# Patient Record
Sex: FEMALE | Race: WHITE | URBAN - METROPOLITAN AREA
[De-identification: names, ages, dates, MRNs, and addresses within clinical notes are randomized per-mention and may not be internally consistent; named-entity substitution may affect disease eponyms.]

---

## 2017-06-14 ENCOUNTER — OUTPATIENT (OUTPATIENT)
Dept: OUTPATIENT SERVICES | Facility: HOSPITAL | Age: 67
LOS: 1 days | Discharge: HOME | End: 2017-06-14

## 2017-06-21 ENCOUNTER — OUTPATIENT (OUTPATIENT)
Dept: OUTPATIENT SERVICES | Facility: HOSPITAL | Age: 67
LOS: 1 days | Discharge: HOME | End: 2017-06-21

## 2017-06-26 ENCOUNTER — OUTPATIENT (OUTPATIENT)
Dept: OUTPATIENT SERVICES | Facility: HOSPITAL | Age: 67
LOS: 1 days | Discharge: HOME | End: 2017-06-26

## 2017-06-28 DIAGNOSIS — F43.12 POST-TRAUMATIC STRESS DISORDER, CHRONIC: ICD-10-CM

## 2017-06-28 DIAGNOSIS — E78.00 PURE HYPERCHOLESTEROLEMIA, UNSPECIFIED: ICD-10-CM

## 2017-06-28 DIAGNOSIS — I25.10 ATHEROSCLEROTIC HEART DISEASE OF NATIVE CORONARY ARTERY WITHOUT ANGINA PECTORIS: ICD-10-CM

## 2017-06-28 DIAGNOSIS — E03.9 HYPOTHYROIDISM, UNSPECIFIED: ICD-10-CM

## 2017-06-28 DIAGNOSIS — Z79.899 OTHER LONG TERM (CURRENT) DRUG THERAPY: ICD-10-CM

## 2017-07-16 ENCOUNTER — EMERGENCY (EMERGENCY)
Facility: HOSPITAL | Age: 67
LOS: 0 days | Discharge: HOME | End: 2017-07-16
Admitting: INTERNAL MEDICINE

## 2017-07-16 DIAGNOSIS — Z79.899 OTHER LONG TERM (CURRENT) DRUG THERAPY: ICD-10-CM
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
## 2017-07-16 DIAGNOSIS — M79.89 OTHER SPECIFIED SOFT TISSUE DISORDERS: ICD-10-CM

## 2017-07-16 DIAGNOSIS — S80.01XA CONTUSION OF RIGHT KNEE, INITIAL ENCOUNTER: ICD-10-CM
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
## 2017-07-16 DIAGNOSIS — E78.5 HYPERLIPIDEMIA, UNSPECIFIED: ICD-10-CM

## 2017-07-16 DIAGNOSIS — I10 ESSENTIAL (PRIMARY) HYPERTENSION: ICD-10-CM

## 2017-07-26 ENCOUNTER — OUTPATIENT (OUTPATIENT)
Dept: OUTPATIENT SERVICES | Facility: HOSPITAL | Age: 67
LOS: 1 days | Discharge: HOME | End: 2017-07-26

## 2017-07-26 DIAGNOSIS — F43.12 POST-TRAUMATIC STRESS DISORDER, CHRONIC: ICD-10-CM

## 2017-08-04 DIAGNOSIS — F43.12 POST-TRAUMATIC STRESS DISORDER, CHRONIC: ICD-10-CM

## 2017-08-23 ENCOUNTER — OUTPATIENT (OUTPATIENT)
Dept: OUTPATIENT SERVICES | Facility: HOSPITAL | Age: 67
LOS: 1 days | Discharge: HOME | End: 2017-08-23

## 2017-08-23 DIAGNOSIS — F43.12 POST-TRAUMATIC STRESS DISORDER, CHRONIC: ICD-10-CM

## 2017-09-08 ENCOUNTER — OUTPATIENT (OUTPATIENT)
Dept: OUTPATIENT SERVICES | Facility: HOSPITAL | Age: 67
LOS: 1 days | Discharge: HOME | End: 2017-09-08

## 2017-09-08 DIAGNOSIS — F43.12 POST-TRAUMATIC STRESS DISORDER, CHRONIC: ICD-10-CM

## 2017-11-01 ENCOUNTER — OUTPATIENT (OUTPATIENT)
Dept: OUTPATIENT SERVICES | Facility: HOSPITAL | Age: 67
LOS: 1 days | Discharge: HOME | End: 2017-11-01

## 2017-11-01 DIAGNOSIS — F43.12 POST-TRAUMATIC STRESS DISORDER, CHRONIC: ICD-10-CM

## 2017-11-10 ENCOUNTER — OUTPATIENT (OUTPATIENT)
Dept: OUTPATIENT SERVICES | Facility: HOSPITAL | Age: 67
LOS: 1 days | Discharge: HOME | End: 2017-11-10

## 2017-11-10 DIAGNOSIS — F43.12 POST-TRAUMATIC STRESS DISORDER, CHRONIC: ICD-10-CM

## 2017-11-22 ENCOUNTER — OUTPATIENT (OUTPATIENT)
Dept: OUTPATIENT SERVICES | Facility: HOSPITAL | Age: 67
LOS: 1 days | Discharge: HOME | End: 2017-11-22

## 2017-11-22 DIAGNOSIS — I20.9 ANGINA PECTORIS, UNSPECIFIED: ICD-10-CM

## 2017-12-04 ENCOUNTER — OUTPATIENT (OUTPATIENT)
Dept: OUTPATIENT SERVICES | Facility: HOSPITAL | Age: 67
LOS: 1 days | Discharge: HOME | End: 2017-12-04

## 2017-12-04 DIAGNOSIS — F43.12 POST-TRAUMATIC STRESS DISORDER, CHRONIC: ICD-10-CM

## 2018-01-31 ENCOUNTER — OUTPATIENT (OUTPATIENT)
Dept: OUTPATIENT SERVICES | Facility: HOSPITAL | Age: 68
LOS: 1 days | Discharge: HOME | End: 2018-01-31

## 2018-01-31 DIAGNOSIS — Z01.818 ENCOUNTER FOR OTHER PREPROCEDURAL EXAMINATION: ICD-10-CM

## 2018-01-31 DIAGNOSIS — S82.51XA DISPLACED FRACTURE OF MEDIAL MALLEOLUS OF RIGHT TIBIA, INITIAL ENCOUNTER FOR CLOSED FRACTURE: ICD-10-CM

## 2018-02-01 ENCOUNTER — OUTPATIENT (OUTPATIENT)
Dept: OUTPATIENT SERVICES | Facility: HOSPITAL | Age: 68
LOS: 1 days | Discharge: HOME | End: 2018-02-01

## 2018-02-01 DIAGNOSIS — F33.1 MAJOR DEPRESSIVE DISORDER, RECURRENT, MODERATE: ICD-10-CM

## 2018-02-01 DIAGNOSIS — F43.12 POST-TRAUMATIC STRESS DISORDER, CHRONIC: ICD-10-CM

## 2018-02-07 ENCOUNTER — INPATIENT (INPATIENT)
Facility: HOSPITAL | Age: 68
LOS: 12 days | Discharge: NURSING, REHAB OR LONG-TERM CARE | End: 2018-02-20
Attending: ORTHOPAEDIC SURGERY

## 2018-02-07 ENCOUNTER — TRANSCRIPTION ENCOUNTER (OUTPATIENT)
Age: 68
End: 2018-02-07

## 2018-02-07 VITALS
WEIGHT: 158.07 LBS | DIASTOLIC BLOOD PRESSURE: 104 MMHG | RESPIRATION RATE: 18 BRPM | HEART RATE: 72 BPM | TEMPERATURE: 98 F | SYSTOLIC BLOOD PRESSURE: 176 MMHG | HEIGHT: 62 IN

## 2018-02-07 RX ORDER — MEPERIDINE HYDROCHLORIDE 50 MG/ML
12.5 INJECTION INTRAMUSCULAR; INTRAVENOUS; SUBCUTANEOUS ONCE
Qty: 0 | Refills: 0 | Status: ON HOLD | OUTPATIENT
Start: 2018-02-07

## 2018-02-07 RX ORDER — ENOXAPARIN SODIUM 100 MG/ML
40 INJECTION SUBCUTANEOUS EVERY 24 HOURS
Qty: 0 | Refills: 0 | Status: COMPLETED | OUTPATIENT
Start: 2018-02-07 | End: 2018-02-14

## 2018-02-07 RX ORDER — HYDROMORPHONE HYDROCHLORIDE 2 MG/ML
0.5 INJECTION INTRAMUSCULAR; INTRAVENOUS; SUBCUTANEOUS
Qty: 0 | Refills: 0 | Status: ON HOLD | OUTPATIENT
Start: 2018-02-07

## 2018-02-07 RX ORDER — CARVEDILOL PHOSPHATE 80 MG/1
12.5 CAPSULE, EXTENDED RELEASE ORAL DAILY
Qty: 0 | Refills: 0 | Status: DISCONTINUED | OUTPATIENT
Start: 2018-02-07 | End: 2018-02-20

## 2018-02-07 RX ORDER — LOSARTAN POTASSIUM 100 MG/1
100 TABLET, FILM COATED ORAL DAILY
Qty: 0 | Refills: 0 | Status: DISCONTINUED | OUTPATIENT
Start: 2018-02-07 | End: 2018-02-20

## 2018-02-07 RX ORDER — OXYCODONE HYDROCHLORIDE 5 MG/1
5 TABLET ORAL ONCE
Qty: 0 | Refills: 0 | Status: ON HOLD | OUTPATIENT
Start: 2018-02-07

## 2018-02-07 RX ORDER — HYDROCHLOROTHIAZIDE 25 MG
25 TABLET ORAL DAILY
Qty: 0 | Refills: 0 | Status: DISCONTINUED | OUTPATIENT
Start: 2018-02-07 | End: 2018-02-20

## 2018-02-07 RX ORDER — OXYCODONE AND ACETAMINOPHEN 5; 325 MG/1; MG/1
1 TABLET ORAL EVERY 6 HOURS
Qty: 0 | Refills: 0 | Status: DISCONTINUED | OUTPATIENT
Start: 2018-02-07 | End: 2018-02-09

## 2018-02-07 RX ORDER — CEFAZOLIN SODIUM 1 G
1000 VIAL (EA) INJECTION EVERY 8 HOURS
Qty: 0 | Refills: 0 | Status: COMPLETED | OUTPATIENT
Start: 2018-02-07 | End: 2018-02-08

## 2018-02-07 RX ORDER — SERTRALINE 25 MG/1
100 TABLET, FILM COATED ORAL DAILY
Qty: 0 | Refills: 0 | Status: DISCONTINUED | OUTPATIENT
Start: 2018-02-07 | End: 2018-02-20

## 2018-02-07 RX ORDER — MORPHINE SULFATE 50 MG/1
2 CAPSULE, EXTENDED RELEASE ORAL
Qty: 0 | Refills: 0 | Status: ON HOLD | OUTPATIENT
Start: 2018-02-07

## 2018-02-07 RX ORDER — ACETAMINOPHEN 500 MG
650 TABLET ORAL EVERY 6 HOURS
Qty: 0 | Refills: 0 | Status: COMPLETED | OUTPATIENT
Start: 2018-02-07 | End: 2018-02-14

## 2018-02-07 RX ORDER — ZOLPIDEM TARTRATE 10 MG/1
5 TABLET ORAL AT BEDTIME
Qty: 0 | Refills: 0 | Status: DISCONTINUED | OUTPATIENT
Start: 2018-02-07 | End: 2018-02-13

## 2018-02-07 RX ORDER — DIPHENHYDRAMINE HCL 50 MG
25 CAPSULE ORAL ONCE
Qty: 0 | Refills: 0 | Status: COMPLETED | OUTPATIENT
Start: 2018-02-07 | End: 2018-02-16

## 2018-02-07 RX ORDER — ONDANSETRON 8 MG/1
4 TABLET, FILM COATED ORAL ONCE
Qty: 0 | Refills: 0 | Status: ON HOLD | OUTPATIENT
Start: 2018-02-07

## 2018-02-07 RX ORDER — DEXAMETHASONE 0.5 MG/5ML
4 ELIXIR ORAL ONCE
Qty: 0 | Refills: 0 | Status: ON HOLD | OUTPATIENT
Start: 2018-02-07

## 2018-02-07 RX ADMIN — Medication 100 MILLIGRAM(S): at 22:53

## 2018-02-07 RX ADMIN — ZOLPIDEM TARTRATE 5 MILLIGRAM(S): 10 TABLET ORAL at 23:25

## 2018-02-07 NOTE — PRE-ANESTHESIA EVALUATION ADULT - NSANTHOSAYNRD_GEN_A_CORE
No. SULEMA screening performed.  STOP BANG Legend: 0-2 = LOW Risk; 3-4 = INTERMEDIATE Risk; 5-8 = HIGH Risk/none

## 2018-02-07 NOTE — BRIEF OPERATIVE NOTE - PROCEDURE
<<-----Click on this checkbox to enter Procedure Osteotomy of distal tibia  02/07/2018  repair of right tibia non-union/malunion CPT 70693  Active  [email or web address] Osteotomy of right medial malleolus  02/07/2018  repair of right tibia non-union/malunion CPT 92148  Active  [email or web address]

## 2018-02-07 NOTE — BRIEF OPERATIVE NOTE - POST-OP DX
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

## 2018-02-07 NOTE — DISCHARGE NOTE ADULT - MEDICATION SUMMARY - MEDICATIONS TO TAKE
I will START or STAY ON the medications listed below when I get home from the hospital:    losartan 100 mg oral tablet  -- 1 tab(s) by mouth once a day  -- Indication: For home med    enoxaparin  -- 40 unit(s) subcutaneous once a day  -- Indication: For dvt prophylaxis    pregabalin 75 mg oral capsule  -- 1 cap(s) by mouth once a day  -- Indication: For home med    sertraline 100 mg oral tablet  -- 1 tab(s) by mouth once a day  -- Indication: For home med    carvedilol 12.5 mg oral tablet  -- 1 tab(s) by mouth once a day  -- Indication: For home med    hydroCHLOROthiazide 25 mg oral tablet  -- 1 tab(s) by mouth once a day  -- Indication: For home med

## 2018-02-07 NOTE — BRIEF OPERATIVE NOTE - OPERATION/FINDINGS
mal-aligned, impending malunion of right medial malleolus which was corrected to normal; total TT 60 min at 300 mmHG    post op- NWB x 6-12 weeks.  periop PO abx post op.  ASA for DVT. mal-aligned, impending malunion of right medial malleolus which was corrected to normal; total TT 60 min at 300 mmHG  Dictation #  post op- NWB x 6-12 weeks.  periop PO abx post op.  ASA for DVT. mal-aligned, impending malunion of right medial malleolus which was corrected to normal; total TT 60 min at 300 mmHG  Dictation # [id number]; Implants: Synthes 5 hole 1/3 tubular with 4 x4.0 and 1 x3.5 mm screws  post op- NWB x 6-12 weeks.  periop PO abx post op.  ASA for DVT.

## 2018-02-07 NOTE — DISCHARGE NOTE ADULT - CARE PLAN
Principal Discharge DX:	Ankle fracture, right, closed, with nonunion, subsequent encounter  Goal:	fracture healing, return of ADLs  Assessment and plan of treatment:	s/p ORIF

## 2018-02-07 NOTE — BRIEF OPERATIVE NOTE - PRE-OP DX
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

## 2018-02-07 NOTE — PRE-ANESTHESIA EVALUATION ADULT - NSANTHPMHFT_GEN_ALL_CORE
hypertension  osteoarthritis   anxiety  depression, non suicidal  hypercholesterolemia  CAD  hep A    psh:   csection  cardiac stent  cholecystectomy biliary fistula repair  left leg above knee amputation

## 2018-02-07 NOTE — PRE-ANESTHESIA EVALUATION ADULT - NSANTHLABRESULTSFT_GEN_ALL_CORE
Sodium, Serum 141    mEq/L  135 - 146 Final         Potassium, Serum 3.7    mmol/L  3.5 - 5.0 Final TEST PERFORMED AT [address], [facility] [address]       Chloride, Serum 107    mEq/L  98 - 110 Final         Carbon Dioxide, Serum 25    mEq/L  17 - 32 Final TESTS PERFORMED AT [address], [facility] [address]       Anion Gap, Serum 9.0    mEq/L  7 - 14 Final         Blood Urea Nitrogen, Serum 23  High mg/dL  10 - 20 Final         Creatinine, Serum 0.75    mg/dL  0.7 - 1.5 Final TEST PERFORMED AT [address], [facility] [address]       Glucose, Serum 89    mg/dL  70 - 110 Final TEST PERFORMED AT [address], [facility] [address]       Calcium, Total Serum 10.0    mg/dL  8.5 - 10.1 Final TESTS PERFORMED AT [address], [facility] [address]       GFR Calculation 77         Final         BUN/Creatinine 30.7  High %  10 - 20 Final     --------------------------------------------------------------------------                                 Test Set Comments: Sodium, Serum 141    mEq/L  135 - 146 Final         Potassium, Serum 3.7    mmol/L  3.5 - 5.0 Final TEST PERFORMED AT [address], [place] [address]       Chloride, Serum 107    mEq/L  98 - 110 Final         Carbon Dioxide, Serum 25    mEq/L  17 - 32 Final TESTS PERFORMED AT [address], [place] [address]       Anion Gap, Serum 9.0    mEq/L  7 - 14 Final         Blood Urea Nitrogen, Serum 23  High mg/dL  10 - 20 Final         Creatinine, Serum 0.75    mg/dL  0.7 - 1.5 Final TEST PERFORMED AT [address] [address]       Glucose, Serum 89    mg/dL  70 - 110 Final TEST PERFORMED AT [address] [place] [address]       Calcium, Total Serum 10.0    mg/dL  8.5 - 10.1 Final TESTS PERFORMED AT [address], [place] [address]       GFR Calculation 77         Final         BUN/Creatinine 30.7  High %  10 - 20 Final     --------------------------------------------------------------------------           Test Item    Results    Flag    Units    Reference Range    Test Item Status    Comments    Sensitivities       Protein Total, Serum 7.3    g/dL  6.0 - 8.0 Final TEST PERFORMED AT [address], [place] [address]       Albumin, Serum 4.2    g/dL  3.0 - 5.5 Final TEST PERFORMED AT [address] [address]       Bilirubin Total, Serum 0.8    mg/dL  0.2 - 1.2 Final         Alkaline Phosphatase, Serum 115    IU/L  30 - 115 Final         Aspartate Aminotransferase (AST/SGOT) 23    IU/L  0 - 41 Final         Alanine Aminotransferase (ALT/SGPT) 19    IU/L  0 - 45 Final TESTS PERFORMED AT [address] [address]       Bilirubin Direct, Serum 0.19    mg/dL  0.0 - 0.2 Final TEST PERFORMED AT [address] [address]     --------------------------------------------------------------                                                          Test Set Comments: Sodium, Serum 141    mEq/L  135 - 146 Final         Potassium, Serum 3.7    mmol/L  3.5 - 5.0 Final TEST PERFORMED AT [address] [place] [address]       Chloride, Serum 107    mEq/L  98 - 110 Final         Carbon Dioxide, Serum 25    mEq/L  17 - 32 Final TESTS PERFORMED AT [address] [place] [address]       Anion Gap, Serum 9.0    mEq/L  7 - 14 Final         Blood Urea Nitrogen, Serum 23  High mg/dL  10 - 20 Final         Creatinine, Serum 0.75    mg/dL  0.7 - 1.5 Final TEST PERFORMED AT [address] [address]       Glucose, Serum 89    mg/dL  70 - 110 Final TEST PERFORMED AT [address], [place] [address]       Calcium, Total Serum 10.0    mg/dL  8.5 - 10.1 Final TESTS PERFORMED AT [address] [place] [address]       GFR Calculation 77         Final         BUN/Creatinine 30.7  High %  10 - 20 Final     --------------------------------------------------------------------------     Protein Total, Serum 7.3    g/dL  6.0 - 8.0 Final TEST PERFORMED AT [address] [place] [address]       Albumin, Serum 4.2    g/dL  3.0 - 5.5 Final TEST PERFORMED AT [address] [place] [address]       Bilirubin Total, Serum 0.8    mg/dL  0.2 - 1.2 Final         Alkaline Phosphatase, Serum 115    IU/L  30 - 115 Final         Aspartate Aminotransferase (AST/SGOT) 23    IU/L  0 - 41 Final         Alanine Aminotransferase (ALT/SGPT) 19    IU/L  0 - 45 Final TESTS PERFORMED AT [address] [address]       Bilirubin Direct, Serum 0.19    mg/dL  0.0 - 0.2 Final TEST PERFORMED AT [place] [address] [address]     --------------------------------------------------------------                                                              Test Set Comments: Sodium, Serum 141    mEq/L  135 - 146 Final         Potassium, Serum 3.7    mmol/L  3.5 - 5.0 Final TEST PERFORMED AT [address] [place] [address]       Chloride, Serum 107    mEq/L  98 - 110 Final         Carbon Dioxide, Serum 25    mEq/L  17 - 32 Final TESTS PERFORMED AT [address] [place] [address]       Anion Gap, Serum 9.0    mEq/L  7 - 14 Final         Blood Urea Nitrogen, Serum 23  High mg/dL  10 - 20 Final         Creatinine, Serum 0.75    mg/dL  0.7 - 1.5 Final TEST PERFORMED AT [address] [place] [address]       Glucose, Serum 89    mg/dL  70 - 110 Final TEST PERFORMED AT [address] [address]       Calcium, Total Serum 10.0    mg/dL  8.5 - 10.1 Final TESTS PERFORMED AT [address] [place] [address]       GFR Calculation 77         Final         BUN/Creatinine 30.7  High %  10 - 20 Final     --------------------------------------------------------------------------     Protein Total, Serum 7.3    g/dL  6.0 - 8.0 Final TEST PERFORMED AT [address] [place] [address]       Albumin, Serum 4.2    g/dL  3.0 - 5.5 Final TEST PERFORMED AT [address] [place] [address]       Bilirubin Total, Serum 0.8    mg/dL  0.2 - 1.2 Final         Alkaline Phosphatase, Serum 115    IU/L  30 - 115 Final         Aspartate Aminotransferase (AST/SGOT) 23    IU/L  0 - 41 Final         Alanine Aminotransferase (ALT/SGPT) 19    IU/L  0 - 45 Final TESTS PERFORMED AT [address] [place] [address]       Bilirubin Direct, Serum 0.19    mg/dL  0.0 - 0.2 Final TEST PERFORMED AT [place] [address] [address] [address]     --------------------------------------------------------------     Prothrombin Time, Plasma 11.7    SEC  9.95 - 12.87 Final         INR 1.1       0.65 - 1.3 Final NO ANTICOAGULANT,NORMAL 0.65 - 1.3 ORAL ANTICOAGULANT,STD DOSE 2.0 - 3.0 MECHANICAL HEART VALVES 2.5 - 3.5 NOTE: INR RESULTS ARE INTENDED FOR USE ONLY TO MONITOR ORAL ANTICOAGULANT THERAPY IN STABILIZED PATIENTS. TESTS PERFORMED AT [address] [address]        ---------------------------------------------                                                                                              Test Set Comments: Sodium, Serum 141    mEq/L  135 - 146 Final         Potassium, Serum 3.7    mmol/L  3.5 - 5.0 Final TEST PERFORMED AT [address] [place] [address]       Chloride, Serum 107    mEq/L  98 - 110 Final         Carbon Dioxide, Serum 25    mEq/L  17 - 32 Final TESTS PERFORMED AT [address] [place] [address]       Anion Gap, Serum 9.0    mEq/L  7 - 14 Final         Blood Urea Nitrogen, Serum 23  High mg/dL  10 - 20 Final         Creatinine, Serum 0.75    mg/dL  0.7 - 1.5 Final TEST PERFORMED AT [address] [place] [address]       Glucose, Serum 89    mg/dL  70 - 110 Final TEST PERFORMED AT [address] [place] [address]       Calcium, Total Serum 10.0    mg/dL  8.5 - 10.1 Final TESTS PERFORMED AT [address] [place] [address]       GFR Calculation 77         Final         BUN/Creatinine 30.7  High %  10 - 20 Final     --------------------------------------------------------------------------     Protein Total, Serum 7.3    g/dL  6.0 - 8.0 Final TEST PERFORMED AT [address] [place] [address]       Albumin, Serum 4.2    g/dL  3.0 - 5.5 Final TEST PERFORMED AT [address] [place] [address]       Bilirubin Total, Serum 0.8    mg/dL  0.2 - 1.2 Final         Alkaline Phosphatase, Serum 115    IU/L  30 - 115 Final         Aspartate Aminotransferase (AST/SGOT) 23    IU/L  0 - 41 Final         Alanine Aminotransferase (ALT/SGPT) 19    IU/L  0 - 45 Final TESTS PERFORMED AT [address] [place] [address]       Bilirubin Direct, Serum 0.19    mg/dL  0.0 - 0.2 Final TEST PERFORMED AT [place] [address] [address] [address]     --------------------------------------------------------------     Prothrombin Time, Plasma 11.7    SEC  9.95 - 12.87 Final         INR 1.1       0.65 - 1.3 Final NO ANTICOAGULANT,NORMAL 0.65 - 1.3 ORAL ANTICOAGULANT,STD DOSE 2.0 - 3.0 MECHANICAL HEART VALVES 2.5 - 3.5 NOTE: INR RESULTS ARE INTENDED FOR USE ONLY TO MONITOR ORAL ANTICOAGULANT THERAPY IN STABILIZED PATIENTS. TESTS PERFORMED AT [address], [facility] [address]        ---------------------------------------------  Activated Partial Thromboplastin Time 32.5    SEC  27.0 - 39.2 Final TEST PERFORMED AT [address], [facility] [address]       ----------------------------------          Test Item    Results    Flag    Units    Reference Range    Test Item Status    Comments    Sensitivities       WBC Count 7.21    TH/MM3  4.8 - 10.8 Final         RBC Count 4.16  Low MIL/MM3  4.2 - 5.4 Final         Hemoglobin 12.9    g/dL  12.0 - 16.0 Final         Hematocrit 38.0    %  37 - 47 Final         MCV 91.3    fL  81 - 99 Final         MCH 31.0    PG  27 - 31 Final         MCHC 33.9    G/DL  32 - 37 Final         RCDW 13.4    %  11.5 - 14.5 Final         Platelet Count - Automated 200    TH/MM3  130 - 400 Final         MPV 11.4  High fL  7.4 - 10.4 Final         Auto Lymphocyte # 2.37    TH/MM3  1.2 - 3.4 Final         Auto Monocyte # 0.49    TH/MM3  0.11 - 0.59 Final         Auto Eosinophil # 0.11    TH/MM3  0 - 0.7 Final         Auto Basophil # 0.04    TH/MM3  0 - 0.2 Final         Auto Lymphocyte % 32.9    %  20.5 - 51.1 Final         Auto Monocyte % 6.8    %  1.7 - 9.3 Final         Auto Basophil % 0.6    %  0 - 2 Final         Auto Immature Granulocyte # 0.01    TH/MM3  0.01 - 0.03 Final         Auto Immature Granulocyte % 0.1    %  0.1 - 0.3 Final         Eosinophils % 1.5    %  0 - 8 Final         Auto Granulocytes % 58.1    %  42.2 - 75.2 Final         Auto Granulocytes # 4.19    TH/MM3  1.4 - 6.5 Final         Differential AUTO         Final TESTS PERFORMED AT [address] [address]                                                                                                                                                 Test Set Comments:

## 2018-02-07 NOTE — DISCHARGE NOTE ADULT - CARE PROVIDER_API CALL
Bandar Oropeza), Orthopaedic Surgery  [address] [place] [address]  Phone: [phone]  Fax: [phone]

## 2018-02-07 NOTE — DISCHARGE NOTE ADULT - PATIENT PORTAL LINK FT
You can access the [facility] Portal, offered by [facility], by registering with the following website: [email or web address]

## 2018-02-08 RX ADMIN — OXYCODONE AND ACETAMINOPHEN 1 TABLET(S): 5; 325 TABLET ORAL at 20:23

## 2018-02-08 RX ADMIN — OXYCODONE AND ACETAMINOPHEN 1 TABLET(S): 5; 325 TABLET ORAL at 21:20

## 2018-02-08 RX ADMIN — Medication 75 MILLIGRAM(S): at 15:37

## 2018-02-08 RX ADMIN — OXYCODONE AND ACETAMINOPHEN 1 TABLET(S): 5; 325 TABLET ORAL at 17:06

## 2018-02-08 RX ADMIN — Medication 100 MILLIGRAM(S): at 05:39

## 2018-02-08 RX ADMIN — ENOXAPARIN SODIUM 40 MILLIGRAM(S): 100 INJECTION SUBCUTANEOUS at 12:55

## 2018-02-08 RX ADMIN — SERTRALINE 100 MILLIGRAM(S): 25 TABLET, FILM COATED ORAL at 12:56

## 2018-02-08 RX ADMIN — OXYCODONE AND ACETAMINOPHEN 1 TABLET(S): 5; 325 TABLET ORAL at 09:30

## 2018-02-08 NOTE — PROGRESS NOTE ADULT - SUBJECTIVE AND OBJECTIVE BOX
68 y o F s/p right medial malleolus fx ORIF pod 1  pt seen and examined, c/o pain, burning sensation  NAD, A&Ox3, Vital Signs Last 24 Hrs  T(C): 37.2 (08 Feb 2018 15:45), Max: 37.2 (08 Feb 2018 15:45)  T(F): 99 (08 Feb 2018 15:45), Max: 99 (08 Feb 2018 15:45)  HR: 85 (08 Feb 2018 15:45) (66 - 85)  BP: 150/70 (08 Feb 2018 15:45) (93/53 - 150/70)  BP(mean): --  RR: 18 (08 Feb 2018 15:45) (15 - 23)  SpO2: 95% (07 Feb 2018 17:30) (95% - 96%)    PE: RLE splint in place, dressing d/c/i, toes are well perfused, motor function, STLT grossly intact

## 2018-02-08 NOTE — PROGRESS NOTE ADULT - ASSESSMENT
68 y o F s/p right medial malleolus fx ORIF pod 1  pain control  PT evaluation  DVT prophylaxis  abx prophylaxis  will follow

## 2018-02-09 RX ADMIN — SERTRALINE 100 MILLIGRAM(S): 25 TABLET, FILM COATED ORAL at 12:28

## 2018-02-09 RX ADMIN — Medication 25 MILLIGRAM(S): at 05:00

## 2018-02-09 RX ADMIN — ZOLPIDEM TARTRATE 5 MILLIGRAM(S): 10 TABLET ORAL at 00:57

## 2018-02-09 RX ADMIN — OXYCODONE AND ACETAMINOPHEN 1 TABLET(S): 5; 325 TABLET ORAL at 00:49

## 2018-02-09 RX ADMIN — Medication 75 MILLIGRAM(S): at 12:28

## 2018-02-09 RX ADMIN — LOSARTAN POTASSIUM 100 MILLIGRAM(S): 100 TABLET, FILM COATED ORAL at 05:00

## 2018-02-09 RX ADMIN — CARVEDILOL PHOSPHATE 12.5 MILLIGRAM(S): 80 CAPSULE, EXTENDED RELEASE ORAL at 05:00

## 2018-02-09 RX ADMIN — ENOXAPARIN SODIUM 40 MILLIGRAM(S): 100 INJECTION SUBCUTANEOUS at 12:29

## 2018-02-09 RX ADMIN — OXYCODONE AND ACETAMINOPHEN 1 TABLET(S): 5; 325 TABLET ORAL at 01:15

## 2018-02-09 RX ADMIN — ZOLPIDEM TARTRATE 5 MILLIGRAM(S): 10 TABLET ORAL at 23:27

## 2018-02-09 NOTE — PROGRESS NOTE ADULT - SUBJECTIVE AND OBJECTIVE BOX
68 y o F s/p right medial malleolus fx ORIF pod 2      S+E at bedside on AM rounds. Pain controlled. NAEO.       AFVSS  NAD  Non labored on RA  RLE  Splint C/D/I  Calf soft NTTP  +EHL/FHL  SILT distal exposed digits  WWP    A/P  68 y o F s/p right medial malleolus fx ORIF pod 2  NWB RLE  DVT PPX  OOB to chair  PT/Rehab  Dispo pending

## 2018-02-10 RX ADMIN — SERTRALINE 100 MILLIGRAM(S): 25 TABLET, FILM COATED ORAL at 12:23

## 2018-02-10 RX ADMIN — ENOXAPARIN SODIUM 40 MILLIGRAM(S): 100 INJECTION SUBCUTANEOUS at 12:25

## 2018-02-10 RX ADMIN — Medication 25 MILLIGRAM(S): at 06:50

## 2018-02-10 RX ADMIN — LOSARTAN POTASSIUM 100 MILLIGRAM(S): 100 TABLET, FILM COATED ORAL at 06:50

## 2018-02-10 RX ADMIN — CARVEDILOL PHOSPHATE 12.5 MILLIGRAM(S): 80 CAPSULE, EXTENDED RELEASE ORAL at 06:51

## 2018-02-10 RX ADMIN — Medication 75 MILLIGRAM(S): at 12:24

## 2018-02-10 NOTE — PROGRESS NOTE ADULT - SUBJECTIVE AND OBJECTIVE BOX
68 y o F s/p right medial malleolus fx ORIF pod #3      S+E at bedside on AM rounds. Doing well. Pain controlled        Vital Signs Last 24 Hrs  T(F): 99.4 (09 Feb 2018 23:27), Max: 99.4 (09 Feb 2018 23:27)  HR: 77 (09 Feb 2018 23:27) (72 - 77)  BP: 140/62 (09 Feb 2018 23:27) (139/67 - 143/80)  RR: 18 (09 Feb 2018 23:27) (18 - 18)    NAD  Non labored on RA  RLE  Splint C/D/I  Calf soft NTTP, no swelling   +EHL/FHL  SILT distal exposed digits  Foot WWP    A/P  68 y o F s/p right medial malleolus fx ORIF POD#3  NWB RLE  DVT PPX  OOB to chair  PT/Rehab  Dispo Rehab

## 2018-02-11 RX ADMIN — LOSARTAN POTASSIUM 100 MILLIGRAM(S): 100 TABLET, FILM COATED ORAL at 06:52

## 2018-02-11 RX ADMIN — Medication 75 MILLIGRAM(S): at 13:30

## 2018-02-11 RX ADMIN — SERTRALINE 100 MILLIGRAM(S): 25 TABLET, FILM COATED ORAL at 13:30

## 2018-02-11 RX ADMIN — ZOLPIDEM TARTRATE 5 MILLIGRAM(S): 10 TABLET ORAL at 22:13

## 2018-02-11 RX ADMIN — ENOXAPARIN SODIUM 40 MILLIGRAM(S): 100 INJECTION SUBCUTANEOUS at 13:30

## 2018-02-11 RX ADMIN — Medication 25 MILLIGRAM(S): at 06:52

## 2018-02-11 RX ADMIN — Medication 650 MILLIGRAM(S): at 22:12

## 2018-02-11 RX ADMIN — CARVEDILOL PHOSPHATE 12.5 MILLIGRAM(S): 80 CAPSULE, EXTENDED RELEASE ORAL at 06:52

## 2018-02-11 NOTE — PROGRESS NOTE ADULT - SUBJECTIVE AND OBJECTIVE BOX
68 y o F s/p right medial malleolus fx ORIF pod #4      S+E at bedside on AM rounds. NAEO      AFVSS  NAD  Non labored on RA  RLE  Splint C/D/I  Calf soft NTTP, no swelling   +EHL/FHL  SILT distal exposed digits  Foot WWP    A/P  68 y o F s/p right medial malleolus fx ORIF POD#4  NWB RLE  DVT PPX  OOB to chair  PT/Rehab  Dispo Rehab

## 2018-02-12 RX ADMIN — ENOXAPARIN SODIUM 40 MILLIGRAM(S): 100 INJECTION SUBCUTANEOUS at 11:34

## 2018-02-12 RX ADMIN — SERTRALINE 100 MILLIGRAM(S): 25 TABLET, FILM COATED ORAL at 11:30

## 2018-02-12 RX ADMIN — Medication 75 MILLIGRAM(S): at 11:29

## 2018-02-12 RX ADMIN — ZOLPIDEM TARTRATE 5 MILLIGRAM(S): 10 TABLET ORAL at 23:09
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
## 2018-02-12 NOTE — PROGRESS NOTE ADULT - SUBJECTIVE AND OBJECTIVE BOX
68 y o F s/p right medial malleolus fx ORIF pod #5      S+E at bedside on AM rounds. NAEO      AFVSS  NAD  Non labored on RA  RLE  Splint C/D/I  Calf soft NTTP, no swelling   +EHL/FHL  SILT distal exposed digits  Foot WWP    A/P  68 y o F s/p right medial malleolus fx ORIF POD#5  NWB RLE  DVT PPX  OOB to chair  PT/Rehab: continue ambulation training  Dispo Rehab

## 2018-02-12 NOTE — PROGRESS NOTE ADULT - SUBJECTIVE AND OBJECTIVE BOX
68yFemale  No Acute Events    T(C): 36.5 (02-12-18 @ 07:30), Max: 36.6 (02-11-18 @ 23:30)  HR: 62 (02-12-18 @ 07:30) (62 - 64)  BP: 110/65 (02-12-18 @ 07:30) (100/64 - 110/65)  RR: 18 (02-12-18 @ 07:30) (18 - 18)  SpO2: --    PE  NAD  Compartments soft, NT  NVI  Splint clean/dry/intact    Plan  - DVT PPx  - IS  - SCD  - PT  - Pain Control

## 2018-02-12 NOTE — CONSULT NOTE ADULT - ASSESSMENT
IMPRESSION: Rehab of right ankle fracture, S/P ORIF; left remote AKA    PRECAUTIONS: [x  ] Cardiac  [  ] Respiratory  [  ] Seizures [  ] Contact Isolation  [  ] Droplet Isolation  [  ] Other    Weight Bearing Status: NWB RLE    RECOMMENDATION: It would be prudent to start with sliding board transfers and wheelchair mobility. Ambulation would be possible at this time.    Out of Bed to Chair     DVT/Decubiti Prophylaxis    REHAB PLAN:     [ x  ] Bedside P/T 3-5 times a week   [   ]   Bedside O/T  2-3 times a week             [   ] No Rehab Therapy Indicated                   [   ]  Speech Therapy   Conditioning/ROM                                    ADL  Bed Mobility                                               Conditioning/ROM  Transfers                                                     Bed Mobility  Sitting /Standing Balance                         Transfers                                        Gait Training                                               Sitting/Standing Balance  Stair Training [   ]Applicable                    Home equipment Eval                                                                        Splinting  [   ] Only      GOALS:   ADL   [   ]   Independent                    Transfers  [   ] Independent                          Ambulation  [   ] Independent     [    ] With device                            [x   ]  CG                                                         [ x  ]  CG                                                                  [   ] CG                            [    ] Min A                                                   [   ] Min A                                                              [   ] Min  A          DISCHARGE PLAN:   [   ]  Good candidate for Intensive Rehabilitation/Hospital based-[facility]A [facility]                                             Will tolerate 3hrs Intensive Rehab Daily                                       [ x   ]  Short Term Rehab in Skilled Nursing Facility is required                                       [    ]  Home with Outpatient or  services                                         [    ]  Possible Candidate for Intensive Hospital based Rehab

## 2018-02-13 RX ADMIN — ENOXAPARIN SODIUM 40 MILLIGRAM(S): 100 INJECTION SUBCUTANEOUS at 12:36

## 2018-02-13 RX ADMIN — SERTRALINE 100 MILLIGRAM(S): 25 TABLET, FILM COATED ORAL at 13:42

## 2018-02-13 RX ADMIN — Medication 75 MILLIGRAM(S): at 12:36

## 2018-02-13 RX ADMIN — ZOLPIDEM TARTRATE 5 MILLIGRAM(S): 10 TABLET ORAL at 23:57

## 2018-02-13 RX ADMIN — Medication 650 MILLIGRAM(S): at 12:41

## 2018-02-13 NOTE — PROGRESS NOTE ADULT - SUBJECTIVE AND OBJECTIVE BOX
ORTHO PROGRESS NOTE       68yFemale POD # 6     S/P ORIF right ankle.  Pt with Left AKA    Patient seen and examined at bedside . The patient is awake and alert in NAD. No complaints of chest pain, SOB, N/V.    Vital Signs Last 24 Hrs  T(C): 36.8 (12 Feb 2018 23:45), Max: 36.8 (12 Feb 2018 15:31)  T(F): 98.3 (12 Feb 2018 23:45), Max: 98.3 (12 Feb 2018 15:31)  HR: 73 (12 Feb 2018 23:45) (66 - 73)  BP: 115/69 (12 Feb 2018 23:45) (115/69 - 117/67)  BP(mean): --  RR: 18 (12 Feb 2018 23:45) (18 - 18)  SpO2: --        DVT ppx : Lovenox     MEDICATIONS  (STANDING):  carvedilol 12.5 milliGRAM(s) Oral daily  enoxaparin Injectable 40 milliGRAM(s) SubCutaneous every 24 hours  hydrochlorothiazide 25 milliGRAM(s) Oral daily  losartan 100 milliGRAM(s) Oral daily  pregabalin 75 milliGRAM(s) Oral daily  sertraline 100 milliGRAM(s) Oral daily    MEDICATIONS  (PRN):  acetaminophen   Tablet 650 milliGRAM(s) Oral every 6 hours PRN For Temp greater than 38.5 C (101.3 F)  acetaminophen   Tablet. 650 milliGRAM(s) Oral every 6 hours PRN Mild Pain (1 - 3)  diphenhydrAMINE   Capsule 25 milliGRAM(s) Oral Once PRN Insomnia  oxyCODONE    5 mG/acetaminophen 325 mG 1 Tablet(s) Oral every 6 hours PRN Moderate Pain  zolpidem 5 milliGRAM(s) Oral at bedtime PRN Insomnia      PE:  Right lower extremity splint  C/D/I          NVI, SILT           A/P: 68yFemale POD # 6   S/P ORIF Right ankle           OOB to Chair            Physical Therapy           Pain control            Incentive Spirometry            DVT Prophylaxis            Discharge planning for SNF awaiting placement

## 2018-02-14 RX ORDER — ENOXAPARIN SODIUM 100 MG/ML
40 INJECTION SUBCUTANEOUS DAILY
Qty: 0 | Refills: 0 | Status: DISCONTINUED | OUTPATIENT
Start: 2018-02-14 | End: 2018-02-20

## 2018-02-14 RX ADMIN — Medication 25 MILLIGRAM(S): at 05:59

## 2018-02-14 RX ADMIN — Medication 75 MILLIGRAM(S): at 11:15

## 2018-02-14 RX ADMIN — ENOXAPARIN SODIUM 40 MILLIGRAM(S): 100 INJECTION SUBCUTANEOUS at 11:10

## 2018-02-14 RX ADMIN — Medication 650 MILLIGRAM(S): at 13:54

## 2018-02-14 RX ADMIN — LOSARTAN POTASSIUM 100 MILLIGRAM(S): 100 TABLET, FILM COATED ORAL at 05:59

## 2018-02-14 RX ADMIN — CARVEDILOL PHOSPHATE 12.5 MILLIGRAM(S): 80 CAPSULE, EXTENDED RELEASE ORAL at 05:59

## 2018-02-14 RX ADMIN — SERTRALINE 100 MILLIGRAM(S): 25 TABLET, FILM COATED ORAL at 11:10

## 2018-02-14 RX ADMIN — Medication 650 MILLIGRAM(S): at 11:18

## 2018-02-14 NOTE — PHYSICAL THERAPY INITIAL EVALUATION ADULT - IMPAIRMENTS FOUND, PT EVAL
gross motor/ROM/gait, locomotion, and balance/aerobic capacity/endurance/ergonomics and body mechanics

## 2018-02-14 NOTE — PROGRESS NOTE ADULT - SUBJECTIVE AND OBJECTIVE BOX
68yFemale  No Acute Events    T(C): 36.3 (02-14-18 @ 07:30), Max: 37.2 (02-13-18 @ 23:37)  HR: 71 (02-14-18 @ 07:30) (70 - 71)  BP: 116/56 (02-14-18 @ 07:30) (116/56 - 135/77)  RR: 18 (02-14-18 @ 07:30) (17 - 18)  SpO2: --    PE  NAD  Compartments soft, NT  NVI  splint clean/dry/intact    Plan  - DVT PPx  - IS  - SCD  - PT  - Pain Control

## 2018-02-14 NOTE — PHYSICAL THERAPY INITIAL EVALUATION ADULT - TRANSFER SAFETY CONCERNS NOTED: SIT/STAND, REHAB EVAL
decreased balance during turns/decreased proprioception/decreased sequencing ability/inability to maintain weight-bearing restrictions w/o assist
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
## 2018-02-14 NOTE — PHYSICAL THERAPY INITIAL EVALUATION ADULT - PLANNED THERAPY INTERVENTIONS, PT EVAL
gait training/bed mobility training/strengthening/balance training/prosthetic fitting/training/wheelchair management/propulsion training

## 2018-02-14 NOTE — PROGRESS NOTE ADULT - SUBJECTIVE AND OBJECTIVE BOX
ORTHO PROGRESS NOTE       68yFemale POD # 7     S/P ORIF right ankle.  Pt with Left AKA    Patient seen and examined at bedside . The patient is awake and alert in NAD. No complaints of chest pain, SOB, N/V.    Vital Signs Last 24 Hrs  T(C): 37.2 (13 Feb 2018 23:37), Max: 37.2 (13 Feb 2018 23:37)  T(F): 99 (13 Feb 2018 23:37), Max: 99 (13 Feb 2018 23:37)  HR: 71 (13 Feb 2018 23:37) (70 - 71)  BP: 135/77 (13 Feb 2018 23:37) (132/72 - 135/77)  BP(mean): --  RR: 18 (13 Feb 2018 23:37) (17 - 18)  SpO2: --      DVT ppx : Lovenox     MEDICATIONS  (STANDING):  carvedilol 12.5 milliGRAM(s) Oral daily  enoxaparin Injectable 40 milliGRAM(s) SubCutaneous every 24 hours  hydrochlorothiazide 25 milliGRAM(s) Oral daily  losartan 100 milliGRAM(s) Oral daily  pregabalin 75 milliGRAM(s) Oral daily  sertraline 100 milliGRAM(s) Oral daily    MEDICATIONS  (PRN):  acetaminophen   Tablet 650 milliGRAM(s) Oral every 6 hours PRN For Temp greater than 38.5 C (101.3 F)  acetaminophen   Tablet. 650 milliGRAM(s) Oral every 6 hours PRN Mild Pain (1 - 3)  diphenhydrAMINE   Capsule 25 milliGRAM(s) Oral Once PRN Insomnia  oxyCODONE    5 mG/acetaminophen 325 mG 1 Tablet(s) Oral every 6 hours PRN Moderate Pain  zolpidem 5 milliGRAM(s) Oral at bedtime PRN Insomnia      PE:  Right lower extremity splint  C/D/I          NVI, SILT           A/P: 68yFemale POD # 7   S/P ORIF Right ankle           OOB to Chair            Physical Therapy           Pain control            Incentive Spirometry            DVT Prophylaxis            Discharge planning for SNF awaiting placement

## 2018-02-14 NOTE — PHYSICAL THERAPY INITIAL EVALUATION ADULT - PHYSICAL ASSIST/NONPHYSICAL ASSIST: SIT/STAND, REHAB EVAL
2 person assist/verbal cues/2 PTs assisted pt to stand and lock into L prosthetic/nonverbal cues (demo/gestures)/set-up required
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
## 2018-02-15 RX ADMIN — Medication 25 MILLIGRAM(S): at 06:04

## 2018-02-15 RX ADMIN — CARVEDILOL PHOSPHATE 12.5 MILLIGRAM(S): 80 CAPSULE, EXTENDED RELEASE ORAL at 06:04

## 2018-02-15 RX ADMIN — SERTRALINE 100 MILLIGRAM(S): 25 TABLET, FILM COATED ORAL at 11:10

## 2018-02-15 RX ADMIN — Medication 650 MILLIGRAM(S): at 18:27

## 2018-02-15 RX ADMIN — ENOXAPARIN SODIUM 40 MILLIGRAM(S): 100 INJECTION SUBCUTANEOUS at 11:10

## 2018-02-15 RX ADMIN — LOSARTAN POTASSIUM 100 MILLIGRAM(S): 100 TABLET, FILM COATED ORAL at 06:04

## 2018-02-15 NOTE — PROGRESS NOTE ADULT - SUBJECTIVE AND OBJECTIVE BOX
68 F POD # 8     S/P ORIF right ankle.  Pt with Left AKA    Patient seen and examined at bedside . NAD. No CP, SOB    Vital Signs Last 24 Hrs  T(C): 36 (15 Feb 2018 07:39), Max: 37.1 (14 Feb 2018 23:34)  T(F): 96.8 (15 Feb 2018 07:39), Max: 98.7 (14 Feb 2018 23:34)  HR: 68 (15 Feb 2018 07:39) (65 - 80)  BP: 97/55 (15 Feb 2018 07:39) (70/45 - 126/67)  BP(mean): --  RR: 18 (15 Feb 2018 07:39) (17 - 18)  SpO2: --      DVT ppx : Lovenox       PE:  Right lower extremity splint  C/D/I          SHABBIR HOUSTON

## 2018-02-15 NOTE — PROGRESS NOTE ADULT - ASSESSMENT
A/P: 68 F POD # 8   S/P ORIF Right ankle                    Physical Therapy, NWB           Pain control            Incentive Spirometry            DVT Prophylaxis            Discharge planning for SNF awaiting placement

## 2018-02-16 LAB
ANION GAP SERPL CALC-SCNC: 14 MMOL/L — SIGNIFICANT CHANGE UP (ref 7–14)
BUN SERPL-MCNC: 45 MG/DL — HIGH (ref 10–20)
CALCIUM SERPL-MCNC: 9.6 MG/DL — SIGNIFICANT CHANGE UP (ref 8.5–10.1)
CHLORIDE SERPL-SCNC: 101 MMOL/L — SIGNIFICANT CHANGE UP (ref 98–110)
CO2 SERPL-SCNC: 22 MMOL/L — SIGNIFICANT CHANGE UP (ref 17–32)
CREAT SERPL-MCNC: 1 MG/DL — SIGNIFICANT CHANGE UP (ref 0.7–1.5)
GLUCOSE SERPL-MCNC: 114 MG/DL — HIGH (ref 70–110)
HCT VFR BLD CALC: 34.9 % — LOW (ref 37–47)
HGB BLD-MCNC: 12.3 G/DL — LOW (ref 14–18)
MCHC RBC-ENTMCNC: 30.2 PG — SIGNIFICANT CHANGE UP (ref 27–31)
MCHC RBC-ENTMCNC: 35.2 G/DL — SIGNIFICANT CHANGE UP (ref 32–37)
MCV RBC AUTO: 85.7 FL — SIGNIFICANT CHANGE UP (ref 81–91)
NRBC # BLD: 0 /100 WBCS — SIGNIFICANT CHANGE UP (ref 0–0)
PLATELET # BLD AUTO: 284 K/UL — SIGNIFICANT CHANGE UP (ref 130–400)
POTASSIUM SERPL-MCNC: 3.5 MMOL/L — SIGNIFICANT CHANGE UP (ref 3.5–5)
POTASSIUM SERPL-SCNC: 3.5 MMOL/L — SIGNIFICANT CHANGE UP (ref 3.5–5)
RBC # BLD: 4.07 M/UL — LOW (ref 4.2–5.4)
RBC # FLD: 12.6 % — SIGNIFICANT CHANGE UP (ref 11.5–14.5)
SODIUM SERPL-SCNC: 137 MMOL/L — SIGNIFICANT CHANGE UP (ref 135–146)
WBC # BLD: 9.92 K/UL — SIGNIFICANT CHANGE UP (ref 4.8–10.8)
WBC # FLD AUTO: 9.92 K/UL — SIGNIFICANT CHANGE UP (ref 4.8–10.8)

## 2018-02-16 RX ORDER — ACETAMINOPHEN 500 MG
650 TABLET ORAL EVERY 6 HOURS
Qty: 0 | Refills: 0 | Status: DISCONTINUED | OUTPATIENT
Start: 2018-02-16 | End: 2018-02-20

## 2018-02-16 RX ADMIN — Medication 25 MILLIGRAM(S): at 00:43

## 2018-02-16 RX ADMIN — LOSARTAN POTASSIUM 100 MILLIGRAM(S): 100 TABLET, FILM COATED ORAL at 05:50

## 2018-02-16 RX ADMIN — SERTRALINE 100 MILLIGRAM(S): 25 TABLET, FILM COATED ORAL at 12:52

## 2018-02-16 RX ADMIN — CARVEDILOL PHOSPHATE 12.5 MILLIGRAM(S): 80 CAPSULE, EXTENDED RELEASE ORAL at 05:50

## 2018-02-16 RX ADMIN — Medication 25 MILLIGRAM(S): at 05:50

## 2018-02-16 RX ADMIN — ENOXAPARIN SODIUM 40 MILLIGRAM(S): 100 INJECTION SUBCUTANEOUS at 12:53

## 2018-02-16 NOTE — PROGRESS NOTE ADULT - SUBJECTIVE AND OBJECTIVE BOX
ORTHO PROGRESS NOTE       68yFemale POD # 9     S/P ORIF right ankle.  Pt with Left AKA    Patient seen and examined at bedside . The patient is awake and alert in NAD. No complaints of chest pain, SOB, N/V.  Vital Signs Last 24 Hrs  T(C): 36.5 (15 Feb 2018 23:40), Max: 36.5 (15 Feb 2018 23:40)  T(F): 97.7 (15 Feb 2018 23:40), Max: 97.7 (15 Feb 2018 23:40)  HR: 65 (15 Feb 2018 23:40) (65 - 65)  BP: 93/54 (15 Feb 2018 23:40) (89/57 - 93/54)  BP(mean): --  RR: 18 (15 Feb 2018 23:40) (16 - 18)  SpO2: 98% (15 Feb 2018 19:30) (98% - 98%)      DVT ppx : Lovenox     MEDICATIONS  (STANDING):  carvedilol 12.5 milliGRAM(s) Oral daily  enoxaparin Injectable 40 milliGRAM(s) SubCutaneous daily  hydrochlorothiazide 25 milliGRAM(s) Oral daily  losartan 100 milliGRAM(s) Oral daily  sertraline 100 milliGRAM(s) Oral daily      PE:  Right lower extremity splint  C/D/I          NVI, SILT           A/P: 68yFemale POD # 9   S/P ORIF Right ankle           OOB to Chair            Physical Therapy           Pain control            Incentive Spirometry            DVT Prophylaxis            Discharge planning for SNF awaiting placement

## 2018-02-17 RX ORDER — ZOLPIDEM TARTRATE 10 MG/1
5 TABLET ORAL AT BEDTIME
Qty: 0 | Refills: 0 | Status: DISCONTINUED | OUTPATIENT
Start: 2018-02-17 | End: 2018-02-20

## 2018-02-17 RX ADMIN — ENOXAPARIN SODIUM 40 MILLIGRAM(S): 100 INJECTION SUBCUTANEOUS at 12:20

## 2018-02-17 RX ADMIN — Medication 650 MILLIGRAM(S): at 22:26

## 2018-02-17 RX ADMIN — Medication 650 MILLIGRAM(S): at 00:47

## 2018-02-17 RX ADMIN — Medication 650 MILLIGRAM(S): at 23:00

## 2018-02-17 RX ADMIN — Medication 650 MILLIGRAM(S): at 13:36

## 2018-02-17 RX ADMIN — Medication 650 MILLIGRAM(S): at 12:25

## 2018-02-17 RX ADMIN — SERTRALINE 100 MILLIGRAM(S): 25 TABLET, FILM COATED ORAL at 12:20

## 2018-02-17 RX ADMIN — ZOLPIDEM TARTRATE 5 MILLIGRAM(S): 10 TABLET ORAL at 00:48

## 2018-02-18 RX ADMIN — SERTRALINE 100 MILLIGRAM(S): 25 TABLET, FILM COATED ORAL at 11:05

## 2018-02-18 RX ADMIN — ZOLPIDEM TARTRATE 5 MILLIGRAM(S): 10 TABLET ORAL at 21:05

## 2018-02-18 RX ADMIN — Medication 25 MILLIGRAM(S): at 06:03

## 2018-02-18 RX ADMIN — ZOLPIDEM TARTRATE 5 MILLIGRAM(S): 10 TABLET ORAL at 00:03

## 2018-02-18 RX ADMIN — CARVEDILOL PHOSPHATE 12.5 MILLIGRAM(S): 80 CAPSULE, EXTENDED RELEASE ORAL at 06:03

## 2018-02-18 RX ADMIN — Medication 650 MILLIGRAM(S): at 20:47

## 2018-02-18 RX ADMIN — ENOXAPARIN SODIUM 40 MILLIGRAM(S): 100 INJECTION SUBCUTANEOUS at 11:05

## 2018-02-18 RX ADMIN — LOSARTAN POTASSIUM 100 MILLIGRAM(S): 100 TABLET, FILM COATED ORAL at 06:03

## 2018-02-19 RX ADMIN — ENOXAPARIN SODIUM 40 MILLIGRAM(S): 100 INJECTION SUBCUTANEOUS at 11:36

## 2018-02-19 RX ADMIN — Medication 650 MILLIGRAM(S): at 11:36

## 2018-02-19 RX ADMIN — ZOLPIDEM TARTRATE 5 MILLIGRAM(S): 10 TABLET ORAL at 22:30

## 2018-02-19 RX ADMIN — Medication 650 MILLIGRAM(S): at 22:29

## 2018-02-19 RX ADMIN — CARVEDILOL PHOSPHATE 12.5 MILLIGRAM(S): 80 CAPSULE, EXTENDED RELEASE ORAL at 05:41

## 2018-02-19 RX ADMIN — Medication 650 MILLIGRAM(S): at 13:24

## 2018-02-19 RX ADMIN — Medication 25 MILLIGRAM(S): at 05:41

## 2018-02-19 RX ADMIN — LOSARTAN POTASSIUM 100 MILLIGRAM(S): 100 TABLET, FILM COATED ORAL at 05:41

## 2018-02-19 RX ADMIN — SERTRALINE 100 MILLIGRAM(S): 25 TABLET, FILM COATED ORAL at 11:35

## 2018-02-20 VITALS
RESPIRATION RATE: 18 BRPM | HEART RATE: 92 BPM | SYSTOLIC BLOOD PRESSURE: 136 MMHG | DIASTOLIC BLOOD PRESSURE: 70 MMHG | TEMPERATURE: 99 F

## 2018-02-20 RX ORDER — SERTRALINE 25 MG/1
1 TABLET, FILM COATED ORAL
Qty: 0 | Refills: 0 | DISCHARGE
Start: 2018-02-20

## 2018-02-20 RX ORDER — CARVEDILOL PHOSPHATE 80 MG/1
1 CAPSULE, EXTENDED RELEASE ORAL
Qty: 0 | Refills: 0 | COMMUNITY
Start: 2018-02-20

## 2018-02-20 RX ORDER — LOSARTAN POTASSIUM 100 MG/1
1 TABLET, FILM COATED ORAL
Qty: 0 | Refills: 0 | DISCHARGE
Start: 2018-02-20

## 2018-02-20 RX ORDER — ENOXAPARIN SODIUM 100 MG/ML
40 INJECTION SUBCUTANEOUS
Qty: 0 | Refills: 0 | COMMUNITY
Start: 2018-02-20

## 2018-02-20 RX ADMIN — LOSARTAN POTASSIUM 100 MILLIGRAM(S): 100 TABLET, FILM COATED ORAL at 05:13

## 2018-02-20 RX ADMIN — Medication 650 MILLIGRAM(S): at 06:56

## 2018-02-20 RX ADMIN — ENOXAPARIN SODIUM 40 MILLIGRAM(S): 100 INJECTION SUBCUTANEOUS at 12:23

## 2018-02-20 RX ADMIN — SERTRALINE 100 MILLIGRAM(S): 25 TABLET, FILM COATED ORAL at 12:23

## 2018-02-20 RX ADMIN — Medication 25 MILLIGRAM(S): at 05:13

## 2018-02-20 RX ADMIN — CARVEDILOL PHOSPHATE 12.5 MILLIGRAM(S): 80 CAPSULE, EXTENDED RELEASE ORAL at 05:13

## 2018-02-20 RX ADMIN — Medication 75 MILLIGRAM(S): at 14:44

## 2018-02-20 NOTE — PROVIDER CONTACT NOTE (OTHER) - SITUATION
Pt having Loose bowel movements x3 this shift. Pt VSS at this time. No other issues at this time.
pt refuses IV access
md notified of pt's Bp 104/56,p68, md states to give all BP meds,

## 2018-02-20 NOTE — PROGRESS NOTE ADULT - SUBJECTIVE AND OBJECTIVE BOX
68yFemale  No Acute Events    T(C): 35.9 (02-20-18 @ 07:15), Max: 36.4 (02-20-18 @ 00:00)  HR: 60 (02-20-18 @ 07:15) (60 - 60)  BP: 104/65 (02-20-18 @ 07:15) (97/57 - 104/65)  RR: 19 (02-20-18 @ 07:15) (18 - 19)  SpO2: --    PE  NAD  Compartments soft, NT  NVI  clean/dry/intact  Splint intact    Plan  - DVT PPx  - IS  - SCD  - PT  - Pain Control  - dc planning

## 2018-02-21 ENCOUNTER — OUTPATIENT (OUTPATIENT)
Dept: OUTPATIENT SERVICES | Facility: HOSPITAL | Age: 68
LOS: 1 days | Discharge: HOME | End: 2018-02-21

## 2018-02-21 DIAGNOSIS — R05 COUGH: ICD-10-CM

## 2018-02-22 ENCOUNTER — OUTPATIENT (OUTPATIENT)
Dept: OUTPATIENT SERVICES | Facility: HOSPITAL | Age: 68
LOS: 1 days | Discharge: HOME | End: 2018-02-22

## 2018-02-22 DIAGNOSIS — F32.9 MAJOR DEPRESSIVE DISORDER, SINGLE EPISODE, UNSPECIFIED: ICD-10-CM

## 2018-02-22 DIAGNOSIS — F41.9 ANXIETY DISORDER, UNSPECIFIED: ICD-10-CM
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
## 2018-02-22 DIAGNOSIS — M19.90 UNSPECIFIED OSTEOARTHRITIS, UNSPECIFIED SITE: ICD-10-CM

## 2018-02-22 DIAGNOSIS — S82.54XA NONDISPLACED FRACTURE OF MEDIAL MALLEOLUS OF RIGHT TIBIA, INITIAL ENCOUNTER FOR CLOSED FRACTURE: ICD-10-CM

## 2018-02-22 DIAGNOSIS — R79.9 ABNORMAL FINDING OF BLOOD CHEMISTRY, UNSPECIFIED: ICD-10-CM

## 2018-02-22 DIAGNOSIS — E78.00 PURE HYPERCHOLESTEROLEMIA, UNSPECIFIED: ICD-10-CM

## 2018-02-22 DIAGNOSIS — Z95.5 PRESENCE OF CORONARY ANGIOPLASTY IMPLANT AND GRAFT: ICD-10-CM
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
## 2018-02-22 DIAGNOSIS — I10 ESSENTIAL (PRIMARY) HYPERTENSION: ICD-10-CM

## 2018-02-22 DIAGNOSIS — Z00.00 ENCOUNTER FOR GENERAL ADULT MEDICAL EXAMINATION WITHOUT ABNORMAL FINDINGS: ICD-10-CM

## 2018-02-22 DIAGNOSIS — Z89.612 ACQUIRED ABSENCE OF LEFT LEG ABOVE KNEE: ICD-10-CM

## 2018-02-22 DIAGNOSIS — I25.10 ATHEROSCLEROTIC HEART DISEASE OF NATIVE CORONARY ARTERY WITHOUT ANGINA PECTORIS: ICD-10-CM
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
## 2018-02-26 ENCOUNTER — OUTPATIENT (OUTPATIENT)
Dept: OUTPATIENT SERVICES | Facility: HOSPITAL | Age: 68
LOS: 1 days | Discharge: HOME | End: 2018-02-26

## 2018-02-26 DIAGNOSIS — R79.9 ABNORMAL FINDING OF BLOOD CHEMISTRY, UNSPECIFIED: ICD-10-CM

## 2018-03-05 ENCOUNTER — OUTPATIENT (OUTPATIENT)
Dept: OUTPATIENT SERVICES | Facility: HOSPITAL | Age: 68
LOS: 1 days | Discharge: HOME | End: 2018-03-05

## 2018-03-05 DIAGNOSIS — R79.9 ABNORMAL FINDING OF BLOOD CHEMISTRY, UNSPECIFIED: ICD-10-CM

## 2018-04-12 ENCOUNTER — OUTPATIENT (OUTPATIENT)
Dept: OUTPATIENT SERVICES | Facility: HOSPITAL | Age: 68
LOS: 1 days | Discharge: HOME | End: 2018-04-12

## 2018-04-12 DIAGNOSIS — Z02.9 ENCOUNTER FOR ADMINISTRATIVE EXAMINATIONS, UNSPECIFIED: ICD-10-CM

## 2018-05-08 ENCOUNTER — OUTPATIENT (OUTPATIENT)
Dept: OUTPATIENT SERVICES | Facility: HOSPITAL | Age: 68
LOS: 1 days | Discharge: HOME | End: 2018-05-08

## 2018-05-08 DIAGNOSIS — F33.1 MAJOR DEPRESSIVE DISORDER, RECURRENT, MODERATE: ICD-10-CM

## 2018-05-08 DIAGNOSIS — F43.12 POST-TRAUMATIC STRESS DISORDER, CHRONIC: ICD-10-CM

## 2018-06-13 ENCOUNTER — OUTPATIENT (OUTPATIENT)
Dept: OUTPATIENT SERVICES | Facility: HOSPITAL | Age: 68
LOS: 1 days | Discharge: HOME | End: 2018-06-13

## 2018-06-13 DIAGNOSIS — F33.1 MAJOR DEPRESSIVE DISORDER, RECURRENT, MODERATE: ICD-10-CM

## 2018-06-13 DIAGNOSIS — F43.12 POST-TRAUMATIC STRESS DISORDER, CHRONIC: ICD-10-CM

## 2018-06-25 ENCOUNTER — OUTPATIENT (OUTPATIENT)
Dept: OUTPATIENT SERVICES | Facility: HOSPITAL | Age: 68
LOS: 1 days | Discharge: HOME | End: 2018-06-25

## 2018-06-25 VITALS
DIASTOLIC BLOOD PRESSURE: 86 MMHG | HEIGHT: 62 IN | RESPIRATION RATE: 20 BRPM | OXYGEN SATURATION: 100 % | HEART RATE: 80 BPM | WEIGHT: 139.99 LBS | TEMPERATURE: 97 F | SYSTOLIC BLOOD PRESSURE: 160 MMHG

## 2018-06-25 DIAGNOSIS — Z98.890 OTHER SPECIFIED POSTPROCEDURAL STATES: Chronic | ICD-10-CM

## 2018-06-25 DIAGNOSIS — E01.8 OTHER IODINE-DEFICIENCY RELATED THYROID DISORDERS AND ALLIED CONDITIONS: ICD-10-CM

## 2018-06-25 DIAGNOSIS — F32.9 MAJOR DEPRESSIVE DISORDER, SINGLE EPISODE, UNSPECIFIED: ICD-10-CM

## 2018-06-25 DIAGNOSIS — M17.11 UNILATERAL PRIMARY OSTEOARTHRITIS, RIGHT KNEE: ICD-10-CM

## 2018-06-25 DIAGNOSIS — F41.9 ANXIETY DISORDER, UNSPECIFIED: ICD-10-CM

## 2018-06-25 DIAGNOSIS — Z01.818 ENCOUNTER FOR OTHER PREPROCEDURAL EXAMINATION: ICD-10-CM

## 2018-06-25 DIAGNOSIS — I10 ESSENTIAL (PRIMARY) HYPERTENSION: ICD-10-CM

## 2018-06-25 DIAGNOSIS — R06.02 SHORTNESS OF BREATH: ICD-10-CM

## 2018-06-25 LAB
ALBUMIN SERPL ELPH-MCNC: 4.5 G/DL — SIGNIFICANT CHANGE UP (ref 3.5–5.2)
ALP SERPL-CCNC: 141 U/L — HIGH (ref 30–115)
ALT FLD-CCNC: 13 U/L — SIGNIFICANT CHANGE UP (ref 0–41)
ANION GAP SERPL CALC-SCNC: 16 MMOL/L — HIGH (ref 7–14)
APPEARANCE UR: (no result)
APTT BLD: 40.9 SEC — HIGH (ref 27–39.2)
AST SERPL-CCNC: 20 U/L — SIGNIFICANT CHANGE UP (ref 0–41)
BACTERIA # UR AUTO: (no result) /HPF
BASOPHILS # BLD AUTO: 0.07 K/UL — SIGNIFICANT CHANGE UP (ref 0–0.2)
BASOPHILS NFR BLD AUTO: 0.8 % — SIGNIFICANT CHANGE UP (ref 0–1)
BILIRUB SERPL-MCNC: 0.5 MG/DL — SIGNIFICANT CHANGE UP (ref 0.2–1.2)
BILIRUB UR-MCNC: NEGATIVE — SIGNIFICANT CHANGE UP
BLD GP AB SCN SERPL QL: SIGNIFICANT CHANGE UP
BUN SERPL-MCNC: 15 MG/DL — SIGNIFICANT CHANGE UP (ref 10–20)
CALCIUM SERPL-MCNC: 9.6 MG/DL — SIGNIFICANT CHANGE UP (ref 8.5–10.1)
CHLORIDE SERPL-SCNC: 101 MMOL/L — SIGNIFICANT CHANGE UP (ref 98–110)
CO2 SERPL-SCNC: 24 MMOL/L — SIGNIFICANT CHANGE UP (ref 17–32)
COLOR SPEC: YELLOW — SIGNIFICANT CHANGE UP
CREAT SERPL-MCNC: 0.7 MG/DL — SIGNIFICANT CHANGE UP (ref 0.7–1.5)
DIFF PNL FLD: NEGATIVE — SIGNIFICANT CHANGE UP
EOSINOPHIL # BLD AUTO: 0.22 K/UL — SIGNIFICANT CHANGE UP (ref 0–0.7)
EOSINOPHIL NFR BLD AUTO: 2.5 % — SIGNIFICANT CHANGE UP (ref 0–8)
EPI CELLS # UR: (no result) /HPF
GLUCOSE SERPL-MCNC: 84 MG/DL — SIGNIFICANT CHANGE UP (ref 70–99)
GLUCOSE UR QL: NEGATIVE MG/DL — SIGNIFICANT CHANGE UP
HCT VFR BLD CALC: 40.1 % — SIGNIFICANT CHANGE UP (ref 37–47)
HGB BLD-MCNC: 13.4 G/DL — SIGNIFICANT CHANGE UP (ref 12–16)
IMM GRANULOCYTES NFR BLD AUTO: 0.2 % — SIGNIFICANT CHANGE UP (ref 0.1–0.3)
INR BLD: 1.2 RATIO — SIGNIFICANT CHANGE UP (ref 0.65–1.3)
KETONES UR-MCNC: NEGATIVE — SIGNIFICANT CHANGE UP
LEUKOCYTE ESTERASE UR-ACNC: NEGATIVE — SIGNIFICANT CHANGE UP
LYMPHOCYTES # BLD AUTO: 2.03 K/UL — SIGNIFICANT CHANGE UP (ref 1.2–3.4)
LYMPHOCYTES # BLD AUTO: 23.2 % — SIGNIFICANT CHANGE UP (ref 20.5–51.1)
MCHC RBC-ENTMCNC: 29.6 PG — SIGNIFICANT CHANGE UP (ref 27–31)
MCHC RBC-ENTMCNC: 33.4 G/DL — SIGNIFICANT CHANGE UP (ref 32–37)
MCV RBC AUTO: 88.7 FL — SIGNIFICANT CHANGE UP (ref 81–99)
MONOCYTES # BLD AUTO: 0.68 K/UL — HIGH (ref 0.1–0.6)
MONOCYTES NFR BLD AUTO: 7.8 % — SIGNIFICANT CHANGE UP (ref 1.7–9.3)
MRSA PCR RESULT.: NEGATIVE — SIGNIFICANT CHANGE UP
NEUTROPHILS # BLD AUTO: 5.74 K/UL — SIGNIFICANT CHANGE UP (ref 1.4–6.5)
NEUTROPHILS NFR BLD AUTO: 65.5 % — SIGNIFICANT CHANGE UP (ref 42.2–75.2)
NITRITE UR-MCNC: NEGATIVE — SIGNIFICANT CHANGE UP
NRBC # BLD: 0 /100 WBCS — SIGNIFICANT CHANGE UP (ref 0–0)
PH UR: 5.5 — SIGNIFICANT CHANGE UP (ref 5–8)
PLATELET # BLD AUTO: 202 K/UL — SIGNIFICANT CHANGE UP (ref 130–400)
POTASSIUM SERPL-MCNC: 4.5 MMOL/L — SIGNIFICANT CHANGE UP (ref 3.5–5)
POTASSIUM SERPL-SCNC: 4.5 MMOL/L — SIGNIFICANT CHANGE UP (ref 3.5–5)
PROT SERPL-MCNC: 7.7 G/DL — SIGNIFICANT CHANGE UP (ref 6–8)
PROT UR-MCNC: NEGATIVE MG/DL — SIGNIFICANT CHANGE UP
PROTHROM AB SERPL-ACNC: 12.9 SEC — HIGH (ref 9.95–12.87)
RBC # BLD: 4.52 M/UL — SIGNIFICANT CHANGE UP (ref 4.2–5.4)
RBC # FLD: 14.2 % — SIGNIFICANT CHANGE UP (ref 11.5–14.5)
SODIUM SERPL-SCNC: 141 MMOL/L — SIGNIFICANT CHANGE UP (ref 135–146)
SP GR SPEC: 1.02 — SIGNIFICANT CHANGE UP (ref 1.01–1.03)
TYPE + AB SCN PNL BLD: SIGNIFICANT CHANGE UP
UROBILINOGEN FLD QL: 0.2 MG/DL — SIGNIFICANT CHANGE UP (ref 0.2–0.2)
WBC # BLD: 8.76 K/UL — SIGNIFICANT CHANGE UP (ref 4.8–10.8)
WBC # FLD AUTO: 8.76 K/UL — SIGNIFICANT CHANGE UP (ref 4.8–10.8)
WBC UR QL: SIGNIFICANT CHANGE UP /HPF

## 2018-06-25 NOTE — H&P PST ADULT - NSANTHOSAYNRD_GEN_A_CORE
No. SULEMA screening performed.  STOP BANG Legend: 0-2 = LOW Risk; 3-4 = INTERMEDIATE Risk; 5-8 = HIGH Risk

## 2018-06-25 NOTE — H&P PST ADULT - HISTORY OF PRESENT ILLNESS
69 y/o female reports h/o osteoarthritis with severe right knee pain with difficult ambulation for few years. Denies chest pain, cough, fever; admits to [facility] < 1/2 blk / 1 FOS.   Elected for procedure.  S/p mechanical fall with right ankle fracture s/p ORIF ([facility], Feb 2018).    CXR (1/31/18): No acute disease.   Stress test (11/22/17): no fixed defect. EF 55%.  Last Echo (2010): EF 60%
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
## 2018-06-25 NOTE — H&P PST ADULT - PMH
Anxiety    Chronic pain    Depression    Essential hypertension    HLD (hyperlipidemia)    Hypothyroid    Insomnia    Osteoarthritis

## 2018-06-25 NOTE — H&P PST ADULT - ATTENDING COMMENTS
Orthopedic Attending  Risks, benefits and alternative to surgical management of the patient's arthritic right knee were again reviewed with patient and her son, their questions answered to their satisfaction and the patient wishes to proceed with right total knee replacement surgery today.

## 2018-06-26 LAB
CULTURE RESULTS: SIGNIFICANT CHANGE UP
SPECIMEN SOURCE: SIGNIFICANT CHANGE UP

## 2018-07-18 ENCOUNTER — RESULT REVIEW (OUTPATIENT)
Age: 68
End: 2018-07-18

## 2018-07-18 ENCOUNTER — INPATIENT (INPATIENT)
Facility: HOSPITAL | Age: 68
LOS: 8 days | Discharge: NURSING, REHAB OR LONG-TERM CARE | End: 2018-07-27
Attending: ORTHOPAEDIC SURGERY | Admitting: ORTHOPAEDIC SURGERY

## 2018-07-18 VITALS
DIASTOLIC BLOOD PRESSURE: 87 MMHG | TEMPERATURE: 98 F | HEART RATE: 62 BPM | WEIGHT: 139.99 LBS | HEIGHT: 62 IN | RESPIRATION RATE: 18 BRPM | SYSTOLIC BLOOD PRESSURE: 144 MMHG
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
## 2018-07-18 DIAGNOSIS — Z98.890 OTHER SPECIFIED POSTPROCEDURAL STATES: Chronic | ICD-10-CM

## 2018-07-18 LAB
BLD GP AB SCN SERPL QL: SIGNIFICANT CHANGE UP
TYPE + AB SCN PNL BLD: SIGNIFICANT CHANGE UP

## 2018-07-18 RX ORDER — SERTRALINE 25 MG/1
100 TABLET, FILM COATED ORAL DAILY
Qty: 0 | Refills: 0 | Status: DISCONTINUED | OUTPATIENT
Start: 2018-07-18 | End: 2018-07-27

## 2018-07-18 RX ORDER — MIRTAZAPINE 45 MG/1
15 TABLET, ORALLY DISINTEGRATING ORAL AT BEDTIME
Qty: 0 | Refills: 0 | Status: DISCONTINUED | OUTPATIENT
Start: 2018-07-18 | End: 2018-07-27

## 2018-07-18 RX ORDER — FERROUS SULFATE 325(65) MG
325 TABLET ORAL
Qty: 0 | Refills: 0 | Status: DISCONTINUED | OUTPATIENT
Start: 2018-07-18 | End: 2018-07-27

## 2018-07-18 RX ORDER — DIPHENHYDRAMINE HCL 50 MG
25 CAPSULE ORAL AT BEDTIME
Qty: 0 | Refills: 0 | Status: DISCONTINUED | OUTPATIENT
Start: 2018-07-18 | End: 2018-07-27

## 2018-07-18 RX ORDER — CARVEDILOL PHOSPHATE 80 MG/1
12.5 CAPSULE, EXTENDED RELEASE ORAL DAILY
Qty: 0 | Refills: 0 | Status: DISCONTINUED | OUTPATIENT
Start: 2018-07-18 | End: 2018-07-27

## 2018-07-18 RX ORDER — LANOLIN ALCOHOL/MO/W.PET/CERES
3 CREAM (GRAM) TOPICAL AT BEDTIME
Qty: 0 | Refills: 0 | Status: DISCONTINUED | OUTPATIENT
Start: 2018-07-18 | End: 2018-07-27

## 2018-07-18 RX ORDER — ACETAMINOPHEN 500 MG
650 TABLET ORAL EVERY 6 HOURS
Qty: 0 | Refills: 0 | Status: DISCONTINUED | OUTPATIENT
Start: 2018-07-18 | End: 2018-07-27

## 2018-07-18 RX ORDER — ENOXAPARIN SODIUM 100 MG/ML
40 INJECTION SUBCUTANEOUS ONCE
Qty: 0 | Refills: 0 | Status: COMPLETED | OUTPATIENT
Start: 2018-07-19 | End: 2018-07-19

## 2018-07-18 RX ORDER — SODIUM CHLORIDE 9 MG/ML
1000 INJECTION, SOLUTION INTRAVENOUS
Qty: 0 | Refills: 0 | Status: DISCONTINUED | OUTPATIENT
Start: 2018-07-18 | End: 2018-07-19

## 2018-07-18 RX ORDER — OXYCODONE HYDROCHLORIDE 5 MG/1
5 TABLET ORAL EVERY 4 HOURS
Qty: 0 | Refills: 0 | Status: DISCONTINUED | OUTPATIENT
Start: 2018-07-18 | End: 2018-07-25

## 2018-07-18 RX ORDER — METOCLOPRAMIDE HCL 10 MG
10 TABLET ORAL EVERY 6 HOURS
Qty: 0 | Refills: 0 | Status: DISCONTINUED | OUTPATIENT
Start: 2018-07-18 | End: 2018-07-27

## 2018-07-18 RX ORDER — OXYCODONE HYDROCHLORIDE 5 MG/1
10 TABLET ORAL EVERY 4 HOURS
Qty: 0 | Refills: 0 | Status: DISCONTINUED | OUTPATIENT
Start: 2018-07-18 | End: 2018-07-25

## 2018-07-18 RX ORDER — HYDROMORPHONE HYDROCHLORIDE 2 MG/ML
0.25 INJECTION INTRAMUSCULAR; INTRAVENOUS; SUBCUTANEOUS
Qty: 0 | Refills: 0 | Status: DISCONTINUED | OUTPATIENT
Start: 2018-07-18 | End: 2018-07-18

## 2018-07-18 RX ORDER — DOCUSATE SODIUM 100 MG
100 CAPSULE ORAL THREE TIMES A DAY
Qty: 0 | Refills: 0 | Status: DISCONTINUED | OUTPATIENT
Start: 2018-07-18 | End: 2018-07-27

## 2018-07-18 RX ORDER — LEVOTHYROXINE SODIUM 125 MCG
25 TABLET ORAL DAILY
Qty: 0 | Refills: 0 | Status: DISCONTINUED | OUTPATIENT
Start: 2018-07-18 | End: 2018-07-27

## 2018-07-18 RX ORDER — ASPIRIN/CALCIUM CARB/MAGNESIUM 324 MG
325 TABLET ORAL
Qty: 0 | Refills: 0 | Status: DISCONTINUED | OUTPATIENT
Start: 2018-07-18 | End: 2018-07-27

## 2018-07-18 RX ORDER — FOLIC ACID 0.8 MG
1 TABLET ORAL DAILY
Qty: 0 | Refills: 0 | Status: DISCONTINUED | OUTPATIENT
Start: 2018-07-18 | End: 2018-07-27

## 2018-07-18 RX ORDER — HYDROMORPHONE HYDROCHLORIDE 2 MG/ML
0.5 INJECTION INTRAMUSCULAR; INTRAVENOUS; SUBCUTANEOUS
Qty: 0 | Refills: 0 | Status: DISCONTINUED | OUTPATIENT
Start: 2018-07-18 | End: 2018-07-18

## 2018-07-18 RX ORDER — ONDANSETRON 8 MG/1
4 TABLET, FILM COATED ORAL ONCE
Qty: 0 | Refills: 0 | Status: DISCONTINUED | OUTPATIENT
Start: 2018-07-18 | End: 2018-07-27

## 2018-07-18 RX ORDER — SIMVASTATIN 20 MG/1
40 TABLET, FILM COATED ORAL AT BEDTIME
Qty: 0 | Refills: 0 | Status: DISCONTINUED | OUTPATIENT
Start: 2018-07-18 | End: 2018-07-27

## 2018-07-18 RX ORDER — ZOLPIDEM TARTRATE 10 MG/1
5 TABLET ORAL AT BEDTIME
Qty: 0 | Refills: 0 | Status: DISCONTINUED | OUTPATIENT
Start: 2018-07-18 | End: 2018-07-25

## 2018-07-18 RX ORDER — SODIUM CHLORIDE 9 MG/ML
1000 INJECTION INTRAMUSCULAR; INTRAVENOUS; SUBCUTANEOUS
Qty: 0 | Refills: 0 | Status: DISCONTINUED | OUTPATIENT
Start: 2018-07-18 | End: 2018-07-19

## 2018-07-18 RX ORDER — POTASSIUM CHLORIDE 20 MEQ
10 PACKET (EA) ORAL DAILY
Qty: 0 | Refills: 0 | Status: DISCONTINUED | OUTPATIENT
Start: 2018-07-18 | End: 2018-07-27

## 2018-07-18 RX ORDER — CEFAZOLIN SODIUM 1 G
2000 VIAL (EA) INJECTION EVERY 8 HOURS
Qty: 0 | Refills: 0 | Status: COMPLETED | OUTPATIENT
Start: 2018-07-18 | End: 2018-07-19

## 2018-07-18 RX ORDER — MORPHINE SULFATE 50 MG/1
4 CAPSULE, EXTENDED RELEASE ORAL EVERY 4 HOURS
Qty: 0 | Refills: 0 | Status: DISCONTINUED | OUTPATIENT
Start: 2018-07-18 | End: 2018-07-18

## 2018-07-18 RX ORDER — LOSARTAN POTASSIUM 100 MG/1
100 TABLET, FILM COATED ORAL DAILY
Qty: 0 | Refills: 0 | Status: DISCONTINUED | OUTPATIENT
Start: 2018-07-18 | End: 2018-07-27

## 2018-07-18 RX ORDER — HYDROCHLOROTHIAZIDE 25 MG
25 TABLET ORAL DAILY
Qty: 0 | Refills: 0 | Status: DISCONTINUED | OUTPATIENT
Start: 2018-07-18 | End: 2018-07-27

## 2018-07-18 RX ADMIN — Medication 325 MILLIGRAM(S): at 20:04

## 2018-07-18 RX ADMIN — Medication 100 MILLIGRAM(S): at 22:10

## 2018-07-18 RX ADMIN — SODIUM CHLORIDE 75 MILLILITER(S): 9 INJECTION INTRAMUSCULAR; INTRAVENOUS; SUBCUTANEOUS at 19:38

## 2018-07-18 RX ADMIN — ZOLPIDEM TARTRATE 5 MILLIGRAM(S): 10 TABLET ORAL at 22:08

## 2018-07-18 RX ADMIN — SODIUM CHLORIDE 100 MILLILITER(S): 9 INJECTION, SOLUTION INTRAVENOUS at 15:21

## 2018-07-18 RX ADMIN — Medication 10 MILLIEQUIVALENT(S): at 22:09

## 2018-07-18 RX ADMIN — SIMVASTATIN 40 MILLIGRAM(S): 20 TABLET, FILM COATED ORAL at 22:10

## 2018-07-18 RX ADMIN — Medication 1 MILLIGRAM(S): at 22:08

## 2018-07-18 RX ADMIN — Medication 650 MILLIGRAM(S): at 16:05

## 2018-07-18 RX ADMIN — Medication 25 MICROGRAM(S): at 22:08

## 2018-07-18 RX ADMIN — MIRTAZAPINE 15 MILLIGRAM(S): 45 TABLET, ORALLY DISINTEGRATING ORAL at 22:10

## 2018-07-18 RX ADMIN — Medication 3 MILLIGRAM(S): at 22:08

## 2018-07-18 NOTE — PRE-ANESTHESIA EVALUATION ADULT - NSANTHADDINFOFT_GEN_ALL_CORE
Pt personally intervewed, history reviewed. Plan explained, all risks and benefits explained. Questions answered.

## 2018-07-18 NOTE — BRIEF OPERATIVE NOTE - OPERATION/FINDINGS
grade chondromalacia; TT  minutes  mmHg  post-op: WBAT; ABx and DVT ppx per protocol  Dict:  Implants; Rob grade V chondromalacia; TT  126 minutes  300 mmHg  post-op: WBAT; ABx and DVT ppx per protocol  Dict:  Implants; [employer] Triathalon size 4 PS knee; size 4 tibial tray with 13mm tibial poly; 35mm patella asymmetric patella; Tobramycin Simplex PMMA grade V chondromalacia; TT  126 minutes  300 mmHg  post-op: WBAT; ABx and DVT ppx per protocol  Dict:[id number]  Implants; [employer] Triathalon size 4 PS component; size 4 universal base plate tibial tray with 13mm tibial poly; 35mm patella asymmetric patella; Tobramycin Simplex PMMA

## 2018-07-18 NOTE — BRIEF OPERATIVE NOTE - PROCEDURE
<<-----Click on this checkbox to enter Procedure Total knee replacement  07/18/2018  of right knee, CPT 82907  Active  [id number] Total knee replacement  07/18/2018  of right knee, CPT 10198 -74840 (Modified to accomodate pre-existing fracture and deformity, which made the surgery more complicated and akin to revision TKA, CPT code 18211  Active  Bandar Oropeza

## 2018-07-18 NOTE — CHART NOTE - NSCHARTNOTEFT_GEN_A_CORE
PACU ANESTHESIA ADMISSION NOTE      ____ Intubated  TV:______       Rate: ______      FiO2: ______    __x__ Patent Airway    __x__ Full return of protective reflexes    ____ Full recovery from anesthesia / sedation to baseline status    Vitals:    /66  RR 14  O2sat. 98%  Temp: 36.9C      Mental Status:  __x__ Awake   _x____ Alert   _____ Drowsy   _____ Sedated    Nausea/Vomiting: ____ Yes, See Post - Op Orders      __x__ No    Pain Scale (0-10): _x____    Treatment: ____ None    __x__ See Post - Op/PCA Orders    Post - Operative Fluids:   ____ Oral   __x__ See Post - Op Orders    Plan:  Discharge to:   ____Home       __x___Floor      _____Critical Care    _____ Other:_________________    Comments: s/p general anesthesia with ETT. No anesthesia complications. Pt's condition is stable in PACU. Full report is given to PACU RN.

## 2018-07-19 LAB
ALBUMIN SERPL ELPH-MCNC: 3.6 G/DL — SIGNIFICANT CHANGE UP (ref 3.5–5.2)
ALP SERPL-CCNC: 106 U/L — SIGNIFICANT CHANGE UP (ref 30–115)
ALT FLD-CCNC: 11 U/L — SIGNIFICANT CHANGE UP (ref 0–41)
ANION GAP SERPL CALC-SCNC: 13 MMOL/L — SIGNIFICANT CHANGE UP (ref 7–14)
AST SERPL-CCNC: 19 U/L — SIGNIFICANT CHANGE UP (ref 0–41)
BASOPHILS # BLD AUTO: 0.02 K/UL — SIGNIFICANT CHANGE UP (ref 0–0.2)
BASOPHILS NFR BLD AUTO: 0.2 % — SIGNIFICANT CHANGE UP (ref 0–1)
BILIRUB SERPL-MCNC: 0.6 MG/DL — SIGNIFICANT CHANGE UP (ref 0.2–1.2)
BUN SERPL-MCNC: 23 MG/DL — HIGH (ref 10–20)
CALCIUM SERPL-MCNC: 8.4 MG/DL — LOW (ref 8.5–10.1)
CHLORIDE SERPL-SCNC: 102 MMOL/L — SIGNIFICANT CHANGE UP (ref 98–110)
CO2 SERPL-SCNC: 24 MMOL/L — SIGNIFICANT CHANGE UP (ref 17–32)
CREAT SERPL-MCNC: 0.9 MG/DL — SIGNIFICANT CHANGE UP (ref 0.7–1.5)
EOSINOPHIL # BLD AUTO: 0.02 K/UL — SIGNIFICANT CHANGE UP (ref 0–0.7)
EOSINOPHIL NFR BLD AUTO: 0.2 % — SIGNIFICANT CHANGE UP (ref 0–8)
GLUCOSE SERPL-MCNC: 109 MG/DL — HIGH (ref 70–99)
HCT VFR BLD CALC: 27.8 % — LOW (ref 37–47)
HGB BLD-MCNC: 9.2 G/DL — LOW (ref 12–16)
IMM GRANULOCYTES NFR BLD AUTO: 0.2 % — SIGNIFICANT CHANGE UP (ref 0.1–0.3)
LYMPHOCYTES # BLD AUTO: 1.49 K/UL — SIGNIFICANT CHANGE UP (ref 1.2–3.4)
LYMPHOCYTES # BLD AUTO: 17.5 % — LOW (ref 20.5–51.1)
MCHC RBC-ENTMCNC: 29.3 PG — SIGNIFICANT CHANGE UP (ref 27–31)
MCHC RBC-ENTMCNC: 33.1 G/DL — SIGNIFICANT CHANGE UP (ref 32–37)
MCV RBC AUTO: 88.5 FL — SIGNIFICANT CHANGE UP (ref 81–99)
MONOCYTES # BLD AUTO: 0.79 K/UL — HIGH (ref 0.1–0.6)
MONOCYTES NFR BLD AUTO: 9.3 % — SIGNIFICANT CHANGE UP (ref 1.7–9.3)
NEUTROPHILS # BLD AUTO: 6.16 K/UL — SIGNIFICANT CHANGE UP (ref 1.4–6.5)
NEUTROPHILS NFR BLD AUTO: 72.6 % — SIGNIFICANT CHANGE UP (ref 42.2–75.2)
PLATELET # BLD AUTO: 155 K/UL — SIGNIFICANT CHANGE UP (ref 130–400)
POTASSIUM SERPL-MCNC: 4.1 MMOL/L — SIGNIFICANT CHANGE UP (ref 3.5–5)
POTASSIUM SERPL-SCNC: 4.1 MMOL/L — SIGNIFICANT CHANGE UP (ref 3.5–5)
PROT SERPL-MCNC: 6 G/DL — SIGNIFICANT CHANGE UP (ref 6–8)
RBC # BLD: 3.14 M/UL — LOW (ref 4.2–5.4)
RBC # FLD: 14 % — SIGNIFICANT CHANGE UP (ref 11.5–14.5)
SODIUM SERPL-SCNC: 139 MMOL/L — SIGNIFICANT CHANGE UP (ref 135–146)
WBC # BLD: 8.5 K/UL — SIGNIFICANT CHANGE UP (ref 4.8–10.8)
WBC # FLD AUTO: 8.5 K/UL — SIGNIFICANT CHANGE UP (ref 4.8–10.8)

## 2018-07-19 RX ADMIN — Medication 100 MILLIGRAM(S): at 13:45

## 2018-07-19 RX ADMIN — MIRTAZAPINE 15 MILLIGRAM(S): 45 TABLET, ORALLY DISINTEGRATING ORAL at 21:37

## 2018-07-19 RX ADMIN — Medication 325 MILLIGRAM(S): at 18:03

## 2018-07-19 RX ADMIN — SERTRALINE 100 MILLIGRAM(S): 25 TABLET, FILM COATED ORAL at 12:00

## 2018-07-19 RX ADMIN — ENOXAPARIN SODIUM 40 MILLIGRAM(S): 100 INJECTION SUBCUTANEOUS at 11:59

## 2018-07-19 RX ADMIN — LOSARTAN POTASSIUM 100 MILLIGRAM(S): 100 TABLET, FILM COATED ORAL at 07:59

## 2018-07-19 RX ADMIN — Medication 100 MILLIGRAM(S): at 06:38

## 2018-07-19 RX ADMIN — Medication 100 MILLIGRAM(S): at 06:39

## 2018-07-19 RX ADMIN — Medication 100 MILLIGRAM(S): at 21:37

## 2018-07-19 RX ADMIN — Medication 650 MILLIGRAM(S): at 13:42

## 2018-07-19 RX ADMIN — Medication 650 MILLIGRAM(S): at 23:28

## 2018-07-19 RX ADMIN — Medication 3 MILLIGRAM(S): at 21:37

## 2018-07-19 RX ADMIN — OXYCODONE HYDROCHLORIDE 10 MILLIGRAM(S): 5 TABLET ORAL at 21:37

## 2018-07-19 RX ADMIN — Medication 650 MILLIGRAM(S): at 07:09

## 2018-07-19 RX ADMIN — OXYCODONE HYDROCHLORIDE 10 MILLIGRAM(S): 5 TABLET ORAL at 23:12

## 2018-07-19 RX ADMIN — Medication 75 MILLIGRAM(S): at 12:40

## 2018-07-19 RX ADMIN — Medication 325 MILLIGRAM(S): at 12:00

## 2018-07-19 RX ADMIN — Medication 25 MILLIGRAM(S): at 07:58

## 2018-07-19 RX ADMIN — Medication 650 MILLIGRAM(S): at 20:44

## 2018-07-19 RX ADMIN — Medication 325 MILLIGRAM(S): at 06:38

## 2018-07-19 RX ADMIN — Medication 650 MILLIGRAM(S): at 18:03

## 2018-07-19 RX ADMIN — SODIUM CHLORIDE 75 MILLILITER(S): 9 INJECTION INTRAMUSCULAR; INTRAVENOUS; SUBCUTANEOUS at 06:39

## 2018-07-19 RX ADMIN — Medication 1 TABLET(S): at 12:00

## 2018-07-19 RX ADMIN — Medication 650 MILLIGRAM(S): at 06:37

## 2018-07-19 RX ADMIN — Medication 1 MILLIGRAM(S): at 12:00

## 2018-07-19 RX ADMIN — Medication 100 MILLIGRAM(S): at 14:03

## 2018-07-19 RX ADMIN — OXYCODONE HYDROCHLORIDE 10 MILLIGRAM(S): 5 TABLET ORAL at 11:57

## 2018-07-19 RX ADMIN — OXYCODONE HYDROCHLORIDE 10 MILLIGRAM(S): 5 TABLET ORAL at 17:28

## 2018-07-19 RX ADMIN — SIMVASTATIN 40 MILLIGRAM(S): 20 TABLET, FILM COATED ORAL at 21:37

## 2018-07-19 RX ADMIN — Medication 650 MILLIGRAM(S): at 13:45

## 2018-07-19 RX ADMIN — Medication 325 MILLIGRAM(S): at 08:34

## 2018-07-19 RX ADMIN — Medication 25 MICROGRAM(S): at 06:39

## 2018-07-19 RX ADMIN — ZOLPIDEM TARTRATE 5 MILLIGRAM(S): 10 TABLET ORAL at 21:37

## 2018-07-19 RX ADMIN — CARVEDILOL PHOSPHATE 12.5 MILLIGRAM(S): 80 CAPSULE, EXTENDED RELEASE ORAL at 07:59

## 2018-07-19 RX ADMIN — Medication 10 MILLIEQUIVALENT(S): at 12:43

## 2018-07-19 RX ADMIN — OXYCODONE HYDROCHLORIDE 10 MILLIGRAM(S): 5 TABLET ORAL at 10:46

## 2018-07-19 NOTE — OCCUPATIONAL THERAPY INITIAL EVALUATION ADULT - ADDITIONAL COMMENTS
pt lives in  with 5 large steps to enter and full flight to bedroom on 2nd fl. +bathtub, Pt reports her spouse assisted with ADL's PTA.
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
## 2018-07-19 NOTE — OCCUPATIONAL THERAPY INITIAL EVALUATION ADULT - RANGE OF MOTION EXAMINATION, UPPER EXTREMITY
Left UE Active ROM was WFL (within functional limits)/Right UE Passive ROM was WNL (within normal limits)/Pt reports right shoulder became limited as of a few weeks ago 2* arthritis. right should AROM: 0-45 degrees flexion; elbow to digits WFL

## 2018-07-19 NOTE — CONSULT NOTE ADULT - ASSESSMENT
IMPRESSION: Rehab of R TKR / L AKA    PRECAUTIONS: [  ] Cardiac  [  ] Respiratory  [  ] Seizures [  ] Contact Isolation  [  ] Droplet Isolation  [  ] Other    Weight Bearing Status: WBAT RLE    RECOMMENDATION:    Out of Bed to Chair     DVT/Decubiti Prophylaxis    REHAB PLAN:     [  X ] Bedside P/T 3-5 times a week   [X   ]   Bedside O/T  2-3 times a week             [   ] No Rehab Therapy Indicated                   [   ]  Speech Therapy   Conditioning/ROM                                    ADL  Bed Mobility                                               Conditioning/ROM  Transfers                                                     Bed Mobility  Sitting /Standing Balance                         Transfers                                        Gait Training                                               Sitting/Standing Balance  Stair Training [   ]Applicable                    Home equipment Eval                                                                        Splinting  [   ] Only      GOALS:   ADL   [ X  ]   Independent                    Transfers  [ X  ] Independent                          Ambulation  [ X  ] Independent     [  X  ] With device                            [   ]  CG                                                         [   ]  CG                                                                  [   ] CG                            [    ] Min A                                                   [   ] Min A                                                              [   ] Min  A          DISCHARGE PLAN:   [X   ]  Good candidate for Intensive Rehabilitation/Hospital based-[facility]                                             Will tolerate 3hrs Intensive Rehab Daily                                       [    ]  Short Term Rehab in Skilled Nursing Facility                                       [    ]  Home with Outpatient or  services                                         [    ]  Possible Candidate for Intensive Hospital based Rehab
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
## 2018-07-20 LAB
HCT VFR BLD CALC: 24.1 % — LOW (ref 37–47)
HGB BLD-MCNC: 8.2 G/DL — LOW (ref 12–16)
MCHC RBC-ENTMCNC: 29.7 PG — SIGNIFICANT CHANGE UP (ref 27–31)
MCHC RBC-ENTMCNC: 34 G/DL — SIGNIFICANT CHANGE UP (ref 32–37)
MCV RBC AUTO: 87.3 FL — SIGNIFICANT CHANGE UP (ref 81–99)
NRBC # BLD: 0 /100 WBCS — SIGNIFICANT CHANGE UP (ref 0–0)
PLATELET # BLD AUTO: 130 K/UL — SIGNIFICANT CHANGE UP (ref 130–400)
RBC # BLD: 2.76 M/UL — LOW (ref 4.2–5.4)
RBC # FLD: 14.2 % — SIGNIFICANT CHANGE UP (ref 11.5–14.5)
WBC # BLD: 11.66 K/UL — HIGH (ref 4.8–10.8)
WBC # FLD AUTO: 11.66 K/UL — HIGH (ref 4.8–10.8)

## 2018-07-20 RX ADMIN — Medication 1 MILLIGRAM(S): at 12:13

## 2018-07-20 RX ADMIN — Medication 1 TABLET(S): at 12:13

## 2018-07-20 RX ADMIN — Medication 75 MILLIGRAM(S): at 12:13

## 2018-07-20 RX ADMIN — Medication 100 MILLIGRAM(S): at 05:11

## 2018-07-20 RX ADMIN — Medication 650 MILLIGRAM(S): at 12:12

## 2018-07-20 RX ADMIN — LOSARTAN POTASSIUM 100 MILLIGRAM(S): 100 TABLET, FILM COATED ORAL at 05:11

## 2018-07-20 RX ADMIN — Medication 650 MILLIGRAM(S): at 05:11

## 2018-07-20 RX ADMIN — CARVEDILOL PHOSPHATE 12.5 MILLIGRAM(S): 80 CAPSULE, EXTENDED RELEASE ORAL at 05:11

## 2018-07-20 RX ADMIN — SIMVASTATIN 40 MILLIGRAM(S): 20 TABLET, FILM COATED ORAL at 21:17

## 2018-07-20 RX ADMIN — Medication 325 MILLIGRAM(S): at 05:11

## 2018-07-20 RX ADMIN — MIRTAZAPINE 15 MILLIGRAM(S): 45 TABLET, ORALLY DISINTEGRATING ORAL at 21:17

## 2018-07-20 RX ADMIN — Medication 100 MILLIGRAM(S): at 21:17

## 2018-07-20 RX ADMIN — Medication 25 MILLIGRAM(S): at 05:11

## 2018-07-20 RX ADMIN — OXYCODONE HYDROCHLORIDE 10 MILLIGRAM(S): 5 TABLET ORAL at 05:12

## 2018-07-20 RX ADMIN — Medication 650 MILLIGRAM(S): at 00:00

## 2018-07-20 RX ADMIN — Medication 325 MILLIGRAM(S): at 17:08

## 2018-07-20 RX ADMIN — Medication 325 MILLIGRAM(S): at 09:15

## 2018-07-20 RX ADMIN — SERTRALINE 100 MILLIGRAM(S): 25 TABLET, FILM COATED ORAL at 12:13

## 2018-07-20 RX ADMIN — Medication 25 MICROGRAM(S): at 05:11

## 2018-07-20 RX ADMIN — ZOLPIDEM TARTRATE 5 MILLIGRAM(S): 10 TABLET ORAL at 21:24

## 2018-07-20 RX ADMIN — Medication 10 MILLIEQUIVALENT(S): at 12:13

## 2018-07-20 RX ADMIN — Medication 3 MILLIGRAM(S): at 21:17

## 2018-07-20 RX ADMIN — Medication 325 MILLIGRAM(S): at 12:13

## 2018-07-20 RX ADMIN — OXYCODONE HYDROCHLORIDE 10 MILLIGRAM(S): 5 TABLET ORAL at 05:46

## 2018-07-20 RX ADMIN — Medication 650 MILLIGRAM(S): at 05:46

## 2018-07-20 RX ADMIN — Medication 650 MILLIGRAM(S): at 17:09

## 2018-07-20 RX ADMIN — Medication 100 MILLIGRAM(S): at 13:58

## 2018-07-21 LAB
ANION GAP SERPL CALC-SCNC: 14 MMOL/L — SIGNIFICANT CHANGE UP (ref 7–14)
BUN SERPL-MCNC: 27 MG/DL — HIGH (ref 10–20)
CALCIUM SERPL-MCNC: 8.1 MG/DL — LOW (ref 8.5–10.1)
CHLORIDE SERPL-SCNC: 103 MMOL/L — SIGNIFICANT CHANGE UP (ref 98–110)
CO2 SERPL-SCNC: 22 MMOL/L — SIGNIFICANT CHANGE UP (ref 17–32)
CREAT SERPL-MCNC: 1.1 MG/DL — SIGNIFICANT CHANGE UP (ref 0.7–1.5)
GLUCOSE SERPL-MCNC: 232 MG/DL — HIGH (ref 70–99)
HCT VFR BLD CALC: 21.5 % — LOW (ref 37–47)
HGB BLD-MCNC: 7.2 G/DL — CRITICAL LOW (ref 12–16)
MCHC RBC-ENTMCNC: 29.9 PG — SIGNIFICANT CHANGE UP (ref 27–31)
MCHC RBC-ENTMCNC: 33.5 G/DL — SIGNIFICANT CHANGE UP (ref 32–37)
MCV RBC AUTO: 89.2 FL — SIGNIFICANT CHANGE UP (ref 81–99)
NRBC # BLD: 0 /100 WBCS — SIGNIFICANT CHANGE UP (ref 0–0)
PLATELET # BLD AUTO: 127 K/UL — LOW (ref 130–400)
POTASSIUM SERPL-MCNC: 3.4 MMOL/L — LOW (ref 3.5–5)
POTASSIUM SERPL-SCNC: 3.4 MMOL/L — LOW (ref 3.5–5)
RBC # BLD: 2.41 M/UL — LOW (ref 4.2–5.4)
RBC # FLD: 14.1 % — SIGNIFICANT CHANGE UP (ref 11.5–14.5)
SODIUM SERPL-SCNC: 139 MMOL/L — SIGNIFICANT CHANGE UP (ref 135–146)
WBC # BLD: 9.36 K/UL — SIGNIFICANT CHANGE UP (ref 4.8–10.8)
WBC # FLD AUTO: 9.36 K/UL — SIGNIFICANT CHANGE UP (ref 4.8–10.8)

## 2018-07-21 RX ORDER — POTASSIUM CHLORIDE 20 MEQ
10 PACKET (EA) ORAL
Qty: 0 | Refills: 0 | Status: DISCONTINUED | OUTPATIENT
Start: 2018-07-21 | End: 2018-07-22

## 2018-07-21 RX ADMIN — Medication 325 MILLIGRAM(S): at 12:13

## 2018-07-21 RX ADMIN — Medication 650 MILLIGRAM(S): at 12:12

## 2018-07-21 RX ADMIN — Medication 650 MILLIGRAM(S): at 12:15

## 2018-07-21 RX ADMIN — LOSARTAN POTASSIUM 100 MILLIGRAM(S): 100 TABLET, FILM COATED ORAL at 05:48

## 2018-07-21 RX ADMIN — Medication 325 MILLIGRAM(S): at 16:49

## 2018-07-21 RX ADMIN — Medication 100 MILLIEQUIVALENT(S): at 22:34

## 2018-07-21 RX ADMIN — Medication 650 MILLIGRAM(S): at 05:48

## 2018-07-21 RX ADMIN — OXYCODONE HYDROCHLORIDE 10 MILLIGRAM(S): 5 TABLET ORAL at 16:46

## 2018-07-21 RX ADMIN — OXYCODONE HYDROCHLORIDE 10 MILLIGRAM(S): 5 TABLET ORAL at 17:15

## 2018-07-21 RX ADMIN — Medication 25 MICROGRAM(S): at 05:48

## 2018-07-21 RX ADMIN — Medication 325 MILLIGRAM(S): at 05:48

## 2018-07-21 RX ADMIN — Medication 100 MILLIGRAM(S): at 13:30

## 2018-07-21 RX ADMIN — MIRTAZAPINE 15 MILLIGRAM(S): 45 TABLET, ORALLY DISINTEGRATING ORAL at 21:56

## 2018-07-21 RX ADMIN — Medication 1 TABLET(S): at 12:13

## 2018-07-21 RX ADMIN — Medication 75 MILLIGRAM(S): at 12:13

## 2018-07-21 RX ADMIN — SERTRALINE 100 MILLIGRAM(S): 25 TABLET, FILM COATED ORAL at 12:13

## 2018-07-21 RX ADMIN — Medication 3 MILLIGRAM(S): at 21:56

## 2018-07-21 RX ADMIN — Medication 25 MILLIGRAM(S): at 05:48

## 2018-07-21 RX ADMIN — Medication 650 MILLIGRAM(S): at 18:31

## 2018-07-21 RX ADMIN — Medication 650 MILLIGRAM(S): at 18:01

## 2018-07-21 RX ADMIN — ZOLPIDEM TARTRATE 5 MILLIGRAM(S): 10 TABLET ORAL at 21:58

## 2018-07-21 RX ADMIN — Medication 100 MILLIGRAM(S): at 05:48

## 2018-07-21 RX ADMIN — Medication 100 MILLIGRAM(S): at 21:56

## 2018-07-21 RX ADMIN — Medication 325 MILLIGRAM(S): at 18:01

## 2018-07-21 RX ADMIN — Medication 1 MILLIGRAM(S): at 12:13

## 2018-07-21 RX ADMIN — CARVEDILOL PHOSPHATE 12.5 MILLIGRAM(S): 80 CAPSULE, EXTENDED RELEASE ORAL at 05:48

## 2018-07-21 RX ADMIN — Medication 325 MILLIGRAM(S): at 07:55

## 2018-07-21 RX ADMIN — SIMVASTATIN 40 MILLIGRAM(S): 20 TABLET, FILM COATED ORAL at 21:56

## 2018-07-21 RX ADMIN — Medication 10 MILLIEQUIVALENT(S): at 12:12

## 2018-07-22 LAB
ANION GAP SERPL CALC-SCNC: 15 MMOL/L — HIGH (ref 7–14)
BUN SERPL-MCNC: 32 MG/DL — HIGH (ref 10–20)
CALCIUM SERPL-MCNC: 8.3 MG/DL — LOW (ref 8.5–10.1)
CHLORIDE SERPL-SCNC: 102 MMOL/L — SIGNIFICANT CHANGE UP (ref 98–110)
CO2 SERPL-SCNC: 22 MMOL/L — SIGNIFICANT CHANGE UP (ref 17–32)
CREAT SERPL-MCNC: 0.9 MG/DL — SIGNIFICANT CHANGE UP (ref 0.7–1.5)
GLUCOSE SERPL-MCNC: 113 MG/DL — HIGH (ref 70–99)
HCT VFR BLD CALC: 22.8 % — LOW (ref 37–47)
HGB BLD-MCNC: 7.5 G/DL — LOW (ref 12–16)
MAGNESIUM SERPL-MCNC: 1.9 MG/DL — SIGNIFICANT CHANGE UP (ref 1.8–2.4)
MCHC RBC-ENTMCNC: 29.3 PG — SIGNIFICANT CHANGE UP (ref 27–31)
MCHC RBC-ENTMCNC: 32.9 G/DL — SIGNIFICANT CHANGE UP (ref 32–37)
MCV RBC AUTO: 89.1 FL — SIGNIFICANT CHANGE UP (ref 81–99)
NRBC # BLD: 0 /100 WBCS — SIGNIFICANT CHANGE UP (ref 0–0)
PLATELET # BLD AUTO: 159 K/UL — SIGNIFICANT CHANGE UP (ref 130–400)
POTASSIUM SERPL-MCNC: 3.8 MMOL/L — SIGNIFICANT CHANGE UP (ref 3.5–5)
POTASSIUM SERPL-SCNC: 3.8 MMOL/L — SIGNIFICANT CHANGE UP (ref 3.5–5)
RBC # BLD: 2.56 M/UL — LOW (ref 4.2–5.4)
RBC # FLD: 14.4 % — SIGNIFICANT CHANGE UP (ref 11.5–14.5)
SODIUM SERPL-SCNC: 139 MMOL/L — SIGNIFICANT CHANGE UP (ref 135–146)
WBC # BLD: 8.44 K/UL — SIGNIFICANT CHANGE UP (ref 4.8–10.8)
WBC # FLD AUTO: 8.44 K/UL — SIGNIFICANT CHANGE UP (ref 4.8–10.8)

## 2018-07-22 RX ORDER — POTASSIUM CHLORIDE 20 MEQ
20 PACKET (EA) ORAL
Qty: 0 | Refills: 0 | Status: COMPLETED | OUTPATIENT
Start: 2018-07-22 | End: 2018-07-22

## 2018-07-22 RX ADMIN — Medication 3 MILLIGRAM(S): at 21:39

## 2018-07-22 RX ADMIN — Medication 325 MILLIGRAM(S): at 08:09

## 2018-07-22 RX ADMIN — Medication 650 MILLIGRAM(S): at 17:28

## 2018-07-22 RX ADMIN — OXYCODONE HYDROCHLORIDE 10 MILLIGRAM(S): 5 TABLET ORAL at 18:00

## 2018-07-22 RX ADMIN — ZOLPIDEM TARTRATE 5 MILLIGRAM(S): 10 TABLET ORAL at 21:42

## 2018-07-22 RX ADMIN — Medication 325 MILLIGRAM(S): at 17:29

## 2018-07-22 RX ADMIN — Medication 25 MILLIGRAM(S): at 06:01

## 2018-07-22 RX ADMIN — SIMVASTATIN 40 MILLIGRAM(S): 20 TABLET, FILM COATED ORAL at 21:39

## 2018-07-22 RX ADMIN — Medication 100 MILLIGRAM(S): at 21:39

## 2018-07-22 RX ADMIN — OXYCODONE HYDROCHLORIDE 10 MILLIGRAM(S): 5 TABLET ORAL at 06:40

## 2018-07-22 RX ADMIN — Medication 1 TABLET(S): at 12:27

## 2018-07-22 RX ADMIN — Medication 1 MILLIGRAM(S): at 12:27

## 2018-07-22 RX ADMIN — OXYCODONE HYDROCHLORIDE 10 MILLIGRAM(S): 5 TABLET ORAL at 17:29

## 2018-07-22 RX ADMIN — Medication 650 MILLIGRAM(S): at 07:00

## 2018-07-22 RX ADMIN — Medication 50 MILLIEQUIVALENT(S): at 04:17

## 2018-07-22 RX ADMIN — Medication 100 MILLIGRAM(S): at 06:01

## 2018-07-22 RX ADMIN — OXYCODONE HYDROCHLORIDE 10 MILLIGRAM(S): 5 TABLET ORAL at 06:05

## 2018-07-22 RX ADMIN — Medication 25 MICROGRAM(S): at 06:01

## 2018-07-22 RX ADMIN — Medication 650 MILLIGRAM(S): at 12:27

## 2018-07-22 RX ADMIN — Medication 650 MILLIGRAM(S): at 06:00

## 2018-07-22 RX ADMIN — Medication 50 MILLIEQUIVALENT(S): at 01:34

## 2018-07-22 RX ADMIN — Medication 650 MILLIGRAM(S): at 12:26

## 2018-07-22 RX ADMIN — CARVEDILOL PHOSPHATE 12.5 MILLIGRAM(S): 80 CAPSULE, EXTENDED RELEASE ORAL at 06:01

## 2018-07-22 RX ADMIN — MIRTAZAPINE 15 MILLIGRAM(S): 45 TABLET, ORALLY DISINTEGRATING ORAL at 21:39

## 2018-07-22 RX ADMIN — Medication 325 MILLIGRAM(S): at 12:27

## 2018-07-22 RX ADMIN — Medication 75 MILLIGRAM(S): at 13:02

## 2018-07-22 RX ADMIN — Medication 10 MILLIEQUIVALENT(S): at 12:27

## 2018-07-22 RX ADMIN — Medication 650 MILLIGRAM(S): at 18:00

## 2018-07-22 RX ADMIN — SERTRALINE 100 MILLIGRAM(S): 25 TABLET, FILM COATED ORAL at 12:27

## 2018-07-22 RX ADMIN — Medication 100 MILLIGRAM(S): at 13:02

## 2018-07-22 RX ADMIN — Medication 325 MILLIGRAM(S): at 06:01

## 2018-07-22 NOTE — PROVIDER CONTACT NOTE (OTHER) - SITUATION
PCA took pt BP this AM electronically BP was 75/38. RN reassessed and took BP manually and it was 100/60.

## 2018-07-23 LAB — SURGICAL PATHOLOGY STUDY: SIGNIFICANT CHANGE UP

## 2018-07-23 RX ADMIN — Medication 650 MILLIGRAM(S): at 00:33

## 2018-07-23 RX ADMIN — Medication 650 MILLIGRAM(S): at 05:28

## 2018-07-23 RX ADMIN — ZOLPIDEM TARTRATE 5 MILLIGRAM(S): 10 TABLET ORAL at 21:25

## 2018-07-23 RX ADMIN — Medication 325 MILLIGRAM(S): at 08:49

## 2018-07-23 RX ADMIN — Medication 650 MILLIGRAM(S): at 11:23

## 2018-07-23 RX ADMIN — Medication 100 MILLIGRAM(S): at 14:19

## 2018-07-23 RX ADMIN — Medication 25 MILLIGRAM(S): at 05:28

## 2018-07-23 RX ADMIN — Medication 325 MILLIGRAM(S): at 05:28

## 2018-07-23 RX ADMIN — OXYCODONE HYDROCHLORIDE 10 MILLIGRAM(S): 5 TABLET ORAL at 21:23

## 2018-07-23 RX ADMIN — Medication 650 MILLIGRAM(S): at 01:03

## 2018-07-23 RX ADMIN — Medication 100 MILLIGRAM(S): at 21:25

## 2018-07-23 RX ADMIN — Medication 3 MILLIGRAM(S): at 21:25

## 2018-07-23 RX ADMIN — Medication 650 MILLIGRAM(S): at 06:00

## 2018-07-23 RX ADMIN — Medication 1 MILLIGRAM(S): at 11:23

## 2018-07-23 RX ADMIN — Medication 10 MILLIEQUIVALENT(S): at 11:24

## 2018-07-23 RX ADMIN — Medication 75 MILLIGRAM(S): at 12:20

## 2018-07-23 RX ADMIN — OXYCODONE HYDROCHLORIDE 10 MILLIGRAM(S): 5 TABLET ORAL at 17:24

## 2018-07-23 RX ADMIN — Medication 25 MICROGRAM(S): at 05:28

## 2018-07-23 RX ADMIN — Medication 1 TABLET(S): at 11:24

## 2018-07-23 RX ADMIN — SIMVASTATIN 40 MILLIGRAM(S): 20 TABLET, FILM COATED ORAL at 21:25

## 2018-07-23 RX ADMIN — Medication 100 MILLIGRAM(S): at 05:28

## 2018-07-23 RX ADMIN — MIRTAZAPINE 15 MILLIGRAM(S): 45 TABLET, ORALLY DISINTEGRATING ORAL at 22:46

## 2018-07-23 RX ADMIN — Medication 650 MILLIGRAM(S): at 17:22

## 2018-07-23 RX ADMIN — Medication 325 MILLIGRAM(S): at 12:19

## 2018-07-23 RX ADMIN — Medication 325 MILLIGRAM(S): at 17:22

## 2018-07-23 RX ADMIN — SERTRALINE 100 MILLIGRAM(S): 25 TABLET, FILM COATED ORAL at 11:23

## 2018-07-24 RX ADMIN — Medication 25 MICROGRAM(S): at 05:42

## 2018-07-24 RX ADMIN — Medication 100 MILLIGRAM(S): at 05:42

## 2018-07-24 RX ADMIN — OXYCODONE HYDROCHLORIDE 10 MILLIGRAM(S): 5 TABLET ORAL at 18:55

## 2018-07-24 RX ADMIN — MIRTAZAPINE 15 MILLIGRAM(S): 45 TABLET, ORALLY DISINTEGRATING ORAL at 22:23

## 2018-07-24 RX ADMIN — OXYCODONE HYDROCHLORIDE 5 MILLIGRAM(S): 5 TABLET ORAL at 06:22

## 2018-07-24 RX ADMIN — SIMVASTATIN 40 MILLIGRAM(S): 20 TABLET, FILM COATED ORAL at 22:24

## 2018-07-24 RX ADMIN — LOSARTAN POTASSIUM 100 MILLIGRAM(S): 100 TABLET, FILM COATED ORAL at 05:42

## 2018-07-24 RX ADMIN — Medication 325 MILLIGRAM(S): at 12:43

## 2018-07-24 RX ADMIN — OXYCODONE HYDROCHLORIDE 5 MILLIGRAM(S): 5 TABLET ORAL at 05:47

## 2018-07-24 RX ADMIN — Medication 10 MILLIEQUIVALENT(S): at 12:43

## 2018-07-24 RX ADMIN — Medication 25 MILLIGRAM(S): at 05:42

## 2018-07-24 RX ADMIN — OXYCODONE HYDROCHLORIDE 5 MILLIGRAM(S): 5 TABLET ORAL at 13:09

## 2018-07-24 RX ADMIN — Medication 650 MILLIGRAM(S): at 00:58

## 2018-07-24 RX ADMIN — ZOLPIDEM TARTRATE 5 MILLIGRAM(S): 10 TABLET ORAL at 22:22

## 2018-07-24 RX ADMIN — Medication 1 MILLIGRAM(S): at 12:41

## 2018-07-24 RX ADMIN — Medication 325 MILLIGRAM(S): at 05:42

## 2018-07-24 RX ADMIN — OXYCODONE HYDROCHLORIDE 5 MILLIGRAM(S): 5 TABLET ORAL at 12:39

## 2018-07-24 RX ADMIN — Medication 100 MILLIGRAM(S): at 22:22

## 2018-07-24 RX ADMIN — Medication 650 MILLIGRAM(S): at 00:18

## 2018-07-24 RX ADMIN — CARVEDILOL PHOSPHATE 12.5 MILLIGRAM(S): 80 CAPSULE, EXTENDED RELEASE ORAL at 05:42

## 2018-07-24 RX ADMIN — Medication 325 MILLIGRAM(S): at 18:57

## 2018-07-24 RX ADMIN — Medication 650 MILLIGRAM(S): at 12:41

## 2018-07-24 RX ADMIN — Medication 3 MILLIGRAM(S): at 22:23

## 2018-07-24 RX ADMIN — Medication 1 TABLET(S): at 12:41

## 2018-07-24 RX ADMIN — Medication 650 MILLIGRAM(S): at 18:54

## 2018-07-24 RX ADMIN — Medication 100 MILLIGRAM(S): at 14:29

## 2018-07-24 RX ADMIN — Medication 325 MILLIGRAM(S): at 07:58

## 2018-07-24 RX ADMIN — Medication 75 MILLIGRAM(S): at 12:39

## 2018-07-24 RX ADMIN — SERTRALINE 100 MILLIGRAM(S): 25 TABLET, FILM COATED ORAL at 12:40

## 2018-07-25 RX ADMIN — Medication 650 MILLIGRAM(S): at 12:21

## 2018-07-25 RX ADMIN — CARVEDILOL PHOSPHATE 12.5 MILLIGRAM(S): 80 CAPSULE, EXTENDED RELEASE ORAL at 05:59

## 2018-07-25 RX ADMIN — Medication 100 MILLIGRAM(S): at 05:59

## 2018-07-25 RX ADMIN — ZOLPIDEM TARTRATE 5 MILLIGRAM(S): 10 TABLET ORAL at 22:30

## 2018-07-25 RX ADMIN — Medication 1 TABLET(S): at 12:22

## 2018-07-25 RX ADMIN — MIRTAZAPINE 15 MILLIGRAM(S): 45 TABLET, ORALLY DISINTEGRATING ORAL at 22:28

## 2018-07-25 RX ADMIN — Medication 650 MILLIGRAM(S): at 06:01

## 2018-07-25 RX ADMIN — Medication 325 MILLIGRAM(S): at 19:20

## 2018-07-25 RX ADMIN — LOSARTAN POTASSIUM 100 MILLIGRAM(S): 100 TABLET, FILM COATED ORAL at 05:59

## 2018-07-25 RX ADMIN — Medication 1 MILLIGRAM(S): at 12:23

## 2018-07-25 RX ADMIN — Medication 650 MILLIGRAM(S): at 23:43

## 2018-07-25 RX ADMIN — Medication 650 MILLIGRAM(S): at 19:21

## 2018-07-25 RX ADMIN — OXYCODONE HYDROCHLORIDE 5 MILLIGRAM(S): 5 TABLET ORAL at 12:25

## 2018-07-25 RX ADMIN — Medication 325 MILLIGRAM(S): at 05:59

## 2018-07-25 RX ADMIN — OXYCODONE HYDROCHLORIDE 5 MILLIGRAM(S): 5 TABLET ORAL at 12:35

## 2018-07-25 RX ADMIN — SIMVASTATIN 40 MILLIGRAM(S): 20 TABLET, FILM COATED ORAL at 22:27

## 2018-07-25 RX ADMIN — Medication 100 MILLIGRAM(S): at 14:20

## 2018-07-25 RX ADMIN — OXYCODONE HYDROCHLORIDE 10 MILLIGRAM(S): 5 TABLET ORAL at 06:00

## 2018-07-25 RX ADMIN — SERTRALINE 100 MILLIGRAM(S): 25 TABLET, FILM COATED ORAL at 12:22

## 2018-07-25 RX ADMIN — Medication 325 MILLIGRAM(S): at 12:22

## 2018-07-25 RX ADMIN — Medication 10 MILLIEQUIVALENT(S): at 12:23

## 2018-07-25 RX ADMIN — Medication 100 MILLIGRAM(S): at 22:27

## 2018-07-25 RX ADMIN — Medication 25 MILLIGRAM(S): at 05:59

## 2018-07-25 RX ADMIN — Medication 325 MILLIGRAM(S): at 07:51

## 2018-07-25 RX ADMIN — Medication 3 MILLIGRAM(S): at 22:27

## 2018-07-25 RX ADMIN — Medication 650 MILLIGRAM(S): at 06:29

## 2018-07-25 RX ADMIN — OXYCODONE HYDROCHLORIDE 10 MILLIGRAM(S): 5 TABLET ORAL at 06:29

## 2018-07-25 RX ADMIN — Medication 75 MILLIGRAM(S): at 12:22

## 2018-07-25 RX ADMIN — Medication 25 MICROGRAM(S): at 05:59

## 2018-07-25 NOTE — PROVIDER CONTACT NOTE (CHANGE IN STATUS NOTIFICATION) - SITUATION
patient complained of numbness in right lower extremity
patient noted to have low blood pressure 92/53 hr 68 oxygen saturation 96% room air patient noted to state she feels tired, dizzy, sleepy. also patient has no iv and refusing

## 2018-07-25 NOTE — DIETITIAN INITIAL EVALUATION ADULT. - PERTINENT MEDS FT
aspirin, biascodyl, acetaminophen, carvedilol, docusate, iron, hydromorphone, levothyroxine, losartan, morphine, MVI, k-chloride, oxy, simvastatin

## 2018-07-25 NOTE — DIETITIAN INITIAL EVALUATION ADULT. - PT NOT SOURCE
LOS- pt is alert and oriented. Son and a guido bear at bedside. No edema. LBM yesterday per pt. Surgical incision. No oral/swallowing/GI issue reported./other (specify)

## 2018-07-25 NOTE — PROVIDER CONTACT NOTE (CHANGE IN STATUS NOTIFICATION) - ACTION/TREATMENT ORDERED:
now new orders, hold blood pressure meds. please. okay for patient not have iv at this time
md will asses patient, no new orders.

## 2018-07-26 LAB
BLD GP AB SCN SERPL QL: SIGNIFICANT CHANGE UP
HCT VFR BLD CALC: 22.4 % — LOW (ref 37–47)
HGB BLD-MCNC: 7.2 G/DL — CRITICAL LOW (ref 12–16)
MCHC RBC-ENTMCNC: 28.9 PG — SIGNIFICANT CHANGE UP (ref 27–31)
MCHC RBC-ENTMCNC: 32.1 G/DL — SIGNIFICANT CHANGE UP (ref 32–37)
MCV RBC AUTO: 90 FL — SIGNIFICANT CHANGE UP (ref 81–99)
NRBC # BLD: 0 /100 WBCS — SIGNIFICANT CHANGE UP (ref 0–0)
PLATELET # BLD AUTO: 259 K/UL — SIGNIFICANT CHANGE UP (ref 130–400)
RBC # BLD: 2.49 M/UL — LOW (ref 4.2–5.4)
RBC # FLD: 14.6 % — HIGH (ref 11.5–14.5)
TYPE + AB SCN PNL BLD: SIGNIFICANT CHANGE UP
WBC # BLD: 6.61 K/UL — SIGNIFICANT CHANGE UP (ref 4.8–10.8)
WBC # FLD AUTO: 6.61 K/UL — SIGNIFICANT CHANGE UP (ref 4.8–10.8)

## 2018-07-26 RX ORDER — SODIUM CHLORIDE 9 MG/ML
1000 INJECTION INTRAMUSCULAR; INTRAVENOUS; SUBCUTANEOUS
Qty: 0 | Refills: 0 | Status: DISCONTINUED | OUTPATIENT
Start: 2018-07-26 | End: 2018-07-27

## 2018-07-26 RX ORDER — OXYCODONE HYDROCHLORIDE 5 MG/1
5 TABLET ORAL EVERY 4 HOURS
Qty: 0 | Refills: 0 | Status: DISCONTINUED | OUTPATIENT
Start: 2018-07-26 | End: 2018-07-27

## 2018-07-26 RX ORDER — OXYCODONE HYDROCHLORIDE 5 MG/1
10 TABLET ORAL EVERY 4 HOURS
Qty: 0 | Refills: 0 | Status: DISCONTINUED | OUTPATIENT
Start: 2018-07-26 | End: 2018-07-27

## 2018-07-26 RX ADMIN — OXYCODONE HYDROCHLORIDE 5 MILLIGRAM(S): 5 TABLET ORAL at 23:30

## 2018-07-26 RX ADMIN — Medication 325 MILLIGRAM(S): at 17:24

## 2018-07-26 RX ADMIN — SIMVASTATIN 40 MILLIGRAM(S): 20 TABLET, FILM COATED ORAL at 21:50

## 2018-07-26 RX ADMIN — Medication 10 MILLIEQUIVALENT(S): at 12:15

## 2018-07-26 RX ADMIN — Medication 650 MILLIGRAM(S): at 00:15

## 2018-07-26 RX ADMIN — CARVEDILOL PHOSPHATE 12.5 MILLIGRAM(S): 80 CAPSULE, EXTENDED RELEASE ORAL at 05:41

## 2018-07-26 RX ADMIN — Medication 100 MILLIGRAM(S): at 14:37

## 2018-07-26 RX ADMIN — Medication 650 MILLIGRAM(S): at 12:16

## 2018-07-26 RX ADMIN — Medication 1 TABLET(S): at 12:16

## 2018-07-26 RX ADMIN — Medication 325 MILLIGRAM(S): at 12:16

## 2018-07-26 RX ADMIN — Medication 650 MILLIGRAM(S): at 05:41

## 2018-07-26 RX ADMIN — LOSARTAN POTASSIUM 100 MILLIGRAM(S): 100 TABLET, FILM COATED ORAL at 05:42

## 2018-07-26 RX ADMIN — Medication 100 MILLIGRAM(S): at 21:50

## 2018-07-26 RX ADMIN — MIRTAZAPINE 15 MILLIGRAM(S): 45 TABLET, ORALLY DISINTEGRATING ORAL at 21:50

## 2018-07-26 RX ADMIN — Medication 325 MILLIGRAM(S): at 17:25

## 2018-07-26 RX ADMIN — Medication 650 MILLIGRAM(S): at 17:25

## 2018-07-26 RX ADMIN — Medication 25 MILLIGRAM(S): at 05:41

## 2018-07-26 RX ADMIN — Medication 650 MILLIGRAM(S): at 06:14

## 2018-07-26 RX ADMIN — Medication 25 MICROGRAM(S): at 05:41

## 2018-07-26 RX ADMIN — Medication 325 MILLIGRAM(S): at 05:41

## 2018-07-26 RX ADMIN — Medication 100 MILLIGRAM(S): at 05:41

## 2018-07-26 RX ADMIN — Medication 3 MILLIGRAM(S): at 21:50

## 2018-07-26 RX ADMIN — Medication 1 MILLIGRAM(S): at 12:16

## 2018-07-26 RX ADMIN — SERTRALINE 100 MILLIGRAM(S): 25 TABLET, FILM COATED ORAL at 12:16

## 2018-07-26 RX ADMIN — SODIUM CHLORIDE 75 MILLILITER(S): 9 INJECTION INTRAMUSCULAR; INTRAVENOUS; SUBCUTANEOUS at 17:26

## 2018-07-26 RX ADMIN — Medication 325 MILLIGRAM(S): at 08:33

## 2018-07-26 RX ADMIN — Medication 650 MILLIGRAM(S): at 23:30

## 2018-07-27 ENCOUNTER — TRANSCRIPTION ENCOUNTER (OUTPATIENT)
Age: 68
End: 2018-07-27

## 2018-07-27 VITALS
SYSTOLIC BLOOD PRESSURE: 125 MMHG | HEART RATE: 100 BPM | DIASTOLIC BLOOD PRESSURE: 74 MMHG | TEMPERATURE: 99 F | RESPIRATION RATE: 20 BRPM

## 2018-07-27 LAB
HCT VFR BLD CALC: 22 % — LOW (ref 37–47)
HGB BLD-MCNC: 7.1 G/DL — CRITICAL LOW (ref 12–16)
MCHC RBC-ENTMCNC: 29.5 PG — SIGNIFICANT CHANGE UP (ref 27–31)
MCHC RBC-ENTMCNC: 32.3 G/DL — SIGNIFICANT CHANGE UP (ref 32–37)
MCV RBC AUTO: 91.3 FL — SIGNIFICANT CHANGE UP (ref 81–99)
NRBC # BLD: 0 /100 WBCS — SIGNIFICANT CHANGE UP (ref 0–0)
PLATELET # BLD AUTO: 288 K/UL — SIGNIFICANT CHANGE UP (ref 130–400)
RBC # BLD: 2.41 M/UL — LOW (ref 4.2–5.4)
RBC # FLD: 14.7 % — HIGH (ref 11.5–14.5)
WBC # BLD: 7.36 K/UL — SIGNIFICANT CHANGE UP (ref 4.8–10.8)
WBC # FLD AUTO: 7.36 K/UL — SIGNIFICANT CHANGE UP (ref 4.8–10.8)

## 2018-07-27 RX ORDER — ASPIRIN/CALCIUM CARB/MAGNESIUM 324 MG
1 TABLET ORAL
Qty: 0 | Refills: 0 | COMMUNITY

## 2018-07-27 RX ORDER — ASPIRIN/CALCIUM CARB/MAGNESIUM 324 MG
1 TABLET ORAL
Qty: 0 | Refills: 0 | COMMUNITY
Start: 2018-07-27

## 2018-07-27 RX ORDER — OXYCODONE HYDROCHLORIDE 5 MG/1
1 TABLET ORAL
Qty: 0 | Refills: 0 | COMMUNITY
Start: 2018-07-27

## 2018-07-27 RX ORDER — DOCUSATE SODIUM 100 MG
1 CAPSULE ORAL
Qty: 0 | Refills: 0 | DISCHARGE
Start: 2018-07-27

## 2018-07-27 RX ADMIN — Medication 100 MILLIGRAM(S): at 13:37

## 2018-07-27 RX ADMIN — OXYCODONE HYDROCHLORIDE 10 MILLIGRAM(S): 5 TABLET ORAL at 08:43

## 2018-07-27 RX ADMIN — OXYCODONE HYDROCHLORIDE 10 MILLIGRAM(S): 5 TABLET ORAL at 02:53

## 2018-07-27 RX ADMIN — OXYCODONE HYDROCHLORIDE 5 MILLIGRAM(S): 5 TABLET ORAL at 00:00

## 2018-07-27 RX ADMIN — Medication 100 MILLIGRAM(S): at 06:03

## 2018-07-27 RX ADMIN — Medication 650 MILLIGRAM(S): at 06:04

## 2018-07-27 RX ADMIN — Medication 650 MILLIGRAM(S): at 13:40

## 2018-07-27 RX ADMIN — Medication 650 MILLIGRAM(S): at 00:00

## 2018-07-27 RX ADMIN — Medication 325 MILLIGRAM(S): at 06:03

## 2018-07-27 RX ADMIN — SODIUM CHLORIDE 75 MILLILITER(S): 9 INJECTION INTRAMUSCULAR; INTRAVENOUS; SUBCUTANEOUS at 06:01

## 2018-07-27 RX ADMIN — Medication 650 MILLIGRAM(S): at 06:23

## 2018-07-27 RX ADMIN — SERTRALINE 100 MILLIGRAM(S): 25 TABLET, FILM COATED ORAL at 13:38

## 2018-07-27 RX ADMIN — OXYCODONE HYDROCHLORIDE 10 MILLIGRAM(S): 5 TABLET ORAL at 03:23

## 2018-07-27 RX ADMIN — Medication 650 MILLIGRAM(S): at 13:35

## 2018-07-27 RX ADMIN — Medication 325 MILLIGRAM(S): at 08:44

## 2018-07-27 RX ADMIN — OXYCODONE HYDROCHLORIDE 10 MILLIGRAM(S): 5 TABLET ORAL at 13:35

## 2018-07-27 RX ADMIN — SODIUM CHLORIDE 75 MILLILITER(S): 9 INJECTION INTRAMUSCULAR; INTRAVENOUS; SUBCUTANEOUS at 13:40

## 2018-07-27 RX ADMIN — Medication 325 MILLIGRAM(S): at 13:39

## 2018-07-27 RX ADMIN — Medication 25 MICROGRAM(S): at 06:03

## 2018-07-27 RX ADMIN — Medication 1 TABLET(S): at 13:38

## 2018-07-27 RX ADMIN — Medication 10 MILLIEQUIVALENT(S): at 13:37

## 2018-07-27 RX ADMIN — Medication 1 MILLIGRAM(S): at 13:38

## 2018-07-27 RX ADMIN — OXYCODONE HYDROCHLORIDE 10 MILLIGRAM(S): 5 TABLET ORAL at 09:00

## 2018-07-27 NOTE — PROGRESS NOTE ADULT - PROVIDER SPECIALTY LIST ADULT
Orthopedics

## 2018-07-27 NOTE — DISCHARGE NOTE ADULT - HOSPITAL COURSE
s/p tka acute blood loss anemia ,observed , offered transfusion for intermittent low bp , pt refused ,   ICU Vital Signs Last 24 Hrs  T(C): 36.4 (27 Jul 2018 08:00), Max: 36.4 (27 Jul 2018 08:00)  T(F): 97.5 (27 Jul 2018 08:00), Max: 97.5 (27 Jul 2018 08:00)  HR: 66 (27 Jul 2018 08:00) (66 - 77)  BP: 111/59 (27 Jul 2018 08:00) (72/40 - 116/56)  BP(mean): --  ABP: --  ABP(mean): --  RR: 18 (27 Jul 2018 08:00) (18 - 19)  SpO2: --  stable for d/c

## 2018-07-27 NOTE — PROGRESS NOTE ADULT - SUBJECTIVE AND OBJECTIVE BOX
ORTHO PROGRESS NOTE       68yFemale POD #4      S/P right TKA    Patient seen and examined at bedside . The patient is awake and alert in NAD. No complaints of chest pain, SOB, N/V. Pending transfer to     Vital Signs Last 24 Hrs  T(C): 37 (22 Jul 2018 07:27), Max: 37.2 (22 Jul 2018 05:48)  T(F): 98.6 (22 Jul 2018 07:27), Max: 99 (22 Jul 2018 05:48)  HR: 76 (22 Jul 2018 07:27) (71 - 81)  BP: 100/60 (22 Jul 2018 08:01) (75/38 - 106/54)  BP(mean): --  RR: 18 (22 Jul 2018 07:27) (17 - 18)  SpO2: 96% (22 Jul 2018 05:48) (96% - 97%)                                      7.5    8.44  )-----------( 159      ( 22 Jul 2018 06:20 )             22.8   07-22    139  |  102  |  32<H>  ----------------------------<  113<H>  3.8   |  22  |  0.9    Ca    8.3<L>      22 Jul 2018 01:10  Mg     1.9     07-22          DVT ppx :aspirin     MEDICATIONS  (STANDING):  acetaminophen   Tablet. 650 milliGRAM(s) Oral every 6 hours  aspirin 325 milliGRAM(s) Oral two times a day  carvedilol 12.5 milliGRAM(s) Oral daily  docusate sodium 100 milliGRAM(s) Oral three times a day  ferrous    sulfate 325 milliGRAM(s) Oral three times a day with meals  folic acid 1 milliGRAM(s) Oral daily  hydrochlorothiazide 25 milliGRAM(s) Oral daily  levothyroxine 25 MICROGram(s) Oral daily  losartan 100 milliGRAM(s) Oral daily  melatonin 3 milliGRAM(s) Oral at bedtime  mirtazapine 15 milliGRAM(s) Oral at bedtime  multivitamin 1 Tablet(s) Oral daily  potassium chloride    Tablet ER 10 milliEquivalent(s) Oral daily  pregabalin 75 milliGRAM(s) Oral daily  sertraline 100 milliGRAM(s) Oral daily  simvastatin 40 milliGRAM(s) Oral at bedtime  zolpidem 5 milliGRAM(s) Oral at bedtime    MEDICATIONS  (PRN):  acetaminophen   Tablet 650 milliGRAM(s) Oral every 6 hours PRN For Temp greater than 38 C (100.4 F)  bisacodyl Suppository 10 milliGRAM(s) Rectal daily PRN If no bowel movement by POD#2  diphenhydrAMINE   Capsule 25 milliGRAM(s) Oral at bedtime PRN Insomnia  HYDROmorphone  Injectable 0.25 milliGRAM(s) IV Push every 10 minutes PRN Moderate Pain (4 - 6)  HYDROmorphone  Injectable 0.5 milliGRAM(s) IV Push every 10 minutes PRN Severe Pain (7 -10)  metoclopramide Injectable 10 milliGRAM(s) IV Push every 6 hours PRN Nausea and/or Vomiting  morphine  - Injectable 4 milliGRAM(s) IV Push every 4 hours PRN Severe Pain  ondansetron Injectable 4 milliGRAM(s) IV Push once PRN Nausea and/or Vomiting  oxyCODONE    IR 10 milliGRAM(s) Oral every 4 hours PRN Moderate Pain  oxyCODONE    IR 5 milliGRAM(s) Oral every 4 hours PRN Mild Pain      PE:    right knee incision clean. Staples in place          Mild serous drainage. New dry dressing applied         Blistering and ecchymosis at inferior aspect of incision. No signs of infection         Compartments soft         NVI, SILT           A/P: 68y Female POD # 4  S/P  right TKA, doing well.            OOB to Chair           Dressing Changes           Physical Therapy           Pain control            Incentive Spirometry            DVT Prophylaxis            Discharge planning- possible transfer to 
ORTHO PROGRESS NOTE       68yFemale POD #  5    S/P right TKA    Patient seen and examined at bedside . The patient is awake and alert in NAD. No complaints of chest pain, SOB, N/V.    Vital Signs Last 24 Hrs  T(C): 36.7 (23 Jul 2018 07:36), Max: 36.9 (22 Jul 2018 16:06)  T(F): 98.1 (23 Jul 2018 07:36), Max: 98.5 (22 Jul 2018 16:06)  HR: 72 (23 Jul 2018 07:36) (72 - 75)  BP: 108/66 (23 Jul 2018 07:36) (99/54 - 116/56)  BP(mean): --  RR: 18 (23 Jul 2018 07:36) (18 - 18)  SpO2: 97% (23 Jul 2018 05:25) (97% - 97%)                          7.5    8.44  )-----------( 159      ( 22 Jul 2018 06:20 )             22.8     07-22    139  |  102  |  32<H>  ----------------------------<  113<H>  3.8   |  22  |  0.9    Ca    8.3<L>      22 Jul 2018 01:10  Mg     1.9     07-22            DVT ppx : aspirin     MEDICATIONS  (STANDING):  acetaminophen   Tablet. 650 milliGRAM(s) Oral every 6 hours  aspirin 325 milliGRAM(s) Oral two times a day  carvedilol 12.5 milliGRAM(s) Oral daily  docusate sodium 100 milliGRAM(s) Oral three times a day  ferrous    sulfate 325 milliGRAM(s) Oral three times a day with meals  folic acid 1 milliGRAM(s) Oral daily  hydrochlorothiazide 25 milliGRAM(s) Oral daily  levothyroxine 25 MICROGram(s) Oral daily  losartan 100 milliGRAM(s) Oral daily  melatonin 3 milliGRAM(s) Oral at bedtime  mirtazapine 15 milliGRAM(s) Oral at bedtime  multivitamin 1 Tablet(s) Oral daily  potassium chloride    Tablet ER 10 milliEquivalent(s) Oral daily  pregabalin 75 milliGRAM(s) Oral daily  sertraline 100 milliGRAM(s) Oral daily  simvastatin 40 milliGRAM(s) Oral at bedtime  zolpidem 5 milliGRAM(s) Oral at bedtime    MEDICATIONS  (PRN):  acetaminophen   Tablet 650 milliGRAM(s) Oral every 6 hours PRN For Temp greater than 38 C (100.4 F)  bisacodyl Suppository 10 milliGRAM(s) Rectal daily PRN If no bowel movement by POD#2  diphenhydrAMINE   Capsule 25 milliGRAM(s) Oral at bedtime PRN Insomnia  HYDROmorphone  Injectable 0.25 milliGRAM(s) IV Push every 10 minutes PRN Moderate Pain (4 - 6)  HYDROmorphone  Injectable 0.5 milliGRAM(s) IV Push every 10 minutes PRN Severe Pain (7 -10)  metoclopramide Injectable 10 milliGRAM(s) IV Push every 6 hours PRN Nausea and/or Vomiting  morphine  - Injectable 4 milliGRAM(s) IV Push every 4 hours PRN Severe Pain  ondansetron Injectable 4 milliGRAM(s) IV Push once PRN Nausea and/or Vomiting  oxyCODONE    IR 10 milliGRAM(s) Oral every 4 hours PRN Moderate Pain  oxyCODONE    IR 5 milliGRAM(s) Oral every 4 hours PRN Mild Pain      PE:   rigth knee incision C/D/I          Compartments soft         NVI, SILT           A/P: 68y Female POD # 5   S/P right TKA  , doing well.            OOB to Chair            Physical Therapy           Pain control            Incentive Spirometry            DVT Prophylaxis            Discharge planning - poss 4A today
ORTHO PROGRESS NOTE       68yFemale POD #  6  S/P right TKA     Patient seen and examined at bedside . The patient is awake and alert in NAD. No complaints of chest pain, SOB, N/V.    Vital Signs Last 24 Hrs  T(C): 36.3 (24 Jul 2018 07:07), Max: 36.3 (23 Jul 2018 15:30)  T(F): 97.4 (24 Jul 2018 07:07), Max: 97.4 (23 Jul 2018 15:30)  HR: 71 (24 Jul 2018 07:07) (71 - 85)  BP: 119/53 (24 Jul 2018 07:07) (93/54 - 130/68)  BP(mean): --  RR: 20 (24 Jul 2018 07:07) (15 - 20)  SpO2: 99% (24 Jul 2018 05:15) (99% - 99%)            DVT ppx : aspirin    MEDICATIONS  (STANDING):  acetaminophen   Tablet. 650 milliGRAM(s) Oral every 6 hours  aspirin 325 milliGRAM(s) Oral two times a day  carvedilol 12.5 milliGRAM(s) Oral daily  docusate sodium 100 milliGRAM(s) Oral three times a day  ferrous    sulfate 325 milliGRAM(s) Oral three times a day with meals  folic acid 1 milliGRAM(s) Oral daily  hydrochlorothiazide 25 milliGRAM(s) Oral daily  levothyroxine 25 MICROGram(s) Oral daily  losartan 100 milliGRAM(s) Oral daily  melatonin 3 milliGRAM(s) Oral at bedtime  mirtazapine 15 milliGRAM(s) Oral at bedtime  multivitamin 1 Tablet(s) Oral daily  potassium chloride    Tablet ER 10 milliEquivalent(s) Oral daily  pregabalin 75 milliGRAM(s) Oral daily  sertraline 100 milliGRAM(s) Oral daily  simvastatin 40 milliGRAM(s) Oral at bedtime  zolpidem 5 milliGRAM(s) Oral at bedtime    MEDICATIONS  (PRN):  acetaminophen   Tablet 650 milliGRAM(s) Oral every 6 hours PRN For Temp greater than 38 C (100.4 F)  bisacodyl Suppository 10 milliGRAM(s) Rectal daily PRN If no bowel movement by POD#2  diphenhydrAMINE   Capsule 25 milliGRAM(s) Oral at bedtime PRN Insomnia  HYDROmorphone  Injectable 0.25 milliGRAM(s) IV Push every 10 minutes PRN Moderate Pain (4 - 6)  HYDROmorphone  Injectable 0.5 milliGRAM(s) IV Push every 10 minutes PRN Severe Pain (7 -10)  metoclopramide Injectable 10 milliGRAM(s) IV Push every 6 hours PRN Nausea and/or Vomiting  morphine  - Injectable 4 milliGRAM(s) IV Push every 4 hours PRN Severe Pain  ondansetron Injectable 4 milliGRAM(s) IV Push once PRN Nausea and/or Vomiting  oxyCODONE    IR 10 milliGRAM(s) Oral every 4 hours PRN Moderate Pain  oxyCODONE    IR 5 milliGRAM(s) Oral every 4 hours PRN Mild Pain      PE:   right knee incision  C/D/I, small blister distally           Compartments soft         NVI, SILT           A/P: 68y Female POD # 6    S/P     right TKA   , doing well.            OOB to Chair            Physical Therapy           Pain control            Incentive Spirometry            DVT Prophylaxis            Discharge planning
ORTHO PROGRESS NOTE       68yFemale POD #  9    S/P     Patient seen and examined at bedside . The patient is awake and alert in NAD. No complaints of chest pain, SOB, N/V. The patient feels well, no complaints.     Vital Signs Last 24 Hrs  T(C): 36 (27 Jul 2018 00:00), Max: 36.3 (26 Jul 2018 16:00)  T(F): 96.8 (27 Jul 2018 00:00), Max: 97.4 (26 Jul 2018 16:00)  HR: 70 (27 Jul 2018 05:57) (70 - 77)  BP: 112/55 (27 Jul 2018 05:57) (72/40 - 116/56)  BP(mean): --  RR: 18 (27 Jul 2018 05:57) (18 - 19)  SpO2: --                          7.1    7.36  )-----------( 288      ( 27 Jul 2018 05:36 )             22.0                 DVT ppx : aspirin     MEDICATIONS  (STANDING):  acetaminophen   Tablet. 650 milliGRAM(s) Oral every 6 hours  aspirin 325 milliGRAM(s) Oral two times a day  carvedilol 12.5 milliGRAM(s) Oral daily  docusate sodium 100 milliGRAM(s) Oral three times a day  ferrous    sulfate 325 milliGRAM(s) Oral three times a day with meals  folic acid 1 milliGRAM(s) Oral daily  hydrochlorothiazide 25 milliGRAM(s) Oral daily  levothyroxine 25 MICROGram(s) Oral daily  losartan 100 milliGRAM(s) Oral daily  melatonin 3 milliGRAM(s) Oral at bedtime  mirtazapine 15 milliGRAM(s) Oral at bedtime  multivitamin 1 Tablet(s) Oral daily  potassium chloride    Tablet ER 10 milliEquivalent(s) Oral daily  sertraline 100 milliGRAM(s) Oral daily  simvastatin 40 milliGRAM(s) Oral at bedtime  sodium chloride 0.9%. 1000 milliLiter(s) (75 mL/Hr) IV Continuous <Continuous>    MEDICATIONS  (PRN):  acetaminophen   Tablet 650 milliGRAM(s) Oral every 6 hours PRN For Temp greater than 38 C (100.4 F)  bisacodyl Suppository 10 milliGRAM(s) Rectal daily PRN If no bowel movement by POD#2  diphenhydrAMINE   Capsule 25 milliGRAM(s) Oral at bedtime PRN Insomnia  metoclopramide Injectable 10 milliGRAM(s) IV Push every 6 hours PRN Nausea and/or Vomiting  ondansetron Injectable 4 milliGRAM(s) IV Push once PRN Nausea and/or Vomiting  oxyCODONE    IR 10 milliGRAM(s) Oral every 4 hours PRN Moderate Pain  oxyCODONE    IR 5 milliGRAM(s) Oral every 4 hours PRN Mild Pain      PE:   right knee incision C/D/I , blister unchanged          Compartments soft         NVI, SILT           A/P: 68yFemale POD # 9    S/P    right TKA  , doing well.            OOB to Chair            Physical Therapy           Pain control            Incentive Spirometry            DVT Prophylaxis            Discharge planning -snf today
ORTHO PROGRESS NOTE       68yFemale POD #1      S/P right TKA    Patient seen and examined at bedside . The patient is awake and alert in NAD. No complaints of chest pain, SOB, N/V.    Vital Signs Last 24 Hrs  T(C): 37.2 (20 Jul 2018 07:47), Max: 38.1 (19 Jul 2018 20:15)  T(F): 99 (20 Jul 2018 07:47), Max: 100.5 (19 Jul 2018 20:15)  HR: 80 (20 Jul 2018 07:47) (79 - 99)  BP: 90/56 (20 Jul 2018 07:47) (90/56 - 150/84)  BP(mean): --  RR: 18 (20 Jul 2018 07:47) (18 - 19)  SpO2: --                          8.2    11.66 )-----------( 130      ( 20 Jul 2018 05:54 )             24.1     07-19    139  |  102  |  23<H>  ----------------------------<  109<H>  4.1   |  24  |  0.9    Ca    8.4<L>      19 Jul 2018 07:23    TPro  6.0  /  Alb  3.6  /  TBili  0.6  /  DBili  x   /  AST  19  /  ALT  11  /  AlkPhos  106  07-19          DVT ppx :aspirin     MEDICATIONS  (STANDING):  acetaminophen   Tablet. 650 milliGRAM(s) Oral every 6 hours  aspirin 325 milliGRAM(s) Oral two times a day  carvedilol 12.5 milliGRAM(s) Oral daily  docusate sodium 100 milliGRAM(s) Oral three times a day  ferrous    sulfate 325 milliGRAM(s) Oral three times a day with meals  folic acid 1 milliGRAM(s) Oral daily  hydrochlorothiazide 25 milliGRAM(s) Oral daily  levothyroxine 25 MICROGram(s) Oral daily  losartan 100 milliGRAM(s) Oral daily  melatonin 3 milliGRAM(s) Oral at bedtime  mirtazapine 15 milliGRAM(s) Oral at bedtime  multivitamin 1 Tablet(s) Oral daily  potassium chloride    Tablet ER 10 milliEquivalent(s) Oral daily  pregabalin 75 milliGRAM(s) Oral daily  sertraline 100 milliGRAM(s) Oral daily  simvastatin 40 milliGRAM(s) Oral at bedtime  zolpidem 5 milliGRAM(s) Oral at bedtime    MEDICATIONS  (PRN):  acetaminophen   Tablet 650 milliGRAM(s) Oral every 6 hours PRN For Temp greater than 38 C (100.4 F)  bisacodyl Suppository 10 milliGRAM(s) Rectal daily PRN If no bowel movement by POD#2  diphenhydrAMINE   Capsule 25 milliGRAM(s) Oral at bedtime PRN Insomnia  HYDROmorphone  Injectable 0.25 milliGRAM(s) IV Push every 10 minutes PRN Moderate Pain (4 - 6)  HYDROmorphone  Injectable 0.5 milliGRAM(s) IV Push every 10 minutes PRN Severe Pain (7 -10)  metoclopramide Injectable 10 milliGRAM(s) IV Push every 6 hours PRN Nausea and/or Vomiting  morphine  - Injectable 4 milliGRAM(s) IV Push every 4 hours PRN Severe Pain  ondansetron Injectable 4 milliGRAM(s) IV Push once PRN Nausea and/or Vomiting  oxyCODONE    IR 10 milliGRAM(s) Oral every 4 hours PRN Moderate Pain  oxyCODONE    IR 5 milliGRAM(s) Oral every 4 hours PRN Mild Pain      PE:    right knee incision C/D/I          Compartments soft         NVI, SILT           A/P: 68y Female POD #  2  S/P  right TKA, doing well.            OOB to Chair            Physical Therapy           Pain control            Incentive Spirometry            DVT Prophylaxis            Discharge planning
ORTHO PROGRESS NOTE       68yFemale POD #3      S/P right TKA    Patient seen and examined at bedside . The patient is awake and alert in NAD. No complaints of chest pain, SOB, N/V. Ambulated with PT yesterday    Vital Signs Last 24 Hrs  T(C): 37.2 (20 Jul 2018 07:47), Max: 38.1 (19 Jul 2018 20:15)  T(F): 99 (20 Jul 2018 07:47), Max: 100.5 (19 Jul 2018 20:15)  HR: 80 (20 Jul 2018 07:47) (79 - 99)  BP: 90/56 (20 Jul 2018 07:47) (90/56 - 150/84)  BP(mean): --  RR: 18 (20 Jul 2018 07:47) (18 - 19)  SpO2: --                          8.2    11.66 )-----------( 130      ( 20 Jul 2018 05:54 )             24.1     07-19    139  |  102  |  23<H>  ----------------------------<  109<H>  4.1   |  24  |  0.9    Ca    8.4<L>      19 Jul 2018 07:23    TPro  6.0  /  Alb  3.6  /  TBili  0.6  /  DBili  x   /  AST  19  /  ALT  11  /  AlkPhos  106  07-19          DVT ppx :aspirin     MEDICATIONS  (STANDING):  acetaminophen   Tablet. 650 milliGRAM(s) Oral every 6 hours  aspirin 325 milliGRAM(s) Oral two times a day  carvedilol 12.5 milliGRAM(s) Oral daily  docusate sodium 100 milliGRAM(s) Oral three times a day  ferrous    sulfate 325 milliGRAM(s) Oral three times a day with meals  folic acid 1 milliGRAM(s) Oral daily  hydrochlorothiazide 25 milliGRAM(s) Oral daily  levothyroxine 25 MICROGram(s) Oral daily  losartan 100 milliGRAM(s) Oral daily  melatonin 3 milliGRAM(s) Oral at bedtime  mirtazapine 15 milliGRAM(s) Oral at bedtime  multivitamin 1 Tablet(s) Oral daily  potassium chloride    Tablet ER 10 milliEquivalent(s) Oral daily  pregabalin 75 milliGRAM(s) Oral daily  sertraline 100 milliGRAM(s) Oral daily  simvastatin 40 milliGRAM(s) Oral at bedtime  zolpidem 5 milliGRAM(s) Oral at bedtime    MEDICATIONS  (PRN):  acetaminophen   Tablet 650 milliGRAM(s) Oral every 6 hours PRN For Temp greater than 38 C (100.4 F)  bisacodyl Suppository 10 milliGRAM(s) Rectal daily PRN If no bowel movement by POD#2  diphenhydrAMINE   Capsule 25 milliGRAM(s) Oral at bedtime PRN Insomnia  HYDROmorphone  Injectable 0.25 milliGRAM(s) IV Push every 10 minutes PRN Moderate Pain (4 - 6)  HYDROmorphone  Injectable 0.5 milliGRAM(s) IV Push every 10 minutes PRN Severe Pain (7 -10)  metoclopramide Injectable 10 milliGRAM(s) IV Push every 6 hours PRN Nausea and/or Vomiting  morphine  - Injectable 4 milliGRAM(s) IV Push every 4 hours PRN Severe Pain  ondansetron Injectable 4 milliGRAM(s) IV Push once PRN Nausea and/or Vomiting  oxyCODONE    IR 10 milliGRAM(s) Oral every 4 hours PRN Moderate Pain  oxyCODONE    IR 5 milliGRAM(s) Oral every 4 hours PRN Mild Pain      PE:    right knee incision clean. Staples in place          Mild serous drainage. New dry dressing applied         Blistering and ecchymosis at inferior aspect of incision. No signs of infection         Compartments soft         NVI, SILT           A/P: 68y Female POD #  3  S/P  right TKA, doing well.            OOB to Chair           Dressing Changes           Physical Therapy           Pain control            Incentive Spirometry            DVT Prophylaxis            Discharge planning
ORTHOPEDIC      POD0 s/p R TKA pod 1   S/e at bedside.  Doing well, pain controlled. Denies fevers, numbness/tingling. No other complaints.    Vital Signs Last 24 Hrs  T(C): 37.2 (19 Jul 2018 07:35), Max: 37.2 (19 Jul 2018 07:35)  T(F): 98.9 (19 Jul 2018 07:35), Max: 98.9 (19 Jul 2018 07:35)  HR: 85 (19 Jul 2018 07:35) (62 - 108)  BP: 111/61 (19 Jul 2018 07:35) (90/52 - 144/87)  BP(mean): --  RR: 18 (19 Jul 2018 07:35) (15 - 22)  SpO2: 95% (18 Jul 2018 16:35) (95% - 98%)    Gen: awake alert NAD  RLE: dressing c/d/i,         nvid          calf soft nt          ipc x 2 in place          A/P 68yFemale POD1 s/p R TKA    - pain control  - postop abx  - DVT prophylaxis  - IS encouraged  - AM labs  - PT/rehab  - D/c planning
ORTHOPEDIC POST OP CHECK      POD0 s/p R TKA.  S/e at bedside.  Doing well, pain controlled.  Eating.  Denies fevers, numbness/tingling. No other complaints.    T(C): 36.1 (07-18-18 @ 17:13), Max: 36.9 (07-18-18 @ 15:20)  HR: 89 (07-18-18 @ 17:13) (62 - 108)  BP: 102/57 (07-18-18 @ 17:13) (102/57 - 144/87)  RR: 18 (07-18-18 @ 17:13) (15 - 22)  SpO2: 95% (07-18-18 @ 16:35) (95% - 98%)    Gen: awake alert NAD  RLE: dressing c/d/i, SILT s/s/sp/dp/t, motor intact TA/EHL/GS, digits wwp           A/P 68yFemale POD0 s/p R TKA doing well  - pain control  - postop abx  - DVT prophylaxis  - IS encouraged  - AM labs  - PT/rehab  - D/c planning
POD#7 S/P TKA  T(C): 36.1 (07-25-18 @ 07:44), Max: 37.1 (07-25-18 @ 00:00)  HR: 67 (07-25-18 @ 07:44) (67 - 75)  BP: 97/56 (07-25-18 @ 07:44) (91/51 - 114/61)  RR: 18 (07-25-18 @ 07:44) (16 - 18)  SpO2: --        PTS PAIN IS CONTROLLED   WOUND IS CDI DRESSING CHANGED  N/V/I  ABX COMPLETE  DVT PROPHYLAXIS IN PLACE  REHAB IN PROGRESS  D/C PLAN STILL PENDING
POD#8 S/P TKA    Vital Signs Last 24 Hrs  T(C): 35.3 (26 Jul 2018 07:55), Max: 36.4 (25 Jul 2018 16:00)  T(F): 95.6 (26 Jul 2018 07:55), Max: 97.5 (25 Jul 2018 16:00)  HR: 73 (26 Jul 2018 07:55) (66 - 73)  BP: 144/67 (26 Jul 2018 07:55) (87/54 - 144/67)  BP(mean): --  RR: 18 (26 Jul 2018 07:55) (17 - 20)  SpO2: --                          7.2    6.61  )-----------( 259      ( 26 Jul 2018 06:00 )             22.4         PTS PAIN IS CONTROLLED   WOUND IS CDI DRESSING CHANGED  N/V/I  ABX COMPLETE  DVT PROPHYLAXIS IN PLACE  REHAB IN PROGRESS  D/C PLAN, PENDING PLACEMENT

## 2018-07-27 NOTE — DISCHARGE NOTE ADULT - CARE PROVIDER_API CALL
Reg Mckinley), Orthopaedic Surgery  [facility] [place] [address]  Phone: [phone]  Fax: [phone]

## 2018-07-27 NOTE — DISCHARGE NOTE ADULT - MEDICATION SUMMARY - MEDICATIONS TO TAKE
I will START or STAY ON the medications listed below when I get home from the hospital:    celecoxib 200 mg oral capsule  -- 1 cap(s) by mouth once a day  -- Indication: For hm    acetaminophen 325 mg oral tablet  -- 1 tab(s) by mouth every 8 hours, As Needed  -- Indication: For hm    traMADol 50 mg oral tablet  -- 2 tab(s) by mouth 2 times a day, As Needed  -- Indication: For hm    meloxicam 15 mg oral tablet  -- 1 tab(s) by mouth once a day  -- Indication: For hm    oxyCODONE 5 mg oral tablet  -- 1 tab(s) by mouth every 4 hours, As needed, Mild Pain  -- Indication: For hm    aspirin 325 mg oral tablet  -- 1 tab(s) by mouth 2 times a day  -- Indication: For po    losartan 100 mg oral tablet  -- 1 tab(s) by mouth once a day  -- Indication: For hm    pregabalin 75 mg oral capsule  -- 1 cap(s) by mouth once a day  -- Indication: For hm    sertraline 100 mg oral tablet  -- 1 tab(s) by mouth once a day  -- Indication: For hm    mirtazapine 7.5 mg oral tablet  -- 2 tab(s) by mouth once a day (at bedtime)  -- Indication: For hm    simvastatin 40 mg oral tablet  -- 1 tab(s) by mouth once a day (at bedtime)  -- Indication: For hm    zolpidem 5 mg oral tablet  -- 1 tab(s) by mouth once a day (at bedtime)  -- Indication: For hm    carvedilol 12.5 mg oral tablet  -- 1 tab(s) by mouth once a day  -- Indication: For hm    hydroCHLOROthiazide 25 mg oral tablet  -- 1 tab(s) by mouth once a day  -- Indication: For hm    docusate sodium 100 mg oral capsule  -- 1 cap(s) by mouth 3 times a day  -- Indication: For hm    bisacodyl 10 mg rectal suppository  -- 1 suppository(ies) rectally once a day, As needed, If no bowel movement by POD#2  -- Indication: For hm    potassium chloride 10 mEq oral tablet, extended release  -- 1 tab(s) by mouth once a day  -- Indication: For hm    Melatonin 3 mg oral tablet  -- 1 tab(s) by mouth once (at bedtime)  -- Indication: For hm    levothyroxine 25 mcg (0.025 mg) oral capsule  -- 1 cap(s) by mouth once a day  -- Indication: For hm    Multiple Vitamins oral tablet  -- 1 tab(s) by mouth once a day  -- Indication: For hm    folic acid 1 mg oral tablet  -- 1 tab(s) by mouth once a day  -- Indication: For hm

## 2018-07-27 NOTE — DISCHARGE NOTE ADULT - PATIENT PORTAL LINK FT
You can access the [facility] Portal, offered by [facility], by registering with the following website: [email or web address]

## 2018-08-01 DIAGNOSIS — I95.81 POSTPROCEDURAL HYPOTENSION: ICD-10-CM

## 2018-08-01 DIAGNOSIS — M25.761 OSTEOPHYTE, RIGHT KNEE: ICD-10-CM
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
## 2018-08-01 DIAGNOSIS — M21.861 OTHER SPECIFIED ACQUIRED DEFORMITIES OF RIGHT LOWER LEG: ICD-10-CM

## 2018-08-01 DIAGNOSIS — M17.11 UNILATERAL PRIMARY OSTEOARTHRITIS, RIGHT KNEE: ICD-10-CM

## 2018-08-01 DIAGNOSIS — D62 ACUTE POSTHEMORRHAGIC ANEMIA: ICD-10-CM

## 2018-08-01 DIAGNOSIS — E78.5 HYPERLIPIDEMIA, UNSPECIFIED: ICD-10-CM

## 2018-08-01 DIAGNOSIS — I25.10 ATHEROSCLEROTIC HEART DISEASE OF NATIVE CORONARY ARTERY WITHOUT ANGINA PECTORIS: ICD-10-CM

## 2018-08-01 DIAGNOSIS — Z89.612 ACQUIRED ABSENCE OF LEFT LEG ABOVE KNEE: ICD-10-CM

## 2018-08-01 DIAGNOSIS — S80.221A BLISTER (NONTHERMAL), RIGHT KNEE, INITIAL ENCOUNTER: ICD-10-CM

## 2018-08-01 DIAGNOSIS — I10 ESSENTIAL (PRIMARY) HYPERTENSION: ICD-10-CM

## 2018-08-01 DIAGNOSIS — M22.41 CHONDROMALACIA PATELLAE, RIGHT KNEE: ICD-10-CM

## 2018-08-03 ENCOUNTER — OUTPATIENT (OUTPATIENT)
Dept: OUTPATIENT SERVICES | Facility: HOSPITAL | Age: 68
LOS: 1 days | Discharge: HOME | End: 2018-08-03

## 2018-08-03 DIAGNOSIS — Z98.890 OTHER SPECIFIED POSTPROCEDURAL STATES: Chronic | ICD-10-CM

## 2018-08-03 DIAGNOSIS — R79.9 ABNORMAL FINDING OF BLOOD CHEMISTRY, UNSPECIFIED: ICD-10-CM

## 2018-08-03 PROBLEM — E78.5 HYPERLIPIDEMIA, UNSPECIFIED: Chronic | Status: ACTIVE | Noted: 2018-06-25

## 2018-08-03 PROBLEM — I10 ESSENTIAL (PRIMARY) HYPERTENSION: Chronic | Status: ACTIVE | Noted: 2018-06-25

## 2018-08-03 PROBLEM — G89.29 OTHER CHRONIC PAIN: Chronic | Status: ACTIVE | Noted: 2018-06-25

## 2018-08-03 PROBLEM — F41.9 ANXIETY DISORDER, UNSPECIFIED: Chronic | Status: ACTIVE | Noted: 2018-06-25

## 2018-08-03 PROBLEM — F32.9 MAJOR DEPRESSIVE DISORDER, SINGLE EPISODE, UNSPECIFIED: Chronic | Status: ACTIVE | Noted: 2018-06-25

## 2018-08-03 PROBLEM — M19.90 UNSPECIFIED OSTEOARTHRITIS, UNSPECIFIED SITE: Chronic | Status: ACTIVE | Noted: 2018-06-25

## 2018-08-03 PROBLEM — G47.00 INSOMNIA, UNSPECIFIED: Chronic | Status: ACTIVE | Noted: 2018-06-25

## 2018-08-03 PROBLEM — E03.9 HYPOTHYROIDISM, UNSPECIFIED: Chronic | Status: ACTIVE | Noted: 2018-06-25

## 2018-08-10 ENCOUNTER — OUTPATIENT (OUTPATIENT)
Dept: OUTPATIENT SERVICES | Facility: HOSPITAL | Age: 68
LOS: 1 days | Discharge: HOME | End: 2018-08-10

## 2018-08-10 DIAGNOSIS — Z98.890 OTHER SPECIFIED POSTPROCEDURAL STATES: Chronic | ICD-10-CM

## 2018-08-10 DIAGNOSIS — D50.9 IRON DEFICIENCY ANEMIA, UNSPECIFIED: ICD-10-CM

## 2018-11-01 ENCOUNTER — OUTPATIENT (OUTPATIENT)
Dept: OUTPATIENT SERVICES | Facility: HOSPITAL | Age: 68
LOS: 1 days | Discharge: HOME | End: 2018-11-01

## 2018-11-01 DIAGNOSIS — Z12.31 ENCOUNTER FOR SCREENING MAMMOGRAM FOR MALIGNANT NEOPLASM OF BREAST: ICD-10-CM

## 2018-11-01 DIAGNOSIS — Z98.890 OTHER SPECIFIED POSTPROCEDURAL STATES: Chronic | ICD-10-CM

## 2018-11-08 ENCOUNTER — OUTPATIENT (OUTPATIENT)
Dept: OUTPATIENT SERVICES | Facility: HOSPITAL | Age: 68
LOS: 1 days | Discharge: HOME | End: 2018-11-08

## 2018-11-08 DIAGNOSIS — Z98.890 OTHER SPECIFIED POSTPROCEDURAL STATES: Chronic | ICD-10-CM

## 2018-11-08 DIAGNOSIS — M06.4 INFLAMMATORY POLYARTHROPATHY: ICD-10-CM

## 2018-11-09 ENCOUNTER — OUTPATIENT (OUTPATIENT)
Dept: OUTPATIENT SERVICES | Facility: HOSPITAL | Age: 68
LOS: 1 days | Discharge: HOME | End: 2018-11-09

## 2018-11-09 DIAGNOSIS — Z98.890 OTHER SPECIFIED POSTPROCEDURAL STATES: Chronic | ICD-10-CM

## 2018-11-09 DIAGNOSIS — Z02.9 ENCOUNTER FOR ADMINISTRATIVE EXAMINATIONS, UNSPECIFIED: ICD-10-CM

## 2018-11-09 DIAGNOSIS — M06.4 INFLAMMATORY POLYARTHROPATHY: ICD-10-CM

## 2018-11-12 ENCOUNTER — OUTPATIENT (OUTPATIENT)
Dept: OUTPATIENT SERVICES | Facility: HOSPITAL | Age: 68
LOS: 1 days | Discharge: HOME | End: 2018-11-12

## 2018-11-12 DIAGNOSIS — Z98.890 OTHER SPECIFIED POSTPROCEDURAL STATES: Chronic | ICD-10-CM

## 2018-11-13 DIAGNOSIS — M06.4 INFLAMMATORY POLYARTHROPATHY: ICD-10-CM

## 2019-03-18 ENCOUNTER — INPATIENT (INPATIENT)
Facility: HOSPITAL | Age: 69
LOS: 10 days | Discharge: NURSING, REHAB OR LONG-TERM CARE | End: 2019-03-29
Attending: INTERNAL MEDICINE | Admitting: INTERNAL MEDICINE
Payer: MEDICARE

## 2019-03-18 VITALS
RESPIRATION RATE: 17 BRPM | OXYGEN SATURATION: 97 % | HEART RATE: 94 BPM | TEMPERATURE: 97 F | SYSTOLIC BLOOD PRESSURE: 143 MMHG | DIASTOLIC BLOOD PRESSURE: 96 MMHG

## 2019-03-18 DIAGNOSIS — Z98.890 OTHER SPECIFIED POSTPROCEDURAL STATES: Chronic | ICD-10-CM

## 2019-03-18 LAB
ALBUMIN SERPL ELPH-MCNC: 4.4 G/DL — SIGNIFICANT CHANGE UP (ref 3.5–5.2)
ALP SERPL-CCNC: 94 U/L — SIGNIFICANT CHANGE UP (ref 30–115)
ALT FLD-CCNC: 15 U/L — SIGNIFICANT CHANGE UP (ref 0–41)
ANION GAP SERPL CALC-SCNC: 17 MMOL/L — HIGH (ref 7–14)
AST SERPL-CCNC: 15 U/L — SIGNIFICANT CHANGE UP (ref 0–41)
BASOPHILS # BLD AUTO: 0.04 K/UL — SIGNIFICANT CHANGE UP (ref 0–0.2)
BASOPHILS NFR BLD AUTO: 0.4 % — SIGNIFICANT CHANGE UP (ref 0–1)
BILIRUB SERPL-MCNC: 1.1 MG/DL — SIGNIFICANT CHANGE UP (ref 0.2–1.2)
BUN SERPL-MCNC: 18 MG/DL — SIGNIFICANT CHANGE UP (ref 10–20)
CALCIUM SERPL-MCNC: 9.8 MG/DL — SIGNIFICANT CHANGE UP (ref 8.5–10.1)
CHLORIDE SERPL-SCNC: 97 MMOL/L — LOW (ref 98–110)
CO2 SERPL-SCNC: 23 MMOL/L — SIGNIFICANT CHANGE UP (ref 17–32)
CREAT SERPL-MCNC: 0.8 MG/DL — SIGNIFICANT CHANGE UP (ref 0.7–1.5)
EOSINOPHIL # BLD AUTO: 0.06 K/UL — SIGNIFICANT CHANGE UP (ref 0–0.7)
EOSINOPHIL NFR BLD AUTO: 0.5 % — SIGNIFICANT CHANGE UP (ref 0–8)
GLUCOSE SERPL-MCNC: 104 MG/DL — HIGH (ref 70–99)
HCT VFR BLD CALC: 45.9 % — SIGNIFICANT CHANGE UP (ref 37–47)
HGB BLD-MCNC: 15.7 G/DL — SIGNIFICANT CHANGE UP (ref 12–16)
IMM GRANULOCYTES NFR BLD AUTO: 0.5 % — HIGH (ref 0.1–0.3)
LACTATE SERPL-SCNC: 2.3 MMOL/L — HIGH (ref 0.5–2.2)
LYMPHOCYTES # BLD AUTO: 18.6 % — LOW (ref 20.5–51.1)
LYMPHOCYTES # BLD AUTO: 2.12 K/UL — SIGNIFICANT CHANGE UP (ref 1.2–3.4)
MCHC RBC-ENTMCNC: 31 PG — SIGNIFICANT CHANGE UP (ref 27–31)
MCHC RBC-ENTMCNC: 34.2 G/DL — SIGNIFICANT CHANGE UP (ref 32–37)
MCV RBC AUTO: 90.7 FL — SIGNIFICANT CHANGE UP (ref 81–99)
MONOCYTES # BLD AUTO: 1.17 K/UL — HIGH (ref 0.1–0.6)
MONOCYTES NFR BLD AUTO: 10.2 % — HIGH (ref 1.7–9.3)
NEUTROPHILS # BLD AUTO: 7.97 K/UL — HIGH (ref 1.4–6.5)
NEUTROPHILS NFR BLD AUTO: 69.8 % — SIGNIFICANT CHANGE UP (ref 42.2–75.2)
NRBC # BLD: 0 /100 WBCS — SIGNIFICANT CHANGE UP (ref 0–0)
PLATELET # BLD AUTO: 224 K/UL — SIGNIFICANT CHANGE UP (ref 130–400)
POTASSIUM SERPL-MCNC: 3.2 MMOL/L — LOW (ref 3.5–5)
POTASSIUM SERPL-SCNC: 3.2 MMOL/L — LOW (ref 3.5–5)
PROT SERPL-MCNC: 7.5 G/DL — SIGNIFICANT CHANGE UP (ref 6–8)
RBC # BLD: 5.06 M/UL — SIGNIFICANT CHANGE UP (ref 4.2–5.4)
RBC # FLD: 13 % — SIGNIFICANT CHANGE UP (ref 11.5–14.5)
SODIUM SERPL-SCNC: 137 MMOL/L — SIGNIFICANT CHANGE UP (ref 135–146)
WBC # BLD: 11.42 K/UL — HIGH (ref 4.8–10.8)
WBC # FLD AUTO: 11.42 K/UL — HIGH (ref 4.8–10.8)

## 2019-03-18 RX ORDER — LANOLIN ALCOHOL/MO/W.PET/CERES
3 CREAM (GRAM) TOPICAL AT BEDTIME
Qty: 0 | Refills: 0 | Status: DISCONTINUED | OUTPATIENT
Start: 2019-03-18 | End: 2019-03-22

## 2019-03-18 RX ORDER — ZOLPIDEM TARTRATE 10 MG/1
1 TABLET ORAL
Qty: 0 | Refills: 0 | COMMUNITY

## 2019-03-18 RX ORDER — MELOXICAM 15 MG/1
1 TABLET ORAL
Qty: 0 | Refills: 0 | COMMUNITY

## 2019-03-18 RX ORDER — CEFEPIME 1 G/1
2000 INJECTION, POWDER, FOR SOLUTION INTRAMUSCULAR; INTRAVENOUS EVERY 12 HOURS
Qty: 0 | Refills: 0 | Status: DISCONTINUED | OUTPATIENT
Start: 2019-03-19 | End: 2019-03-19

## 2019-03-18 RX ORDER — ACETAMINOPHEN 500 MG
1 TABLET ORAL
Qty: 0 | Refills: 0 | COMMUNITY

## 2019-03-18 RX ORDER — SERTRALINE 25 MG/1
100 TABLET, FILM COATED ORAL DAILY
Qty: 0 | Refills: 0 | Status: DISCONTINUED | OUTPATIENT
Start: 2019-03-18 | End: 2019-03-22

## 2019-03-18 RX ORDER — POTASSIUM CHLORIDE 20 MEQ
40 PACKET (EA) ORAL ONCE
Qty: 0 | Refills: 0 | Status: COMPLETED | OUTPATIENT
Start: 2019-03-18 | End: 2019-03-18

## 2019-03-18 RX ORDER — CEFEPIME 1 G/1
INJECTION, POWDER, FOR SOLUTION INTRAMUSCULAR; INTRAVENOUS
Qty: 0 | Refills: 0 | Status: DISCONTINUED | OUTPATIENT
Start: 2019-03-18 | End: 2019-03-19

## 2019-03-18 RX ORDER — LOSARTAN POTASSIUM 100 MG/1
100 TABLET, FILM COATED ORAL DAILY
Qty: 0 | Refills: 0 | Status: DISCONTINUED | OUTPATIENT
Start: 2019-03-18 | End: 2019-03-22

## 2019-03-18 RX ORDER — FOLIC ACID 0.8 MG
1 TABLET ORAL
Qty: 0 | Refills: 0 | COMMUNITY

## 2019-03-18 RX ORDER — TRAMADOL HYDROCHLORIDE 50 MG/1
2 TABLET ORAL
Qty: 0 | Refills: 0 | COMMUNITY

## 2019-03-18 RX ORDER — FOLIC ACID 0.8 MG
1 TABLET ORAL DAILY
Qty: 0 | Refills: 0 | Status: DISCONTINUED | OUTPATIENT
Start: 2019-03-18 | End: 2019-03-22

## 2019-03-18 RX ORDER — SODIUM CHLORIDE 9 MG/ML
1000 INJECTION, SOLUTION INTRAVENOUS ONCE
Qty: 0 | Refills: 0 | Status: COMPLETED | OUTPATIENT
Start: 2019-03-18 | End: 2019-03-18

## 2019-03-18 RX ORDER — VANCOMYCIN HCL 1 G
1000 VIAL (EA) INTRAVENOUS ONCE
Qty: 0 | Refills: 0 | Status: COMPLETED | OUTPATIENT
Start: 2019-03-18 | End: 2019-03-18

## 2019-03-18 RX ORDER — LEVOTHYROXINE SODIUM 125 MCG
25 TABLET ORAL DAILY
Qty: 0 | Refills: 0 | Status: DISCONTINUED | OUTPATIENT
Start: 2019-03-18 | End: 2019-03-22

## 2019-03-18 RX ORDER — CELECOXIB 200 MG/1
200 CAPSULE ORAL DAILY
Qty: 0 | Refills: 0 | Status: DISCONTINUED | OUTPATIENT
Start: 2019-03-18 | End: 2019-03-22

## 2019-03-18 RX ORDER — PIPERACILLIN AND TAZOBACTAM 4; .5 G/20ML; G/20ML
3.38 INJECTION, POWDER, LYOPHILIZED, FOR SOLUTION INTRAVENOUS ONCE
Qty: 0 | Refills: 0 | Status: COMPLETED | OUTPATIENT
Start: 2019-03-18 | End: 2019-03-18

## 2019-03-18 RX ORDER — POTASSIUM CHLORIDE 20 MEQ
10 PACKET (EA) ORAL DAILY
Qty: 0 | Refills: 0 | Status: DISCONTINUED | OUTPATIENT
Start: 2019-03-18 | End: 2019-03-22

## 2019-03-18 RX ORDER — DOCUSATE SODIUM 100 MG
100 CAPSULE ORAL THREE TIMES A DAY
Qty: 0 | Refills: 0 | Status: DISCONTINUED | OUTPATIENT
Start: 2019-03-18 | End: 2019-03-22

## 2019-03-18 RX ORDER — CEFEPIME 1 G/1
2000 INJECTION, POWDER, FOR SOLUTION INTRAMUSCULAR; INTRAVENOUS ONCE
Qty: 0 | Refills: 0 | Status: COMPLETED | OUTPATIENT
Start: 2019-03-18 | End: 2019-03-18

## 2019-03-18 RX ORDER — HEPARIN SODIUM 5000 [USP'U]/ML
5000 INJECTION INTRAVENOUS; SUBCUTANEOUS EVERY 8 HOURS
Qty: 0 | Refills: 0 | Status: DISCONTINUED | OUTPATIENT
Start: 2019-03-18 | End: 2019-03-22

## 2019-03-18 RX ORDER — MIRTAZAPINE 45 MG/1
2 TABLET, ORALLY DISINTEGRATING ORAL
Qty: 0 | Refills: 0 | COMMUNITY

## 2019-03-18 RX ORDER — VANCOMYCIN HCL 1 G
1000 VIAL (EA) INTRAVENOUS EVERY 12 HOURS
Qty: 0 | Refills: 0 | Status: DISCONTINUED | OUTPATIENT
Start: 2019-03-18 | End: 2019-03-20

## 2019-03-18 RX ORDER — SIMVASTATIN 20 MG/1
40 TABLET, FILM COATED ORAL AT BEDTIME
Qty: 0 | Refills: 0 | Status: DISCONTINUED | OUTPATIENT
Start: 2019-03-18 | End: 2019-03-22

## 2019-03-18 RX ORDER — HYDROCHLOROTHIAZIDE 25 MG
25 TABLET ORAL DAILY
Qty: 0 | Refills: 0 | Status: DISCONTINUED | OUTPATIENT
Start: 2019-03-18 | End: 2019-03-22

## 2019-03-18 RX ADMIN — Medication 250 MILLIGRAM(S): at 11:08

## 2019-03-18 RX ADMIN — Medication 40 MILLIEQUIVALENT(S): at 11:45

## 2019-03-18 RX ADMIN — PIPERACILLIN AND TAZOBACTAM 200 GRAM(S): 4; .5 INJECTION, POWDER, LYOPHILIZED, FOR SOLUTION INTRAVENOUS at 14:17

## 2019-03-18 RX ADMIN — CEFEPIME 100 MILLIGRAM(S): 1 INJECTION, POWDER, FOR SOLUTION INTRAMUSCULAR; INTRAVENOUS at 16:18

## 2019-03-18 RX ADMIN — Medication 100 MILLIGRAM(S): at 21:56

## 2019-03-18 RX ADMIN — SODIUM CHLORIDE 1000 MILLILITER(S): 9 INJECTION, SOLUTION INTRAVENOUS at 12:49

## 2019-03-18 RX ADMIN — Medication 3 MILLIGRAM(S): at 21:56

## 2019-03-18 RX ADMIN — SIMVASTATIN 40 MILLIGRAM(S): 20 TABLET, FILM COATED ORAL at 21:56

## 2019-03-18 RX ADMIN — HEPARIN SODIUM 5000 UNIT(S): 5000 INJECTION INTRAVENOUS; SUBCUTANEOUS at 21:56

## 2019-03-18 RX ADMIN — SODIUM CHLORIDE 1000 MILLILITER(S): 9 INJECTION, SOLUTION INTRAVENOUS at 11:45

## 2019-03-18 RX ADMIN — Medication 1000 MILLIGRAM(S): at 12:49

## 2019-03-18 NOTE — H&P ADULT - ATTENDING COMMENTS
Patient seen today, chart reviewed. Patient's H& P modified. Patient depressed with current condition, tearful. Patient first noted discomfort last week and sought out guidance from prosthetist. Patient states a new mold was done? Patient's WBC decreased with IV antibiotics, awaiting xray and ID evaluation. Further plan pending the above. For now keep without prosthetic device...
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
## 2019-03-18 NOTE — ED ADULT NURSE NOTE - OBJECTIVE STATEMENT
Left BKA abrasion and painful. area is red swollen and tender to touch. denies n/v/f/d. denies sob. Pt states pain started last Wednesday. denies numbness/tingling.

## 2019-03-18 NOTE — ED PROVIDER NOTE - ATTENDING CONTRIBUTION TO CARE
70 y/o female with hx of left BKA, dyslipidemia, hypothyroidism, OA presents to ED with pain, swelling and redness to the BKA stump x 1 week.  No fever or chills.  No trauma.  PE:  agree with above.  A/P:  Cellulitis, IV Abx and Admit.

## 2019-03-18 NOTE — H&P ADULT - HISTORY OF PRESENT ILLNESS
69 year old female with a PMH of depression, Anxiety, HTN, DLD, Hypothyroid, Osteoarthritis with a left AKA performed in 1969 presents here c/o pain swelling and redness of the stump x 1 week. Patient denies fever chills cough or any other associated symptoms.

## 2019-03-18 NOTE — H&P ADULT - NSICDXPASTMEDICALHX_GEN_ALL_CORE_FT
PAST MEDICAL HISTORY:  Anxiety     Chronic pain     Depression     Essential hypertension     HLD (hyperlipidemia)     Hypothyroid     Insomnia     Osteoarthritis

## 2019-03-18 NOTE — ED PROVIDER NOTE - OBJECTIVE STATEMENT
69 year old female with a pmh of depression anxiety htn hld hypothyoird osteoarthritis with a left bka perfrommed in 1969 presents here c/o pain swelling and redness of the stump x 1 week. Patient denies fever chills cough cp n/v abdominal pain urinary frequency urgency burning.

## 2019-03-18 NOTE — H&P ADULT - NSHPLABSRESULTS_GEN_ALL_CORE
15.7   11.42 )-----------( 224      ( 18 Mar 2019 10:27 )             45.9     03-18    137  |  97<L>  |  18  ----------------------------<  104<H>  3.2<L>   |  23  |  0.8    Ca    9.8      18 Mar 2019 10:27    TPro  7.5  /  Alb  4.4  /  TBili  1.1  /  DBili  x   /  AST  15  /  ALT  15  /  AlkPhos  94  03-18

## 2019-03-18 NOTE — H&P ADULT - NSHPPHYSICALEXAM_GEN_ALL_CORE
PHYSICAL EXAM:  GENERAL: NAD, speaks in full sentences, no signs of respiratory distress  HEAD:  Atraumatic, Normocephalic  EYES: EOMI, PERRLA, conjunctiva and sclera clear  NECK: Supple, No JVD  CHEST/LUNG: Clear to auscultation bilaterally; No wheeze; No crackles; No accessory muscles used  HEART: Regular rate and rhythm; No murmurs;   ABDOMEN: Soft, Nontender, Nondistended; Bowel sounds present; No guarding  EXTREMITIES:  Left AKA now red and hot.   PSYCH: AAOx3  NEUROLOGY: non-focal  SKIN: No rashes or lesions

## 2019-03-18 NOTE — ED PROVIDER NOTE - CLINICAL SUMMARY MEDICAL DECISION MAKING FREE TEXT BOX
No signs of sepsis.  No abscess on bedside sonography, but significant cobblestoning.  Will require IV Abx and possible debridement.

## 2019-03-18 NOTE — ED PROVIDER NOTE - NS ED MD TWO NIGHTS YN
Patient saw  Dr Dex Kowalski yesterday  Labs ordered but lab unable to draw  Noted pt has [facility] line    Call  nurse Carlotta and determined labs done yesterday from [facility] and brought to [facility]    Called [facility] and spoke to Reba  [facility]  New orders for Hepatic panel fax [phone]    Per Dr Kowalski no need PT as pt does home testing with [facility] and on Coumadin   Yes

## 2019-03-18 NOTE — ED PROVIDER NOTE - NS ED ROS FT
Constitutional: See HPI.  Eyes: No visual changes,   ENMT:  No neck pain   Cardiac: No cp  Respiratory: No cough   GI: No nausea, vomiting, diarrhea or abdominal pain.  : No dysuria, frequency or burning.   MS: see hpi  Neuro: No headache or weakness. No LOC.  Skin: see hpi

## 2019-03-18 NOTE — ED PROVIDER NOTE - PHYSICAL EXAMINATION
CONSTITUTIONAL: WA / WN / NAD  HEAD: NCAT  EYES: PERRL; EOMI;   ENT: Normal pharynx; mucous membranes pink/moist, no erythema.  NECK: Supple; no meningeal signs  CARD: RRR; nl S1/S2; no M/R/G.   RESP: Respiratory rate and effort are normal; breath sounds clear and equal bilaterally.  ABD: Soft, NT ND   MSK/EXT: left bka with erythema fluctuance. No crepetius  SKIN: erytham fluctuance of left bka stump  NEURO: AAOx3  PSYCH: Memory Intact, Normal Affect

## 2019-03-19 LAB
ALBUMIN SERPL ELPH-MCNC: 4 G/DL — SIGNIFICANT CHANGE UP (ref 3.5–5.2)
ALP SERPL-CCNC: 90 U/L — SIGNIFICANT CHANGE UP (ref 30–115)
ALT FLD-CCNC: 13 U/L — SIGNIFICANT CHANGE UP (ref 0–41)
ANION GAP SERPL CALC-SCNC: 14 MMOL/L — SIGNIFICANT CHANGE UP (ref 7–14)
AST SERPL-CCNC: 12 U/L — SIGNIFICANT CHANGE UP (ref 0–41)
BILIRUB SERPL-MCNC: 1.5 MG/DL — HIGH (ref 0.2–1.2)
BLD GP AB SCN SERPL QL: SIGNIFICANT CHANGE UP
BUN SERPL-MCNC: 14 MG/DL — SIGNIFICANT CHANGE UP (ref 10–20)
CALCIUM SERPL-MCNC: 9.5 MG/DL — SIGNIFICANT CHANGE UP (ref 8.5–10.1)
CHLORIDE SERPL-SCNC: 101 MMOL/L — SIGNIFICANT CHANGE UP (ref 98–110)
CO2 SERPL-SCNC: 23 MMOL/L — SIGNIFICANT CHANGE UP (ref 17–32)
CREAT SERPL-MCNC: 0.7 MG/DL — SIGNIFICANT CHANGE UP (ref 0.7–1.5)
ERYTHROCYTE [SEDIMENTATION RATE] IN BLOOD: 47 MM/HR — HIGH (ref 0–20)
GLUCOSE SERPL-MCNC: 124 MG/DL — HIGH (ref 70–99)
HCT VFR BLD CALC: 43.2 % — SIGNIFICANT CHANGE UP (ref 37–47)
HGB BLD-MCNC: 14.5 G/DL — SIGNIFICANT CHANGE UP (ref 12–16)
LACTATE SERPL-SCNC: 1 MMOL/L — SIGNIFICANT CHANGE UP (ref 0.5–2.2)
MCHC RBC-ENTMCNC: 30.3 PG — SIGNIFICANT CHANGE UP (ref 27–31)
MCHC RBC-ENTMCNC: 33.6 G/DL — SIGNIFICANT CHANGE UP (ref 32–37)
MCV RBC AUTO: 90.4 FL — SIGNIFICANT CHANGE UP (ref 81–99)
MRSA PCR RESULT.: NEGATIVE — SIGNIFICANT CHANGE UP
NRBC # BLD: 0 /100 WBCS — SIGNIFICANT CHANGE UP (ref 0–0)
PLATELET # BLD AUTO: 199 K/UL — SIGNIFICANT CHANGE UP (ref 130–400)
POTASSIUM SERPL-MCNC: 3.9 MMOL/L — SIGNIFICANT CHANGE UP (ref 3.5–5)
POTASSIUM SERPL-SCNC: 3.9 MMOL/L — SIGNIFICANT CHANGE UP (ref 3.5–5)
PROT SERPL-MCNC: 7 G/DL — SIGNIFICANT CHANGE UP (ref 6–8)
RBC # BLD: 4.78 M/UL — SIGNIFICANT CHANGE UP (ref 4.2–5.4)
RBC # FLD: 12.8 % — SIGNIFICANT CHANGE UP (ref 11.5–14.5)
SODIUM SERPL-SCNC: 138 MMOL/L — SIGNIFICANT CHANGE UP (ref 135–146)
TYPE + AB SCN PNL BLD: SIGNIFICANT CHANGE UP
WBC # BLD: 8.36 K/UL — SIGNIFICANT CHANGE UP (ref 4.8–10.8)
WBC # FLD AUTO: 8.36 K/UL — SIGNIFICANT CHANGE UP (ref 4.8–10.8)

## 2019-03-19 RX ORDER — CEFTRIAXONE 500 MG/1
INJECTION, POWDER, FOR SOLUTION INTRAMUSCULAR; INTRAVENOUS
Qty: 0 | Refills: 0 | Status: DISCONTINUED | OUTPATIENT
Start: 2019-03-19 | End: 2019-03-21

## 2019-03-19 RX ORDER — CEFTRIAXONE 500 MG/1
1 INJECTION, POWDER, FOR SOLUTION INTRAMUSCULAR; INTRAVENOUS EVERY 24 HOURS
Qty: 0 | Refills: 0 | Status: DISCONTINUED | OUTPATIENT
Start: 2019-03-20 | End: 2019-03-21

## 2019-03-19 RX ORDER — SUCRALFATE 1 G
1 TABLET ORAL
Qty: 0 | Refills: 0 | Status: DISCONTINUED | OUTPATIENT
Start: 2019-03-19 | End: 2019-03-22

## 2019-03-19 RX ORDER — PANTOPRAZOLE SODIUM 20 MG/1
40 TABLET, DELAYED RELEASE ORAL
Qty: 0 | Refills: 0 | Status: DISCONTINUED | OUTPATIENT
Start: 2019-03-19 | End: 2019-03-22

## 2019-03-19 RX ORDER — CEFTRIAXONE 500 MG/1
1 INJECTION, POWDER, FOR SOLUTION INTRAMUSCULAR; INTRAVENOUS ONCE
Qty: 0 | Refills: 0 | Status: COMPLETED | OUTPATIENT
Start: 2019-03-19 | End: 2019-03-19

## 2019-03-19 RX ORDER — CEFTRIAXONE 500 MG/1
INJECTION, POWDER, FOR SOLUTION INTRAMUSCULAR; INTRAVENOUS
Qty: 0 | Refills: 0 | Status: DISCONTINUED | OUTPATIENT
Start: 2019-03-19 | End: 2019-03-19

## 2019-03-19 RX ORDER — ACETAMINOPHEN 500 MG
650 TABLET ORAL EVERY 6 HOURS
Qty: 0 | Refills: 0 | Status: DISCONTINUED | OUTPATIENT
Start: 2019-03-19 | End: 2019-03-22

## 2019-03-19 RX ADMIN — HEPARIN SODIUM 5000 UNIT(S): 5000 INJECTION INTRAVENOUS; SUBCUTANEOUS at 13:25

## 2019-03-19 RX ADMIN — Medication 3 MILLIGRAM(S): at 21:43

## 2019-03-19 RX ADMIN — CELECOXIB 200 MILLIGRAM(S): 200 CAPSULE ORAL at 11:23

## 2019-03-19 RX ADMIN — Medication 100 MILLIGRAM(S): at 05:44

## 2019-03-19 RX ADMIN — HEPARIN SODIUM 5000 UNIT(S): 5000 INJECTION INTRAVENOUS; SUBCUTANEOUS at 05:44

## 2019-03-19 RX ADMIN — SIMVASTATIN 40 MILLIGRAM(S): 20 TABLET, FILM COATED ORAL at 21:43

## 2019-03-19 RX ADMIN — Medication 100 MILLIGRAM(S): at 21:43

## 2019-03-19 RX ADMIN — Medication 25 MILLIGRAM(S): at 05:43

## 2019-03-19 RX ADMIN — Medication 25 MICROGRAM(S): at 05:44

## 2019-03-19 RX ADMIN — Medication 250 MILLIGRAM(S): at 11:24

## 2019-03-19 RX ADMIN — HEPARIN SODIUM 5000 UNIT(S): 5000 INJECTION INTRAVENOUS; SUBCUTANEOUS at 21:43

## 2019-03-19 RX ADMIN — Medication 1 GRAM(S): at 17:01

## 2019-03-19 RX ADMIN — Medication 250 MILLIGRAM(S): at 22:01

## 2019-03-19 RX ADMIN — SERTRALINE 100 MILLIGRAM(S): 25 TABLET, FILM COATED ORAL at 11:23

## 2019-03-19 RX ADMIN — CEFTRIAXONE 100 GRAM(S): 500 INJECTION, POWDER, FOR SOLUTION INTRAMUSCULAR; INTRAVENOUS at 15:14

## 2019-03-19 RX ADMIN — CEFEPIME 100 MILLIGRAM(S): 1 INJECTION, POWDER, FOR SOLUTION INTRAMUSCULAR; INTRAVENOUS at 05:43

## 2019-03-19 RX ADMIN — Medication 1 GRAM(S): at 23:28

## 2019-03-19 RX ADMIN — Medication 100 MILLIGRAM(S): at 13:25

## 2019-03-19 RX ADMIN — Medication 250 MILLIGRAM(S): at 00:01

## 2019-03-19 RX ADMIN — LOSARTAN POTASSIUM 100 MILLIGRAM(S): 100 TABLET, FILM COATED ORAL at 05:43

## 2019-03-19 RX ADMIN — Medication 1 MILLIGRAM(S): at 11:23

## 2019-03-19 NOTE — CONSULT NOTE ADULT - ASSESSMENT
IMPRESSION: Rehab of L AKA Cellulitis/ RO abscess    PRECAUTIONS: [    ] Cardiac  [    ] Respiratory  [    ] Seizures [    ] Contact Isolation  [    ] Droplet Isolation  [    ] Other    Weight Bearing Status:     RECOMMENDATION: FU WITH PROSTHETIST ONCE CELLULITIS RESOLVES TO ASSESS PROSTHETIC FIT    Out of Bed to Chair     DVT/Decubiti Prophylaxis    REHAB PLAN:     [  x   ] Bedside P/T 3-5 times a week   [     ] Bedside O/T  2-3 times a week   [     ] No Rehab Therapy Indicated   [     ]  Speech Therapy   Conditioning/ROM                                 ADL  Bed Mobility                                            Conditioning/ROM  Transfers                                                  Bed Mobility  Sitting /Standing Balance                      Transfers                                        Gait Training                                            Sitting/Standing Balance  Stair Training [ x  ]Applicable                 Home equipment Eval                                                                     Splinting  [   ] Only      GOALS:   ADL   [  x  ]   Independent         Transfers  [   x ] Independent            Ambulation  [   x  ] Independent     [ x    ] With device                            [    ]  CG                                               [    ]  CG                                                    [     ] CG                            [    ] Min A                                          [    ] Min A                                                [     ] Min  A          DISCHARGE PLAN:   [     ]  Good candidate for Intensive Rehabilitation/Hospital based                                             Will tolerate 3hrs Intensive Rehab Daily                                       [    x  ]  Short Term Rehab in Skilled Nursing Facility                                VS                                     [   x   ]  Home with Outpatient or  services                                         [      ]  Possible Candidate for Intensive Hospital based Rehab

## 2019-03-19 NOTE — PROGRESS NOTE ADULT - ASSESSMENT
69 year old female with a PMH of depression, Anxiety, HTN, DLD, Hypothyroid, Osteoarthritis with a left AKA performed in 1969 presents here c/o pain swelling and redness of the stump x 1 week. Patient denies fever chills cough or any other associated symptoms    1) Left Stump cellulitis - possibly 2/2 trauma with prosthetics as no signs of infection and fevers or chills   ·	ID pending   ·	Follow with X-Ray   ·	Continue with Vancomycin and Cefepime   ·	MRSA swap, if negative d/c vancomycin   ·	blood cultures pending     2) HTN  Stable   Continue with losartan 100 and Hydrochlorothiazide 25.     3) DVT Prophylaxis   Heparin Sub Q     4) Disposition 69 year old female with a PMH of depression, Anxiety, HTN, DLD, Hypothyroid, Osteoarthritis with a left AKA performed in 1969 presents here c/o pain swelling and redness of the stump x 1 week. Patient denies fever chills cough or any other associated symptoms    1) Left Stump cellulitis - possibly 2/2 trauma with prosthetics as no signs of infection and fevers or chills   ·	recommended cold compression q4 h for the erythematous and edematous stump site   ·	ID pending   ·	Follow with X-Ray   ·	Continue with Vancomycin and Cefepime   ·	MRSA swap, if negative d/c vancomycin   ·	blood cultures pending   ·	Elevated lactate levels at admission, Lactate, Blood: 2.3 mmol/L but after administration of 1 L LR resolved to Lactate, Blood: 1.0 mmol/L (03.18.19)  ·	elevated WBC at admission WBC Count: 11.42 K/uL (03.18.19) but decreased to WBC Count: 8.36 K/uL (03.19.19 @ 07:18) with administration of antibiotics  ·	continue to monitor CBC      2) HTN  ·	Stable   ·	Continue with losartan 100 and Hydrochlorothiazide 25.     3) DVT Prophylaxis   Heparin Sub Q     4) Disposition 69 year old female with a PMH of depression, Anxiety, HTN, DLD, Hypothyroid, Osteoarthritis with a left AKA performed in 1969 presents here c/o pain swelling and redness of the stump x 1 week. Patient denies fever chills cough or any other associated symptoms    1) Left Stump cellulitis - possibly 2/2 trauma with prosthetics as no signs of infection and fevers or chills   ·	recommended cold compression q4 h for the erythematous and edematous stump site   ·	ID pending   ·	Follow with X-Ray   ·	Continue with Vancomycin and Cefepime   ·	MRSA swap pending, if negative d/c vancomycin   ·	blood cultures pending   ·	Elevated lactate levels at admission, Lactate, Blood: 2.3 mmol/L but after administration of 1 L LR resolved to Lactate, Blood: 1.0 mmol/L (03.18.19)  ·	elevated WBC at admission WBC Count: 11.42 K/uL (03.18.19) but decreased to WBC Count: 8.36 K/uL (03.19.19 @ 07:18) with administration of antibiotics  ·	continue to monitor CBC  ·	Pt was supposed to get her prosthetic leg changed last week, will hold on to it until get the results of xray back  ·	Pt hemodynamically stable as of now      2) HTN  ·	Stable   ·	Continue with losartan 100 and Hydrochlorothiazide 25.     3) DVT Prophylaxis   Heparin Sub Q   Diet: regular  Activity: with crutches  4) Disposition: home once stable 69 year old female with a PMH of depression, Anxiety, HTN, DLD, Hypothyroid, Osteoarthritis with a left AKA performed in 1969 presents here c/o pain swelling and redness of the stump x 1 week. Patient denies fever chills cough or any other associated symptoms    1) Left Stump cellulitis - possibly 2/2 trauma with prosthetics as no signs of infection and fevers or chills   ·	recommended cold compression q4 h for the erythematous and edematous stump site   ·	ID rec to change cefepime to rocephin 3/19 and cw vanco, Usg guided drainage of stump fluctuant mass and wound cultures  ·	Follow with X-Ray   ·	MRSA swap pending  ·	blood cultures pending   ·	Elevated lactate levels at admission, Lactate, Blood: 2.3 mmol/L but after administration of 1 L LR resolved to Lactate, Blood: 1.0 mmol/L (03.18.19)  ·	elevated WBC at admission WBC Count: 11.42 K/uL (03.18.19) but decreased to WBC Count: 8.36 K/uL (03.19.19 @ 07:18) with administration of antibiotics  ·	continue to monitor CBC  ·	Pt was supposed to get her prosthetic leg changed last week, will hold on to it until get the results of xray back  ·	Pt hemodynamically stable as of now      2) HTN  ·	Stable   ·	Continue with losartan 100 and Hydrochlorothiazide 25.     3) DVT Prophylaxis   Heparin Sub Q   Diet: regular  Activity: with crutches  4) Disposition: home once stable
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
## 2019-03-19 NOTE — CONSULT NOTE ADULT - ASSESSMENT
69yF    Chronic pain  Depression/Anxiety  Osteoarthritis  Hypothyroid  HLD   Essential hypertension    Admitted with CELLULITIS of L stump  lactic acidosis 2.3 Sepsis ruled out on admission     - I&D of fluctuance? obtain U/S vs CT wo, send for culture  - CHANGE cefepime to Ceftriaxone 1g q24h  - Vanc 1g q12h  - MRSA nasal PCR    Spectra [phone]
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
## 2019-03-20 LAB
ANION GAP SERPL CALC-SCNC: 16 MMOL/L — HIGH (ref 7–14)
APTT BLD: 40.5 SEC — HIGH (ref 27–39.2)
BASOPHILS # BLD AUTO: 0.04 K/UL — SIGNIFICANT CHANGE UP (ref 0–0.2)
BASOPHILS NFR BLD AUTO: 0.4 % — SIGNIFICANT CHANGE UP (ref 0–1)
BUN SERPL-MCNC: 18 MG/DL — SIGNIFICANT CHANGE UP (ref 10–20)
CALCIUM SERPL-MCNC: 9.7 MG/DL — SIGNIFICANT CHANGE UP (ref 8.5–10.1)
CHLORIDE SERPL-SCNC: 101 MMOL/L — SIGNIFICANT CHANGE UP (ref 98–110)
CO2 SERPL-SCNC: 22 MMOL/L — SIGNIFICANT CHANGE UP (ref 17–32)
CREAT SERPL-MCNC: 0.7 MG/DL — SIGNIFICANT CHANGE UP (ref 0.7–1.5)
CRP SERPL-MCNC: 5.28 MG/DL — HIGH (ref 0–0.4)
EOSINOPHIL # BLD AUTO: 0.1 K/UL — SIGNIFICANT CHANGE UP (ref 0–0.7)
EOSINOPHIL NFR BLD AUTO: 1.1 % — SIGNIFICANT CHANGE UP (ref 0–8)
GLUCOSE SERPL-MCNC: 118 MG/DL — HIGH (ref 70–99)
HCT VFR BLD CALC: 43.7 % — SIGNIFICANT CHANGE UP (ref 37–47)
HGB BLD-MCNC: 14.5 G/DL — SIGNIFICANT CHANGE UP (ref 12–16)
IMM GRANULOCYTES NFR BLD AUTO: 0.3 % — SIGNIFICANT CHANGE UP (ref 0.1–0.3)
INR BLD: 1.08 RATIO — SIGNIFICANT CHANGE UP (ref 0.65–1.3)
LYMPHOCYTES # BLD AUTO: 1.93 K/UL — SIGNIFICANT CHANGE UP (ref 1.2–3.4)
LYMPHOCYTES # BLD AUTO: 20.7 % — SIGNIFICANT CHANGE UP (ref 20.5–51.1)
MCHC RBC-ENTMCNC: 30.3 PG — SIGNIFICANT CHANGE UP (ref 27–31)
MCHC RBC-ENTMCNC: 33.2 G/DL — SIGNIFICANT CHANGE UP (ref 32–37)
MCV RBC AUTO: 91.2 FL — SIGNIFICANT CHANGE UP (ref 81–99)
MONOCYTES # BLD AUTO: 1 K/UL — HIGH (ref 0.1–0.6)
MONOCYTES NFR BLD AUTO: 10.7 % — HIGH (ref 1.7–9.3)
NEUTROPHILS # BLD AUTO: 6.21 K/UL — SIGNIFICANT CHANGE UP (ref 1.4–6.5)
NEUTROPHILS NFR BLD AUTO: 66.8 % — SIGNIFICANT CHANGE UP (ref 42.2–75.2)
NRBC # BLD: 0 /100 WBCS — SIGNIFICANT CHANGE UP (ref 0–0)
PLATELET # BLD AUTO: 212 K/UL — SIGNIFICANT CHANGE UP (ref 130–400)
POTASSIUM SERPL-MCNC: 3.6 MMOL/L — SIGNIFICANT CHANGE UP (ref 3.5–5)
POTASSIUM SERPL-SCNC: 3.6 MMOL/L — SIGNIFICANT CHANGE UP (ref 3.5–5)
PROTHROM AB SERPL-ACNC: 12.4 SEC — SIGNIFICANT CHANGE UP (ref 9.95–12.87)
RBC # BLD: 4.79 M/UL — SIGNIFICANT CHANGE UP (ref 4.2–5.4)
RBC # FLD: 12.9 % — SIGNIFICANT CHANGE UP (ref 11.5–14.5)
SODIUM SERPL-SCNC: 139 MMOL/L — SIGNIFICANT CHANGE UP (ref 135–146)
WBC # BLD: 9.31 K/UL — SIGNIFICANT CHANGE UP (ref 4.8–10.8)
WBC # FLD AUTO: 9.31 K/UL — SIGNIFICANT CHANGE UP (ref 4.8–10.8)

## 2019-03-20 PROCEDURE — 93971 EXTREMITY STUDY: CPT | Mod: 26

## 2019-03-20 RX ORDER — OXYCODONE AND ACETAMINOPHEN 5; 325 MG/1; MG/1
1 TABLET ORAL EVERY 6 HOURS
Qty: 0 | Refills: 0 | Status: DISCONTINUED | OUTPATIENT
Start: 2019-03-20 | End: 2019-03-21

## 2019-03-20 RX ORDER — MORPHINE SULFATE 50 MG/1
2 CAPSULE, EXTENDED RELEASE ORAL ONCE
Qty: 0 | Refills: 0 | Status: DISCONTINUED | OUTPATIENT
Start: 2019-03-20 | End: 2019-03-20

## 2019-03-20 RX ADMIN — CEFTRIAXONE 100 GRAM(S): 500 INJECTION, POWDER, FOR SOLUTION INTRAMUSCULAR; INTRAVENOUS at 14:19

## 2019-03-20 RX ADMIN — Medication 1 GRAM(S): at 11:04

## 2019-03-20 RX ADMIN — LOSARTAN POTASSIUM 100 MILLIGRAM(S): 100 TABLET, FILM COATED ORAL at 05:38

## 2019-03-20 RX ADMIN — HEPARIN SODIUM 5000 UNIT(S): 5000 INJECTION INTRAVENOUS; SUBCUTANEOUS at 14:19

## 2019-03-20 RX ADMIN — PANTOPRAZOLE SODIUM 40 MILLIGRAM(S): 20 TABLET, DELAYED RELEASE ORAL at 06:01

## 2019-03-20 RX ADMIN — MORPHINE SULFATE 2 MILLIGRAM(S): 50 CAPSULE, EXTENDED RELEASE ORAL at 17:55

## 2019-03-20 RX ADMIN — HEPARIN SODIUM 5000 UNIT(S): 5000 INJECTION INTRAVENOUS; SUBCUTANEOUS at 22:19

## 2019-03-20 RX ADMIN — Medication 100 MILLIGRAM(S): at 22:19

## 2019-03-20 RX ADMIN — Medication 1 GRAM(S): at 17:04

## 2019-03-20 RX ADMIN — Medication 25 MILLIGRAM(S): at 05:38

## 2019-03-20 RX ADMIN — Medication 1 GRAM(S): at 05:38

## 2019-03-20 RX ADMIN — HEPARIN SODIUM 5000 UNIT(S): 5000 INJECTION INTRAVENOUS; SUBCUTANEOUS at 05:40

## 2019-03-20 RX ADMIN — CELECOXIB 200 MILLIGRAM(S): 200 CAPSULE ORAL at 11:04

## 2019-03-20 RX ADMIN — Medication 1 GRAM(S): at 23:52

## 2019-03-20 RX ADMIN — SIMVASTATIN 40 MILLIGRAM(S): 20 TABLET, FILM COATED ORAL at 22:19

## 2019-03-20 RX ADMIN — Medication 100 MILLIGRAM(S): at 05:38

## 2019-03-20 RX ADMIN — Medication 100 MILLIGRAM(S): at 14:20

## 2019-03-20 RX ADMIN — Medication 25 MICROGRAM(S): at 05:38

## 2019-03-20 RX ADMIN — Medication 3 MILLIGRAM(S): at 22:19

## 2019-03-20 RX ADMIN — SERTRALINE 100 MILLIGRAM(S): 25 TABLET, FILM COATED ORAL at 11:04

## 2019-03-20 RX ADMIN — Medication 1 MILLIGRAM(S): at 11:04

## 2019-03-20 NOTE — CONSULT NOTE ADULT - ASSESSMENT
ASSESSMENT:  69y Female h/o traumatic amputation 1969, L AKA, ambulates with prosthesis, here with stump cellulitis, abscess, OM. Plan for Incision and drainage at bedside    PLAN:  -I&D plan as per Dr. Calles    03-20-19 @ 15:58

## 2019-03-20 NOTE — PROGRESS NOTE ADULT - SUBJECTIVE AND OBJECTIVE BOX
NERISSA, ТАТЬЯНА  69y, Female      OVERNIGHT EVENTS:  tm 100.1  US Surrounding the left above-the-knee amputation stump, there is a 3.5 x 3.9 x 3.4 cm complex multiseptated, nonvascular fluid collection with thick wall, consistent with an abscess.  Xray Chronic appearing periosteal reaction is noted at the amputation stump, with apparent osseous erosions measuring 4 mm at the stump, best seen on the lateral view. Findings are highly suggestive of acute osteomyelitis.    ROS negative except as per above    VITALS:  T(F): 97.6, Max: 100.1 (03-19-19 @ 14:12)  HR: 83  BP: 115/83  RR: 18Vital Signs Last 24 Hrs  T(C): 36.4 (20 Mar 2019 05:49), Max: 37.8 (19 Mar 2019 14:12)  T(F): 97.6 (20 Mar 2019 05:49), Max: 100.1 (19 Mar 2019 14:12)  HR: 83 (20 Mar 2019 05:49) (83 - 101)  BP: 115/83 (20 Mar 2019 05:49) (112/63 - 115/83)  BP(mean): --  RR: 18 (20 Mar 2019 05:49) (18 - 20)  SpO2: 98% (19 Mar 2019 22:55) (98% - 98%)    PHYSICAL EXAM:  Gen: Awake and alert, non-toxic appearing, NAD  HEENT: NCAT.   Lungs: CTAB  Abd: Soft. NTND  Extr: wwp, L stump increased warmth, erythema + fluctuance  Skin: no rash  Neuro: No focal deficits  Lines: clean      TESTS & MEASUREMENTS:                        14.5   9.31  )-----------( 212      ( 20 Mar 2019 08:05 )             43.7     03-20    139  |  101  |  18  ----------------------------<  118<H>  3.6   |  22  |  0.7    Ca    9.7      20 Mar 2019 08:05    TPro  7.0  /  Alb  4.0  /  TBili  1.5<H>  /  DBili  x   /  AST  12  /  ALT  13  /  AlkPhos  90  03-19    LIVER FUNCTIONS - ( 19 Mar 2019 07:18 )  Alb: 4.0 g/dL / Pro: 7.0 g/dL / ALK PHOS: 90 U/L / ALT: 13 U/L / AST: 12 U/L / GGT: x             Culture - Blood (collected 03-18-19 @ 10:27)  Source: .Blood Blood-Peripheral  Preliminary Report (03-19-19 @ 17:01):    No growth to date.          RADIOLOGY & ADDITIONAL TESTS:    ANTIBIOTICS:    cefepime   IVPB   100 mL/Hr IV Intermittent (03-18-19 @ 16:18)    cefepime   IVPB   100 mL/Hr IV Intermittent (03-19-19 @ 05:43)    cefTRIAXone   IVPB   100 mL/Hr IV Intermittent (03-19-19 @ 15:14)    piperacillin/tazobactam IVPB.   200 mL/Hr IV Intermittent (03-18-19 @ 14:17)    vancomycin  IVPB   250 mL/Hr IV Intermittent (03-19-19 @ 22:01)   250 mL/Hr IV Intermittent (03-19-19 @ 11:24)   250 mL/Hr IV Intermittent (03-19-19 @ 00:01)    vancomycin  IVPB   250 mL/Hr IV Intermittent (03-18-19 @ 11:08)        cefTRIAXone   IVPB      cefTRIAXone   IVPB 1 Gram(s) IV Intermittent every 24 hours
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
## 2019-03-20 NOTE — PROGRESS NOTE ADULT - ASSESSMENT
69 year old female with a PMH of depression, Anxiety, HTN, DLD, Hypothyroid, Osteoarthritis with a left AKA performed in 1969 presents here c/o pain swelling and redness of the stump x 1 week. Patient denies fever chills cough or any other associated symptoms    1) Left Stump cellulitis - possibly 2/2 trauma with prosthetics as no signs of infection and fevers or chills   ·	recommended cold compression q4 h for the erythematous and edematous stump site   ·	ID rec to change cefepime to rocephin 3/19 and cw vanco, Usg guided drainage of stump fluctuant mass and wound cultures  ·	X ray revealed Osteomyelitis --> follow up with ID on management   ·	MRSA swap negative   ·	blood cultures pending   ·	Elevated lactate levels at admission, Lactate, Blood: 2.3 mmol/L but after administration of 1 L LR resolved to Lactate, Blood: 1.0 mmol/L (03.18.19)  ·	elevated WBC at admission WBC Count: 11.42 K/uL (03.18.19) but decreased to WBC Count: 8.36 K/uL (03.19.19 @ 07:18) with administration of antibiotics  ·	continue to monitor CBC  ·	Pt was supposed to get her prosthetic leg changed last week, will hold on to it until infection resolved   ·	Pt hemodynamically stable as of now      2) HTN  ·	Stable   ·	Continue with losartan 100 and Hydrochlorothiazide 25.     3) DVT Prophylaxis   Heparin Sub Q   Diet: regular  Activity: with crutches  4) Disposition: home once stable 69 year old female with a PMH of depression, Anxiety, HTN, DLD, Hypothyroid, Osteoarthritis with a left AKA performed in 1969 presents here c/o pain swelling and redness of the stump x 1 week. Patient denies fever chills cough or any other associated symptoms    1) Left Stump cellulitis with Acute OM  ·	X ray revealed Osteomyelitis --> follow up with ID on management   ·	ID rec to change cefepime to rocephin 3/19 and cw vanco, Usg guided drainage of stump fluctuant mass and wound cultures > ordered, IR aware  ·	MRSA swab negative   ·	blood cultures pending   ·	recommended cold compression q4 h for the erythematous and edematous stump site   ·	Elevated lactate levels at admission, Lactate, Blood: 2.3 mmol/L but after administration of 1 L LR resolved to Lactate, Blood: 1.0 mmol/L (03.18.19)  ·	elevated WBC at admission WBC Count: 11.42 K/uL (03.18.19) but decreased to WBC Count: 8.36 K/uL (03.19.19 @ 07:18) with administration of antibiotics  ·	continue to monitor CBC  ·	Pt was supposed to get her prosthetic leg changed last week, will hold on to it until infection resolved   ·	Pt hemodynamically stable as of now      2) HTN  ·	Stable   ·	Continue with losartan 100 and Hydrochlorothiazide 25.         3) DVT Prophylaxis   Heparin Sub Q   Diet: regular  Activity: with crutches  4) Disposition: home with home care for IV antibiotics most likely once stable 69 year old female with a PMH of depression, Anxiety, HTN, DLD, Hypothyroid, Osteoarthritis with a left AKA performed in 1969 presents here c/o pain swelling and redness of the stump x 1 week. Patient denies fever chills cough or any other associated symptoms    1) Left Stump cellulitis with Acute OM  ·	X ray revealed Osteomyelitis 	  ·	Patient was education on the use and fitting of prosthetic leg after the resolving of OM infection   ·	ID rec to change cefepime to rocephin 3/19   ·	continue treament duration as per ID team   ·	PICC line placement for treatment at home   ·	 Usg guided drainage of stump fluctuant mass and wound cultures > ordered, IR aware  ·	MRSA swab negative, dc vancomycin   ·	blood cultures pending   ·	recommended cold compression q4 h for the erythematous and edematous stump site   ·	Elevated lactate levels at admission, Lactate, Blood: 2.3 mmol/L but after administration of 1 L LR resolved to Lactate, Blood: 1.0 mmol/L (03.18.19)  ·	elevated WBC at admission WBC Count: 11.42 K/uL (03.18.19) but decreased to WBC Count: 8.36 K/uL (03.19.19 @ 07:18) with administration of antibiotics  ·	continue to monitor CBC  ·	Pt was supposed to get her prosthetic leg changed last week, will hold on to it until infection resolved   ·	Pt hemodynamically stable as of now      2) HTN  ·	Stable   ·	Continue with losartan 100 and Hydrochlorothiazide 25.         3) DVT Prophylaxis   Heparin Sub Q   Diet: regular  Activity: walker  4) Disposition: home with home care for IV antibiotics most likely once stable 69 year old female with a PMH of depression, Anxiety, HTN, DLD, Hypothyroid, Osteoarthritis with a left AKA performed in 1969 presents here c/o pain swelling and redness of the stump x 1 week. Patient denies fever chills cough or any other associated symptoms    1) Left Stump cellulitis with Acute OM  ·	X ray revealed Osteomyelitis 	  ·	Patient was education on the use and fitting of prosthetic leg after the resolving of OM infection   ·	ID rec to change cefepime to rocephin 3/19, continue with vancomycin   ·	continue treament duration as per ID team   ·	PICC line placement for treatment at home   ·	 Usg guided drainage of stump fluctuant mass and wound cultures > ordered, IR aware  ·	MRSA swab negative   ·	blood cultures pending   ·	recommended cold compression q4 h for the erythematous and edematous stump site   ·	Elevated lactate levels at admission, Lactate, Blood: 2.3 mmol/L but after administration of 1 L LR resolved to Lactate, Blood: 1.0 mmol/L (03.18.19)  ·	elevated WBC at admission WBC Count: 11.42 K/uL (03.18.19) but decreased to WBC Count: 8.36 K/uL (03.19.19 @ 07:18) with administration of antibiotics  ·	continue to monitor CBC  ·	Pt was supposed to get her prosthetic leg changed last week, will hold on to it until infection resolved   ·	Pt hemodynamically stable as of now      2) HTN  ·	Stable   ·	Continue with losartan 100 and Hydrochlorothiazide 25.         3) DVT Prophylaxis   Heparin Sub Q   Diet: regular  Activity: walker  4) Disposition: home with home care for IV antibiotics most likely once stable 69 year old female with a PMH of depression, Anxiety, HTN, DLD, Hypothyroid, Osteoarthritis with a left AKA performed in 1969 presents here c/o pain swelling and redness of the stump x 1 week. Patient denies fever chills cough or any other associated symptoms    1) Left Stump cellulitis and abscess with Acute OM  ·	X ray revealed acute Osteomyelitis 	  ·	ID rec to change cefepime to rocephin 3/19 and would decide about course and type of antibiotics once wound cultures are back  ·	PICC line placement for treatment at home, IR consult placed  ·	 green team [id number] consulted to drain the abscess, they rec CT left left with Iv cont, then they would drain most likely today  ·	MRSA swab negative, dc vancomycin   ·	blood cultures pending   ·	recommended cold compression q4 h for the erythematous and edematous stump site   ·	Elevated lactate levels at admission, Lactate, Blood: 2.3 mmol/L but after administration of 1 L LR resolved to Lactate, Blood: 1.0 mmol/L (03.18.19)  ·	elevated WBC at admission WBC Count: 11.42 K/uL (03.18.19) but decreased to WBC Count: 8.36 K/uL (03.19.19 @ 07:18) with administration of antibiotics  ·	continue to monitor CBC  ·	Pt was supposed to get her prosthetic leg changed last week, will hold on to it until infection resolved   ·	Pt hemodynamically stable as of now      2) HTN  ·	Stable   ·	Continue with losartan 100 and Hydrochlorothiazide 25.         3) DVT Prophylaxis   Heparin Sub Q   Diet: regular  Activity: walker  4) Disposition: home with home care for IV antibiotics most likely once stable 69 year old female with a PMH of depression, Anxiety, HTN, DLD, Hypothyroid, Osteoarthritis with a left AKA performed in 1969 presents here c/o pain swelling and redness of the stump x 1 week. Patient denies fever chills cough or any other associated symptoms    1) Left Stump cellulitis and abscess with Acute OM  ·	X ray revealed acute Osteomyelitis 	  ·	ID rec to change cefepime to rocephin 3/19 and would decide about course and type of antibiotics once wound cultures are back  ·	PICC line placement for treatment at home, IR consult placed  ·	 green team [id number] consulted to drain the abscess, they rec CT left left with Iv cont, then they would drain most likely today  ·	MRSA swab negative, dc vancomycin   ·	blood cultures pending   ·	recommended cold compression q4 h for the erythematous and edematous stump site   ·	Elevated lactate levels at admission, Lactate, Blood: 2.3 mmol/L but after administration of 1 L LR resolved to Lactate, Blood: 1.0 mmol/L (03.18.19)  ·	elevated WBC at admission WBC Count: 11.42 K/uL (03.18.19) but decreased to WBC Count: 8.36 K/uL (03.19.19 @ 07:18) with administration of antibiotics  ·	continue to monitor CBC  ·	Pt was supposed to get her prosthetic leg changed last week, will hold on to it until infection resolved   ·	Pt hemodynamically stable as of now      2) HTN  ·	Stable   ·	Continue with losartan 100 and Hydrochlorothiazide 25.     3)Suspected folic acid deficiency  ·	cw folic acid supplementation        3) DVT Prophylaxis   Heparin Sub Q   Diet: regular  Activity: walker  4) Disposition: home with home care for IV antibiotics most likely once stable 69 year old female with a PMH of depression, Anxiety, HTN, DLD, Hypothyroid, Osteoarthritis with a left AKA performed in 1969 presents here c/o pain swelling and redness of the stump x 1 week. Patient denies fever chills cough or any other associated symptoms    1) Left Stump cellulitis and abscess with Acute OM  ·	X ray revealed acute Osteomyelitis 	  ·	ID rec to change cefepime to rocephin 3/19 and would decide about course and type of antibiotics once wound cultures are back  ·	PICC line placement for treatment at home, IR consult placed  ·	GS green team [id number] consulted to drain the abscess, they rec CT left left with Iv cont  ·	Ultrasound Left stump showed fluid collection 3.5 x 3.9 x 4 cm consistent with abscess, GS aware, will do I&d   ·	MRSA swab negative, dc vancomycin   ·	blood cultures pending   ·	recommended cold compression q4 h for the erythematous and edematous stump site   ·	Elevated lactate levels at admission, Lactate, Blood: 2.3 mmol/L but after administration of 1 L LR resolved to Lactate, Blood: 1.0 mmol/L (03.18.19)  ·	elevated WBC at admission WBC Count: 11.42 K/uL (03.18.19) but decreased to WBC Count: 8.36 K/uL (03.19.19 @ 07:18) with administration of antibiotics  ·	continue to monitor CBC  ·	Pt was supposed to get her prosthetic leg changed last week, will hold on to it until infection resolved   ·	Pt hemodynamically stable as of now      2) HTN  ·	Stable   ·	Continue with losartan 100 and Hydrochlorothiazide 25.     3)Suspected folic acid deficiency  ·	cw folic acid supplementation        3) DVT Prophylaxis   Heparin Sub Q   Diet: regular  Activity: walker  4) Disposition: home with home care for IV antibiotics most likely once stable

## 2019-03-20 NOTE — CONSULT NOTE ADULT - ASSESSMENT
ASSESSMENT:  69y Female h/o traumatic amputation 1969, L AKA, ambulates with prosthesis, here with stump cellulitis, abscess, OM. Plan for Incision and drainage at bedside    PLAN:   CT LLE as per ID request, assess extent of OM/bone involvement  Will incise and drain, will send Cx  C/W IV abx  Leg elevation  Keep area clean and dry, CHG orders    Above plan discussed with Dr. Calles , patient, and medical team    --------------------------------------------------------------------------------------    03-20-19 @ 11:46 ASSESSMENT:  69y Female h/o traumatic amputation 1969, L AKA, ambulates with prosthesis, here with stump cellulitis, abscess, OM. Plan for Incision and drainage at bedside    PLAN:   US as above, abscess 3x4cm - Will incise and drain, will send Cx  CT LLE as per ID request, assess extent of OM/bone involvement  C/W IV abx (DC'd vanco, MRSA (-))  Leg elevation, Keep area clean and dry, CHG orders  Wound care: packing, guaze, kerlex    Above plan discussed with Dr. Calles , patient, and medical team    --------------------------------------------------------------------------------------    03-20-19 @ 11:46

## 2019-03-20 NOTE — PROGRESS NOTE ADULT - ASSESSMENT
69 year old female with a PMH of depression, Anxiety, HTN, DLD, Hypothyroid, Osteoarthritis with a left AKA performed in 1969 presents here c/o pain swelling and redness of the stump x 1 week. Patient denies fever chills cough or any other associated symptoms    1) Left Stump cellulitis and abscess with Acute OM  ·	X ray revealed acute Osteomyelitis 	  ·	ID rec to change cefepime to rocephin 3/19 and would decide about course and type of antibiotics once wound cultures are back  ·	PICC line placement for treatment at home, IR consult placed  ·	 green team [id number] consulted to drain the abscess, they rec CT left leg with Iv cont, then they would drain most likely today  ·	MRSA swab negative, dc vancomycin   ·	blood cultures pending   ·	recommended cold compression q4 h for the erythematous and edematous stump site   ·	Elevated lactate levels at admission, Lactate, Blood: 2.3 mmol/L but after administration of 1 L LR resolved to Lactate, Blood: 1.0 mmol/L (03.18.19)  ·	elevated WBC at admission WBC Count: 11.42 K/uL (03.18.19) but decreased to WBC Count: 8.36 K/uL (03.19.19 @ 07:18) with administration of antibiotics  ·	continue to monitor CBC  ·	Pt was supposed to get her prosthetic leg changed last week, will hold on to it until infection resolved   ·	Pt hemodynamically stable as of now      2) HTN  ·	Stable   ·	Continue with losartan 100 and Hydrochlorothiazide 25.     3)Suspected folic acid deficiency  ·	cw folic acid supplementation        3) DVT Prophylaxis   Heparin Sub Q   Diet: regular  Activity: walker  4) Disposition: home with home care for IV antibiotics most likely once stable 69 year old female with a PMH of depression, Anxiety, HTN, DLD, Hypothyroid, Osteoarthritis with a left AKA performed in 1969 presents here c/o pain swelling and redness of the stump x 1 week. Patient denies fever chills cough or any other associated symptoms    1) Left Stump cellulitis and abscess with Acute OM- with possible sepsis present on admission  ·	X ray revealed acute Osteomyelitis 	  ·	ID rec to change cefepime to rocephin 3/19 and would decide about course and type of antibiotics once wound cultures are back  ·	PICC line placement for treatment at home, IR consult placed  ·	 green team [id number] consulted to drain the abscess, they rec CT left leg with Iv cont, then they would drain most likely today  ·	MRSA swab negative, dc vancomycin   ·	blood cultures pending   ·	recommended cold compression q4 h for the erythematous and edematous stump site   ·	Elevated lactate levels at admission, Lactate, Blood: 2.3 mmol/L but after administration of 1 L LR resolved to Lactate, Blood: 1.0 mmol/L (03.18.19)  ·	elevated WBC at admission WBC Count: 11.42 K/uL (03.18.19) but decreased to WBC Count: 8.36 K/uL (03.19.19 @ 07:18) with administration of antibiotics  ·	continue to monitor CBC  ·	Pt was supposed to get her prosthetic leg changed last week, will hold on to it until infection resolved   ·	Pt hemodynamically stable as of now      2) HTN  ·	Stable   ·	Continue with losartan 100 and Hydrochlorothiazide 25.     3)Suspected folic acid deficiency  ·	cw folic acid supplementation        3) DVT Prophylaxis   Heparin Sub Q   Diet: regular  Activity: walker  4) Disposition: home with home care for IV antibiotics most likely once stable;     Or possibly SNF-patient and family asked about [facility]

## 2019-03-20 NOTE — PROGRESS NOTE ADULT - ASSESSMENT
69yF    Chronic pain  Depression/Anxiety  Osteoarthritis  Hypothyroid  HLD   Essential hypertension    Admitted with CELLULITIS of L stump  lactic acidosis 2.3 Sepsis ruled out on admission   US Surrounding the left above-the-knee amputation stump, there is a 3.5 x 3.9 x 3.4 cm complex multiseptated, nonvascular fluid collection with thick wall, consistent with an abscess.  Xray Chronic appearing periosteal reaction is noted at the amputation stump, with apparent osseous erosions measuring 4 mm at the stump, best seen on the lateral view. Findings are highly suggestive of acute osteomyelitis.  MRSA nasal PCR neg  Bcx NGTD    - Needs I&D of abscess, send for G/S and culture  - Will likely need PICC line and treatment for acute OM, regimen pending cultures  - Ceftriaxone 1g q24h      Spectra [id number]
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
## 2019-03-20 NOTE — PHYSICAL THERAPY INITIAL EVALUATION ADULT - GAIT DISTANCE, PT EVAL
30'x2, pt c/o increased pain in her L stump during AMB, rates pain 10/10. Pt denied pain at start of PT.

## 2019-03-20 NOTE — PHYSICAL THERAPY INITIAL EVALUATION ADULT - GENERAL OBSERVATIONS, REHAB EVAL
Pt encountered seated in b/s chair with PICC line in NAD and family present. Pt left same as found with PICC line and family present.
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
## 2019-03-21 LAB
ALBUMIN SERPL ELPH-MCNC: 3.8 G/DL — SIGNIFICANT CHANGE UP (ref 3.5–5.2)
ALP SERPL-CCNC: 95 U/L — SIGNIFICANT CHANGE UP (ref 30–115)
ALT FLD-CCNC: 10 U/L — SIGNIFICANT CHANGE UP (ref 0–41)
ANION GAP SERPL CALC-SCNC: 16 MMOL/L — HIGH (ref 7–14)
APTT BLD: 37.9 SEC — SIGNIFICANT CHANGE UP (ref 27–39.2)
AST SERPL-CCNC: 13 U/L — SIGNIFICANT CHANGE UP (ref 0–41)
BASOPHILS # BLD AUTO: 0.05 K/UL — SIGNIFICANT CHANGE UP (ref 0–0.2)
BASOPHILS NFR BLD AUTO: 0.6 % — SIGNIFICANT CHANGE UP (ref 0–1)
BILIRUB SERPL-MCNC: 0.5 MG/DL — SIGNIFICANT CHANGE UP (ref 0.2–1.2)
BUN SERPL-MCNC: 29 MG/DL — HIGH (ref 10–20)
CALCIUM SERPL-MCNC: 9.1 MG/DL — SIGNIFICANT CHANGE UP (ref 8.5–10.1)
CHLORIDE SERPL-SCNC: 103 MMOL/L — SIGNIFICANT CHANGE UP (ref 98–110)
CO2 SERPL-SCNC: 22 MMOL/L — SIGNIFICANT CHANGE UP (ref 17–32)
CREAT SERPL-MCNC: 0.8 MG/DL — SIGNIFICANT CHANGE UP (ref 0.7–1.5)
EOSINOPHIL # BLD AUTO: 0.19 K/UL — SIGNIFICANT CHANGE UP (ref 0–0.7)
EOSINOPHIL NFR BLD AUTO: 2.2 % — SIGNIFICANT CHANGE UP (ref 0–8)
GLUCOSE SERPL-MCNC: 142 MG/DL — HIGH (ref 70–99)
GRAM STN FLD: SIGNIFICANT CHANGE UP
HCT VFR BLD CALC: 38.1 % — SIGNIFICANT CHANGE UP (ref 37–47)
HCT VFR BLD CALC: 39.5 % — SIGNIFICANT CHANGE UP (ref 37–47)
HGB BLD-MCNC: 12.7 G/DL — SIGNIFICANT CHANGE UP (ref 12–16)
HGB BLD-MCNC: 13.4 G/DL — SIGNIFICANT CHANGE UP (ref 12–16)
IMM GRANULOCYTES NFR BLD AUTO: 0.5 % — HIGH (ref 0.1–0.3)
INR BLD: 1.05 RATIO — SIGNIFICANT CHANGE UP (ref 0.65–1.3)
LYMPHOCYTES # BLD AUTO: 1.77 K/UL — SIGNIFICANT CHANGE UP (ref 1.2–3.4)
LYMPHOCYTES # BLD AUTO: 20.5 % — SIGNIFICANT CHANGE UP (ref 20.5–51.1)
MAGNESIUM SERPL-MCNC: 2.1 MG/DL — SIGNIFICANT CHANGE UP (ref 1.8–2.4)
MCHC RBC-ENTMCNC: 30.5 PG — SIGNIFICANT CHANGE UP (ref 27–31)
MCHC RBC-ENTMCNC: 31 PG — SIGNIFICANT CHANGE UP (ref 27–31)
MCHC RBC-ENTMCNC: 33.3 G/DL — SIGNIFICANT CHANGE UP (ref 32–37)
MCHC RBC-ENTMCNC: 33.9 G/DL — SIGNIFICANT CHANGE UP (ref 32–37)
MCV RBC AUTO: 91.4 FL — SIGNIFICANT CHANGE UP (ref 81–99)
MCV RBC AUTO: 91.6 FL — SIGNIFICANT CHANGE UP (ref 81–99)
MONOCYTES # BLD AUTO: 0.82 K/UL — HIGH (ref 0.1–0.6)
MONOCYTES NFR BLD AUTO: 9.5 % — HIGH (ref 1.7–9.3)
NEUTROPHILS # BLD AUTO: 5.75 K/UL — SIGNIFICANT CHANGE UP (ref 1.4–6.5)
NEUTROPHILS NFR BLD AUTO: 66.7 % — SIGNIFICANT CHANGE UP (ref 42.2–75.2)
NRBC # BLD: 0 /100 WBCS — SIGNIFICANT CHANGE UP (ref 0–0)
NRBC # BLD: 0 /100 WBCS — SIGNIFICANT CHANGE UP (ref 0–0)
PLATELET # BLD AUTO: 192 K/UL — SIGNIFICANT CHANGE UP (ref 130–400)
PLATELET # BLD AUTO: 199 K/UL — SIGNIFICANT CHANGE UP (ref 130–400)
POTASSIUM SERPL-MCNC: 3.8 MMOL/L — SIGNIFICANT CHANGE UP (ref 3.5–5)
POTASSIUM SERPL-SCNC: 3.8 MMOL/L — SIGNIFICANT CHANGE UP (ref 3.5–5)
PROT SERPL-MCNC: 6.8 G/DL — SIGNIFICANT CHANGE UP (ref 6–8)
PROTHROM AB SERPL-ACNC: 12.1 SEC — SIGNIFICANT CHANGE UP (ref 9.95–12.87)
RBC # BLD: 4.16 M/UL — LOW (ref 4.2–5.4)
RBC # BLD: 4.32 M/UL — SIGNIFICANT CHANGE UP (ref 4.2–5.4)
RBC # FLD: 12.8 % — SIGNIFICANT CHANGE UP (ref 11.5–14.5)
RBC # FLD: 12.9 % — SIGNIFICANT CHANGE UP (ref 11.5–14.5)
SODIUM SERPL-SCNC: 141 MMOL/L — SIGNIFICANT CHANGE UP (ref 135–146)
SPECIMEN SOURCE: SIGNIFICANT CHANGE UP
WBC # BLD: 8.62 K/UL — SIGNIFICANT CHANGE UP (ref 4.8–10.8)
WBC # BLD: 9.08 K/UL — SIGNIFICANT CHANGE UP (ref 4.8–10.8)
WBC # FLD AUTO: 8.62 K/UL — SIGNIFICANT CHANGE UP (ref 4.8–10.8)
WBC # FLD AUTO: 9.08 K/UL — SIGNIFICANT CHANGE UP (ref 4.8–10.8)

## 2019-03-21 PROCEDURE — 93925 LOWER EXTREMITY STUDY: CPT | Mod: 26

## 2019-03-21 PROCEDURE — 99222 1ST HOSP IP/OBS MODERATE 55: CPT

## 2019-03-21 RX ORDER — NYSTATIN CREAM 100000 [USP'U]/G
1 CREAM TOPICAL
Qty: 0 | Refills: 0 | Status: DISCONTINUED | OUTPATIENT
Start: 2019-03-21 | End: 2019-03-22

## 2019-03-21 RX ORDER — CEFTRIAXONE 500 MG/1
2 INJECTION, POWDER, FOR SOLUTION INTRAMUSCULAR; INTRAVENOUS EVERY 24 HOURS
Qty: 0 | Refills: 0 | Status: DISCONTINUED | OUTPATIENT
Start: 2019-03-21 | End: 2019-03-22

## 2019-03-21 RX ORDER — OXYCODONE HYDROCHLORIDE 5 MG/1
5 TABLET ORAL EVERY 6 HOURS
Qty: 0 | Refills: 0 | Status: DISCONTINUED | OUTPATIENT
Start: 2019-03-21 | End: 2019-03-22

## 2019-03-21 RX ORDER — CEFTRIAXONE 500 MG/1
2 INJECTION, POWDER, FOR SOLUTION INTRAMUSCULAR; INTRAVENOUS EVERY 24 HOURS
Qty: 0 | Refills: 0 | Status: DISCONTINUED | OUTPATIENT
Start: 2019-03-21 | End: 2019-03-21

## 2019-03-21 RX ADMIN — Medication 1 GRAM(S): at 23:18

## 2019-03-21 RX ADMIN — HEPARIN SODIUM 5000 UNIT(S): 5000 INJECTION INTRAVENOUS; SUBCUTANEOUS at 06:19

## 2019-03-21 RX ADMIN — Medication 25 MILLIGRAM(S): at 06:19

## 2019-03-21 RX ADMIN — Medication 10 MILLIEQUIVALENT(S): at 12:16

## 2019-03-21 RX ADMIN — Medication 100 MILLIGRAM(S): at 21:12

## 2019-03-21 RX ADMIN — HEPARIN SODIUM 5000 UNIT(S): 5000 INJECTION INTRAVENOUS; SUBCUTANEOUS at 13:21

## 2019-03-21 RX ADMIN — Medication 1 GRAM(S): at 12:15

## 2019-03-21 RX ADMIN — CEFTRIAXONE 100 GRAM(S): 500 INJECTION, POWDER, FOR SOLUTION INTRAMUSCULAR; INTRAVENOUS at 21:13

## 2019-03-21 RX ADMIN — Medication 25 MICROGRAM(S): at 06:19

## 2019-03-21 RX ADMIN — SIMVASTATIN 40 MILLIGRAM(S): 20 TABLET, FILM COATED ORAL at 21:11

## 2019-03-21 RX ADMIN — SERTRALINE 100 MILLIGRAM(S): 25 TABLET, FILM COATED ORAL at 12:15

## 2019-03-21 RX ADMIN — HEPARIN SODIUM 5000 UNIT(S): 5000 INJECTION INTRAVENOUS; SUBCUTANEOUS at 21:12

## 2019-03-21 RX ADMIN — Medication 3 MILLIGRAM(S): at 21:12

## 2019-03-21 RX ADMIN — Medication 100 MILLIGRAM(S): at 06:19

## 2019-03-21 RX ADMIN — Medication 1 GRAM(S): at 18:21

## 2019-03-21 RX ADMIN — Medication 1 GRAM(S): at 06:20

## 2019-03-21 RX ADMIN — LOSARTAN POTASSIUM 100 MILLIGRAM(S): 100 TABLET, FILM COATED ORAL at 06:19

## 2019-03-21 RX ADMIN — CELECOXIB 200 MILLIGRAM(S): 200 CAPSULE ORAL at 12:15

## 2019-03-21 RX ADMIN — Medication 100 MILLIGRAM(S): at 13:21

## 2019-03-21 RX ADMIN — Medication 1 MILLIGRAM(S): at 12:16

## 2019-03-21 RX ADMIN — PANTOPRAZOLE SODIUM 40 MILLIGRAM(S): 20 TABLET, DELAYED RELEASE ORAL at 06:19

## 2019-03-21 NOTE — PROGRESS NOTE ADULT - ASSESSMENT
70 yo F PMH depression, Anxiety, HTN, DLD, Hypothyroid, Osteoarthritis with left AKA 1969 presents here c/o pain swelling and redness of the stump x 1 week. Patient denies fever chills cough or any other associated symptoms    Left Stump cellulitis and abscess with Acute OM s/p I&D 3/21 by surgery  ·	CT 3/20 osteomyelitis extending to distal femoral stump, abscess	  ·	ID f/u appreciated, dose of Rocephin (3/19- )  ·	PICC line placement, IR consult placed  ·	BCx NGTD  ·	Vascular- duplex art pending  ·	Duplex vein- No DVT left  ·	cold compression q4 h for the erythematous and edematous stump site   ·	Pt was supposed to get her prosthetic leg changed last week, will hold on to it until infection resolved     HTN, hx CAD  ·	Stable   ·	Continue with losartan 100 and Hydrochlorothiazide 25.     Suspected folic acid deficiency  ·	cw folic acid supplementation    DVT Prophylaxis -Heparin Sub Q   Diet: regular  Activity: walker  Disposition: SNF, pt agreeable

## 2019-03-21 NOTE — PROGRESS NOTE ADULT - SUBJECTIVE AND OBJECTIVE BOX
NERISSA, ТАТЬЯНА  69y  Female  HPI:  69 year old female with a PMH of depression, Anxiety, HTN, DLD, Hypothyroid, Osteoarthritis with a left AKA performed in 1969 presents here c/o pain swelling and redness of the stump x 1 week. Patient denies fever chills cough or any other associated symptoms. (18 Mar 2019 15:07)    MEDICATIONS  (STANDING):  cefTRIAXone   IVPB      cefTRIAXone   IVPB 1 Gram(s) IV Intermittent every 24 hours  celecoxib 200 milliGRAM(s) Oral daily  docusate sodium 100 milliGRAM(s) Oral three times a day  folic acid 1 milliGRAM(s) Oral daily  heparin  Injectable 5000 Unit(s) SubCutaneous every 8 hours  hydrochlorothiazide 25 milliGRAM(s) Oral daily  levothyroxine 25 MICROGram(s) Oral daily  losartan 100 milliGRAM(s) Oral daily  melatonin 3 milliGRAM(s) Oral at bedtime  pantoprazole    Tablet 40 milliGRAM(s) Oral before breakfast  potassium chloride    Tablet ER 10 milliEquivalent(s) Oral daily  sertraline 100 milliGRAM(s) Oral daily  simvastatin 40 milliGRAM(s) Oral at bedtime  sucralfate 1 Gram(s) Oral four times a day    MEDICATIONS  (PRN):  acetaminophen   Tablet .. 650 milliGRAM(s) Oral every 6 hours PRN Moderate Pain (4 - 6)  oxyCODONE    IR 5 milliGRAM(s) Oral every 6 hours PRN Severe Pain (7 - 10)    INTERVAL EVENTS: Patient seen today post ID of left stump.    T(C): 36.1 (03-21-19 @ 04:55), Max: 36.5 (03-20-19 @ 21:35)  HR: 72 (03-21-19 @ 04:55) (72 - 92)  BP: 106/56 (03-21-19 @ 04:55) (101/61 - 106/64)  RR: 18 (03-21-19 @ 04:55) (18 - 20)  SpO2: 98% (03-20-19 @ 20:36) (98% - 98%)  Wt(kg): --Vital Signs Last 24 Hrs  T(C): 36.1 (21 Mar 2019 04:55), Max: 36.5 (20 Mar 2019 21:35)  T(F): 97 (21 Mar 2019 04:55), Max: 97.7 (20 Mar 2019 21:35)  HR: 72 (21 Mar 2019 04:55) (72 - 92)  BP: 106/56 (21 Mar 2019 04:55) (101/61 - 106/64)  BP(mean): --  RR: 18 (21 Mar 2019 04:55) (18 - 20)  SpO2: 98% (20 Mar 2019 20:36) (98% - 98%)    PHYSICAL EXAM:  GENERAL: NAD, tearful  NECK: Supple, No JVD  CHEST/LUNG: Clear  HEART: S1, S2, Regular rate and rhythm;   ABDOMEN: Soft, Nontender, Nondistended; Bowel sounds present  EXTREMITIES: Decreased edema of left stump, dressing in place, blood tinged - dry      LABS:  Labs:                        14.5   9.31  )-----------( 212      ( 20 Mar 2019 08:05 )             43.7             03-20    139  |  101  |  18  ----------------------------<  118<H>  3.6   |  22  |  0.7    Ca    9.7      20 Mar 2019 08:05    PT/INR - ( 20 Mar 2019 08:05 )   PT: 12.40 sec;   INR: 1.08 ratio         PTT - ( 20 Mar 2019 08:05 )  PTT:40.5 sec    Culture - Body Fluid with Gram Stain (collected 20 Mar 2019 18:00)  Source: .Body Fluid None  Gram Stain (21 Mar 2019 08:18):    polymorphonuclear leukocytes seen    No organisms seen    by cytocentrifuge    Culture - Blood (collected 19 Mar 2019 21:39)  Source: .Blood None  Preliminary Report (21 Mar 2019 07:00):    No growth to date.      RADIOLOGY & ADDITIONAL TESTS:
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
## 2019-03-21 NOTE — PROGRESS NOTE ADULT - SUBJECTIVE AND OBJECTIVE BOX
GENERAL SURGERY PROGRESS NOTE     ТАТЬЯНА MULTANI  69y  Female  Hospital day :3d  Procedure: I&D     OVERNIGHT EVENTS:  no acute overnight events. 20CC of serous fluid drained, no fever, normal wbc.Packing change done today     T(F): 97 (03-21-19 @ 04:55), Max: 97.7 (03-20-19 @ 21:35)  HR: 72 (03-21-19 @ 04:55) (72 - 92)  BP: 106/56 (03-21-19 @ 04:55) (101/61 - 106/64)  RR: 18 (03-21-19 @ 04:55) (18 - 20)  SpO2: 98% (03-20-19 @ 20:36) (98% - 98%)    DIET/FLUIDS: folic acid 1 milliGRAM(s) Oral daily  potassium chloride    Tablet ER 10 milliEquivalent(s) Oral daily      GI proph:  pantoprazole    Tablet 40 milliGRAM(s) Oral before breakfast    AC/ proph: heparin  Injectable 5000 Unit(s) SubCutaneous every 8 hours    ABx: cefTRIAXone   IVPB      cefTRIAXone   IVPB 1 Gram(s) IV Intermittent every 24 hours      PHYSICAL EXAM:  GENERAL: NAD, well-appearing  CHEST/LUNG: Clear to auscultation bilaterally  HEART: Regular rate and rhythm  ABDOMEN: Soft, Nontender, Nondistended;   EXTREMITIES:  Left AKA stump, dressing intact. minimal soakage.    LABS  Labs:  CAPILLARY BLOOD GLUCOSE                              14.5   9.31  )-----------( 212      ( 20 Mar 2019 08:05 )             43.7         03-20    139  |  101  |  18  ----------------------------<  118<H>  3.6   |  22  |  0.7          LFTs:     Lactate, Blood: 1.0 mmol/L (03-18-19 @ 22:57)  Lactate, Blood: 2.3 mmol/L (03-18-19 @ 10:27)      Coags:     12.40  ----< 1.08    ( 20 Mar 2019 08:05 )     40.5                    Culture - Body Fluid with Gram Stain (collected 20 Mar 2019 18:00)  Source: .Body Fluid None  Gram Stain (21 Mar 2019 08:18):    polymorphonuclear leukocytes seen    No organisms seen    by cytocentrifuge    Culture - Blood (collected 19 Mar 2019 21:39)  Source: .Blood None  Preliminary Report (21 Mar 2019 07:00):    No growth to date.    Culture - Blood (collected 18 Mar 2019 10:27)  Source: .Blood Blood-Peripheral  Preliminary Report (19 Mar 2019 17:01):    No growth to date.

## 2019-03-21 NOTE — PROGRESS NOTE ADULT - SUBJECTIVE AND OBJECTIVE BOX
NERISSA, ТАТЬЯНА  69y, Female      OVERNIGHT EVENTS:  afebrile  s/p I&D, cx pending, blood, serous fluid    ROS negative except as per above    VITALS:  T(F): 97, Max: 97.7 (03-20-19 @ 21:35)  HR: 72  BP: 106/56  RR: 18Vital Signs Last 24 Hrs  T(C): 36.1 (21 Mar 2019 04:55), Max: 36.5 (20 Mar 2019 21:35)  T(F): 97 (21 Mar 2019 04:55), Max: 97.7 (20 Mar 2019 21:35)  HR: 72 (21 Mar 2019 04:55) (72 - 92)  BP: 106/56 (21 Mar 2019 04:55) (101/61 - 106/64)  BP(mean): --  RR: 18 (21 Mar 2019 04:55) (18 - 20)  SpO2: 98% (20 Mar 2019 20:36) (98% - 98%)    PHYSICAL EXAM:  Gen: Awake and alert, non-toxic appearing, NAD  HEENT: NCAT.   Lungs: CTAB  Abd: Soft. NTND  Extr: wwp, L stump dressings  Skin: no rash  Neuro: No focal deficits  Lines: clean      TESTS & MEASUREMENTS:                        14.5   9.31  )-----------( 212      ( 20 Mar 2019 08:05 )             43.7     03-20    139  |  101  |  18  ----------------------------<  118<H>  3.6   |  22  |  0.7    Ca    9.7      20 Mar 2019 08:05          Culture - Body Fluid with Gram Stain (collected 03-20-19 @ 18:00)  Source: .Body Fluid None  Gram Stain (03-21-19 @ 08:18):    polymorphonuclear leukocytes seen    No organisms seen    by cytocentrifuge    Culture - Blood (collected 03-19-19 @ 21:39)  Source: .Blood None  Preliminary Report (03-21-19 @ 07:00):    No growth to date.    Culture - Blood (collected 03-18-19 @ 10:27)  Source: .Blood Blood-Peripheral  Preliminary Report (03-19-19 @ 17:01):    No growth to date.          RADIOLOGY & ADDITIONAL TESTS:    ANTIBIOTICS:    cefepime   IVPB   100 mL/Hr IV Intermittent (03-18-19 @ 16:18)    cefepime   IVPB   100 mL/Hr IV Intermittent (03-19-19 @ 05:43)    cefTRIAXone   IVPB   100 mL/Hr IV Intermittent (03-19-19 @ 15:14)    cefTRIAXone   IVPB   100 mL/Hr IV Intermittent (03-20-19 @ 14:19)    piperacillin/tazobactam IVPB.   200 mL/Hr IV Intermittent (03-18-19 @ 14:17)    vancomycin  IVPB   250 mL/Hr IV Intermittent (03-19-19 @ 22:01)   250 mL/Hr IV Intermittent (03-19-19 @ 11:24)   250 mL/Hr IV Intermittent (03-19-19 @ 00:01)    vancomycin  IVPB   250 mL/Hr IV Intermittent (03-18-19 @ 11:08)        cefTRIAXone   IVPB      cefTRIAXone   IVPB 1 Gram(s) IV Intermittent every 24 hours

## 2019-03-21 NOTE — PROGRESS NOTE ADULT - ASSESSMENT
69yF    Chronic pain  Depression/Anxiety  Osteoarthritis  Hypothyroid  HLD   Essential hypertension    Admitted with CELLULITIS of L stump  lactic acidosis 2.3 Sepsis ruled out on admission   US Surrounding the left above-the-knee amputation stump, there is a 3.5 x 3.9 x 3.4 cm complex multiseptated, nonvascular fluid collection with thick wall, consistent with an abscess.  Xray Chronic appearing periosteal reaction is noted at the amputation stump, with apparent osseous erosions measuring 4 mm at the stump, best seen on the lateral view. Findings are highly suggestive of acute osteomyelitis.  MRSA nasal PCR neg  Bcx NGTD  s/p I&D, cx pending, blood, serous fluid  Sedimentation Rate, Erythrocyte: 47 mm/hr (03.19.19 @ 21:39)  C-Reactive Protein, Serum: 5.28 mg/dL (03.19.19 @ 21:39)    - f/u wcx  - Will need PICC line and treatment for acute OM, regimen pending cultures  - INCREASE to Ceftriaxone 2g q24h for OM      Spectra [id number] 69yF    Chronic pain  Depression/Anxiety  Osteoarthritis  Hypothyroid  HLD   Essential hypertension    Admitted with CELLULITIS of L stump  lactic acidosis 2.3 Sepsis ruled out on admission   US Surrounding the left above-the-knee amputation stump, there is a 3.5 x 3.9 x 3.4 cm complex multiseptated, nonvascular fluid collection with thick wall, consistent with an abscess.  Xray Chronic appearing periosteal reaction is noted at the amputation stump, with apparent osseous erosions measuring 4 mm at the stump, best seen on the lateral view. Findings are highly suggestive of acute osteomyelitis.  MRSA nasal PCR neg  Bcx NGTD  s/p I&D, cx pending, blood, serous fluid  Sedimentation Rate, Erythrocyte: 47 mm/hr (03.19.19 @ 21:39)  C-Reactive Protein, Serum: 5.28 mg/dL (03.19.19 @ 21:39)    - cleared for PICC as bcx NGTD 48h   - f/u wcx  - Will need PICC line and treatment for acute OM, regimen pending cultures  - INCREASE to Ceftriaxone 2g q24h for OM      Spectra [id number]

## 2019-03-21 NOTE — PROGRESS NOTE ADULT - ASSESSMENT
68 yo F PMH depression, Anxiety, HTN, DLD, Hypothyroid, Osteoarthritis with left AKA 1970 presents here c/o pain swelling and redness of the stump x 1 week. Patient denies fever chills cough or any other associated symptoms    1) Left Stump cellulitis and abscess with Acute OM   s/p I&D 3/21 by surgery  ·	CT 3/20 osteomyelitis extending to distal femoral stump, abscess	  ·	ID rocephin (3/19- ), s/p cefepime. Decide antibiotics once wound cultures are back  ·	PICC line placement for treatment at home, IR consult placed  ·	BCx NGTD  ·	Vascular- duplex art pending  ·	Duplex vein- No DVT left  ·	cold compression q4 h for the erythematous and edematous stump site   ·	WBC admission 11.42> wnl  ·	Pt was supposed to get her prosthetic leg changed last week, will hold on to it until infection resolved     2) HTN  ·	Stable   ·	Continue with losartan 100 and Hydrochlorothiazide 25.     3)Suspected folic acid deficiency  ·	cw folic acid supplementation    3) DVT Prophylaxis -Heparin Sub Q   Diet: regular  Activity: walker  4) Disposition: SNF, pt agreeable 68 yo F PMH depression, Anxiety, HTN, DLD, Hypothyroid, Osteoarthritis with left AKA 1970 presents here c/o pain swelling and redness of the stump x 1 week. Patient denies fever chills cough or any other associated symptoms    1) Left Stump cellulitis and abscess with Acute OM   s/p I&D 3/21 by surgery  ·	CT 3/20 osteomyelitis extending to distal femoral stump, abscess	  ·	ID rocephin 2 g qd, s/p cefepime. Decide antibiotics once wound cultures are back  ·	PICC line placement for treatment at home, IR consult placed  ·	BCx NGTD  ·	Vascular- duplex art pending  ·	Duplex vein- No DVT left  ·	cold compression q4 h for the erythematous and edematous stump site   ·	WBC admission 11.42> wnl  ·	Pt was supposed to get her prosthetic leg changed last week, will hold on to it until infection resolved     2) HTN  ·	Stable   ·	Continue with losartan 100 and Hydrochlorothiazide 25.     3)Suspected folic acid deficiency  ·	cw folic acid supplementation    3) DVT Prophylaxis -Heparin Sub Q   Diet: regular  Activity: walker  4) Disposition: SNF, pt agreeable

## 2019-03-22 ENCOUNTER — RESULT REVIEW (OUTPATIENT)
Age: 69
End: 2019-03-22

## 2019-03-22 LAB
ALBUMIN SERPL ELPH-MCNC: 3.9 G/DL — SIGNIFICANT CHANGE UP (ref 3.5–5.2)
ALP SERPL-CCNC: 85 U/L — SIGNIFICANT CHANGE UP (ref 30–115)
ALT FLD-CCNC: 11 U/L — SIGNIFICANT CHANGE UP (ref 0–41)
ANION GAP SERPL CALC-SCNC: 14 MMOL/L — SIGNIFICANT CHANGE UP (ref 7–14)
ANION GAP SERPL CALC-SCNC: 17 MMOL/L — HIGH (ref 7–14)
AST SERPL-CCNC: 13 U/L — SIGNIFICANT CHANGE UP (ref 0–41)
BILIRUB SERPL-MCNC: 0.5 MG/DL — SIGNIFICANT CHANGE UP (ref 0.2–1.2)
BLD GP AB SCN SERPL QL: SIGNIFICANT CHANGE UP
BUN SERPL-MCNC: 24 MG/DL — HIGH (ref 10–20)
BUN SERPL-MCNC: 27 MG/DL — HIGH (ref 10–20)
CALCIUM SERPL-MCNC: 8.9 MG/DL — SIGNIFICANT CHANGE UP (ref 8.5–10.1)
CALCIUM SERPL-MCNC: 9.6 MG/DL — SIGNIFICANT CHANGE UP (ref 8.5–10.1)
CHLORIDE SERPL-SCNC: 103 MMOL/L — SIGNIFICANT CHANGE UP (ref 98–110)
CHLORIDE SERPL-SCNC: 103 MMOL/L — SIGNIFICANT CHANGE UP (ref 98–110)
CO2 SERPL-SCNC: 21 MMOL/L — SIGNIFICANT CHANGE UP (ref 17–32)
CO2 SERPL-SCNC: 24 MMOL/L — SIGNIFICANT CHANGE UP (ref 17–32)
CREAT SERPL-MCNC: 0.7 MG/DL — SIGNIFICANT CHANGE UP (ref 0.7–1.5)
CREAT SERPL-MCNC: 0.7 MG/DL — SIGNIFICANT CHANGE UP (ref 0.7–1.5)
GLUCOSE SERPL-MCNC: 106 MG/DL — HIGH (ref 70–99)
GLUCOSE SERPL-MCNC: 147 MG/DL — HIGH (ref 70–99)
HCT VFR BLD CALC: 40 % — SIGNIFICANT CHANGE UP (ref 37–47)
HGB BLD-MCNC: 13.5 G/DL — SIGNIFICANT CHANGE UP (ref 12–16)
MAGNESIUM SERPL-MCNC: 1.9 MG/DL — SIGNIFICANT CHANGE UP (ref 1.8–2.4)
MAGNESIUM SERPL-MCNC: 2 MG/DL — SIGNIFICANT CHANGE UP (ref 1.8–2.4)
MCHC RBC-ENTMCNC: 31 PG — SIGNIFICANT CHANGE UP (ref 27–31)
MCHC RBC-ENTMCNC: 33.8 G/DL — SIGNIFICANT CHANGE UP (ref 32–37)
MCV RBC AUTO: 92 FL — SIGNIFICANT CHANGE UP (ref 81–99)
NRBC # BLD: 0 /100 WBCS — SIGNIFICANT CHANGE UP (ref 0–0)
PHOSPHATE SERPL-MCNC: 3.1 MG/DL — SIGNIFICANT CHANGE UP (ref 2.1–4.9)
PLATELET # BLD AUTO: 208 K/UL — SIGNIFICANT CHANGE UP (ref 130–400)
POTASSIUM SERPL-MCNC: 3.5 MMOL/L — SIGNIFICANT CHANGE UP (ref 3.5–5)
POTASSIUM SERPL-MCNC: 3.8 MMOL/L — SIGNIFICANT CHANGE UP (ref 3.5–5)
POTASSIUM SERPL-SCNC: 3.5 MMOL/L — SIGNIFICANT CHANGE UP (ref 3.5–5)
POTASSIUM SERPL-SCNC: 3.8 MMOL/L — SIGNIFICANT CHANGE UP (ref 3.5–5)
PROT SERPL-MCNC: 6.8 G/DL — SIGNIFICANT CHANGE UP (ref 6–8)
RBC # BLD: 4.35 M/UL — SIGNIFICANT CHANGE UP (ref 4.2–5.4)
RBC # FLD: 12.7 % — SIGNIFICANT CHANGE UP (ref 11.5–14.5)
SODIUM SERPL-SCNC: 141 MMOL/L — SIGNIFICANT CHANGE UP (ref 135–146)
SODIUM SERPL-SCNC: 141 MMOL/L — SIGNIFICANT CHANGE UP (ref 135–146)
TYPE + AB SCN PNL BLD: SIGNIFICANT CHANGE UP
TYPE + AB SCN PNL BLD: SIGNIFICANT CHANGE UP
WBC # BLD: 9.24 K/UL — SIGNIFICANT CHANGE UP (ref 4.8–10.8)
WBC # FLD AUTO: 9.24 K/UL — SIGNIFICANT CHANGE UP (ref 4.8–10.8)

## 2019-03-22 PROCEDURE — 99232 SBSQ HOSP IP/OBS MODERATE 35: CPT

## 2019-03-22 RX ORDER — CELECOXIB 200 MG/1
200 CAPSULE ORAL DAILY
Qty: 0 | Refills: 0 | Status: DISCONTINUED | OUTPATIENT
Start: 2019-03-22 | End: 2019-03-27

## 2019-03-22 RX ORDER — SODIUM CHLORIDE 9 MG/ML
1000 INJECTION INTRAMUSCULAR; INTRAVENOUS; SUBCUTANEOUS
Qty: 0 | Refills: 0 | Status: DISCONTINUED | OUTPATIENT
Start: 2019-03-22 | End: 2019-03-22

## 2019-03-22 RX ORDER — SIMVASTATIN 20 MG/1
40 TABLET, FILM COATED ORAL AT BEDTIME
Qty: 0 | Refills: 0 | Status: DISCONTINUED | OUTPATIENT
Start: 2019-03-22 | End: 2019-03-27

## 2019-03-22 RX ORDER — HEPARIN SODIUM 5000 [USP'U]/ML
5000 INJECTION INTRAVENOUS; SUBCUTANEOUS EVERY 8 HOURS
Qty: 0 | Refills: 0 | Status: DISCONTINUED | OUTPATIENT
Start: 2019-03-22 | End: 2019-03-27

## 2019-03-22 RX ORDER — DOCUSATE SODIUM 100 MG
100 CAPSULE ORAL THREE TIMES A DAY
Qty: 0 | Refills: 0 | Status: DISCONTINUED | OUTPATIENT
Start: 2019-03-22 | End: 2019-03-27

## 2019-03-22 RX ORDER — LANOLIN ALCOHOL/MO/W.PET/CERES
3 CREAM (GRAM) TOPICAL AT BEDTIME
Qty: 0 | Refills: 0 | Status: DISCONTINUED | OUTPATIENT
Start: 2019-03-22 | End: 2019-03-27

## 2019-03-22 RX ORDER — SUCRALFATE 1 G
1 TABLET ORAL
Qty: 0 | Refills: 0 | Status: DISCONTINUED | OUTPATIENT
Start: 2019-03-22 | End: 2019-03-27

## 2019-03-22 RX ORDER — SERTRALINE 25 MG/1
100 TABLET, FILM COATED ORAL DAILY
Qty: 0 | Refills: 0 | Status: DISCONTINUED | OUTPATIENT
Start: 2019-03-22 | End: 2019-03-27

## 2019-03-22 RX ORDER — CEFTRIAXONE 500 MG/1
2 INJECTION, POWDER, FOR SOLUTION INTRAMUSCULAR; INTRAVENOUS EVERY 24 HOURS
Qty: 0 | Refills: 0 | Status: DISCONTINUED | OUTPATIENT
Start: 2019-03-22 | End: 2019-03-27

## 2019-03-22 RX ORDER — FOLIC ACID 0.8 MG
1 TABLET ORAL DAILY
Qty: 0 | Refills: 0 | Status: DISCONTINUED | OUTPATIENT
Start: 2019-03-22 | End: 2019-03-27

## 2019-03-22 RX ORDER — OXYCODONE HYDROCHLORIDE 5 MG/1
5 TABLET ORAL EVERY 6 HOURS
Qty: 0 | Refills: 0 | Status: DISCONTINUED | OUTPATIENT
Start: 2019-03-22 | End: 2019-03-27

## 2019-03-22 RX ORDER — POTASSIUM CHLORIDE 20 MEQ
10 PACKET (EA) ORAL DAILY
Qty: 0 | Refills: 0 | Status: DISCONTINUED | OUTPATIENT
Start: 2019-03-22 | End: 2019-03-26

## 2019-03-22 RX ORDER — NYSTATIN CREAM 100000 [USP'U]/G
1 CREAM TOPICAL
Qty: 0 | Refills: 0 | Status: DISCONTINUED | OUTPATIENT
Start: 2019-03-22 | End: 2019-03-26

## 2019-03-22 RX ORDER — ACETAMINOPHEN 500 MG
650 TABLET ORAL EVERY 6 HOURS
Qty: 0 | Refills: 0 | Status: DISCONTINUED | OUTPATIENT
Start: 2019-03-22 | End: 2019-03-27

## 2019-03-22 RX ORDER — LEVOTHYROXINE SODIUM 125 MCG
25 TABLET ORAL DAILY
Qty: 0 | Refills: 0 | Status: DISCONTINUED | OUTPATIENT
Start: 2019-03-22 | End: 2019-03-27

## 2019-03-22 RX ORDER — HYDROCHLOROTHIAZIDE 25 MG
25 TABLET ORAL DAILY
Qty: 0 | Refills: 0 | Status: DISCONTINUED | OUTPATIENT
Start: 2019-03-22 | End: 2019-03-27

## 2019-03-22 RX ORDER — HYDROMORPHONE HYDROCHLORIDE 2 MG/ML
0.25 INJECTION INTRAMUSCULAR; INTRAVENOUS; SUBCUTANEOUS
Qty: 0 | Refills: 0 | Status: DISCONTINUED | OUTPATIENT
Start: 2019-03-22 | End: 2019-03-22

## 2019-03-22 RX ORDER — PANTOPRAZOLE SODIUM 20 MG/1
40 TABLET, DELAYED RELEASE ORAL
Qty: 0 | Refills: 0 | Status: DISCONTINUED | OUTPATIENT
Start: 2019-03-22 | End: 2019-03-27

## 2019-03-22 RX ORDER — LOSARTAN POTASSIUM 100 MG/1
100 TABLET, FILM COATED ORAL DAILY
Qty: 0 | Refills: 0 | Status: DISCONTINUED | OUTPATIENT
Start: 2019-03-22 | End: 2019-03-27

## 2019-03-22 RX ORDER — HYDROMORPHONE HYDROCHLORIDE 2 MG/ML
0.5 INJECTION INTRAMUSCULAR; INTRAVENOUS; SUBCUTANEOUS
Qty: 0 | Refills: 0 | Status: DISCONTINUED | OUTPATIENT
Start: 2019-03-22 | End: 2019-03-22

## 2019-03-22 RX ADMIN — Medication 100 MILLIGRAM(S): at 21:03

## 2019-03-22 RX ADMIN — CEFTRIAXONE 100 GRAM(S): 500 INJECTION, POWDER, FOR SOLUTION INTRAMUSCULAR; INTRAVENOUS at 21:02

## 2019-03-22 RX ADMIN — HEPARIN SODIUM 5000 UNIT(S): 5000 INJECTION INTRAVENOUS; SUBCUTANEOUS at 21:03

## 2019-03-22 RX ADMIN — Medication 1 GRAM(S): at 05:20

## 2019-03-22 RX ADMIN — HEPARIN SODIUM 5000 UNIT(S): 5000 INJECTION INTRAVENOUS; SUBCUTANEOUS at 05:20

## 2019-03-22 RX ADMIN — LOSARTAN POTASSIUM 100 MILLIGRAM(S): 100 TABLET, FILM COATED ORAL at 05:20

## 2019-03-22 RX ADMIN — OXYCODONE HYDROCHLORIDE 5 MILLIGRAM(S): 5 TABLET ORAL at 21:11

## 2019-03-22 RX ADMIN — Medication 3 MILLIGRAM(S): at 21:03

## 2019-03-22 RX ADMIN — HYDROMORPHONE HYDROCHLORIDE 0.25 MILLIGRAM(S): 2 INJECTION INTRAMUSCULAR; INTRAVENOUS; SUBCUTANEOUS at 17:20

## 2019-03-22 RX ADMIN — HYDROMORPHONE HYDROCHLORIDE 0.25 MILLIGRAM(S): 2 INJECTION INTRAMUSCULAR; INTRAVENOUS; SUBCUTANEOUS at 19:32

## 2019-03-22 RX ADMIN — NYSTATIN CREAM 1 APPLICATION(S): 100000 CREAM TOPICAL at 05:20

## 2019-03-22 RX ADMIN — PANTOPRAZOLE SODIUM 40 MILLIGRAM(S): 20 TABLET, DELAYED RELEASE ORAL at 05:20

## 2019-03-22 RX ADMIN — SODIUM CHLORIDE 75 MILLILITER(S): 9 INJECTION INTRAMUSCULAR; INTRAVENOUS; SUBCUTANEOUS at 16:40

## 2019-03-22 RX ADMIN — Medication 25 MILLIGRAM(S): at 05:20

## 2019-03-22 RX ADMIN — Medication 25 MICROGRAM(S): at 05:20

## 2019-03-22 RX ADMIN — Medication 100 MILLIGRAM(S): at 05:20

## 2019-03-22 RX ADMIN — SIMVASTATIN 40 MILLIGRAM(S): 20 TABLET, FILM COATED ORAL at 21:03
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
## 2019-03-22 NOTE — PROGRESS NOTE ADULT - SUBJECTIVE AND OBJECTIVE BOX
RUBYJESSICABETH, ТАТЬЯНА  69y  Female  HPI:  69 year old female with a PMH of depression, Anxiety, HTN, DLD, Hypothyroid, Osteoarthritis with a left AKA performed in 1969 presents here c/o pain swelling and redness of the stump x 1 week. Patient denies fever chills cough or any other associated symptoms. (18 Mar 2019 15:07)    MEDICATIONS  (STANDING):  cefTRIAXone   IVPB 2 Gram(s) IV Intermittent every 24 hours  celecoxib 200 milliGRAM(s) Oral daily  docusate sodium 100 milliGRAM(s) Oral three times a day  folic acid 1 milliGRAM(s) Oral daily  heparin  Injectable 5000 Unit(s) SubCutaneous every 8 hours  hydrochlorothiazide 25 milliGRAM(s) Oral daily  levothyroxine 25 MICROGram(s) Oral daily  losartan 100 milliGRAM(s) Oral daily  melatonin 3 milliGRAM(s) Oral at bedtime  nystatin Ointment 1 Application(s) Topical two times a day  pantoprazole    Tablet 40 milliGRAM(s) Oral before breakfast  potassium chloride    Tablet ER 10 milliEquivalent(s) Oral daily  sertraline 100 milliGRAM(s) Oral daily  simvastatin 40 milliGRAM(s) Oral at bedtime  sucralfate 1 Gram(s) Oral four times a day    MEDICATIONS  (PRN):  acetaminophen   Tablet .. 650 milliGRAM(s) Oral every 6 hours PRN Moderate Pain (4 - 6)  oxyCODONE    IR 5 milliGRAM(s) Oral every 6 hours PRN Severe Pain (7 - 10)    INTERVAL EVENTS: Patient seen today worried about surgical procedure. Patient with pains to both UE and upper chest?     T(C): 36.1 (03-22-19 @ 04:59), Max: 36.5 (03-21-19 @ 14:10)  HR: 78 (03-22-19 @ 04:59) (74 - 82)  BP: 112/69 (03-22-19 @ 04:59) (110/65 - 123/66)  RR: 20 (03-22-19 @ 04:59) (18 - 20)  SpO2: 95% (03-22-19 @ 00:34) (95% - 97%)  Wt(kg): --Vital Signs Last 24 Hrs  T(C): 36.1 (22 Mar 2019 04:59), Max: 36.5 (21 Mar 2019 14:10)  T(F): 96.9 (22 Mar 2019 04:59), Max: 97.7 (21 Mar 2019 14:10)  HR: 78 (22 Mar 2019 04:59) (74 - 82)  BP: 112/69 (22 Mar 2019 04:59) (110/65 - 123/66)  BP(mean): --  RR: 20 (22 Mar 2019 04:59) (18 - 20)  SpO2: 95% (22 Mar 2019 00:34) (95% - 97%)    PHYSICAL EXAM:  GENERAL: NAD  NECK: Supple, No JVD  CHEST/LUNG: Clear  HEART: S1, S2, Regular rate and rhythm;   ABDOMEN: Soft, Nontender, Nondistended; Bowel sounds present  EXTREMITIES: No edema  SKIN: Wound covered / dressing intact    LABS:  Labs:                        13.5   9.24  )-----------( 208      ( 22 Mar 2019 07:29 )             40.0             03-22    141  |  103  |  24<H>  ----------------------------<  106<H>  3.8   |  24  |  0.7    Ca    9.6      22 Mar 2019 07:29  Phos  3.1     03-21  Mg     1.9     03-22    TPro  6.8  /  Alb  3.9  /  TBili  0.5  /  DBili  x   /  AST  13  /  ALT  11  /  AlkPhos  85  03-22    LIVER FUNCTIONS - ( 22 Mar 2019 07:29 )  Alb: 3.9 g/dL / Pro: 6.8 g/dL / ALK PHOS: 85 U/L / ALT: 11 U/L / AST: 13 U/L / GGT: x                   PT/INR - ( 21 Mar 2019 22:05 )   PT: 12.10 sec;   INR: 1.05 ratio      PTT - ( 21 Mar 2019 22:05 )  PTT:37.9 sec      Culture - Body Fluid with Gram Stain (collected 20 Mar 2019 18:00)  Source: .Body Fluid None  Gram Stain (21 Mar 2019 08:18):    polymorphonuclear leukocytes seen    No organisms seen    by cytocentrifuge    Culture - Blood (collected 20 Mar 2019 08:05)  Source: .Blood None  Preliminary Report (21 Mar 2019 18:00):    No growth to date.    Culture - Blood (collected 19 Mar 2019 21:39)  Source: .Blood None  Preliminary Report (21 Mar 2019 07:00):    No growth to date.      RADIOLOGY & ADDITIONAL TESTS:  < from: CT Knee w/ IV Cont, Left (03.20.19 @ 16:54) >  IMPRESSION:  Abscess as above, apparently extending into the distal aspect of the   femoral stump indicative of osteomyelitis.      < end of copied text >      < from: 12 Lead ECG (03.21.19 @ 17:51) >    Diagnosis Line Sinus rhythm with Premature atrial complexes  Nonspecific ST abnormality  Abnormal ECG    < end of copied text > RUBYJESSICABETH ТАТЬЯНА  69y  Female  HPI:  69 year old female with a PMH of depression, Anxiety, HTN, DLD, Hypothyroid, Osteoarthritis with a left AKA performed in 1969 presents here c/o pain swelling and redness of the stump x 1 week. Patient denies fever chills cough or any other associated symptoms. (18 Mar 2019 15:07)    MEDICATIONS  (STANDING):  cefTRIAXone   IVPB 2 Gram(s) IV Intermittent every 24 hours  celecoxib 200 milliGRAM(s) Oral daily  docusate sodium 100 milliGRAM(s) Oral three times a day  folic acid 1 milliGRAM(s) Oral daily  heparin  Injectable 5000 Unit(s) SubCutaneous every 8 hours  hydrochlorothiazide 25 milliGRAM(s) Oral daily  levothyroxine 25 MICROGram(s) Oral daily  losartan 100 milliGRAM(s) Oral daily  melatonin 3 milliGRAM(s) Oral at bedtime  nystatin Ointment 1 Application(s) Topical two times a day  pantoprazole    Tablet 40 milliGRAM(s) Oral before breakfast  potassium chloride    Tablet ER 10 milliEquivalent(s) Oral daily  sertraline 100 milliGRAM(s) Oral daily  simvastatin 40 milliGRAM(s) Oral at bedtime  sucralfate 1 Gram(s) Oral four times a day    MEDICATIONS  (PRN):  acetaminophen   Tablet .. 650 milliGRAM(s) Oral every 6 hours PRN Moderate Pain (4 - 6)  oxyCODONE    IR 5 milliGRAM(s) Oral every 6 hours PRN Severe Pain (7 - 10)    INTERVAL EVENTS: Patient seen today worried about surgical procedure. Patient with pains to both UE and upper chest on occasion wakes her up? Patient with hx of CAD, PCI. Patient denies symptoms now. Medications reviewed... not on Coreg 12.5 mg BID. Patient states pharmacy did not refill. Patient has not seen cardiology since last surgery (ankle).    T(C): 36.1 (03-22-19 @ 04:59), Max: 36.5 (03-21-19 @ 14:10)  HR: 78 (03-22-19 @ 04:59) (74 - 82)  BP: 112/69 (03-22-19 @ 04:59) (110/65 - 123/66)  RR: 20 (03-22-19 @ 04:59) (18 - 20)  SpO2: 95% (03-22-19 @ 00:34) (95% - 97%)  Wt(kg): --Vital Signs Last 24 Hrs  T(C): 36.1 (22 Mar 2019 04:59), Max: 36.5 (21 Mar 2019 14:10)  T(F): 96.9 (22 Mar 2019 04:59), Max: 97.7 (21 Mar 2019 14:10)  HR: 78 (22 Mar 2019 04:59) (74 - 82)  BP: 112/69 (22 Mar 2019 04:59) (110/65 - 123/66)  BP(mean): --  RR: 20 (22 Mar 2019 04:59) (18 - 20)  SpO2: 95% (22 Mar 2019 00:34) (95% - 97%)    PHYSICAL EXAM:  GENERAL: NAD  NECK: Supple, No JVD  CHEST/LUNG: Clear  HEART: S1, S2, Regular rate and rhythm;   ABDOMEN: Soft, Nontender, Nondistended; Bowel sounds present  EXTREMITIES: No edema  SKIN: Wound covered / dressing intact    LABS:  Labs:                        13.5   9.24  )-----------( 208      ( 22 Mar 2019 07:29 )             40.0             03-22    141  |  103  |  24<H>  ----------------------------<  106<H>  3.8   |  24  |  0.7    Ca    9.6      22 Mar 2019 07:29  Phos  3.1     03-21  Mg     1.9     03-22    TPro  6.8  /  Alb  3.9  /  TBili  0.5  /  DBili  x   /  AST  13  /  ALT  11  /  AlkPhos  85  03-22    LIVER FUNCTIONS - ( 22 Mar 2019 07:29 )  Alb: 3.9 g/dL / Pro: 6.8 g/dL / ALK PHOS: 85 U/L / ALT: 11 U/L / AST: 13 U/L / GGT: x                   PT/INR - ( 21 Mar 2019 22:05 )   PT: 12.10 sec;   INR: 1.05 ratio      PTT - ( 21 Mar 2019 22:05 )  PTT:37.9 sec      Culture - Body Fluid with Gram Stain (collected 20 Mar 2019 18:00)  Source: .Body Fluid None  Gram Stain (21 Mar 2019 08:18):    polymorphonuclear leukocytes seen    No organisms seen    by cytocentrifuge    Culture - Blood (collected 20 Mar 2019 08:05)  Source: .Blood None  Preliminary Report (21 Mar 2019 18:00):    No growth to date.    Culture - Blood (collected 19 Mar 2019 21:39)  Source: .Blood None  Preliminary Report (21 Mar 2019 07:00):    No growth to date.      RADIOLOGY & ADDITIONAL TESTS:  < from: CT Knee w/ IV Cont, Left (03.20.19 @ 16:54) >  IMPRESSION:  Abscess as above, apparently extending into the distal aspect of the   femoral stump indicative of osteomyelitis.      < end of copied text >      < from: 12 Lead ECG (03.21.19 @ 17:51) >    Diagnosis Line Sinus rhythm with Premature atrial complexes  Nonspecific ST abnormality  Abnormal ECG    < end of copied text >

## 2019-03-22 NOTE — PROCEDURE NOTE - NSPROCDETAILS_GEN_ALL_CORE
incision left open, packing placed
supine position/sterile technique, catheter placed/sterile dressing applied/ultrasound guidance/location identified, draped/prepped, sterile technique used

## 2019-03-22 NOTE — PROCEDURE NOTE - NSPOSTCAREGUIDE_GEN_A_CORE
Instructed patient/caregiver to follow-up with primary care physician/Verbal/written post procedure instructions were given to patient/caregiver/Instructed patient/caregiver regarding signs and symptoms of infection/Keep the cast/splint/dressing clean and dry/Care for catheter as per unit/ICU protocols
Instructed patient/caregiver to follow-up with primary care physician/Instructed patient/caregiver regarding signs and symptoms of infection/Keep the cast/splint/dressing clean and dry/Verbal/written post procedure instructions were given to patient/caregiver

## 2019-03-22 NOTE — CONSULT NOTE ADULT - ASSESSMENT
#) Left AKA stump osteomyelitis and abbess s/p I&D on 3/21  #) CAD s/p PCI  #) HTN  #) OA  #) depression and anxiety     PLAN    Intermediate risk to low risk procedure

## 2019-03-22 NOTE — PROGRESS NOTE ADULT - ASSESSMENT
69yF    Chronic pain  Depression/Anxiety  Osteoarthritis  Hypothyroid  HLD   Essential hypertension    Admitted with CELLULITIS of L stump  lactic acidosis 2.3 Sepsis ruled out on admission   US Surrounding the left above-the-knee amputation stump, there is a 3.5 x 3.9 x 3.4 cm complex multiseptated, nonvascular fluid collection with thick wall, consistent with an abscess.  Xray Chronic appearing periosteal reaction is noted at the amputation stump, with apparent osseous erosions measuring 4 mm at the stump, best seen on the lateral view. Findings are highly suggestive of acute osteomyelitis.  MRSA nasal PCR neg  Bcx NGTD  s/p I&D, cx pending, blood, serous fluid  Sedimentation Rate, Erythrocyte: 47 mm/hr (03.19.19 @ 21:39)  C-Reactive Protein, Serum: 5.28 mg/dL (03.19.19 @ 21:39)    - OR washout, please send cultures  - cleared for PICC as bcx NGTD 48h   - f/u wcx  - Ceftriaxone 2g q24h for OM x 6 weeks if no growth on cultures  - ID follow-up [id number] [phone]        Spectra [id number]
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
## 2019-03-22 NOTE — CONSULT NOTE ADULT - ATTENDING COMMENTS
Left AKA stump abscess already drained by GS. Normal arterial flow in the stump and right leg on duplex. No vascular intervention.
Pt seen and examined. Pt with stable CAD tolerated knee replacement surgery 9monyhs ago. No angina or chf, She can walk several blocks and climb stairs using her prosthesis. No cardiac contraindication to low risk surgery at low-moderate risk 1 point on revised index for preop risk assessment.
above noted abdomen soft abscess of LT B/K stump for I/D pt fullyb examined by me and agree with above

## 2019-03-22 NOTE — PROGRESS NOTE ADULT - SUBJECTIVE AND OBJECTIVE BOX
NERISSA, ТАТЬЯНА  69y, Female      OVERNIGHT EVENTS:  OR for debridement    ROS negative except as per above    VITALS:  T(F): 96.9, Max: 97.7 (03-21-19 @ 14:10)  HR: 78  BP: 112/69  RR: 20Vital Signs Last 24 Hrs  T(C): 36.1 (22 Mar 2019 04:59), Max: 36.5 (21 Mar 2019 14:10)  T(F): 96.9 (22 Mar 2019 04:59), Max: 97.7 (21 Mar 2019 14:10)  HR: 78 (22 Mar 2019 04:59) (74 - 82)  BP: 112/69 (22 Mar 2019 04:59) (110/65 - 123/66)  BP(mean): --  RR: 20 (22 Mar 2019 04:59) (18 - 20)  SpO2: 95% (22 Mar 2019 00:34) (95% - 97%)    PHYSICAL EXAM:  Gen: Awake and alert, non-toxic appearing, NAD  HEENT: NCAT.   Lungs: CTAB  Abd: Soft. NTND  Extr: wwp, L stump dressings, decreased erythema  Skin: no rash  Neuro: No focal deficits  Lines: clean      TESTS & MEASUREMENTS:                        13.5   9.24  )-----------( 208      ( 22 Mar 2019 07:29 )             40.0     03-22    141  |  103  |  24<H>  ----------------------------<  106<H>  3.8   |  24  |  0.7    Ca    9.6      22 Mar 2019 07:29  Phos  3.1     03-21  Mg     1.9     03-22    TPro  6.8  /  Alb  3.9  /  TBili  0.5  /  DBili  x   /  AST  13  /  ALT  11  /  AlkPhos  85  03-22    LIVER FUNCTIONS - ( 22 Mar 2019 07:29 )  Alb: 3.9 g/dL / Pro: 6.8 g/dL / ALK PHOS: 85 U/L / ALT: 11 U/L / AST: 13 U/L / GGT: x             Culture - Body Fluid with Gram Stain (collected 03-20-19 @ 18:00)  Source: .Body Fluid None  Gram Stain (03-21-19 @ 08:18):    polymorphonuclear leukocytes seen    No organisms seen    by cytocentrifuge  Preliminary Report (03-22-19 @ 10:00):    No growth    Culture - Blood (collected 03-20-19 @ 08:05)  Source: .Blood None  Preliminary Report (03-21-19 @ 18:00):    No growth to date.    Culture - Blood (collected 03-19-19 @ 21:39)  Source: .Blood None  Preliminary Report (03-21-19 @ 07:00):    No growth to date.    Culture - Blood (collected 03-18-19 @ 10:27)  Source: .Blood Blood-Peripheral  Preliminary Report (03-19-19 @ 17:01):    No growth to date.          RADIOLOGY & ADDITIONAL TESTS:    ANTIBIOTICS:    cefepime   IVPB   100 mL/Hr IV Intermittent (03-18-19 @ 16:18)    cefepime   IVPB   100 mL/Hr IV Intermittent (03-19-19 @ 05:43)    cefTRIAXone   IVPB   100 mL/Hr IV Intermittent (03-19-19 @ 15:14)    cefTRIAXone   IVPB   100 mL/Hr IV Intermittent (03-20-19 @ 14:19)    cefTRIAXone   IVPB   100 mL/Hr IV Intermittent (03-21-19 @ 21:13)    piperacillin/tazobactam IVPB.   200 mL/Hr IV Intermittent (03-18-19 @ 14:17)    vancomycin  IVPB   250 mL/Hr IV Intermittent (03-19-19 @ 22:01)   250 mL/Hr IV Intermittent (03-19-19 @ 11:24)   250 mL/Hr IV Intermittent (03-19-19 @ 00:01)    vancomycin  IVPB   250 mL/Hr IV Intermittent (03-18-19 @ 11:08)        cefTRIAXone   IVPB 2 Gram(s) IV Intermittent every 24 hours

## 2019-03-22 NOTE — PROGRESS NOTE ADULT - ASSESSMENT
70 yo F PMH depression, Anxiety, HTN, DLD, Hypothyroid, Osteoarthritis with left AKA 1969 presents here c/o pain swelling and redness of the stump x 1 week. Patient denies fever chills cough or any other associated symptoms    Left Stump cellulitis and abscess with Acute OM s/p I&D 3/21 by surgery  ·	CT 3/20 osteomyelitis extending to distal femoral stump, abscess	  ·	ID f/u appreciated, on Rocephin (3/19- ), dose increased yesterday  ·	PICC line placement, IR consult placed  ·	all cultures NGTD  ·	Duplex vein- No DVT left  ·	Patient scheduled for debridement today, given vague symptoms as above, obtain cardiology evaluation.... patient followed by Dr Tillman  ·	Pt was supposed to get her prosthetic leg changed last week, will hold on to it until infection resolved     HTN, hx CAD with stent  ·	Stable?  ·	Continue with Losartan 100 and Hydrochlorothiazide 25, Patient was also on Coreg 12.5 ? no longer taking    Suspected folic acid deficiency  ·	continue folic acid supplementation    Patient has a long standing hx of Depression, followed by psych in the community. Continue Zoloft.    DVT Prophylaxis -Heparin Sub Q   Diet: regular  Activity: walker  Disposition: SNF, pt agreeable

## 2019-03-22 NOTE — CONSULT NOTE ADULT - REASON FOR ADMISSION
Left stump Cellulitis
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
## 2019-03-22 NOTE — PROCEDURE NOTE - NSINFORMCONSENT_GEN_A_CORE
Benefits, risks, and possible complications of procedure explained to patient/caregiver who verbalized understanding and gave written consent.
patient/Benefits, risks, and possible complications of procedure explained to patient/caregiver who verbalized understanding and gave written consent.

## 2019-03-22 NOTE — CHART NOTE - NSCHARTNOTEFT_GEN_A_CORE
PACU ANESTHESIA ADMISSION NOTE      Procedure:   Post op diagnosis:      ____  Intubated  TV:______       Rate: ______      FiO2: ______    __x__  Patent Airway    __x__  Full return of protective reflexes    __x__  Full recovery from anesthesia / back to baseline status    Vitals:  T(C): 36.1 (03-22-19 @ 04:59), Max: 36.1 (03-22-19 @ 00:34)  HR: 78 (03-22-19 @ 04:59) (78 - 82)  BP: 112/69 (03-22-19 @ 04:59) (112/69 - 123/66)  RR: 20 (03-22-19 @ 04:59) (18 - 20)  SpO2: 95% (03-22-19 @ 00:34) (95% - 97%)    Mental Status:  __x__ Awake   ___x__ Alert   _____ Drowsy   _____ Sedated    Nausea/Vomiting:  __x__ NO  ______Yes,   See Post - Op Orders          Pain Scale (0-10):  _____    Treatment: ____ None    __x__ See Post - Op/PCA Orders    Post - Operative Fluids:   ____ Oral   __x__ See Post - Op Orders    Plan: Discharge:   ____Home       ___X__Floor     _____Critical Care    _____  Other:_________________    Comments: Patient had smooth intraoperative event, no anesthesia complication.  PACU Vital signs: HR:      89      BP:   104 /60          RR: 18            O2 Sat:      97 %     Temp: 98.6
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
## 2019-03-23 LAB
ALBUMIN SERPL ELPH-MCNC: 3.9 G/DL — SIGNIFICANT CHANGE UP (ref 3.5–5.2)
ALP SERPL-CCNC: 91 U/L — SIGNIFICANT CHANGE UP (ref 30–115)
ALT FLD-CCNC: 10 U/L — SIGNIFICANT CHANGE UP (ref 0–41)
ANION GAP SERPL CALC-SCNC: 16 MMOL/L — HIGH (ref 7–14)
AST SERPL-CCNC: 12 U/L — SIGNIFICANT CHANGE UP (ref 0–41)
BILIRUB SERPL-MCNC: 0.6 MG/DL — SIGNIFICANT CHANGE UP (ref 0.2–1.2)
BUN SERPL-MCNC: 17 MG/DL — SIGNIFICANT CHANGE UP (ref 10–20)
CALCIUM SERPL-MCNC: 9.2 MG/DL — SIGNIFICANT CHANGE UP (ref 8.5–10.1)
CHLORIDE SERPL-SCNC: 100 MMOL/L — SIGNIFICANT CHANGE UP (ref 98–110)
CO2 SERPL-SCNC: 23 MMOL/L — SIGNIFICANT CHANGE UP (ref 17–32)
CREAT SERPL-MCNC: 0.6 MG/DL — LOW (ref 0.7–1.5)
CULTURE RESULTS: SIGNIFICANT CHANGE UP
GLUCOSE SERPL-MCNC: 104 MG/DL — HIGH (ref 70–99)
HCT VFR BLD CALC: 38.9 % — SIGNIFICANT CHANGE UP (ref 37–47)
HGB BLD-MCNC: 13.2 G/DL — SIGNIFICANT CHANGE UP (ref 12–16)
MAGNESIUM SERPL-MCNC: 1.9 MG/DL — SIGNIFICANT CHANGE UP (ref 1.8–2.4)
MCHC RBC-ENTMCNC: 30.9 PG — SIGNIFICANT CHANGE UP (ref 27–31)
MCHC RBC-ENTMCNC: 33.9 G/DL — SIGNIFICANT CHANGE UP (ref 32–37)
MCV RBC AUTO: 91.1 FL — SIGNIFICANT CHANGE UP (ref 81–99)
NRBC # BLD: 0 /100 WBCS — SIGNIFICANT CHANGE UP (ref 0–0)
PLATELET # BLD AUTO: 185 K/UL — SIGNIFICANT CHANGE UP (ref 130–400)
POTASSIUM SERPL-MCNC: 3.6 MMOL/L — SIGNIFICANT CHANGE UP (ref 3.5–5)
POTASSIUM SERPL-SCNC: 3.6 MMOL/L — SIGNIFICANT CHANGE UP (ref 3.5–5)
PROT SERPL-MCNC: 6.8 G/DL — SIGNIFICANT CHANGE UP (ref 6–8)
RBC # BLD: 4.27 M/UL — SIGNIFICANT CHANGE UP (ref 4.2–5.4)
RBC # FLD: 12.8 % — SIGNIFICANT CHANGE UP (ref 11.5–14.5)
SODIUM SERPL-SCNC: 139 MMOL/L — SIGNIFICANT CHANGE UP (ref 135–146)
SPECIMEN SOURCE: SIGNIFICANT CHANGE UP
WBC # BLD: 8.72 K/UL — SIGNIFICANT CHANGE UP (ref 4.8–10.8)
WBC # FLD AUTO: 8.72 K/UL — SIGNIFICANT CHANGE UP (ref 4.8–10.8)

## 2019-03-23 PROCEDURE — 99232 SBSQ HOSP IP/OBS MODERATE 35: CPT

## 2019-03-23 RX ADMIN — Medication 10 MILLIEQUIVALENT(S): at 11:11

## 2019-03-23 RX ADMIN — Medication 25 MILLIGRAM(S): at 05:28

## 2019-03-23 RX ADMIN — Medication 100 MILLIGRAM(S): at 13:22

## 2019-03-23 RX ADMIN — HEPARIN SODIUM 5000 UNIT(S): 5000 INJECTION INTRAVENOUS; SUBCUTANEOUS at 05:28

## 2019-03-23 RX ADMIN — Medication 1 GRAM(S): at 11:10

## 2019-03-23 RX ADMIN — SERTRALINE 100 MILLIGRAM(S): 25 TABLET, FILM COATED ORAL at 11:11

## 2019-03-23 RX ADMIN — SIMVASTATIN 40 MILLIGRAM(S): 20 TABLET, FILM COATED ORAL at 21:13

## 2019-03-23 RX ADMIN — NYSTATIN CREAM 1 APPLICATION(S): 100000 CREAM TOPICAL at 05:28

## 2019-03-23 RX ADMIN — Medication 100 MILLIGRAM(S): at 21:13

## 2019-03-23 RX ADMIN — Medication 100 MILLIGRAM(S): at 05:28

## 2019-03-23 RX ADMIN — Medication 1 GRAM(S): at 05:28

## 2019-03-23 RX ADMIN — NYSTATIN CREAM 1 APPLICATION(S): 100000 CREAM TOPICAL at 17:15

## 2019-03-23 RX ADMIN — Medication 1 GRAM(S): at 17:15

## 2019-03-23 RX ADMIN — LOSARTAN POTASSIUM 100 MILLIGRAM(S): 100 TABLET, FILM COATED ORAL at 05:28

## 2019-03-23 RX ADMIN — HEPARIN SODIUM 5000 UNIT(S): 5000 INJECTION INTRAVENOUS; SUBCUTANEOUS at 21:13

## 2019-03-23 RX ADMIN — Medication 1 GRAM(S): at 23:04

## 2019-03-23 RX ADMIN — Medication 25 MICROGRAM(S): at 05:28

## 2019-03-23 RX ADMIN — Medication 3 MILLIGRAM(S): at 21:13

## 2019-03-23 RX ADMIN — CEFTRIAXONE 100 GRAM(S): 500 INJECTION, POWDER, FOR SOLUTION INTRAMUSCULAR; INTRAVENOUS at 21:14

## 2019-03-23 RX ADMIN — Medication 1 MILLIGRAM(S): at 11:11

## 2019-03-23 RX ADMIN — PANTOPRAZOLE SODIUM 40 MILLIGRAM(S): 20 TABLET, DELAYED RELEASE ORAL at 05:28

## 2019-03-23 RX ADMIN — CELECOXIB 200 MILLIGRAM(S): 200 CAPSULE ORAL at 11:11

## 2019-03-23 RX ADMIN — Medication 1 GRAM(S): at 00:00

## 2019-03-23 RX ADMIN — CELECOXIB 200 MILLIGRAM(S): 200 CAPSULE ORAL at 11:53

## 2019-03-23 RX ADMIN — HEPARIN SODIUM 5000 UNIT(S): 5000 INJECTION INTRAVENOUS; SUBCUTANEOUS at 13:21

## 2019-03-23 NOTE — PROGRESS NOTE ADULT - ASSESSMENT
70 yo F PMH depression, Anxiety, HTN, DLD, Hypothyroid, Osteoarthritis with left AKA 1969 presents here c/o pain swelling and redness of the stump x 1 week. Patient denies fever chills cough or any other associated symptoms    Left Stump cellulitis and abscess with Acute OM s/p I&D 3/21 and 3/22 by surgery  ·	CT 3/20 osteomyelitis extending to distal femoral stump, abscess	  ·	ID f/u appreciated, on Rocephin (3/19- ), dose increased yesterday  ·	PICC line placed for 6 wks of IV antibiotics  ·	all cultures NGTD, await results of yesterdays procedure  ·	Duplex vein- No DVT left  ·	Pt was supposed to get her prosthetic leg changed last week, will hold on to it until infection resolved     HTN, hx CAD with stent  ·	Stable?  ·	Continue with Losartan 100 and Hydrochlorothiazide 25, Patient was also on Coreg 12.5 ? no longer taking    Suspected folic acid deficiency  ·	continue folic acid supplementation    Patient has a long standing hx of Depression, followed by psych in the community. Continue Zoloft.    DVT Prophylaxis -Heparin Sub Q   Diet: regular  Activity: walker  Disposition: SNF, once above issues resolved

## 2019-03-23 NOTE — PROGRESS NOTE ADULT - ASSESSMENT
ASSESSMENT/PLAN:   69F, s/p debridement of soft tissue of LLE  - keep dressing dry  - pain control  - DVT/GI prophylaxis
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
## 2019-03-23 NOTE — PROGRESS NOTE ADULT - SUBJECTIVE AND OBJECTIVE BOX
RUBYJESSICABETH, ТАТЬЯНА  69y  Female  HPI:  69 year old female with a PMH of depression, Anxiety, HTN, DLD, Hypothyroid, Osteoarthritis with a left AKA performed in 1969 presents here c/o pain swelling and redness of the stump x 1 week. Patient denies fever chills cough or any other associated symptoms. (18 Mar 2019 15:07)    MEDICATIONS  (STANDING):  cefTRIAXone   IVPB 2 Gram(s) IV Intermittent every 24 hours  celecoxib 200 milliGRAM(s) Oral daily  docusate sodium 100 milliGRAM(s) Oral three times a day  folic acid 1 milliGRAM(s) Oral daily  heparin  Injectable 5000 Unit(s) SubCutaneous every 8 hours  hydrochlorothiazide 25 milliGRAM(s) Oral daily  levothyroxine 25 MICROGram(s) Oral daily  losartan 100 milliGRAM(s) Oral daily  melatonin 3 milliGRAM(s) Oral at bedtime  nystatin Ointment 1 Application(s) Topical two times a day  pantoprazole    Tablet 40 milliGRAM(s) Oral before breakfast  potassium chloride    Tablet ER 10 milliEquivalent(s) Oral daily  sertraline 100 milliGRAM(s) Oral daily  simvastatin 40 milliGRAM(s) Oral at bedtime  sucralfate 1 Gram(s) Oral four times a day    MEDICATIONS  (PRN):  acetaminophen   Tablet .. 650 milliGRAM(s) Oral every 6 hours PRN Moderate Pain (4 - 6)  oxyCODONE    IR 5 milliGRAM(s) Oral every 6 hours PRN Severe Pain (7 - 10)    INTERVAL EVENTS: Patient seen today post debridement of soft tissue and bone. Patient had PICC placement as well.    T(C): 36.8 (03-23-19 @ 05:35), Max: 37 (03-22-19 @ 16:40)  HR: 83 (03-23-19 @ 05:35) (80 - 88)  BP: 109/66 (03-23-19 @ 05:35) (97/60 - 116/55)  RR: 18 (03-23-19 @ 05:35) (16 - 22)  SpO2: 96% (03-23-19 @ 07:45) (95% - 100%)  Wt(kg): --Vital Signs Last 24 Hrs  T(C): 36.8 (23 Mar 2019 05:35), Max: 37 (22 Mar 2019 16:40)  T(F): 98.2 (23 Mar 2019 05:35), Max: 98.6 (22 Mar 2019 16:40)  HR: 83 (23 Mar 2019 05:35) (80 - 88)  BP: 109/66 (23 Mar 2019 05:35) (97/60 - 116/55)  BP(mean): --  RR: 18 (23 Mar 2019 05:35) (16 - 22)  SpO2: 96% (23 Mar 2019 07:45) (95% - 100%)    PHYSICAL EXAM:  GENERAL: NAD  NECK: Supple, No JVD  CHEST/LUNG: Clear  HEART: S1, S2, Regular rate and rhythm;   ABDOMEN: Soft, Nontender, Bowel sounds present  EXTREMITIES: Mild edema, bruising of RUE, new PICC noted  SKIN: Left stump dressing intact    LABS:  Labs:                        13.2   8.72  )-----------( 185      ( 23 Mar 2019 06:28 )             38.9             03-23    139  |  100  |  17  ----------------------------<  104<H>  3.6   |  23  |  0.6<L>    Ca    9.2      23 Mar 2019 06:28  Phos  3.1     03-21  Mg     1.9     03-23    TPro  6.8  /  Alb  3.9  /  TBili  0.6  /  DBili  x   /  AST  12  /  ALT  10  /  AlkPhos  91  03-23    LIVER FUNCTIONS - ( 23 Mar 2019 06:28 )  Alb: 3.9 g/dL / Pro: 6.8 g/dL / ALK PHOS: 91 U/L / ALT: 10 U/L / AST: 12 U/L / GGT: x                   PT/INR - ( 21 Mar 2019 22:05 )   PT: 12.10 sec;   INR: 1.05 ratio       PTT - ( 21 Mar 2019 22:05 )  PTT:37.9 sec          Culture - Body Fluid with Gram Stain (collected 20 Mar 2019 18:00)  Source: .Body Fluid None  Gram Stain (21 Mar 2019 08:18):    polymorphonuclear leukocytes seen    No organisms seen    by cytocentrifuge  Preliminary Report (22 Mar 2019 10:00):    No growth      RADIOLOGY & ADDITIONAL TESTS:  < from: Xray Chest 1 View- PORTABLE-Urgent (03.21.19 @ 18:28) >  Impression:      No radiographic evidence of acute cardiopulmonary disease.      < end of copied text >      < from: VA Duplex Lower Extrem Arterial, Bilat (03.21.19 @ 11:05) >    Impression:    No evidence of significant arterial occlusive disease in bilateral lower   extremities.      < end of copied text >
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
## 2019-03-24 LAB
ALBUMIN SERPL ELPH-MCNC: 3.8 G/DL — SIGNIFICANT CHANGE UP (ref 3.5–5.2)
ALP SERPL-CCNC: 94 U/L — SIGNIFICANT CHANGE UP (ref 30–115)
ALT FLD-CCNC: 10 U/L — SIGNIFICANT CHANGE UP (ref 0–41)
ANION GAP SERPL CALC-SCNC: 13 MMOL/L — SIGNIFICANT CHANGE UP (ref 7–14)
AST SERPL-CCNC: 13 U/L — SIGNIFICANT CHANGE UP (ref 0–41)
BILIRUB SERPL-MCNC: 0.4 MG/DL — SIGNIFICANT CHANGE UP (ref 0.2–1.2)
BUN SERPL-MCNC: 23 MG/DL — HIGH (ref 10–20)
CALCIUM SERPL-MCNC: 9.4 MG/DL — SIGNIFICANT CHANGE UP (ref 8.5–10.1)
CHLORIDE SERPL-SCNC: 101 MMOL/L — SIGNIFICANT CHANGE UP (ref 98–110)
CO2 SERPL-SCNC: 25 MMOL/L — SIGNIFICANT CHANGE UP (ref 17–32)
CREAT SERPL-MCNC: 0.7 MG/DL — SIGNIFICANT CHANGE UP (ref 0.7–1.5)
GLUCOSE SERPL-MCNC: 99 MG/DL — SIGNIFICANT CHANGE UP (ref 70–99)
HCT VFR BLD CALC: 39.3 % — SIGNIFICANT CHANGE UP (ref 37–47)
HGB BLD-MCNC: 13 G/DL — SIGNIFICANT CHANGE UP (ref 12–16)
MAGNESIUM SERPL-MCNC: 1.9 MG/DL — SIGNIFICANT CHANGE UP (ref 1.8–2.4)
MCHC RBC-ENTMCNC: 30.5 PG — SIGNIFICANT CHANGE UP (ref 27–31)
MCHC RBC-ENTMCNC: 33.1 G/DL — SIGNIFICANT CHANGE UP (ref 32–37)
MCV RBC AUTO: 92.3 FL — SIGNIFICANT CHANGE UP (ref 81–99)
NRBC # BLD: 0 /100 WBCS — SIGNIFICANT CHANGE UP (ref 0–0)
PLATELET # BLD AUTO: 195 K/UL — SIGNIFICANT CHANGE UP (ref 130–400)
POTASSIUM SERPL-MCNC: 4 MMOL/L — SIGNIFICANT CHANGE UP (ref 3.5–5)
POTASSIUM SERPL-SCNC: 4 MMOL/L — SIGNIFICANT CHANGE UP (ref 3.5–5)
PROT SERPL-MCNC: 6.7 G/DL — SIGNIFICANT CHANGE UP (ref 6–8)
RBC # BLD: 4.26 M/UL — SIGNIFICANT CHANGE UP (ref 4.2–5.4)
RBC # FLD: 12.7 % — SIGNIFICANT CHANGE UP (ref 11.5–14.5)
SODIUM SERPL-SCNC: 139 MMOL/L — SIGNIFICANT CHANGE UP (ref 135–146)
WBC # BLD: 8.87 K/UL — SIGNIFICANT CHANGE UP (ref 4.8–10.8)
WBC # FLD AUTO: 8.87 K/UL — SIGNIFICANT CHANGE UP (ref 4.8–10.8)

## 2019-03-24 RX ADMIN — NYSTATIN CREAM 1 APPLICATION(S): 100000 CREAM TOPICAL at 17:15

## 2019-03-24 RX ADMIN — NYSTATIN CREAM 1 APPLICATION(S): 100000 CREAM TOPICAL at 05:31

## 2019-03-24 RX ADMIN — Medication 1 GRAM(S): at 05:31

## 2019-03-24 RX ADMIN — HEPARIN SODIUM 5000 UNIT(S): 5000 INJECTION INTRAVENOUS; SUBCUTANEOUS at 05:31

## 2019-03-24 RX ADMIN — Medication 1 MILLIGRAM(S): at 12:01

## 2019-03-24 RX ADMIN — HEPARIN SODIUM 5000 UNIT(S): 5000 INJECTION INTRAVENOUS; SUBCUTANEOUS at 21:38

## 2019-03-24 RX ADMIN — CELECOXIB 200 MILLIGRAM(S): 200 CAPSULE ORAL at 12:30

## 2019-03-24 RX ADMIN — Medication 1 GRAM(S): at 12:01

## 2019-03-24 RX ADMIN — CELECOXIB 200 MILLIGRAM(S): 200 CAPSULE ORAL at 12:01

## 2019-03-24 RX ADMIN — Medication 100 MILLIGRAM(S): at 21:38

## 2019-03-24 RX ADMIN — Medication 10 MILLIEQUIVALENT(S): at 12:01

## 2019-03-24 RX ADMIN — Medication 3 MILLIGRAM(S): at 21:38

## 2019-03-24 RX ADMIN — Medication 1 GRAM(S): at 17:15

## 2019-03-24 RX ADMIN — Medication 25 MICROGRAM(S): at 05:31

## 2019-03-24 RX ADMIN — Medication 100 MILLIGRAM(S): at 05:31

## 2019-03-24 RX ADMIN — Medication 25 MILLIGRAM(S): at 06:40

## 2019-03-24 RX ADMIN — Medication 1 GRAM(S): at 23:38

## 2019-03-24 RX ADMIN — HEPARIN SODIUM 5000 UNIT(S): 5000 INJECTION INTRAVENOUS; SUBCUTANEOUS at 13:48

## 2019-03-24 RX ADMIN — SIMVASTATIN 40 MILLIGRAM(S): 20 TABLET, FILM COATED ORAL at 21:38

## 2019-03-24 RX ADMIN — Medication 100 MILLIGRAM(S): at 13:48

## 2019-03-24 RX ADMIN — LOSARTAN POTASSIUM 100 MILLIGRAM(S): 100 TABLET, FILM COATED ORAL at 06:40

## 2019-03-24 RX ADMIN — PANTOPRAZOLE SODIUM 40 MILLIGRAM(S): 20 TABLET, DELAYED RELEASE ORAL at 05:31

## 2019-03-24 RX ADMIN — SERTRALINE 100 MILLIGRAM(S): 25 TABLET, FILM COATED ORAL at 12:01

## 2019-03-24 RX ADMIN — CEFTRIAXONE 100 GRAM(S): 500 INJECTION, POWDER, FOR SOLUTION INTRAMUSCULAR; INTRAVENOUS at 21:38

## 2019-03-24 NOTE — PROGRESS NOTE ADULT - SUBJECTIVE AND OBJECTIVE BOX
NERISSA, ТАТЬЯНА  69y, Female      OVERNIGHT EVENTS:  no acute events overnight    ROS negative except as per above    VITALS:  T(F): 96.3, Max: 99 (03-23-19 @ 20:40)  HR: 71  BP: 129/59  RR: 18Vital Signs Last 24 Hrs  T(C): 35.7 (24 Mar 2019 05:27), Max: 37.2 (23 Mar 2019 20:40)  T(F): 96.3 (24 Mar 2019 05:27), Max: 99 (23 Mar 2019 20:40)  HR: 71 (24 Mar 2019 06:38) (71 - 92)  BP: 129/59 (24 Mar 2019 06:38) (99/52 - 129/59)  BP(mean): --  RR: 18 (24 Mar 2019 05:27) (18 - 18)  SpO2: 96% (24 Mar 2019 07:43) (96% - 97%)    PHYSICAL EXAM:  Gen: Awake and alert, non-toxic appearing, NAD  HEENT: NCAT.   Lungs: CTAB  Abd: Soft. NTND  Extr: wwp, L stump dressings, decreased erythema  Skin: no rash  Neuro: No focal deficits  Lines: clean      TESTS & MEASUREMENTS:                        13.0   8.87  )-----------( 195      ( 24 Mar 2019 07:07 )             39.3     03-24    139  |  101  |  23<H>  ----------------------------<  99  4.0   |  25  |  0.7    Ca    9.4      24 Mar 2019 07:07  Mg     1.9     03-24    TPro  6.7  /  Alb  3.8  /  TBili  0.4  /  DBili  x   /  AST  13  /  ALT  10  /  AlkPhos  94  03-24    LIVER FUNCTIONS - ( 24 Mar 2019 07:07 )  Alb: 3.8 g/dL / Pro: 6.7 g/dL / ALK PHOS: 94 U/L / ALT: 10 U/L / AST: 13 U/L / GGT: x             Culture - Body Fluid with Gram Stain (collected 03-20-19 @ 18:00)  Source: .Body Fluid None  Gram Stain (03-21-19 @ 08:18):    polymorphonuclear leukocytes seen    No organisms seen    by cytocentrifuge  Preliminary Report (03-22-19 @ 10:00):    No growth    Culture - Blood (collected 03-20-19 @ 08:05)  Source: .Blood None  Preliminary Report (03-21-19 @ 18:00):    No growth to date.    Culture - Blood (collected 03-19-19 @ 21:39)  Source: .Blood None  Preliminary Report (03-21-19 @ 07:00):    No growth to date.    Culture - Blood (collected 03-18-19 @ 10:27)  Source: .Blood Blood-Peripheral  Final Report (03-23-19 @ 17:00):    No growth at 5 days.          RADIOLOGY & ADDITIONAL TESTS:    ANTIBIOTICS:    cefepime   IVPB   100 mL/Hr IV Intermittent (03-18-19 @ 16:18)    cefepime   IVPB   100 mL/Hr IV Intermittent (03-19-19 @ 05:43)    cefTRIAXone   IVPB   100 mL/Hr IV Intermittent (03-19-19 @ 15:14)    cefTRIAXone   IVPB   100 mL/Hr IV Intermittent (03-20-19 @ 14:19)    cefTRIAXone   IVPB   100 mL/Hr IV Intermittent (03-21-19 @ 21:13)    cefTRIAXone   IVPB   100 mL/Hr IV Intermittent (03-23-19 @ 21:14)   100 mL/Hr IV Intermittent (03-22-19 @ 21:02)    piperacillin/tazobactam IVPB.   200 mL/Hr IV Intermittent (03-18-19 @ 14:17)    vancomycin  IVPB   250 mL/Hr IV Intermittent (03-19-19 @ 22:01)   250 mL/Hr IV Intermittent (03-19-19 @ 11:24)   250 mL/Hr IV Intermittent (03-19-19 @ 00:01)    vancomycin  IVPB   250 mL/Hr IV Intermittent (03-18-19 @ 11:08)        cefTRIAXone   IVPB 2 Gram(s) IV Intermittent every 24 hours

## 2019-03-24 NOTE — PROGRESS NOTE ADULT - ASSESSMENT
68 yo F PMH depression, Anxiety, HTN, DLD, Hypothyroid, Osteoarthritis with left AKA 1969 presents here c/o pain swelling and redness of the stump x 1 week. Patient denies fever chills cough or any other associated symptoms    Left Stump cellulitis and abscess with Acute OM s/p I&D 3/21 and 3/22 by surgery	  ·	ID f/u appreciated, on Rocephin (3/19- )  ·	PICC line placed for 6 wks of IV antibiotics  ·	await results of cutltures from last debridement  ·	Pt was supposed to get her prosthetic leg changed last week, will hold on to it until infection resolved     HTN, hx CAD with stent  ·	Stable  ·	Continue with Losartan 100 and Hydrochlorothiazide 25, Patient was also on Coreg 12.5 ? no longer taking    Suspected folic acid deficiency  ·	continue folic acid supplementation    Patient has a long standing hx of Depression, appears to have adjustment issue. Patient follows psych in the community. Continue Zoloft. Emotional support.    DVT Prophylaxis -Heparin Sub Q   Diet: regular  Activity: walker  Disposition: SNF, once above issues resolved

## 2019-03-24 NOTE — PROGRESS NOTE ADULT - ASSESSMENT
69yF    Chronic pain  Depression/Anxiety  Osteoarthritis  Hypothyroid  HLD   Essential hypertension    Admitted with CELLULITIS of L stump  lactic acidosis 2.3 Sepsis ruled out on admission   US Surrounding the left above-the-knee amputation stump, there is a 3.5 x 3.9 x 3.4 cm complex multiseptated, nonvascular fluid collection with thick wall, consistent with an abscess.  CT and xray with osteomyelitis.  MRSA nasal PCR neg  Bcx NGTD  s/p I&D, cx pending, blood, serous fluid  Sedimentation Rate, Erythrocyte: 47 mm/hr (03.19.19 @ 21:39)  C-Reactive Protein, Serum: 5.28 mg/dL (03.19.19 @ 21:39)    - OR washout, please send cultures  - cleared for PICC as bcx NGTD 48h   - f/u wcx  - Ceftriaxone 2g q24h for OM x 6 weeks if no growth on cultures  - ID follow-up [id number] [phone]        [facility] [id number]

## 2019-03-24 NOTE — PROGRESS NOTE ADULT - SUBJECTIVE AND OBJECTIVE BOX
GENERAL SURGERY PROGRESS NOTE     ТАТЬЯНА MULTANI  69y  Female  Hospital day :6d  Procedure: Debridement of soft tissue of left lower extremity    OVERNIGHT EVENTS:      T(F): 96.3 (03-24-19 @ 05:27), Max: 99 (03-23-19 @ 20:40)  HR: 79 (03-24-19 @ 05:27) (79 - 92)  BP: 105/60 (03-24-19 @ 05:27) (99/52 - 109/66)  RR: 18 (03-24-19 @ 05:27) (18 - 18)  SpO2: 97% (03-23-19 @ 20:59) (96% - 97%)    DIET/FLUIDS: folic acid 1 milliGRAM(s) Oral daily  potassium chloride    Tablet ER 10 milliEquivalent(s) Oral daily     GI proph:  pantoprazole    Tablet 40 milliGRAM(s) Oral before breakfast    AC/ proph: heparin  Injectable 5000 Unit(s) SubCutaneous every 8 hours    ABx: cefTRIAXone   IVPB 2 Gram(s) IV Intermittent every 24 hours      PHYSICAL EXAM:  GENERAL: NAD  CHEST/LUNG: Clear to auscultation bilaterally  HEART: Regular rate and rhythm  ABDOMEN: Soft, Nontender, Nondistended;   Extremities- left lower extremity wound dressing intact.        LABS  Labs:  CAPILLARY BLOOD GLUCOSE                              13.2   8.72  )-----------( 185      ( 23 Mar 2019 06:28 )             38.9         03-23    139  |  100  |  17  ----------------------------<  104<H>  3.6   |  23  |  0.6<L>      Calcium, Total Serum: 9.2 mg/dL (03-23-19 @ 06:28)      LFTs:             6.8  | 0.6  | 12       ------------------[91      ( 23 Mar 2019 06:28 )  3.9  | x    | 10          Lipase:x      Amylase:x

## 2019-03-24 NOTE — PROGRESS NOTE ADULT - ASSESSMENT
68 yo F PMH depression, Anxiety, HTN, DLD, Hypothyroid, Osteoarthritis with left AKA 1969, CAD s/p PCI >5 yr ago, presents here c/o pain swelling and redness of the stump x 1 week.     1) Left Stump cellulitis and abscess with Acute OM -active  s/p I&D 3/21 by surgery  ·	CT 3/20 osteomyelitis extending to distal femoral stump, abscess	  ·	ID rocephin 2 g qd, s/p cefepime. Decide antibiotics once wound cultures are back.   ·	s/p PICC line 3/22  ·	s/p debridement 3/22, obtain wound cx (sx resident unsure if wound cx were sent and request to call back tomorrow for day team)  ·	Possible further debridement next week per sx  ·	BCx NGTD  ·	I&D Cx- No organism  ·	Vascular- duplex art- no sig arterial occlusive dz in b/l LE  ·	Duplex vein- No DVT left  ·	cold compression q4 h for the erythematous and edematous stump site   ·	WBC admission 11.42> wnl  ·	Pt was supposed to get her prosthetic leg changed last week, will hold on to it until infection resolved     2) HTN  ·	Stable   ·	Continue with losartan 100 and Hydrochlorothiazide 25.     3)Suspected folic acid deficiency  ·	cw folic acid supplementation    3) DVT Prophylaxis -Heparin Sub Q   Diet: regular  Activity: walker  4) Disposition: SNF, pt agreeable
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
## 2019-03-24 NOTE — PROGRESS NOTE ADULT - SUBJECTIVE AND OBJECTIVE BOX
NERISSA, ТАТЬЯНА  69y  Female  HPI:  69 year old female with a PMH of depression, Anxiety, HTN, DLD, Hypothyroid, Osteoarthritis with a left AKA performed in 1969 presents here c/o pain swelling and redness of the stump x 1 week. Patient denies fever chills cough or any other associated symptoms. (18 Mar 2019 15:07)    MEDICATIONS  (STANDING):  cefTRIAXone   IVPB 2 Gram(s) IV Intermittent every 24 hours  celecoxib 200 milliGRAM(s) Oral daily  docusate sodium 100 milliGRAM(s) Oral three times a day  folic acid 1 milliGRAM(s) Oral daily  heparin  Injectable 5000 Unit(s) SubCutaneous every 8 hours  hydrochlorothiazide 25 milliGRAM(s) Oral daily  levothyroxine 25 MICROGram(s) Oral daily  losartan 100 milliGRAM(s) Oral daily  melatonin 3 milliGRAM(s) Oral at bedtime  nystatin Ointment 1 Application(s) Topical two times a day  pantoprazole    Tablet 40 milliGRAM(s) Oral before breakfast  potassium chloride    Tablet ER 10 milliEquivalent(s) Oral daily  sertraline 100 milliGRAM(s) Oral daily  simvastatin 40 milliGRAM(s) Oral at bedtime  sucralfate 1 Gram(s) Oral four times a day    MEDICATIONS  (PRN):  acetaminophen   Tablet .. 650 milliGRAM(s) Oral every 6 hours PRN Moderate Pain (4 - 6)  oxyCODONE    IR 5 milliGRAM(s) Oral every 6 hours PRN Severe Pain (7 - 10)    INTERVAL EVENTS: Patient seen today without distress, tearful... stating she has no one except her .    T(C): 35.7 (03-24-19 @ 05:27), Max: 37.2 (03-23-19 @ 20:40)  HR: 71 (03-24-19 @ 06:38) (71 - 92)  BP: 129/59 (03-24-19 @ 06:38) (99/52 - 129/59)  RR: 18 (03-24-19 @ 05:27) (18 - 18)  SpO2: 96% (03-24-19 @ 07:43) (96% - 97%)  Wt(kg): --Vital Signs Last 24 Hrs  T(C): 35.7 (24 Mar 2019 05:27), Max: 37.2 (23 Mar 2019 20:40)  T(F): 96.3 (24 Mar 2019 05:27), Max: 99 (23 Mar 2019 20:40)  HR: 71 (24 Mar 2019 06:38) (71 - 92)  BP: 129/59 (24 Mar 2019 06:38) (99/52 - 129/59)  BP(mean): --  RR: 18 (24 Mar 2019 05:27) (18 - 18)  SpO2: 96% (24 Mar 2019 07:43) (96% - 97%)    PHYSICAL EXAM:  GENERAL: NAD  NECK: Supple, No JVD  CHEST/LUNG: Clear; No wheezing  HEART: S1, S2, Regular rate and rhythm  ABDOMEN: Soft, Nontender, Nondistended; Bowel sounds present  EXTREMITIES: No edema  SKIN: left stump dressing intact    LABS:  Labs:                        13.0   8.87  )-----------( 195      ( 24 Mar 2019 07:07 )             39.3             03-24    139  |  101  |  23<H>  ----------------------------<  99  4.0   |  25  |  0.7    Ca    9.4      24 Mar 2019 07:07  Mg     1.9     03-24    TPro  6.7  /  Alb  3.8  /  TBili  0.4  /  DBili  x   /  AST  13  /  ALT  10  /  AlkPhos  94  03-24    LIVER FUNCTIONS - ( 24 Mar 2019 07:07 )  Alb: 3.8 g/dL / Pro: 6.7 g/dL / ALK PHOS: 94 U/L / ALT: 10 U/L / AST: 13 U/L / GGT: x                         RADIOLOGY & ADDITIONAL TESTS:

## 2019-03-25 LAB
ALBUMIN SERPL ELPH-MCNC: 3.8 G/DL — SIGNIFICANT CHANGE UP (ref 3.5–5.2)
ALP SERPL-CCNC: 100 U/L — SIGNIFICANT CHANGE UP (ref 30–115)
ALT FLD-CCNC: 13 U/L — SIGNIFICANT CHANGE UP (ref 0–41)
ANION GAP SERPL CALC-SCNC: 16 MMOL/L — HIGH (ref 7–14)
AST SERPL-CCNC: 16 U/L — SIGNIFICANT CHANGE UP (ref 0–41)
BILIRUB SERPL-MCNC: 0.3 MG/DL — SIGNIFICANT CHANGE UP (ref 0.2–1.2)
BLD GP AB SCN SERPL QL: SIGNIFICANT CHANGE UP
BUN SERPL-MCNC: 28 MG/DL — HIGH (ref 10–20)
CALCIUM SERPL-MCNC: 9.5 MG/DL — SIGNIFICANT CHANGE UP (ref 8.5–10.1)
CHLORIDE SERPL-SCNC: 103 MMOL/L — SIGNIFICANT CHANGE UP (ref 98–110)
CO2 SERPL-SCNC: 22 MMOL/L — SIGNIFICANT CHANGE UP (ref 17–32)
CREAT SERPL-MCNC: 0.7 MG/DL — SIGNIFICANT CHANGE UP (ref 0.7–1.5)
CULTURE RESULTS: SIGNIFICANT CHANGE UP
CULTURE RESULTS: SIGNIFICANT CHANGE UP
GLUCOSE SERPL-MCNC: 103 MG/DL — HIGH (ref 70–99)
HCT VFR BLD CALC: 38.1 % — SIGNIFICANT CHANGE UP (ref 37–47)
HGB BLD-MCNC: 13 G/DL — SIGNIFICANT CHANGE UP (ref 12–16)
MAGNESIUM SERPL-MCNC: 1.9 MG/DL — SIGNIFICANT CHANGE UP (ref 1.8–2.4)
MCHC RBC-ENTMCNC: 31.3 PG — HIGH (ref 27–31)
MCHC RBC-ENTMCNC: 34.1 G/DL — SIGNIFICANT CHANGE UP (ref 32–37)
MCV RBC AUTO: 91.6 FL — SIGNIFICANT CHANGE UP (ref 81–99)
NRBC # BLD: 0 /100 WBCS — SIGNIFICANT CHANGE UP (ref 0–0)
PLATELET # BLD AUTO: 200 K/UL — SIGNIFICANT CHANGE UP (ref 130–400)
POTASSIUM SERPL-MCNC: 3.6 MMOL/L — SIGNIFICANT CHANGE UP (ref 3.5–5)
POTASSIUM SERPL-SCNC: 3.6 MMOL/L — SIGNIFICANT CHANGE UP (ref 3.5–5)
PROT SERPL-MCNC: 7 G/DL — SIGNIFICANT CHANGE UP (ref 6–8)
RBC # BLD: 4.16 M/UL — LOW (ref 4.2–5.4)
RBC # FLD: 12.6 % — SIGNIFICANT CHANGE UP (ref 11.5–14.5)
SODIUM SERPL-SCNC: 141 MMOL/L — SIGNIFICANT CHANGE UP (ref 135–146)
SPECIMEN SOURCE: SIGNIFICANT CHANGE UP
SPECIMEN SOURCE: SIGNIFICANT CHANGE UP
TYPE + AB SCN PNL BLD: SIGNIFICANT CHANGE UP
WBC # BLD: 7.72 K/UL — SIGNIFICANT CHANGE UP (ref 4.8–10.8)
WBC # FLD AUTO: 7.72 K/UL — SIGNIFICANT CHANGE UP (ref 4.8–10.8)

## 2019-03-25 RX ADMIN — HEPARIN SODIUM 5000 UNIT(S): 5000 INJECTION INTRAVENOUS; SUBCUTANEOUS at 05:52

## 2019-03-25 RX ADMIN — NYSTATIN CREAM 1 APPLICATION(S): 100000 CREAM TOPICAL at 17:31

## 2019-03-25 RX ADMIN — Medication 10 MILLIEQUIVALENT(S): at 11:14

## 2019-03-25 RX ADMIN — Medication 100 MILLIGRAM(S): at 13:20

## 2019-03-25 RX ADMIN — Medication 1 GRAM(S): at 11:15

## 2019-03-25 RX ADMIN — Medication 25 MILLIGRAM(S): at 05:52

## 2019-03-25 RX ADMIN — Medication 25 MICROGRAM(S): at 05:52

## 2019-03-25 RX ADMIN — CEFTRIAXONE 100 GRAM(S): 500 INJECTION, POWDER, FOR SOLUTION INTRAMUSCULAR; INTRAVENOUS at 21:43

## 2019-03-25 RX ADMIN — SERTRALINE 100 MILLIGRAM(S): 25 TABLET, FILM COATED ORAL at 11:15

## 2019-03-25 RX ADMIN — CELECOXIB 200 MILLIGRAM(S): 200 CAPSULE ORAL at 12:00

## 2019-03-25 RX ADMIN — HEPARIN SODIUM 5000 UNIT(S): 5000 INJECTION INTRAVENOUS; SUBCUTANEOUS at 21:43

## 2019-03-25 RX ADMIN — Medication 100 MILLIGRAM(S): at 21:43

## 2019-03-25 RX ADMIN — LOSARTAN POTASSIUM 100 MILLIGRAM(S): 100 TABLET, FILM COATED ORAL at 05:52

## 2019-03-25 RX ADMIN — Medication 3 MILLIGRAM(S): at 21:43

## 2019-03-25 RX ADMIN — Medication 1 GRAM(S): at 17:31

## 2019-03-25 RX ADMIN — Medication 1 GRAM(S): at 05:51

## 2019-03-25 RX ADMIN — HEPARIN SODIUM 5000 UNIT(S): 5000 INJECTION INTRAVENOUS; SUBCUTANEOUS at 13:20

## 2019-03-25 RX ADMIN — PANTOPRAZOLE SODIUM 40 MILLIGRAM(S): 20 TABLET, DELAYED RELEASE ORAL at 05:51

## 2019-03-25 RX ADMIN — Medication 1 MILLIGRAM(S): at 11:15

## 2019-03-25 RX ADMIN — CELECOXIB 200 MILLIGRAM(S): 200 CAPSULE ORAL at 11:14

## 2019-03-25 RX ADMIN — Medication 100 MILLIGRAM(S): at 05:52

## 2019-03-25 RX ADMIN — SIMVASTATIN 40 MILLIGRAM(S): 20 TABLET, FILM COATED ORAL at 21:43

## 2019-03-25 NOTE — DIETITIAN INITIAL EVALUATION ADULT. - PERTINENT MEDS FT
heparin, rocephin, colace, folic acid, hydrochlorothiazide, synthroid, cozaar, oxycodone, protonix, zocor, carafate

## 2019-03-25 NOTE — DIETITIAN INITIAL EVALUATION ADULT. - SOURCE
patient/Pt reports fair appetite and good PO intake during LOS and PTA. During LOS PO intake % typically. No reported vit/min supplementation. NKFA. Last BM 3/24- no GI distress reported. Denies chew/swallow difficulty. Pt w/ L stump and R arm wounds. Pt denies need for PO supplements and reports wt stable ~145#. BMI: 26.5. IBW: 110#+/-10%

## 2019-03-25 NOTE — PROGRESS NOTE ADULT - ASSESSMENT
70 yo F PMH depression, Anxiety, HTN, DLD, Hypothyroid, Osteoarthritis with left AKA 1969, CAD s/p PCI >5 yr ago, presents here c/o pain swelling and redness of the stump x 1 week.     1) Left Stump cellulitis and abscess with Acute OM -active  s/p I&D 3/21 by surgery  ·	CT 3/20 osteomyelitis extending to distal femoral stump, abscess	  ·	ID rocephin 2 g qd, s/p cefepime. Decide antibiotics once wound cultures are back.   ·	s/p PICC line 3/22  ·	s/p debridement 3/22, obtain wound cx (sx resident unsure if wound cx were sent and request to call back tomorrow for day team)  ·	Possible further debridement next week per sx  ·	BCx NGTD  ·	I&D Cx- No organism; need to review w Surgery-? any further procedures; +/- DC if none  ·	Vascular- duplex art- no sig arterial occlusive dz in b/l LE  ·	Duplex vein- No DVT left  ·	cold compression q4 h for the erythematous and edematous stump site   ·	WBC admission 11.42> wnl  ·	Pt was supposed to get her prosthetic leg changed last week, will hold on to it until infection resolved     2) HTN  ·	Stable   ·	Continue with losartan 100 and Hydrochlorothiazide 25.     3)Suspected folic acid deficiency  ·	cw folic acid supplementation    3) DVT Prophylaxis -Heparin Sub Q   Diet: regular  Activity: walker  4) Disposition: SNF, pt agreeable

## 2019-03-25 NOTE — PROGRESS NOTE ADULT - ASSESSMENT
68 yo F PMH depression, Anxiety, HTN, DLD, Hypothyroid, Osteoarthritis with left AKA 1969, CAD s/p PCI >5 yr ago, presents here c/o pain swelling and redness of the stump x 1 week.     1) Left Stump cellulitis and abscess with Acute OM -active  s/p I&D 3/21 by surgery  ·	CT 3/20 osteomyelitis extending to distal femoral stump, abscess	  ·	ID rocephin 2 g qd, s/p cefepime. Decide antibiotics once wound cultures are back.   ·	s/p PICC line 3/22  ·	s/p debridement 3/22, f/u debridement cx  ·	Further debridement Wed per sx  ·	BCx NGTD  ·	I&D Cx- No organism  ·	Vascular- duplex art- no sig arterial occlusive dz in b/l LE  ·	Duplex vein- No DVT left  ·	cold compression q4 h for the erythematous and edematous stump site   ·	WBC admission 11.42> wnl  ·	Pt was supposed to get her prosthetic leg changed last week, will hold on to it until infection resolved     2) HTN  ·	Stable   ·	Continue with losartan 100 and Hydrochlorothiazide 25.     3)Suspected folic acid deficiency  ·	cw folic acid supplementation    3) DVT Prophylaxis -Heparin Sub Q   Diet: regular  Activity: walker  4) Disposition: SNF, pt agreeable
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
## 2019-03-25 NOTE — DIETITIAN INITIAL EVALUATION ADULT. - ENERGY NEEDS
estimated energy needs: 1360-1480kcal (MSJx1.2-1.3AF)  estimated protein needs: 65-78g (1.0-1.2g/kg)  estimated fluid needs: 1ml/kcal or per LIP
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
## 2019-03-25 NOTE — PROGRESS NOTE ADULT - SUBJECTIVE AND OBJECTIVE BOX
NERISSA, ТАТЬЯНА  69y, Female      OVERNIGHT EVENTS:    has no pain right stump    VITALS:  T(F): 96.1, Max: 98 (03-24-19 @ 13:55)  HR: 76  BP: 119/64  RR: 18Vital Signs Last 24 Hrs  T(C): 35.6 (25 Mar 2019 05:08), Max: 36.7 (24 Mar 2019 13:55)  T(F): 96.1 (25 Mar 2019 05:08), Max: 98 (24 Mar 2019 13:55)  HR: 76 (25 Mar 2019 05:08) (76 - 86)  BP: 119/64 (25 Mar 2019 05:08) (109/61 - 123/76)  BP(mean): --  RR: 18 (25 Mar 2019 05:08) (18 - 18)  SpO2: 98% (24 Mar 2019 20:00) (98% - 98%)    TESTS & MEASUREMENTS:                        13.0   7.72  )-----------( 200      ( 25 Mar 2019 07:04 )             38.1     03-25    141  |  103  |  28<H>  ----------------------------<  103<H>  3.6   |  22  |  0.7    Ca    9.5      25 Mar 2019 07:04  Mg     1.9     03-25    TPro  7.0  /  Alb  3.8  /  TBili  0.3  /  DBili  x   /  AST  16  /  ALT  13  /  AlkPhos  100  03-25    LIVER FUNCTIONS - ( 25 Mar 2019 07:04 )  Alb: 3.8 g/dL / Pro: 7.0 g/dL / ALK PHOS: 100 U/L / ALT: 13 U/L / AST: 16 U/L / GGT: x             Culture - Body Fluid with Gram Stain (collected 03-20-19 @ 18:00)  Source: .Body Fluid None  Gram Stain (03-21-19 @ 08:18):    polymorphonuclear leukocytes seen    No organisms seen    by cytocentrifuge  Preliminary Report (03-22-19 @ 10:00):    No growth    Culture - Blood (collected 03-20-19 @ 08:05)  Source: .Blood None  Preliminary Report (03-21-19 @ 18:00):    No growth to date.    Culture - Blood (collected 03-19-19 @ 21:39)  Source: .Blood None  Final Report (03-25-19 @ 07:01):    No growth at 5 days.            RADIOLOGY & ADDITIONAL TESTS:    ANTIBIOTICS:  cefTRIAXone   IVPB 2 Gram(s) IV Intermittent every 24 hours

## 2019-03-25 NOTE — PROGRESS NOTE ADULT - SUBJECTIVE AND OBJECTIVE BOX
Progress Note: Trauma Surgery  Patient: ТАТЬЯНА MULTANI , 69y (1950)Female   MRN: [id number]  Location: [address]  Visit: 03-18-19 Inpatient  Date: 03-25-19 @ 09:18  Hospital Day: 8  Post-op Day: 5    Procedure/Injury: s/p I&D of LLE Abscess aka Stump  Events over 24h: No acute event overnight. No new complaint. Pain has been decreasing. Pt is vitally stable. On regular diet, tolerating well. Denies nausea, vomiting, and/or abdominal pain. Ambulating inadequately. Not using Incentive spirometer. Voiding adequately. +Flatus, -Bowel movement. Dressing was changed yesterday.    Vitals: T(F): 96.1 (03-25-19 @ 05:08), Max: 98 (03-24-19 @ 13:55)  HR: 76 (03-25-19 @ 05:08)  BP: 119/64 (03-25-19 @ 05:08) (109/61 - 123/76)  RR: 18 (03-25-19 @ 05:08)  SpO2: 98% (03-24-19 @ 20:00)    Diet: Diet, Regular (03-22-19 @ 18:41)    IV Fluid: folic acid 1 milliGRAM(s) Oral daily  potassium chloride    Tablet ER 10 milliEquivalent(s) Oral daily      In:   03-24-19 @ 07:01  -  03-25-19 @ 07:00  --------------------------------------------------------  IN: 0 mL      Out:   03-24-19 @ 07:01  -  03-25-19 @ 07:00  --------------------------------------------------------  OUT:  Total OUT: 0 mL        Net:   03-24-19 @ 07:01  -  03-25-19 @ 07:00  --------------------------------------------------------  NET: 0 mL        Physical Examination:  General Appearance: NAD, alert and cooperative  HEENT: NCAT, WNL  Heart: S1 and S2. No murmurs. Rhythm is regular  Lungs: Clear to auscultation BL  Abdomen: Positive bowel sounds. Soft, nondistended, nontender.   Incisions/Wounds: Dressings in place, clean, dry and intact, no signs of infection/active bleeding/drainage    Medications: [Standing]  cefTRIAXone   IVPB 2 Gram(s) IV Intermittent every 24 hours  celecoxib 200 milliGRAM(s) Oral daily  docusate sodium 100 milliGRAM(s) Oral three times a day  folic acid 1 milliGRAM(s) Oral daily  heparin  Injectable 5000 Unit(s) SubCutaneous every 8 hours  hydrochlorothiazide 25 milliGRAM(s) Oral daily  levothyroxine 25 MICROGram(s) Oral daily  losartan 100 milliGRAM(s) Oral daily  melatonin 3 milliGRAM(s) Oral at bedtime  nystatin Ointment 1 Application(s) Topical two times a day  pantoprazole    Tablet 40 milliGRAM(s) Oral before breakfast  potassium chloride    Tablet ER 10 milliEquivalent(s) Oral daily  sertraline 100 milliGRAM(s) Oral daily  simvastatin 40 milliGRAM(s) Oral at bedtime  sucralfate 1 Gram(s) Oral four times a day    Medications:[PRN]  acetaminophen   Tablet .. 650 milliGRAM(s) Oral every 6 hours PRN  oxyCODONE    IR 5 milliGRAM(s) Oral every 6 hours PRN    Labs:                        13.0   7.72  )-----------( 200      ( 25 Mar 2019 07:04 )             38.1   03-25    141  |  103  |  28<H>  ----------------------------<  103<H>  3.6   |  22  |  0.7    Ca    9.5      25 Mar 2019 07:04  Mg     1.9     03-25    TPro  7.0  /  Alb  3.8  /  TBili  0.3  /  DBili  x   /  AST  16  /  ALT  13  /  AlkPhos  100  03-25  LIVER FUNCTIONS - ( 25 Mar 2019 07:04 )  Alb: 3.8 g/dL / Pro: 7.0 g/dL / ALK PHOS: 100 U/L / ALT: 13 U/L / AST: 16 U/L / GGT: x             Micro/Urine:    Imaging:  None/24h

## 2019-03-25 NOTE — PROGRESS NOTE ADULT - ASSESSMENT
· Assessment		  69yF    Chronic pain  Depression/Anxiety  Osteoarthritis  Hypothyroid  HLD   Essential hypertension    Admitted with CELLULITIS of L stump  lactic acidosis 2.3 Sepsis ruled out on admission   US Surrounding the left above-the-knee amputation stump, there is a 3.5 x 3.9 x 3.4 cm complex multiseptated, nonvascular fluid collection with thick wall, consistent with an abscess.  CT and xray with chronic osteomyelitis.  MRSA nasal PCR neg  Bcx NGTD  s/p I&D, cx pending, blood, serous fluid. NO cellulitis  Sedimentation Rate, Erythrocyte: 47 mm/hr (03.19.19 @ 21:39)  C-Reactive Protein, Serum: 5.28 mg/dL (03.19.19 @ 21:39)  OR cultures NTD  BCx repeatedly NTD    - cleared for PICC as bcx NGTD 48h   - Ceftriaxone 2g q24h for OM x 6 weeks   - ID follow-up [id number] [phone]

## 2019-03-25 NOTE — PROGRESS NOTE ADULT - ASSESSMENT
Assessment:  69y Female patient admitted S/P I&D of LLE Abscess aka Stump, with the above physical exam, labs, and imaging findings.    Plan:  _Continue ABx  -Dressing change wet packing, wet to dry packing daily  -F/u Bone biopsy and culture  -Will revise the stump again this week  -Pain control as needed  -Hemodynamic monitoring as per routine  -Encourage ambulation and incentive spirometer use (10x/hr when awake)  -GI and DVT prophylaxis  -Check and replete CBC and BMP q daily  -Strict input and output monitoring  -Continue current management    Date/Time: 03-25-19 @ 05:18

## 2019-03-25 NOTE — DIETITIAN INITIAL EVALUATION ADULT. - OTHER INFO
Reason for assessment: LOS. Pt presented to ED w/ chief complaint of L stump cellulitis. PT is s/p I+D 3/21 by surgery and s/p debridement 3/22.

## 2019-03-26 LAB
ALBUMIN SERPL ELPH-MCNC: 3.9 G/DL — SIGNIFICANT CHANGE UP (ref 3.5–5.2)
ALP SERPL-CCNC: 100 U/L — SIGNIFICANT CHANGE UP (ref 30–115)
ALT FLD-CCNC: 13 U/L — SIGNIFICANT CHANGE UP (ref 0–41)
ANION GAP SERPL CALC-SCNC: 15 MMOL/L — HIGH (ref 7–14)
APTT BLD: 44.2 SEC — HIGH (ref 27–39.2)
AST SERPL-CCNC: 15 U/L — SIGNIFICANT CHANGE UP (ref 0–41)
BILIRUB SERPL-MCNC: 0.3 MG/DL — SIGNIFICANT CHANGE UP (ref 0.2–1.2)
BUN SERPL-MCNC: 26 MG/DL — HIGH (ref 10–20)
CALCIUM SERPL-MCNC: 9.4 MG/DL — SIGNIFICANT CHANGE UP (ref 8.5–10.1)
CHLORIDE SERPL-SCNC: 103 MMOL/L — SIGNIFICANT CHANGE UP (ref 98–110)
CO2 SERPL-SCNC: 23 MMOL/L — SIGNIFICANT CHANGE UP (ref 17–32)
CREAT SERPL-MCNC: 0.7 MG/DL — SIGNIFICANT CHANGE UP (ref 0.7–1.5)
CULTURE RESULTS: SIGNIFICANT CHANGE UP
GLUCOSE SERPL-MCNC: 99 MG/DL — SIGNIFICANT CHANGE UP (ref 70–99)
HCT VFR BLD CALC: 39.2 % — SIGNIFICANT CHANGE UP (ref 37–47)
HGB BLD-MCNC: 12.9 G/DL — SIGNIFICANT CHANGE UP (ref 12–16)
INR BLD: 1.1 RATIO — SIGNIFICANT CHANGE UP (ref 0.65–1.3)
MAGNESIUM SERPL-MCNC: 1.9 MG/DL — SIGNIFICANT CHANGE UP (ref 1.8–2.4)
MCHC RBC-ENTMCNC: 30.4 PG — SIGNIFICANT CHANGE UP (ref 27–31)
MCHC RBC-ENTMCNC: 32.9 G/DL — SIGNIFICANT CHANGE UP (ref 32–37)
MCV RBC AUTO: 92.5 FL — SIGNIFICANT CHANGE UP (ref 81–99)
NRBC # BLD: 0 /100 WBCS — SIGNIFICANT CHANGE UP (ref 0–0)
PLATELET # BLD AUTO: 207 K/UL — SIGNIFICANT CHANGE UP (ref 130–400)
POTASSIUM SERPL-MCNC: 3.5 MMOL/L — SIGNIFICANT CHANGE UP (ref 3.5–5)
POTASSIUM SERPL-SCNC: 3.5 MMOL/L — SIGNIFICANT CHANGE UP (ref 3.5–5)
PROT SERPL-MCNC: 6.7 G/DL — SIGNIFICANT CHANGE UP (ref 6–8)
PROTHROM AB SERPL-ACNC: 12.6 SEC — SIGNIFICANT CHANGE UP (ref 9.95–12.87)
RBC # BLD: 4.24 M/UL — SIGNIFICANT CHANGE UP (ref 4.2–5.4)
RBC # FLD: 12.6 % — SIGNIFICANT CHANGE UP (ref 11.5–14.5)
SODIUM SERPL-SCNC: 141 MMOL/L — SIGNIFICANT CHANGE UP (ref 135–146)
SPECIMEN SOURCE: SIGNIFICANT CHANGE UP
WBC # BLD: 8.17 K/UL — SIGNIFICANT CHANGE UP (ref 4.8–10.8)
WBC # FLD AUTO: 8.17 K/UL — SIGNIFICANT CHANGE UP (ref 4.8–10.8)

## 2019-03-26 RX ORDER — SODIUM CHLORIDE 9 MG/ML
1000 INJECTION INTRAMUSCULAR; INTRAVENOUS; SUBCUTANEOUS
Qty: 0 | Refills: 0 | Status: DISCONTINUED | OUTPATIENT
Start: 2019-03-26 | End: 2019-03-27

## 2019-03-26 RX ORDER — NYSTATIN CREAM 100000 [USP'U]/G
1 CREAM TOPICAL
Qty: 0 | Refills: 0 | Status: DISCONTINUED | OUTPATIENT
Start: 2019-03-26 | End: 2019-03-27

## 2019-03-26 RX ORDER — POTASSIUM CHLORIDE 20 MEQ
10 PACKET (EA) ORAL DAILY
Qty: 0 | Refills: 0 | Status: DISCONTINUED | OUTPATIENT
Start: 2019-03-26 | End: 2019-03-27

## 2019-03-26 RX ORDER — LACTOBACILLUS ACIDOPHILUS 100MM CELL
1 CAPSULE ORAL DAILY
Qty: 0 | Refills: 0 | Status: DISCONTINUED | OUTPATIENT
Start: 2019-03-26 | End: 2019-03-27

## 2019-03-26 RX ORDER — DIPHENHYDRAMINE HCL 50 MG
25 CAPSULE ORAL EVERY 6 HOURS
Qty: 0 | Refills: 0 | Status: DISCONTINUED | OUTPATIENT
Start: 2019-03-26 | End: 2019-03-27

## 2019-03-26 RX ORDER — POTASSIUM CHLORIDE 20 MEQ
20 PACKET (EA) ORAL ONCE
Qty: 0 | Refills: 0 | Status: COMPLETED | OUTPATIENT
Start: 2019-03-26 | End: 2019-03-26

## 2019-03-26 RX ADMIN — Medication 10 MILLIEQUIVALENT(S): at 12:32

## 2019-03-26 RX ADMIN — HEPARIN SODIUM 5000 UNIT(S): 5000 INJECTION INTRAVENOUS; SUBCUTANEOUS at 05:46

## 2019-03-26 RX ADMIN — NYSTATIN CREAM 1 APPLICATION(S): 100000 CREAM TOPICAL at 18:58

## 2019-03-26 RX ADMIN — SERTRALINE 100 MILLIGRAM(S): 25 TABLET, FILM COATED ORAL at 12:32

## 2019-03-26 RX ADMIN — PANTOPRAZOLE SODIUM 40 MILLIGRAM(S): 20 TABLET, DELAYED RELEASE ORAL at 05:46

## 2019-03-26 RX ADMIN — HEPARIN SODIUM 5000 UNIT(S): 5000 INJECTION INTRAVENOUS; SUBCUTANEOUS at 21:33

## 2019-03-26 RX ADMIN — Medication 1 GRAM(S): at 05:46

## 2019-03-26 RX ADMIN — Medication 1 TABLET(S): at 18:57

## 2019-03-26 RX ADMIN — SIMVASTATIN 40 MILLIGRAM(S): 20 TABLET, FILM COATED ORAL at 21:34

## 2019-03-26 RX ADMIN — Medication 100 MILLIGRAM(S): at 05:46

## 2019-03-26 RX ADMIN — Medication 100 MILLIGRAM(S): at 13:19

## 2019-03-26 RX ADMIN — LOSARTAN POTASSIUM 100 MILLIGRAM(S): 100 TABLET, FILM COATED ORAL at 05:46

## 2019-03-26 RX ADMIN — HEPARIN SODIUM 5000 UNIT(S): 5000 INJECTION INTRAVENOUS; SUBCUTANEOUS at 13:19

## 2019-03-26 RX ADMIN — Medication 25 MICROGRAM(S): at 05:46

## 2019-03-26 RX ADMIN — Medication 1 GRAM(S): at 17:39

## 2019-03-26 RX ADMIN — CEFTRIAXONE 100 GRAM(S): 500 INJECTION, POWDER, FOR SOLUTION INTRAMUSCULAR; INTRAVENOUS at 22:03

## 2019-03-26 RX ADMIN — Medication 1 MILLIGRAM(S): at 12:38

## 2019-03-26 RX ADMIN — Medication 100 MILLIGRAM(S): at 21:33

## 2019-03-26 RX ADMIN — Medication 1 GRAM(S): at 00:08

## 2019-03-26 RX ADMIN — Medication 25 MILLIGRAM(S): at 05:46

## 2019-03-26 RX ADMIN — Medication 3 MILLIGRAM(S): at 21:33

## 2019-03-26 RX ADMIN — NYSTATIN CREAM 1 APPLICATION(S): 100000 CREAM TOPICAL at 05:46

## 2019-03-26 RX ADMIN — CELECOXIB 200 MILLIGRAM(S): 200 CAPSULE ORAL at 12:32

## 2019-03-26 RX ADMIN — Medication 1 GRAM(S): at 12:32
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
## 2019-03-26 NOTE — PROGRESS NOTE ADULT - ASSESSMENT
70 yo F PMH depression, Anxiety, HTN, DLD, Hypothyroid, Osteoarthritis with left AKA 1969 presents here c/o pain swelling and redness of the stump x 1 week. Patient denies fever chills cough or any other associated symptoms    Left Stump cellulitis and abscess with Acute OM s/p I&D 3/21 and 3/22 by surgery	  ·	on Rocephin (3/19- ), PICC leandra in place  ·	for revision and closure tomorrow? NPO for OR  ·	await results of path from last debridement, cultures not obtained at that time  ·	Pt was supposed to get her prosthetic leg changed last week, will hold on to it until infection resolved     HTN, hx CAD with stent  ·	Stable, no further complaints  ·	Continue with Losartan 100 and Hydrochlorothiazide 25, Patient was also on Coreg 12.5 ? no longer taking    Suspected folic acid deficiency  ·	continue folic acid supplementation    Patient has a long standing hx of Depression, appears to have adjustment issue. Patient follows psych in the community. Continue Zoloft. Emotional support.    DVT Prophylaxis -Heparin Sub Q   Diet: regular  Activity: walker  Disposition: SNF, once above issues resolved

## 2019-03-26 NOTE — PROGRESS NOTE ADULT - SUBJECTIVE AND OBJECTIVE BOX
RUBYJESSICABETH, ТАТЬЯНА  69y  Female  HPI:  69 year old female with a PMH of depression, Anxiety, HTN, DLD, Hypothyroid, Osteoarthritis with a left AKA performed in 1969 presents here c/o pain swelling and redness of the stump x 1 week. Patient denies fever chills cough or any other associated symptoms. (18 Mar 2019 15:07)    MEDICATIONS  (STANDING):  cefTRIAXone   IVPB 2 Gram(s) IV Intermittent every 24 hours  celecoxib 200 milliGRAM(s) Oral daily  docusate sodium 100 milliGRAM(s) Oral three times a day  folic acid 1 milliGRAM(s) Oral daily  heparin  Injectable 5000 Unit(s) SubCutaneous every 8 hours  hydrochlorothiazide 25 milliGRAM(s) Oral daily  levothyroxine 25 MICROGram(s) Oral daily  losartan 100 milliGRAM(s) Oral daily  melatonin 3 milliGRAM(s) Oral at bedtime  nystatin Ointment 1 Application(s) Topical two times a day  pantoprazole    Tablet 40 milliGRAM(s) Oral before breakfast  potassium chloride    Tablet ER 10 milliEquivalent(s) Oral daily  sertraline 100 milliGRAM(s) Oral daily  simvastatin 40 milliGRAM(s) Oral at bedtime  sucralfate 1 Gram(s) Oral four times a day    MEDICATIONS  (PRN):  acetaminophen   Tablet .. 650 milliGRAM(s) Oral every 6 hours PRN Moderate Pain (4 - 6)  oxyCODONE    IR 5 milliGRAM(s) Oral every 6 hours PRN Severe Pain (7 - 10)    INTERVAL EVENTS: Patient seen today without distress. Patient OOB in chair,    T(C): 36 (03-26-19 @ 04:55), Max: 36.4 (03-25-19 @ 19:46)  HR: 88 (03-26-19 @ 08:30) (75 - 88)  BP: 114/60 (03-26-19 @ 04:55) (114/60 - 132/74)  RR: 18 (03-26-19 @ 04:55) (16 - 18)  SpO2: 98% (03-26-19 @ 08:30) (98% - 98%)  Wt(kg): --Vital Signs Last 24 Hrs  T(C): 36 (26 Mar 2019 04:55), Max: 36.4 (25 Mar 2019 19:46)  T(F): 96.8 (26 Mar 2019 04:55), Max: 97.6 (25 Mar 2019 19:46)  HR: 88 (26 Mar 2019 08:30) (75 - 88)  BP: 114/60 (26 Mar 2019 04:55) (114/60 - 132/74)  BP(mean): --  RR: 18 (26 Mar 2019 04:55) (16 - 18)  SpO2: 98% (26 Mar 2019 08:30) (98% - 98%)    PHYSICAL EXAM:  GENERAL: NAD, depressed.  NECK: Supple, No JVD  CHEST/LUNG: Clear  HEART: S1, S2, Regular rate and rhythm;   ABDOMEN: Soft, Nontender, Bowel sounds present  EXTREMITIES: Decreased edema and erythema to the right arm (prior IV site), decreased tenderness to left stump  LABS:  Labs:                        12.9   8.17  )-----------( 207      ( 26 Mar 2019 06:24 )             39.2             03-26    141  |  103  |  26<H>  ----------------------------<  99  3.5   |  23  |  0.7    Ca    9.4      26 Mar 2019 06:24  Mg     1.9     03-26    TPro  6.7  /  Alb  3.9  /  TBili  0.3  /  DBili  x   /  AST  15  /  ALT  13  /  AlkPhos  100  03-26    LIVER FUNCTIONS - ( 26 Mar 2019 06:24 )  Alb: 3.9 g/dL / Pro: 6.7 g/dL / ALK PHOS: 100 U/L / ALT: 13 U/L / AST: 15 U/L / GGT: x                         RADIOLOGY & ADDITIONAL TESTS:
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
## 2019-03-26 NOTE — PROGRESS NOTE ADULT - ASSESSMENT
70 yo F PMH depression, Anxiety, HTN, DLD, Hypothyroid, Osteoarthritis with left AKA 1969, CAD s/p PCI >5 yr ago, presents here c/o pain swelling and redness of the stump x 1 week.     1) Left Stump cellulitis and abscess with Acute OM -active  s/p I&D 3/21 by surgery  ·	CT 3/20 osteomyelitis extending to distal femoral stump, abscess	  ·	ID rocephin 2 g qd 6 weeks, s/p cefepime. Decide antibiotics once wound cultures are back.   ·	s/p PICC line 3/22  ·	s/p debridement 3/22, f/u debridement cx  ·	Further debridement tomorrow, NPO midnight   ·	BCx NG  ·	I&D Cx- No organism  ·	Vascular- duplex art- no sig arterial occlusive dz in b/l LE  ·	Duplex vein- No DVT left  ·	cold compression q4 h for the erythematous and edematous stump site   ·	WBC admission 11.42> wnl  ·	Pt was supposed to get her prosthetic leg changed last week, will hold on to it until infection resolved     2) HTN  ·	Stable   ·	Continue with losartan 100 and Hydrochlorothiazide 25.     3)Suspected folic acid deficiency  ·	cw folic acid supplementation    #) Groin itch possible fungal  switch nystatin cream to powder  probiotics  Contacted ID, will see pt tomorrow    #Hypokalemia  3.5 today  On K 10 daily, home med  40 meq today per sx    #) DVT Prophylaxis -Heparin Sub Q   Diet: DASH/TLC  Activity: walker  4) Disposition: SNF, pt agreeable

## 2019-03-26 NOTE — PHARMACOTHERAPY INTERVENTION NOTE - COMMENTS
recommended md covering kristie, a  to change potasium tablet ,extended release to  powder ,due to interaction of benadryl and potasium
rocephin IVP. Isked dr bettencourt to change to IVPB---done

## 2019-03-27 ENCOUNTER — RESULT REVIEW (OUTPATIENT)
Age: 69
End: 2019-03-27

## 2019-03-27 LAB
ALBUMIN SERPL ELPH-MCNC: 3.7 G/DL — SIGNIFICANT CHANGE UP (ref 3.5–5.2)
ALP SERPL-CCNC: 94 U/L — SIGNIFICANT CHANGE UP (ref 30–115)
ALT FLD-CCNC: 13 U/L — SIGNIFICANT CHANGE UP (ref 0–41)
ANION GAP SERPL CALC-SCNC: 14 MMOL/L — SIGNIFICANT CHANGE UP (ref 7–14)
AST SERPL-CCNC: 14 U/L — SIGNIFICANT CHANGE UP (ref 0–41)
BILIRUB SERPL-MCNC: 0.3 MG/DL — SIGNIFICANT CHANGE UP (ref 0.2–1.2)
BLD GP AB SCN SERPL QL: SIGNIFICANT CHANGE UP
BUN SERPL-MCNC: 26 MG/DL — HIGH (ref 10–20)
CALCIUM SERPL-MCNC: 9.3 MG/DL — SIGNIFICANT CHANGE UP (ref 8.5–10.1)
CHLORIDE SERPL-SCNC: 103 MMOL/L — SIGNIFICANT CHANGE UP (ref 98–110)
CO2 SERPL-SCNC: 24 MMOL/L — SIGNIFICANT CHANGE UP (ref 17–32)
CREAT SERPL-MCNC: 0.7 MG/DL — SIGNIFICANT CHANGE UP (ref 0.7–1.5)
GLUCOSE SERPL-MCNC: 97 MG/DL — SIGNIFICANT CHANGE UP (ref 70–99)
HCT VFR BLD CALC: 37.6 % — SIGNIFICANT CHANGE UP (ref 37–47)
HGB BLD-MCNC: 12.6 G/DL — SIGNIFICANT CHANGE UP (ref 12–16)
MAGNESIUM SERPL-MCNC: 1.9 MG/DL — SIGNIFICANT CHANGE UP (ref 1.8–2.4)
MCHC RBC-ENTMCNC: 30.7 PG — SIGNIFICANT CHANGE UP (ref 27–31)
MCHC RBC-ENTMCNC: 33.5 G/DL — SIGNIFICANT CHANGE UP (ref 32–37)
MCV RBC AUTO: 91.7 FL — SIGNIFICANT CHANGE UP (ref 81–99)
NRBC # BLD: 0 /100 WBCS — SIGNIFICANT CHANGE UP (ref 0–0)
PLATELET # BLD AUTO: 207 K/UL — SIGNIFICANT CHANGE UP (ref 130–400)
POTASSIUM SERPL-MCNC: 4.1 MMOL/L — SIGNIFICANT CHANGE UP (ref 3.5–5)
POTASSIUM SERPL-SCNC: 4.1 MMOL/L — SIGNIFICANT CHANGE UP (ref 3.5–5)
PROT SERPL-MCNC: 6.6 G/DL — SIGNIFICANT CHANGE UP (ref 6–8)
RBC # BLD: 4.1 M/UL — LOW (ref 4.2–5.4)
RBC # FLD: 12.5 % — SIGNIFICANT CHANGE UP (ref 11.5–14.5)
SODIUM SERPL-SCNC: 141 MMOL/L — SIGNIFICANT CHANGE UP (ref 135–146)
TYPE + AB SCN PNL BLD: SIGNIFICANT CHANGE UP
WBC # BLD: 7.02 K/UL — SIGNIFICANT CHANGE UP (ref 4.8–10.8)
WBC # FLD AUTO: 7.02 K/UL — SIGNIFICANT CHANGE UP (ref 4.8–10.8)

## 2019-03-27 RX ORDER — POTASSIUM CHLORIDE 20 MEQ
10 PACKET (EA) ORAL DAILY
Qty: 0 | Refills: 0 | Status: DISCONTINUED | OUTPATIENT
Start: 2019-03-27 | End: 2019-03-29

## 2019-03-27 RX ORDER — SIMVASTATIN 20 MG/1
40 TABLET, FILM COATED ORAL AT BEDTIME
Qty: 0 | Refills: 0 | Status: DISCONTINUED | OUTPATIENT
Start: 2019-03-27 | End: 2019-03-29

## 2019-03-27 RX ORDER — HYDROMORPHONE HYDROCHLORIDE 2 MG/ML
0.5 INJECTION INTRAMUSCULAR; INTRAVENOUS; SUBCUTANEOUS
Qty: 0 | Refills: 0 | Status: DISCONTINUED | OUTPATIENT
Start: 2019-03-27 | End: 2019-03-27

## 2019-03-27 RX ORDER — SUCRALFATE 1 G
1 TABLET ORAL
Qty: 0 | Refills: 0 | Status: DISCONTINUED | OUTPATIENT
Start: 2019-03-27 | End: 2019-03-29

## 2019-03-27 RX ORDER — DIPHENHYDRAMINE HCL 50 MG
25 CAPSULE ORAL EVERY 6 HOURS
Qty: 0 | Refills: 0 | Status: DISCONTINUED | OUTPATIENT
Start: 2019-03-27 | End: 2019-03-29

## 2019-03-27 RX ORDER — SERTRALINE 25 MG/1
100 TABLET, FILM COATED ORAL DAILY
Qty: 0 | Refills: 0 | Status: DISCONTINUED | OUTPATIENT
Start: 2019-03-27 | End: 2019-03-29

## 2019-03-27 RX ORDER — LANOLIN ALCOHOL/MO/W.PET/CERES
3 CREAM (GRAM) TOPICAL AT BEDTIME
Qty: 0 | Refills: 0 | Status: DISCONTINUED | OUTPATIENT
Start: 2019-03-27 | End: 2019-03-29

## 2019-03-27 RX ORDER — LOSARTAN POTASSIUM 100 MG/1
100 TABLET, FILM COATED ORAL DAILY
Qty: 0 | Refills: 0 | Status: DISCONTINUED | OUTPATIENT
Start: 2019-03-27 | End: 2019-03-29

## 2019-03-27 RX ORDER — FOLIC ACID 0.8 MG
1 TABLET ORAL DAILY
Qty: 0 | Refills: 0 | Status: DISCONTINUED | OUTPATIENT
Start: 2019-03-27 | End: 2019-03-29

## 2019-03-27 RX ORDER — ACETAMINOPHEN 500 MG
650 TABLET ORAL EVERY 6 HOURS
Qty: 0 | Refills: 0 | Status: DISCONTINUED | OUTPATIENT
Start: 2019-03-27 | End: 2019-03-28

## 2019-03-27 RX ORDER — LEVOTHYROXINE SODIUM 125 MCG
25 TABLET ORAL DAILY
Qty: 0 | Refills: 0 | Status: DISCONTINUED | OUTPATIENT
Start: 2019-03-27 | End: 2019-03-29

## 2019-03-27 RX ORDER — MEPERIDINE HYDROCHLORIDE 50 MG/ML
12.5 INJECTION INTRAMUSCULAR; INTRAVENOUS; SUBCUTANEOUS
Qty: 0 | Refills: 0 | Status: DISCONTINUED | OUTPATIENT
Start: 2019-03-27 | End: 2019-03-27

## 2019-03-27 RX ORDER — SODIUM CHLORIDE 9 MG/ML
1000 INJECTION, SOLUTION INTRAVENOUS
Qty: 0 | Refills: 0 | Status: DISCONTINUED | OUTPATIENT
Start: 2019-03-27 | End: 2019-03-27

## 2019-03-27 RX ORDER — SODIUM CHLORIDE 9 MG/ML
1000 INJECTION INTRAMUSCULAR; INTRAVENOUS; SUBCUTANEOUS
Qty: 0 | Refills: 0 | Status: DISCONTINUED | OUTPATIENT
Start: 2019-03-27 | End: 2019-03-28

## 2019-03-27 RX ORDER — HYDROMORPHONE HYDROCHLORIDE 2 MG/ML
1 INJECTION INTRAMUSCULAR; INTRAVENOUS; SUBCUTANEOUS
Qty: 0 | Refills: 0 | Status: DISCONTINUED | OUTPATIENT
Start: 2019-03-27 | End: 2019-03-27

## 2019-03-27 RX ORDER — CEFTRIAXONE 500 MG/1
2 INJECTION, POWDER, FOR SOLUTION INTRAMUSCULAR; INTRAVENOUS EVERY 24 HOURS
Qty: 0 | Refills: 0 | Status: DISCONTINUED | OUTPATIENT
Start: 2019-03-27 | End: 2019-03-29

## 2019-03-27 RX ORDER — OXYCODONE HYDROCHLORIDE 5 MG/1
5 TABLET ORAL EVERY 6 HOURS
Qty: 0 | Refills: 0 | Status: DISCONTINUED | OUTPATIENT
Start: 2019-03-27 | End: 2019-03-28

## 2019-03-27 RX ORDER — CELECOXIB 200 MG/1
200 CAPSULE ORAL DAILY
Qty: 0 | Refills: 0 | Status: DISCONTINUED | OUTPATIENT
Start: 2019-03-27 | End: 2019-03-29

## 2019-03-27 RX ORDER — HYDROCHLOROTHIAZIDE 25 MG
25 TABLET ORAL DAILY
Qty: 0 | Refills: 0 | Status: DISCONTINUED | OUTPATIENT
Start: 2019-03-27 | End: 2019-03-29

## 2019-03-27 RX ORDER — FLUCONAZOLE 150 MG/1
200 TABLET ORAL DAILY
Qty: 0 | Refills: 0 | Status: COMPLETED | OUTPATIENT
Start: 2019-03-27 | End: 2019-03-29

## 2019-03-27 RX ORDER — LACTOBACILLUS ACIDOPHILUS 100MM CELL
1 CAPSULE ORAL DAILY
Qty: 0 | Refills: 0 | Status: DISCONTINUED | OUTPATIENT
Start: 2019-03-27 | End: 2019-03-29

## 2019-03-27 RX ORDER — DOCUSATE SODIUM 100 MG
100 CAPSULE ORAL THREE TIMES A DAY
Qty: 0 | Refills: 0 | Status: DISCONTINUED | OUTPATIENT
Start: 2019-03-27 | End: 2019-03-29

## 2019-03-27 RX ORDER — PANTOPRAZOLE SODIUM 20 MG/1
40 TABLET, DELAYED RELEASE ORAL
Qty: 0 | Refills: 0 | Status: DISCONTINUED | OUTPATIENT
Start: 2019-03-27 | End: 2019-03-29

## 2019-03-27 RX ORDER — NYSTATIN CREAM 100000 [USP'U]/G
1 CREAM TOPICAL
Qty: 0 | Refills: 0 | Status: DISCONTINUED | OUTPATIENT
Start: 2019-03-27 | End: 2019-03-29

## 2019-03-27 RX ORDER — DEXAMETHASONE 0.5 MG/5ML
8 ELIXIR ORAL ONCE
Qty: 0 | Refills: 0 | Status: DISCONTINUED | OUTPATIENT
Start: 2019-03-27 | End: 2019-03-27

## 2019-03-27 RX ORDER — ONDANSETRON 8 MG/1
4 TABLET, FILM COATED ORAL ONCE
Qty: 0 | Refills: 0 | Status: DISCONTINUED | OUTPATIENT
Start: 2019-03-27 | End: 2019-03-27

## 2019-03-27 RX ORDER — HEPARIN SODIUM 5000 [USP'U]/ML
5000 INJECTION INTRAVENOUS; SUBCUTANEOUS EVERY 8 HOURS
Qty: 0 | Refills: 0 | Status: DISCONTINUED | OUTPATIENT
Start: 2019-03-27 | End: 2019-03-29

## 2019-03-27 RX ADMIN — Medication 1 GRAM(S): at 00:13

## 2019-03-27 RX ADMIN — Medication 100 MILLIGRAM(S): at 21:34

## 2019-03-27 RX ADMIN — Medication 25 MICROGRAM(S): at 05:24

## 2019-03-27 RX ADMIN — SODIUM CHLORIDE 50 MILLILITER(S): 9 INJECTION INTRAMUSCULAR; INTRAVENOUS; SUBCUTANEOUS at 13:00

## 2019-03-27 RX ADMIN — Medication 25 MILLIGRAM(S): at 05:24

## 2019-03-27 RX ADMIN — CEFTRIAXONE 100 GRAM(S): 500 INJECTION, POWDER, FOR SOLUTION INTRAMUSCULAR; INTRAVENOUS at 18:40

## 2019-03-27 RX ADMIN — SIMVASTATIN 40 MILLIGRAM(S): 20 TABLET, FILM COATED ORAL at 21:36

## 2019-03-27 RX ADMIN — HYDROMORPHONE HYDROCHLORIDE 0.5 MILLIGRAM(S): 2 INJECTION INTRAMUSCULAR; INTRAVENOUS; SUBCUTANEOUS at 14:04

## 2019-03-27 RX ADMIN — SERTRALINE 100 MILLIGRAM(S): 25 TABLET, FILM COATED ORAL at 14:06

## 2019-03-27 RX ADMIN — Medication 25 MILLIGRAM(S): at 14:05

## 2019-03-27 RX ADMIN — HYDROMORPHONE HYDROCHLORIDE 0.5 MILLIGRAM(S): 2 INJECTION INTRAMUSCULAR; INTRAVENOUS; SUBCUTANEOUS at 12:25

## 2019-03-27 RX ADMIN — Medication 1 GRAM(S): at 17:10

## 2019-03-27 RX ADMIN — FLUCONAZOLE 200 MILLIGRAM(S): 150 TABLET ORAL at 17:09

## 2019-03-27 RX ADMIN — Medication 20 MILLIEQUIVALENT(S): at 00:12

## 2019-03-27 RX ADMIN — HYDROMORPHONE HYDROCHLORIDE 0.5 MILLIGRAM(S): 2 INJECTION INTRAMUSCULAR; INTRAVENOUS; SUBCUTANEOUS at 12:40

## 2019-03-27 RX ADMIN — Medication 10 MILLIEQUIVALENT(S): at 14:08

## 2019-03-27 RX ADMIN — Medication 1 GRAM(S): at 14:06

## 2019-03-27 RX ADMIN — Medication 650 MILLIGRAM(S): at 20:26

## 2019-03-27 RX ADMIN — SODIUM CHLORIDE 50 MILLILITER(S): 9 INJECTION INTRAMUSCULAR; INTRAVENOUS; SUBCUTANEOUS at 00:18

## 2019-03-27 RX ADMIN — Medication 1 MILLIGRAM(S): at 14:07

## 2019-03-27 RX ADMIN — LOSARTAN POTASSIUM 100 MILLIGRAM(S): 100 TABLET, FILM COATED ORAL at 05:24

## 2019-03-27 RX ADMIN — Medication 650 MILLIGRAM(S): at 21:30

## 2019-03-27 RX ADMIN — HEPARIN SODIUM 5000 UNIT(S): 5000 INJECTION INTRAVENOUS; SUBCUTANEOUS at 21:35

## 2019-03-27 RX ADMIN — Medication 1 TABLET(S): at 14:07

## 2019-03-27 RX ADMIN — CELECOXIB 200 MILLIGRAM(S): 200 CAPSULE ORAL at 14:07

## 2019-03-27 RX ADMIN — Medication 100 MILLIGRAM(S): at 14:05

## 2019-03-27 RX ADMIN — SODIUM CHLORIDE 125 MILLILITER(S): 9 INJECTION, SOLUTION INTRAVENOUS at 12:04

## 2019-03-27 RX ADMIN — Medication 1 GRAM(S): at 05:24

## 2019-03-27 RX ADMIN — HYDROMORPHONE HYDROCHLORIDE 0.5 MILLIGRAM(S): 2 INJECTION INTRAMUSCULAR; INTRAVENOUS; SUBCUTANEOUS at 12:03

## 2019-03-27 RX ADMIN — Medication 100 MILLIGRAM(S): at 05:24

## 2019-03-27 RX ADMIN — OXYCODONE HYDROCHLORIDE 5 MILLIGRAM(S): 5 TABLET ORAL at 23:32

## 2019-03-27 RX ADMIN — OXYCODONE HYDROCHLORIDE 5 MILLIGRAM(S): 5 TABLET ORAL at 17:10

## 2019-03-27 RX ADMIN — HYDROMORPHONE HYDROCHLORIDE 0.5 MILLIGRAM(S): 2 INJECTION INTRAMUSCULAR; INTRAVENOUS; SUBCUTANEOUS at 13:10

## 2019-03-27 RX ADMIN — Medication 1 GRAM(S): at 23:38

## 2019-03-27 RX ADMIN — PANTOPRAZOLE SODIUM 40 MILLIGRAM(S): 20 TABLET, DELAYED RELEASE ORAL at 05:26

## 2019-03-27 RX ADMIN — HEPARIN SODIUM 5000 UNIT(S): 5000 INJECTION INTRAVENOUS; SUBCUTANEOUS at 14:36

## 2019-03-27 RX ADMIN — NYSTATIN CREAM 1 APPLICATION(S): 100000 CREAM TOPICAL at 05:26

## 2019-03-27 RX ADMIN — HYDROMORPHONE HYDROCHLORIDE 0.5 MILLIGRAM(S): 2 INJECTION INTRAMUSCULAR; INTRAVENOUS; SUBCUTANEOUS at 12:12

## 2019-03-27 RX ADMIN — Medication 3 MILLIGRAM(S): at 21:36

## 2019-03-27 RX ADMIN — NYSTATIN CREAM 1 APPLICATION(S): 100000 CREAM TOPICAL at 18:48

## 2019-03-27 NOTE — PROGRESS NOTE ADULT - ASSESSMENT
70 yo F PMH depression, Anxiety, HTN, DLD, Hypothyroid, Osteoarthritis with left AKA 1969, CAD s/p PCI >5 yr ago, presents here c/o pain swelling and redness of the stump x 1 week.     1) Left Stump cellulitis and abscess with Acute OM -active  s/p I&D 3/21 by surgery  ·	CT 3/20 osteomyelitis extending to distal femoral stump, abscess	  ·	ID rocephin 2 g qd, s/p cefepime. Decide antibiotics once wound cultures are back.   ·	s/p PICC line 3/22  ·	s/p debridement 3/22, obtain wound cx (sx resident unsure if wound cx were sent and request to call back tomorrow for day team)  ·	 further debridement today per sx-HO stated that she asked surgery and ordered deep tissue cultures  ·	BCx NGTD  ·	Vascular- duplex art- no sig arterial occlusive dz in b/l LE  ·	Duplex vein- No DVT left  ·	cold compression q4 h for the erythematous and edematous stump site   ·	WBC admission 11.42> wnl-Has been even lower after treatment started  ·	Pt was supposed to get her prosthetic leg changed last week, will hold on to it until infection resolved And surgical healing is complete     2) HTN  ·	Stable   ·	Continue with losartan 100 and Hydrochlorothiazide 25.     3)Suspected folic acid deficiency  ·	cw folic acid supplementation    3) DVT Prophylaxis -Heparin Sub Q   Diet: regular  Activity: walker  4) Disposition: SNF, pt agreeable

## 2019-03-27 NOTE — PROGRESS NOTE ADULT - SUBJECTIVE AND OBJECTIVE BOX
NERISSA, ТАТЬЯНА  69y, Female      OVERNIGHT EVENTS:  no acute events overnight  pruritic fungal rash groins/axilla    ROS negative except as per above    VITALS:  T(F): 96.4, Max: 97.7 (03-26-19 @ 12:20)  HR: 79  BP: 106/59  RR: 18Vital Signs Last 24 Hrs  T(C): 35.8 (27 Mar 2019 09:19), Max: 36.5 (26 Mar 2019 12:20)  T(F): 96.4 (27 Mar 2019 09:19), Max: 97.7 (26 Mar 2019 12:20)  HR: 79 (27 Mar 2019 09:19) (75 - 89)  BP: 106/59 (27 Mar 2019 09:19) (106/59 - 129/63)  BP(mean): --  RR: 18 (27 Mar 2019 09:19) (18 - 18)  SpO2: 97% (27 Mar 2019 09:19) (97% - 97%)    PHYSICAL EXAM:  Gen: Awake and alert, non-toxic appearing, NAD  HEENT: NCAT.   Lungs: CTAB  Abd: Soft. NTND  Extr: wwp, L stump with decreased erythema, packing  Skin: no rash  Neuro: No focal deficits  Lines: clean    TESTS & MEASUREMENTS:                        12.6   7.02  )-----------( 207      ( 27 Mar 2019 06:29 )             37.6     03-27    141  |  103  |  26<H>  ----------------------------<  97  4.1   |  24  |  0.7    Ca    9.3      27 Mar 2019 06:29  Mg     1.9     03-27    TPro  6.6  /  Alb  3.7  /  TBili  0.3  /  DBili  x   /  AST  14  /  ALT  13  /  AlkPhos  94  03-27    LIVER FUNCTIONS - ( 27 Mar 2019 06:29 )  Alb: 3.7 g/dL / Pro: 6.6 g/dL / ALK PHOS: 94 U/L / ALT: 13 U/L / AST: 14 U/L / GGT: x             Culture - Body Fluid with Gram Stain (collected 03-20-19 @ 18:00)  Source: .Body Fluid None  Gram Stain (03-21-19 @ 08:18):    polymorphonuclear leukocytes seen    No organisms seen    by cytocentrifuge  Final Report (03-26-19 @ 11:25):    No growth at 5 days          RADIOLOGY & ADDITIONAL TESTS:    ANTIBIOTICS:    cefepime   IVPB   100 mL/Hr IV Intermittent (03-18-19 @ 16:18)    cefepime   IVPB   100 mL/Hr IV Intermittent (03-19-19 @ 05:43)    cefTRIAXone   IVPB   100 mL/Hr IV Intermittent (03-19-19 @ 15:14)    cefTRIAXone   IVPB   100 mL/Hr IV Intermittent (03-20-19 @ 14:19)    cefTRIAXone   IVPB   100 mL/Hr IV Intermittent (03-21-19 @ 21:13)    cefTRIAXone   IVPB   100 mL/Hr IV Intermittent (03-26-19 @ 22:03)   100 mL/Hr IV Intermittent (03-25-19 @ 21:43)   100 mL/Hr IV Intermittent (03-24-19 @ 21:38)   100 mL/Hr IV Intermittent (03-23-19 @ 21:14)   100 mL/Hr IV Intermittent (03-22-19 @ 21:02)    piperacillin/tazobactam IVPB.   200 mL/Hr IV Intermittent (03-18-19 @ 14:17)    vancomycin  IVPB   250 mL/Hr IV Intermittent (03-19-19 @ 22:01)   250 mL/Hr IV Intermittent (03-19-19 @ 11:24)   250 mL/Hr IV Intermittent (03-19-19 @ 00:01)    vancomycin  IVPB   250 mL/Hr IV Intermittent (03-18-19 @ 11:08)        cefTRIAXone   IVPB 2 Gram(s) IV Intermittent every 24 hours

## 2019-03-27 NOTE — BRIEF OPERATIVE NOTE - OPERATION/FINDINGS
Debridement of soft tissue and bone, left AKA stump
STEPHANIE AKA stump revised, muscle and subQ tissue sutured over stump for protection. Skin closed with staples. #10 ALFIE drain in place
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
## 2019-03-27 NOTE — PROGRESS NOTE ADULT - ASSESSMENT
69yF    Chronic pain  Depression/Anxiety  Osteoarthritis  Hypothyroid  HLD   Essential hypertension    Admitted with CELLULITIS of L stump  lactic acidosis 2.3 Sepsis ruled out on admission   US Surrounding the left above-the-knee amputation stump, there is a 3.5 x 3.9 x 3.4 cm complex multiseptated, nonvascular fluid collection with thick wall, consistent with an abscess s/p I&D with a negative wound cultures  CT and xray with osteomyelitis.  MRSA nasal PCR neg  Bcx NGTD  ESR 47   CRP 5.28 mg/dL   s/p Debridement of soft tissue of left lower extremity 22-Mar-2019    - pending OR--> please send cultures  - For pruritic fungal rash groins/axilla--> fluconazole PO 200mg x 3 days, topical nystatin powder  - Ceftriaxone 2g q24h for OM x 6 weeks if no growth on cultures  - ID follow-up [id number] [phone]  - Weekly cbc, bmp    Spectra [id number]

## 2019-03-27 NOTE — PRE-ANESTHESIA EVALUATION ADULT - NSANTHOSAYNRD_GEN_A_CORE
No. SULEMA screening performed.  STOP BANG Legend: 0-2 = LOW Risk; 3-4 = INTERMEDIATE Risk; 5-8 = HIGH Risk
No. SULEMA screening performed.  STOP BANG Legend: 0-2 = LOW Risk; 3-4 = INTERMEDIATE Risk; 5-8 = HIGH Risk
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
## 2019-03-27 NOTE — PROGRESS NOTE ADULT - NSHPATTENDINGPLANDISCUSS_GEN_ALL_CORE
pt, HO
pt, HO
Medicine floor resident
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
## 2019-03-27 NOTE — CHART NOTE - NSCHARTNOTEFT_GEN_A_CORE
Surgery post-op Note 03-27-19 @ 15:27    Procedure: Revision of LLE AKA    S: 68 y/o Female s/p Revision of LLE AKA. Patient is laying comfortably in the bed. Reports some pain at the stump. Denies any fever, chills, chest pain, shortness of breath.    O:   T(C): 36.4 (03-27-19 @ 14:57), Max: 37.1 (03-27-19 @ 11:49)  HR: 94 (03-27-19 @ 14:57) (75 - 94)  BP: 132/62 (03-27-19 @ 14:57) (106/59 - 144/66)  RR: 16 (03-27-19 @ 14:57) (15 - 21)  SpO2: 98% (03-27-19 @ 13:19) (96% - 98%)    PE:  General: AAOx 3. NAD  Heart: S1 and S2 noted  Lungs: Clear to auscultation bilaterally  Abdomen: Soft Non tender, Non distended.   Extremities: LLE AKA stump with dressing and emmanuel in place. No bleeding or discharge noted from the incision site. Emmanuel with serosangenous fluid    03-27-19 @ 07:01  -  03-27-19 @ 15:27  --------------------------------------------------------  IN: 150 mL / OUT: 5 mL / NET: 145 mL    acetaminophen   Tablet .. 650 milliGRAM(s) Oral every 6 hours PRN  cefTRIAXone   IVPB 2 Gram(s) IV Intermittent every 24 hours  celecoxib 200 milliGRAM(s) Oral daily  diphenhydrAMINE 25 milliGRAM(s) Oral every 6 hours PRN  docusate sodium 100 milliGRAM(s) Oral three times a day  fluconAZOLE   Tablet 200 milliGRAM(s) Oral daily  folic acid 1 milliGRAM(s) Oral daily  heparin  Injectable 5000 Unit(s) SubCutaneous every 8 hours  hydrochlorothiazide 25 milliGRAM(s) Oral daily  lactobacillus acidophilus 1 Tablet(s) Oral daily  levothyroxine 25 MICROGram(s) Oral daily  losartan 100 milliGRAM(s) Oral daily  melatonin 3 milliGRAM(s) Oral at bedtime  nystatin Powder 1 Application(s) Topical two times a day  oxyCODONE    IR 5 milliGRAM(s) Oral every 6 hours PRN  pantoprazole    Tablet 40 milliGRAM(s) Oral before breakfast  potassium chloride   Powder 10 milliEquivalent(s) Oral daily  sertraline 100 milliGRAM(s) Oral daily  simvastatin 40 milliGRAM(s) Oral at bedtime  sodium chloride 0.9%. 1000 milliLiter(s) IV Continuous <Continuous>  sucralfate 1 Gram(s) Oral four times a day    Assessment:  68 y/o Female s/p Revision of LLE AKA.    Plan:.  IV antibiotics  f/u emmanuel drain output  Pain control  DVT and GI prophylaxis  Incentive spirometry

## 2019-03-27 NOTE — BRIEF OPERATIVE NOTE - NSICDXBRIEFPROCEDURE_GEN_ALL_CORE_FT
PROCEDURES:  Debridement of soft tissue of left lower extremity 22-Mar-2019 16:38:51  Tiffanie Lopez
PROCEDURES:  Revision of AKA (above-knee amputation) 27-Mar-2019 11:53:23  Abdiel Lemon

## 2019-03-27 NOTE — PROGRESS NOTE ADULT - ASSESSMENT
68 yo F PMH depression, Anxiety, HTN, DLD, Hypothyroid, Osteoarthritis with left AKA 1969, CAD s/p PCI >5 yr ago, presents here c/o pain swelling and redness of the stump x 1 week.     1) Left Stump cellulitis and abscess with Acute OM -active  ·	CT 3/20 osteomyelitis extending to distal femoral stump, abscess	  ·	ID rocephin 2 g qd 6 weeks (3/21-5/1), s/p cefepime. Can change antibiotics once OR cultures are back.   ·	s/p PICC line 3/22  ·	s/p I&D 3/21 by surgery  ·	s/p debridement 3/22 and 3/27, f/u bx report  ·	BCx NG  ·	I&D Cx- No organism  ·	Vascular- duplex art- no sig arterial occlusive dz in b/l LE  ·	Duplex vein- No DVT left  ·	WBC admission 11.42> wnl  ·	Pt was supposed to get her prosthetic leg changed last week, will hold on to it until infection resolved   ·	Need updated PT note tomorrow    2) HTN  ·	Stable   ·	Continue with losartan 100 and Hydrochlorothiazide 25.     3)Suspected folic acid deficiency  ·	cw folic acid supplementation    #) tinea cruris  nystatin powder  fluconazole 200 qd for 3 days  probiotics    #Hypokalemia  4.1 today s/p 40 meq repletion  On K 10 daily, home med    #) DVT Prophylaxis -Heparin Sub Q - can resume per sx  Diet: DASH/TLC  Activity: walker  4) Disposition: SNF, pt agreeable

## 2019-03-27 NOTE — PRE-ANESTHESIA EVALUATION ADULT - NSANTHPEFT_GEN_ALL_CORE
AAOX3, NAD, NC,AT  CVS: S1S2 RRR  RESP: CTABL no W/R/R  ABD: Soft NT, ND   EXT: Motor sensory grossly intact.
cor: rrr s1s2 nl  lungs: clear

## 2019-03-28 ENCOUNTER — TRANSCRIPTION ENCOUNTER (OUTPATIENT)
Age: 69
End: 2019-03-28

## 2019-03-28 LAB
ALBUMIN SERPL ELPH-MCNC: 3.5 G/DL — SIGNIFICANT CHANGE UP (ref 3.5–5.2)
ALP SERPL-CCNC: 89 U/L — SIGNIFICANT CHANGE UP (ref 30–115)
ALT FLD-CCNC: 13 U/L — SIGNIFICANT CHANGE UP (ref 0–41)
ANION GAP SERPL CALC-SCNC: 12 MMOL/L — SIGNIFICANT CHANGE UP (ref 7–14)
AST SERPL-CCNC: 16 U/L — SIGNIFICANT CHANGE UP (ref 0–41)
BILIRUB SERPL-MCNC: 0.6 MG/DL — SIGNIFICANT CHANGE UP (ref 0.2–1.2)
BUN SERPL-MCNC: 15 MG/DL — SIGNIFICANT CHANGE UP (ref 10–20)
CALCIUM SERPL-MCNC: 8.9 MG/DL — SIGNIFICANT CHANGE UP (ref 8.5–10.1)
CHLORIDE SERPL-SCNC: 101 MMOL/L — SIGNIFICANT CHANGE UP (ref 98–110)
CO2 SERPL-SCNC: 25 MMOL/L — SIGNIFICANT CHANGE UP (ref 17–32)
CREAT SERPL-MCNC: 0.6 MG/DL — LOW (ref 0.7–1.5)
GLUCOSE SERPL-MCNC: 95 MG/DL — SIGNIFICANT CHANGE UP (ref 70–99)
HCT VFR BLD CALC: 34.9 % — LOW (ref 37–47)
HGB BLD-MCNC: 11.5 G/DL — LOW (ref 12–16)
MAGNESIUM SERPL-MCNC: 1.6 MG/DL — LOW (ref 1.8–2.4)
MCHC RBC-ENTMCNC: 30.5 PG — SIGNIFICANT CHANGE UP (ref 27–31)
MCHC RBC-ENTMCNC: 33 G/DL — SIGNIFICANT CHANGE UP (ref 32–37)
MCV RBC AUTO: 92.6 FL — SIGNIFICANT CHANGE UP (ref 81–99)
NRBC # BLD: 0 /100 WBCS — SIGNIFICANT CHANGE UP (ref 0–0)
PLATELET # BLD AUTO: 189 K/UL — SIGNIFICANT CHANGE UP (ref 130–400)
POTASSIUM SERPL-MCNC: 4.4 MMOL/L — SIGNIFICANT CHANGE UP (ref 3.5–5)
POTASSIUM SERPL-SCNC: 4.4 MMOL/L — SIGNIFICANT CHANGE UP (ref 3.5–5)
PROT SERPL-MCNC: 6.2 G/DL — SIGNIFICANT CHANGE UP (ref 6–8)
RBC # BLD: 3.77 M/UL — LOW (ref 4.2–5.4)
RBC # FLD: 12.6 % — SIGNIFICANT CHANGE UP (ref 11.5–14.5)
SODIUM SERPL-SCNC: 138 MMOL/L — SIGNIFICANT CHANGE UP (ref 135–146)
SURGICAL PATHOLOGY STUDY: SIGNIFICANT CHANGE UP
SURGICAL PATHOLOGY STUDY: SIGNIFICANT CHANGE UP
WBC # BLD: 6.99 K/UL — SIGNIFICANT CHANGE UP (ref 4.8–10.8)
WBC # FLD AUTO: 6.99 K/UL — SIGNIFICANT CHANGE UP (ref 4.8–10.8)

## 2019-03-28 RX ORDER — SENNA PLUS 8.6 MG/1
2 TABLET ORAL AT BEDTIME
Qty: 0 | Refills: 0 | Status: DISCONTINUED | OUTPATIENT
Start: 2019-03-28 | End: 2019-03-29

## 2019-03-28 RX ORDER — FLUCONAZOLE 150 MG/1
1 TABLET ORAL
Qty: 0 | Refills: 0 | COMMUNITY
Start: 2019-03-28

## 2019-03-28 RX ORDER — LACTOBACILLUS ACIDOPHILUS 100MM CELL
0 CAPSULE ORAL
Qty: 0 | Refills: 0 | COMMUNITY
Start: 2019-03-28

## 2019-03-28 RX ORDER — FOLIC ACID 0.8 MG
1 TABLET ORAL
Qty: 0 | Refills: 0 | DISCHARGE
Start: 2019-03-28

## 2019-03-28 RX ORDER — OXYCODONE HYDROCHLORIDE 5 MG/1
10 TABLET ORAL EVERY 4 HOURS
Qty: 0 | Refills: 0 | Status: DISCONTINUED | OUTPATIENT
Start: 2019-03-28 | End: 2019-03-29

## 2019-03-28 RX ORDER — OXYCODONE HYDROCHLORIDE 5 MG/1
1 TABLET ORAL
Qty: 0 | Refills: 0 | DISCHARGE
Start: 2019-03-28

## 2019-03-28 RX ORDER — OXYCODONE HYDROCHLORIDE 5 MG/1
5 TABLET ORAL EVERY 4 HOURS
Qty: 0 | Refills: 0 | Status: DISCONTINUED | OUTPATIENT
Start: 2019-03-28 | End: 2019-03-29

## 2019-03-28 RX ORDER — ACETAMINOPHEN 500 MG
650 TABLET ORAL EVERY 6 HOURS
Qty: 0 | Refills: 0 | Status: DISCONTINUED | OUTPATIENT
Start: 2019-03-28 | End: 2019-03-29

## 2019-03-28 RX ORDER — SUCRALFATE 1 G
1 TABLET ORAL
Qty: 0 | Refills: 0 | DISCHARGE
Start: 2019-03-28

## 2019-03-28 RX ORDER — PANTOPRAZOLE SODIUM 20 MG/1
1 TABLET, DELAYED RELEASE ORAL
Qty: 0 | Refills: 0 | DISCHARGE
Start: 2019-03-28

## 2019-03-28 RX ORDER — FLUCONAZOLE 150 MG/1
1 TABLET ORAL
Qty: 0 | Refills: 0 | DISCHARGE
Start: 2019-03-28

## 2019-03-28 RX ORDER — CEFTRIAXONE 500 MG/1
2 INJECTION, POWDER, FOR SOLUTION INTRAMUSCULAR; INTRAVENOUS
Qty: 0 | Refills: 0 | DISCHARGE
Start: 2019-03-28

## 2019-03-28 RX ORDER — NYSTATIN CREAM 100000 [USP'U]/G
1 CREAM TOPICAL
Qty: 0 | Refills: 0 | DISCHARGE
Start: 2019-03-28

## 2019-03-28 RX ORDER — ACETAMINOPHEN 500 MG
2 TABLET ORAL
Qty: 0 | Refills: 0 | DISCHARGE
Start: 2019-03-28

## 2019-03-28 RX ORDER — LACTOBACILLUS ACIDOPHILUS 100MM CELL
0 CAPSULE ORAL
Qty: 0 | Refills: 0 | DISCHARGE
Start: 2019-03-28

## 2019-03-28 RX ADMIN — Medication 100 MILLIGRAM(S): at 14:27

## 2019-03-28 RX ADMIN — HEPARIN SODIUM 5000 UNIT(S): 5000 INJECTION INTRAVENOUS; SUBCUTANEOUS at 06:21

## 2019-03-28 RX ADMIN — Medication 1 GRAM(S): at 17:31

## 2019-03-28 RX ADMIN — HEPARIN SODIUM 5000 UNIT(S): 5000 INJECTION INTRAVENOUS; SUBCUTANEOUS at 14:27

## 2019-03-28 RX ADMIN — CELECOXIB 200 MILLIGRAM(S): 200 CAPSULE ORAL at 15:23

## 2019-03-28 RX ADMIN — Medication 650 MILLIGRAM(S): at 23:45

## 2019-03-28 RX ADMIN — Medication 650 MILLIGRAM(S): at 17:31

## 2019-03-28 RX ADMIN — SIMVASTATIN 40 MILLIGRAM(S): 20 TABLET, FILM COATED ORAL at 21:04

## 2019-03-28 RX ADMIN — Medication 3 MILLIGRAM(S): at 21:04

## 2019-03-28 RX ADMIN — Medication 1 MILLIGRAM(S): at 11:57

## 2019-03-28 RX ADMIN — OXYCODONE HYDROCHLORIDE 10 MILLIGRAM(S): 5 TABLET ORAL at 13:25

## 2019-03-28 RX ADMIN — OXYCODONE HYDROCHLORIDE 10 MILLIGRAM(S): 5 TABLET ORAL at 11:59

## 2019-03-28 RX ADMIN — Medication 100 MILLIGRAM(S): at 06:20

## 2019-03-28 RX ADMIN — HEPARIN SODIUM 5000 UNIT(S): 5000 INJECTION INTRAVENOUS; SUBCUTANEOUS at 21:03

## 2019-03-28 RX ADMIN — LOSARTAN POTASSIUM 100 MILLIGRAM(S): 100 TABLET, FILM COATED ORAL at 06:20

## 2019-03-28 RX ADMIN — NYSTATIN CREAM 1 APPLICATION(S): 100000 CREAM TOPICAL at 17:31

## 2019-03-28 RX ADMIN — Medication 100 MILLIGRAM(S): at 21:04

## 2019-03-28 RX ADMIN — CEFTRIAXONE 100 GRAM(S): 500 INJECTION, POWDER, FOR SOLUTION INTRAMUSCULAR; INTRAVENOUS at 18:32

## 2019-03-28 RX ADMIN — PANTOPRAZOLE SODIUM 40 MILLIGRAM(S): 20 TABLET, DELAYED RELEASE ORAL at 06:20

## 2019-03-28 RX ADMIN — SERTRALINE 100 MILLIGRAM(S): 25 TABLET, FILM COATED ORAL at 11:57

## 2019-03-28 RX ADMIN — Medication 1 GRAM(S): at 06:19

## 2019-03-28 RX ADMIN — FLUCONAZOLE 200 MILLIGRAM(S): 150 TABLET ORAL at 11:57

## 2019-03-28 RX ADMIN — Medication 650 MILLIGRAM(S): at 21:05

## 2019-03-28 RX ADMIN — Medication 1 GRAM(S): at 23:45

## 2019-03-28 RX ADMIN — NYSTATIN CREAM 1 APPLICATION(S): 100000 CREAM TOPICAL at 06:20

## 2019-03-28 RX ADMIN — SENNA PLUS 2 TABLET(S): 8.6 TABLET ORAL at 21:04

## 2019-03-28 RX ADMIN — Medication 10 MILLIEQUIVALENT(S): at 11:58

## 2019-03-28 RX ADMIN — Medication 1 GRAM(S): at 11:58

## 2019-03-28 RX ADMIN — Medication 25 MILLIGRAM(S): at 06:20

## 2019-03-28 RX ADMIN — Medication 1 TABLET(S): at 11:58

## 2019-03-28 RX ADMIN — CELECOXIB 200 MILLIGRAM(S): 200 CAPSULE ORAL at 11:57

## 2019-03-28 RX ADMIN — Medication 25 MICROGRAM(S): at 06:20

## 2019-03-28 NOTE — PROGRESS NOTE ADULT - ASSESSMENT
68 yo F PMH depression, Anxiety, HTN, DLD, Hypothyroid, Osteoarthritis with left AKA 1969, CAD s/p PCI >5 yr ago, presents here c/o pain swelling and redness of the stump x 1 week.     1) Left Stump cellulitis and abscess with Acute OM -active  ·	CT 3/20 osteomyelitis extending to distal femoral stump, abscess	  ·	ID rocephin 2 g qd 6 weeks (3/21-5/1), s/p cefepime.   ·	s/p PICC line 3/22  ·	s/p I&D 3/21 by surgery  ·	s/p debridement 3/22 and 3/27, f/u bx report  ·	BCx NG  ·	I&D Cx- No organism  ·	Vascular- duplex art- no sig arterial occlusive dz in b/l LE  ·	Duplex vein- No DVT left  ·	WBC admission 11.42> wnl  ·	Pt was supposed to get her prosthetic leg changed last week, will hold on to it until infection resolved   ·	PT saw pt today.   ·	Pain control, oxy 5 prn for mod pain. oxy 10 prn for severe pain    2) HTN  ·	Stable   ·	Continue with losartan 100 and Hydrochlorothiazide 25.     3)Suspected folic acid deficiency  ·	cw folic acid supplementation    #) tinea cruris and armpit itch  nystatin powder  fluconazole 200 qd for 3 days (end 3/29)  probiotics    #Hypokalemia  4.4 today  On K 10 daily, home med    #Depression, appears to have adjustment issue. Patient follows psych in the community. Continue Zoloft. Emotional support.    #) DVT Prophylaxis -Heparin Sub Q - can resume per sx  Diet: DASH/TLC  Activity: walker  4) Disposition: SNF, waiting bed, cleared by sx for d/c, ALFIE drain removed

## 2019-03-28 NOTE — DISCHARGE NOTE PROVIDER - NSDCCPCAREPLAN_GEN_ALL_CORE_FT
PRINCIPAL DISCHARGE DIAGNOSIS  Diagnosis: Leg osteomyelitis  Assessment and Plan of Treatment: Your above the knee amputation had an abscess and osteomyelitis.   Take medications as prescribed.  Continue antibiotics IV rocephin 2 g every day for 6 weeks total (5/1/19).  Follow up with infectious disease Dr. Stiven Beach and surgeon Dr. Calles in 1 week.   Obtain blood work CBC and CMP every week. PRINCIPAL DISCHARGE DIAGNOSIS  Diagnosis: Leg osteomyelitis  Assessment and Plan of Treatment: Your above the knee amputation had an abscess and osteomyelitis which was drained and debrided twice.  Take medications as prescribed.  Continue antibiotics IV rocephin 2 g every day for 6 weeks total (5/1/19).  Follow up with infectious disease Dr. Stiven Beach, surgeon Dr. Calles, primary care Dr. Witt in 1 week. Continue with physical therapy.  Change your prosthetic leg after infection resolves and antibiotics finishes.  Obtain blood work CBC and CMP every week.  Return to emergency room if worsening symptoms. PRINCIPAL DISCHARGE DIAGNOSIS  Diagnosis: Leg osteomyelitis  Assessment and Plan of Treatment: Your above the knee amputation had an abscess and osteomyelitis which was drained and debrided twice.  Take medications as prescribed.  Continue antibiotics IV rocephin 2 g every day for 6 weeks total (5/1/19).  Follow up with infectious disease Dr. Stiven Beach, surgeon Dr. Calles, primary care Dr. Witt in 1 week. Continue with physical therapy.  Change your prosthetic leg after infection resolves and antibiotics finishes.  Obtain blood work CBC and CMP every week.  Return to emergency room if worsening symptoms.        SECONDARY DISCHARGE DIAGNOSES  Diagnosis: Tinea cruris  Assessment and Plan of Treatment: Continue fluconazole for 7 days total (end 4/2) and nystatin powder for fungal infection in groin and armpits.  Follow up with primary care doctor in 1 week.

## 2019-03-28 NOTE — DISCHARGE NOTE PROVIDER - PROVIDER TOKENS
PROVIDER:[TOKEN:[[id number]]],PROVIDER:[TOKEN:[[id number]]],PROVIDER:[TOKEN:[[id number]]]

## 2019-03-28 NOTE — PROGRESS NOTE ADULT - ASSESSMENT
Assessment:  69y Female patient admitted S/P AKA revision and closure of AKA, with the above physical exam, labs, and imaging findings.    Plan:  -Continue IV abx  -F/U ALFIE output  -Pain control as needed  -Hemodynamic monitoring as per routine  -Encourage ambulation and incentive spirometer use (10x/hr when awake)  -GI and DVT prophylaxis  -Check and replete CBC and BMP q daily  -Strict input and output monitoring  -Continue current management    Date/Time: 03-28-19 @ 02:18

## 2019-03-28 NOTE — PROGRESS NOTE ADULT - SUBJECTIVE AND OBJECTIVE BOX
Progress Note: Trauma Surgery  Patient: ТАТЬЯНА MULTANI , 69y (1950)Female   MRN: [id number]  Location: [address]  Visit: 03-18-19 Inpatient  Date: 03-28-19 @ 02:18  Hospital Day: 11  Post-op Day: 6    Procedure/Injury: s/p AKA revision and closure of AKA  Events over 24h: Patient is laying comfortably in the bed. No acute event overnight. No new complaint. Pt is vitally stable. On DASH diet, tolerating well. Denies nausea, vomiting, and/or abdominal pain. Ambulating inadequately. Not using Incentive spirometer. Voiding adequately. +Flatus, -Bowel movement.    Vitals: T(F): 98.1 (03-27-19 @ 20:07), Max: 98.7 (03-27-19 @ 11:49)  HR: 79 (03-27-19 @ 20:25)  BP: 131/67 (03-27-19 @ 20:07) (106/59 - 144/66)  RR: 17 (03-27-19 @ 20:25)  SpO2: 98% (03-27-19 @ 13:19)    Diet: Diet, DASH/TLC:   Sodium & Cholesterol Restricted (03-27-19 @ 12:00)    IV Fluid: folic acid 1 milliGRAM(s) Oral daily  potassium chloride   Powder 10 milliEquivalent(s) Oral daily  sodium chloride 0.9%. 1000 milliLiter(s) (50 mL/Hr) IV Continuous <Continuous>      In:   03-27-19 @ 07:01  -  03-28-19 @ 02:18  --------------------------------------------------------  IN: 150 mL      Out:   03-27-19 @ 07:01  -  03-28-19 @ 02:18  --------------------------------------------------------  OUT:    Bulb: 35 mL  Total OUT: 35 mL        Net:   03-27-19 @ 07:01  -  03-28-19 @ 02:18  --------------------------------------------------------  NET: 115 mL        Physical Examination:  General: AAOx 3. NAD  Heart: S1 and S2 noted  Lungs: Clear to auscultation bilaterally  Abdomen: Soft Non tender, Non distended.   Extremities: LLE AKA stump with dressing and emmanuel in place. No bleeding or discharge noted from the incision site. Emmanuel with serosangenous fluid    Medications: [Standing]  cefTRIAXone   IVPB 2 Gram(s) IV Intermittent every 24 hours  celecoxib 200 milliGRAM(s) Oral daily  docusate sodium 100 milliGRAM(s) Oral three times a day  fluconAZOLE   Tablet 200 milliGRAM(s) Oral daily  folic acid 1 milliGRAM(s) Oral daily  heparin  Injectable 5000 Unit(s) SubCutaneous every 8 hours  hydrochlorothiazide 25 milliGRAM(s) Oral daily  lactobacillus acidophilus 1 Tablet(s) Oral daily  levothyroxine 25 MICROGram(s) Oral daily  losartan 100 milliGRAM(s) Oral daily  melatonin 3 milliGRAM(s) Oral at bedtime  nystatin Powder 1 Application(s) Topical two times a day  pantoprazole    Tablet 40 milliGRAM(s) Oral before breakfast  potassium chloride   Powder 10 milliEquivalent(s) Oral daily  sertraline 100 milliGRAM(s) Oral daily  simvastatin 40 milliGRAM(s) Oral at bedtime  sodium chloride 0.9%. 1000 milliLiter(s) (50 mL/Hr) IV Continuous <Continuous>  sucralfate 1 Gram(s) Oral four times a day    Medications:[PRN]  acetaminophen   Tablet .. 650 milliGRAM(s) Oral every 6 hours PRN  diphenhydrAMINE 25 milliGRAM(s) Oral every 6 hours PRN  oxyCODONE    IR 5 milliGRAM(s) Oral every 6 hours PRN    Labs:                        12.6   7.02  )-----------( 207      ( 27 Mar 2019 06:29 )             37.6   03-27    141  |  103  |  26<H>  ----------------------------<  97  4.1   |  24  |  0.7    Ca    9.3      27 Mar 2019 06:29  Mg     1.9     03-27    TPro  6.6  /  Alb  3.7  /  TBili  0.3  /  DBili  x   /  AST  14  /  ALT  13  /  AlkPhos  94  03-27  LIVER FUNCTIONS - ( 27 Mar 2019 06:29 )  Alb: 3.7 g/dL / Pro: 6.6 g/dL / ALK PHOS: 94 U/L / ALT: 13 U/L / AST: 14 U/L / GGT: x         PT/INR - ( 26 Mar 2019 22:57 )   PT: 12.60 sec;   INR: 1.10 ratio         PTT - ( 26 Mar 2019 22:57 )  PTT:44.2 sec    Micro/Urine:    Imaging:  None/24h

## 2019-03-28 NOTE — DISCHARGE NOTE PROVIDER - CARE PROVIDERS DIRECT ADDRESSES
,DirectAddress_Unknown,DirectAddress_Unknown,[email or web address]

## 2019-03-28 NOTE — DISCHARGE NOTE PROVIDER - CARE PROVIDER_API CALL
Pam Calles)  Surgery  [address] [address]  Phone: [phone]  Fax: [phone]  Follow Up Time:     Stiven Beach)  Infectious Disease; Internal Medicine  [address] [place] [id number]  Phone: [phone]  Fax: [phone]  Follow Up Time:     Apple Witt)  Medicine  [address] [place] [address]  Phone: [phone]  Fax: [phone]  Follow Up Time:

## 2019-03-28 NOTE — PROGRESS NOTE ADULT - SUBJECTIVE AND OBJECTIVE BOX
NERISSA, ТАТЬЯНА  69y, Female      OVERNIGHT EVENTS:    no fevers, pain left stump    VITALS:  T(F): 98.3, Max: 98.3 (03-28-19 @ 05:36)  HR: 83  BP: 115/66  RR: 19Vital Signs Last 24 Hrs  T(C): 36.8 (28 Mar 2019 05:36), Max: 36.8 (27 Mar 2019 12:34)  T(F): 98.3 (28 Mar 2019 05:36), Max: 98.3 (28 Mar 2019 05:36)  HR: 83 (28 Mar 2019 05:36) (79 - 94)  BP: 115/66 (28 Mar 2019 05:36) (115/65 - 132/62)  BP(mean): --  RR: 19 (28 Mar 2019 05:36) (16 - 21)  SpO2: 97% (28 Mar 2019 07:30) (96% - 98%)    TESTS & MEASUREMENTS:                        11.5   6.99  )-----------( 189      ( 28 Mar 2019 07:10 )             34.9     03-28    138  |  101  |  15  ----------------------------<  95  4.4   |  25  |  0.6<L>    Ca    8.9      28 Mar 2019 07:10  Mg     1.6     03-28    TPro  6.2  /  Alb  3.5  /  TBili  0.6  /  DBili  x   /  AST  16  /  ALT  13  /  AlkPhos  89  03-28    LIVER FUNCTIONS - ( 28 Mar 2019 07:10 )  Alb: 3.5 g/dL / Pro: 6.2 g/dL / ALK PHOS: 89 U/L / ALT: 13 U/L / AST: 16 U/L / GGT: x                   RADIOLOGY & ADDITIONAL TESTS:    ANTIBIOTICS:  cefTRIAXone   IVPB 2 Gram(s) IV Intermittent every 24 hours  fluconAZOLE   Tablet 200 milliGRAM(s) Oral daily
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
## 2019-03-28 NOTE — PROGRESS NOTE ADULT - SUBJECTIVE AND OBJECTIVE BOX
NERISSA, ТАТЬЯНА  69y  Female  HPI:  69 year old female with a PMH of depression, Anxiety, HTN, DLD, Hypothyroid, Osteoarthritis with a left AKA performed in 1969 presents here c/o pain swelling and redness of the stump x 1 week. Patient denies fever chills cough or any other associated symptoms. (18 Mar 2019 15:07)    MEDICATIONS  (STANDING):  acetaminophen   Tablet .. 650 milliGRAM(s) Oral every 6 hours  cefTRIAXone   IVPB 2 Gram(s) IV Intermittent every 24 hours  celecoxib 200 milliGRAM(s) Oral daily  docusate sodium 100 milliGRAM(s) Oral three times a day  fluconAZOLE   Tablet 200 milliGRAM(s) Oral daily  folic acid 1 milliGRAM(s) Oral daily  heparin  Injectable 5000 Unit(s) SubCutaneous every 8 hours  hydrochlorothiazide 25 milliGRAM(s) Oral daily  lactobacillus acidophilus 1 Tablet(s) Oral daily  levothyroxine 25 MICROGram(s) Oral daily  losartan 100 milliGRAM(s) Oral daily  melatonin 3 milliGRAM(s) Oral at bedtime  nystatin Powder 1 Application(s) Topical two times a day  pantoprazole    Tablet 40 milliGRAM(s) Oral before breakfast  potassium chloride   Powder 10 milliEquivalent(s) Oral daily  senna 2 Tablet(s) Oral at bedtime  sertraline 100 milliGRAM(s) Oral daily  simvastatin 40 milliGRAM(s) Oral at bedtime  sucralfate 1 Gram(s) Oral four times a day    MEDICATIONS  (PRN):  diphenhydrAMINE 25 milliGRAM(s) Oral every 6 hours PRN Rash and/or Itching  oxyCODONE    IR 10 milliGRAM(s) Oral every 4 hours PRN Severe Pain (7 - 10)  oxyCODONE    IR 5 milliGRAM(s) Oral every 4 hours PRN Moderate Pain (4 - 6)    INTERVAL EVENTS: Patient seen today has more pain since revision of stump. Patient has a ALFIE drain in place    T(C): 36.8 (03-28-19 @ 05:36), Max: 36.8 (03-27-19 @ 13:19)  HR: 83 (03-28-19 @ 05:36) (79 - 94)  BP: 115/66 (03-28-19 @ 05:36) (115/65 - 132/62)  RR: 19 (03-28-19 @ 05:36) (16 - 21)  SpO2: 97% (03-28-19 @ 07:30) (96% - 98%)  Wt(kg): --Vital Signs Last 24 Hrs  T(C): 36.8 (28 Mar 2019 05:36), Max: 36.8 (27 Mar 2019 13:19)  T(F): 98.3 (28 Mar 2019 05:36), Max: 98.3 (28 Mar 2019 05:36)  HR: 83 (28 Mar 2019 05:36) (79 - 94)  BP: 115/66 (28 Mar 2019 05:36) (115/65 - 132/62)  BP(mean): --  RR: 19 (28 Mar 2019 05:36) (16 - 21)  SpO2: 97% (28 Mar 2019 07:30) (96% - 98%)    PHYSICAL EXAM:  GENERAL: NAD  NECK: Supple, No JVD  CHEST/LUNG: Clear  HEART: S1, S2, Regular rate and rhythm;   ABDOMEN: Soft, Nontender, Bowel sounds present  EXTREMITIES: No  edema  SKIN: Left stump tender, ALFIE with drainage    LABS:  Labs:                        11.5   6.99  )-----------( 189      ( 28 Mar 2019 07:10 )             34.9             03-28    138  |  101  |  15  ----------------------------<  95  4.4   |  25  |  0.6<L>    Ca    8.9      28 Mar 2019 07:10  Mg     1.6     03-28    TPro  6.2  /  Alb  3.5  /  TBili  0.6  /  DBili  x   /  AST  16  /  ALT  13  /  AlkPhos  89  03-28    LIVER FUNCTIONS - ( 28 Mar 2019 07:10 )  Alb: 3.5 g/dL / Pro: 6.2 g/dL / ALK PHOS: 89 U/L / ALT: 13 U/L / AST: 16 U/L / GGT: x                   PT/INR - ( 26 Mar 2019 22:57 )   PT: 12.60 sec;   INR: 1.10 ratio      PTT - ( 26 Mar 2019 22:57 )  PTT:44.2 sec      RADIOLOGY & ADDITIONAL TESTS:  none new

## 2019-03-28 NOTE — PROGRESS NOTE ADULT - ASSESSMENT
68 yo F PMH depression, Anxiety, HTN, DLD, Hypothyroid, Osteoarthritis with left AKA 1969 presents here c/o pain swelling and redness of the stump x 1 week. Patient denies fever chills cough or any other associated symptoms    Left Stump cellulitis and abscess with Acute OM s/p I&D 3/21 and 3/22 by surgery	  ·	on Rocephin (3/19- ), PICC leandra in place  ·	post revision and closure  ·	await results of path +/- cultures   ·	increase pain rx    HTN, hx CAD with stent  ·	Stable, no further complaints  ·	Continue with Losartan 100 and Hydrochlorothiazide 25, Patient was also on Coreg 12.5 ? no longer taking    Suspected folic acid deficiency  ·	continue folic acid supplementation    Patient has a long standing hx of Depression, appears to have adjustment issue. Patient follows psych in the community. Continue Zoloft. Emotional support.    DVT Prophylaxis -Heparin Sub Q   Diet: regular  Activity: walker  Disposition: to discharge to SNF once authorization obtained.

## 2019-03-28 NOTE — PROGRESS NOTE ADULT - ASSESSMENT
69yF    Chronic pain  Depression/Anxiety  Osteoarthritis  Hypothyroid  HLD   Essential hypertension    Admitted with CELLULITIS of L stump  lactic acidosis 2.3 Sepsis ruled out on admission   US Surrounding the left above-the-knee amputation stump, there is a 3.5 x 3.9 x 3.4 cm complex multiseptated, nonvascular fluid collection with thick wall, consistent with an abscess s/p I&D with a negative wound cultures  CT and xray with osteomyelitis.  MRSA nasal PCR neg  Bcx NGTD  ESR 47   CRP 5.28 mg/dL   s/p Debridement of soft tissue of left lower extremity 22-Mar-2019  WCx NTD      - For pruritic fungal rash groins/axilla--> fluconazole PO 200mg x 3 days, topical nystatin powder  - Ceftriaxone 2g q24h for OM x 6 weeks   - ID follow-up [address] [phone]

## 2019-03-28 NOTE — DISCHARGE NOTE PROVIDER - HOSPITAL COURSE
69 year old female with a PMH of depression, Anxiety, HTN, DLD, Hypothyroid, CAD s/p PCI >5 yr ago, Osteoarthritis with a left AKA performed in 1969 presents here c/o pain swelling and redness of the stump x 1 week. Patient denies fever chills cough or any other associated symptoms.        1) Left Stump cellulitis and abscess with Acute OM -active    CT 3/20 osteomyelitis extending to distal femoral stump, abscess	    ID rocephin 2 g qd 6 weeks (3/21-5/1), s/p cefepime.      s/p PICC line 3/22    s/p I&D 3/21 by surgery    s/p debridement 3/22 and 3/27, f/u bx report    BCx NG    I&D Cx- No organism    Vascular- duplex art- no sig arterial occlusive dz in b/l LE    Duplex vein- No DVT left    WBC admission 11.42> wnl    Pt was supposed to get her prosthetic leg changed last week, will hold on to it until infection resolved     Pain control with oxycodone 5 prn for mod pain and 10 prn for severe pain (senna, colace to prevent constipation)        2) HTN    Stable     Continue with losartan 100 and Hydrochlorothiazide 25.         3)Suspected folic acid deficiency    cw folic acid supplementation        #) tinea cruris    nystatin powder    fluconazole 200 qd for 3 days (end 3/29)    probiotics        #Hypokalemia- stable    On K 10 daily, home med        Pt stable and will be d/c SNF 69 year old female with a PMH of depression, Anxiety, HTN, DLD, Hypothyroid, CAD s/p PCI >5 yr ago, Osteoarthritis with a left AKA performed in 1969 presents here c/o pain swelling and redness of the stump x 1 week. Patient denies fever chills cough or any other associated symptoms.        1) Left Stump cellulitis and abscess with Acute OM -active    CT 3/20 osteomyelitis extending to distal femoral stump, abscess	    ID rocephin 2 g qd 6 weeks (3/21-5/1), s/p cefepime.      s/p PICC line 3/22    s/p I&D 3/21 by surgery    s/p debridement 3/22 (bx Granulation tissue with coagulative necrosis) and 3/27 (free of OM)    BCx NG    I&D Cx- No organism    Vascular- duplex art- no sig arterial occlusive dz in b/l LE    Duplex vein- No DVT left    WBC admission 11.42> wnl    Pt was supposed to get her prosthetic leg changed last week, will hold on to it until infection resolved     Pain control with oxycodone 5 prn for mod pain and 10 prn for severe pain (senna, colace to prevent constipation)        2) HTN    Stable     Continue with losartan 100 and Hydrochlorothiazide 25.         3)Suspected folic acid deficiency    cw folic acid supplementation        #) tinea cruris    nystatin powder    fluconazole 200 qd for 3 days (end 3/29)    probiotics        #Hypokalemia- stable    On K 10 daily, home med        #Depression    c/w home med        Pt stable and will be d/c SNF 69 year old female with a PMH of depression, Anxiety, HTN, DLD, Hypothyroid, CAD s/p PCI >5 yr ago, Osteoarthritis with a left AKA performed in 1969 presents here c/o pain swelling and redness of the stump x 1 week. Patient denies fever chills cough or any other associated symptoms.        1) Left Stump cellulitis and abscess with Acute OM -active    CT 3/20 osteomyelitis extending to distal femoral stump, abscess	    ID rocephin 2 g qd 6 weeks (3/21-5/1), s/p cefepime.      s/p PICC line 3/22    s/p I&D 3/21 by surgery    s/p debridement 3/22 (bx Granulation tissue with coagulative necrosis) and 3/27 (free of OM)    BCx NG    I&D Cx- No organism    Vascular- duplex art- no sig arterial occlusive dz in b/l LE    Duplex vein- No DVT left    WBC admission 11.42> wnl    Pt was supposed to get her prosthetic leg changed last week, will hold on to it until infection resolved     Pain control with oxycodone 5 prn for mod pain and 10 prn for severe pain (senna, colace to prevent constipation)        2) HTN    Stable     Continue with losartan 100 and Hydrochlorothiazide 25.         3)Suspected folic acid deficiency    cw folic acid supplementation        #) tinea cruris    nystatin powder    fluconazole 200 qd for 7 days (end 4/2)    probiotics        #Hypokalemia- stable    On K 10 daily, home med        #Depression    c/w home med        Pt stable and will be d/c SNF

## 2019-03-29 ENCOUNTER — TRANSCRIPTION ENCOUNTER (OUTPATIENT)
Age: 69
End: 2019-03-29

## 2019-03-29 VITALS
OXYGEN SATURATION: 97 % | RESPIRATION RATE: 18 BRPM | TEMPERATURE: 97 F | HEART RATE: 78 BPM | DIASTOLIC BLOOD PRESSURE: 64 MMHG | SYSTOLIC BLOOD PRESSURE: 110 MMHG

## 2019-03-29 LAB
ALBUMIN SERPL ELPH-MCNC: 3.7 G/DL — SIGNIFICANT CHANGE UP (ref 3.5–5.2)
ALP SERPL-CCNC: 92 U/L — SIGNIFICANT CHANGE UP (ref 30–115)
ALT FLD-CCNC: 11 U/L — SIGNIFICANT CHANGE UP (ref 0–41)
ANION GAP SERPL CALC-SCNC: 15 MMOL/L — HIGH (ref 7–14)
AST SERPL-CCNC: 13 U/L — SIGNIFICANT CHANGE UP (ref 0–41)
BILIRUB SERPL-MCNC: 0.5 MG/DL — SIGNIFICANT CHANGE UP (ref 0.2–1.2)
BLD GP AB SCN SERPL QL: SIGNIFICANT CHANGE UP
BUN SERPL-MCNC: 24 MG/DL — HIGH (ref 10–20)
CALCIUM SERPL-MCNC: 9.1 MG/DL — SIGNIFICANT CHANGE UP (ref 8.5–10.1)
CHLORIDE SERPL-SCNC: 101 MMOL/L — SIGNIFICANT CHANGE UP (ref 98–110)
CO2 SERPL-SCNC: 24 MMOL/L — SIGNIFICANT CHANGE UP (ref 17–32)
CREAT SERPL-MCNC: 0.7 MG/DL — SIGNIFICANT CHANGE UP (ref 0.7–1.5)
GLUCOSE SERPL-MCNC: 104 MG/DL — HIGH (ref 70–99)
HCT VFR BLD CALC: 34.5 % — LOW (ref 37–47)
HGB BLD-MCNC: 11.4 G/DL — LOW (ref 12–16)
MAGNESIUM SERPL-MCNC: 1.8 MG/DL — SIGNIFICANT CHANGE UP (ref 1.8–2.4)
MCHC RBC-ENTMCNC: 30.6 PG — SIGNIFICANT CHANGE UP (ref 27–31)
MCHC RBC-ENTMCNC: 33 G/DL — SIGNIFICANT CHANGE UP (ref 32–37)
MCV RBC AUTO: 92.5 FL — SIGNIFICANT CHANGE UP (ref 81–99)
NRBC # BLD: 0 /100 WBCS — SIGNIFICANT CHANGE UP (ref 0–0)
PLATELET # BLD AUTO: 184 K/UL — SIGNIFICANT CHANGE UP (ref 130–400)
POTASSIUM SERPL-MCNC: 3.9 MMOL/L — SIGNIFICANT CHANGE UP (ref 3.5–5)
POTASSIUM SERPL-SCNC: 3.9 MMOL/L — SIGNIFICANT CHANGE UP (ref 3.5–5)
PROT SERPL-MCNC: 6.2 G/DL — SIGNIFICANT CHANGE UP (ref 6–8)
RBC # BLD: 3.73 M/UL — LOW (ref 4.2–5.4)
RBC # FLD: 12.6 % — SIGNIFICANT CHANGE UP (ref 11.5–14.5)
SODIUM SERPL-SCNC: 140 MMOL/L — SIGNIFICANT CHANGE UP (ref 135–146)
TYPE + AB SCN PNL BLD: SIGNIFICANT CHANGE UP
WBC # BLD: 7.39 K/UL — SIGNIFICANT CHANGE UP (ref 4.8–10.8)
WBC # FLD AUTO: 7.39 K/UL — SIGNIFICANT CHANGE UP (ref 4.8–10.8)

## 2019-03-29 RX ORDER — FLUCONAZOLE 150 MG/1
200 TABLET ORAL DAILY
Qty: 0 | Refills: 0 | Status: DISCONTINUED | OUTPATIENT
Start: 2019-03-30 | End: 2019-03-29

## 2019-03-29 RX ADMIN — HEPARIN SODIUM 5000 UNIT(S): 5000 INJECTION INTRAVENOUS; SUBCUTANEOUS at 05:55

## 2019-03-29 RX ADMIN — Medication 1 GRAM(S): at 17:30

## 2019-03-29 RX ADMIN — SERTRALINE 100 MILLIGRAM(S): 25 TABLET, FILM COATED ORAL at 11:31

## 2019-03-29 RX ADMIN — Medication 1 TABLET(S): at 11:30

## 2019-03-29 RX ADMIN — Medication 10 MILLIEQUIVALENT(S): at 11:30

## 2019-03-29 RX ADMIN — Medication 25 MILLIGRAM(S): at 05:55

## 2019-03-29 RX ADMIN — Medication 650 MILLIGRAM(S): at 17:31

## 2019-03-29 RX ADMIN — CELECOXIB 200 MILLIGRAM(S): 200 CAPSULE ORAL at 11:29

## 2019-03-29 RX ADMIN — PANTOPRAZOLE SODIUM 40 MILLIGRAM(S): 20 TABLET, DELAYED RELEASE ORAL at 06:02

## 2019-03-29 RX ADMIN — Medication 650 MILLIGRAM(S): at 05:55

## 2019-03-29 RX ADMIN — HEPARIN SODIUM 5000 UNIT(S): 5000 INJECTION INTRAVENOUS; SUBCUTANEOUS at 14:04

## 2019-03-29 RX ADMIN — Medication 650 MILLIGRAM(S): at 17:33

## 2019-03-29 RX ADMIN — NYSTATIN CREAM 1 APPLICATION(S): 100000 CREAM TOPICAL at 06:02

## 2019-03-29 RX ADMIN — CEFTRIAXONE 100 GRAM(S): 500 INJECTION, POWDER, FOR SOLUTION INTRAMUSCULAR; INTRAVENOUS at 17:33

## 2019-03-29 RX ADMIN — Medication 1 GRAM(S): at 11:30

## 2019-03-29 RX ADMIN — Medication 100 MILLIGRAM(S): at 14:03

## 2019-03-29 RX ADMIN — Medication 650 MILLIGRAM(S): at 06:30

## 2019-03-29 RX ADMIN — FLUCONAZOLE 200 MILLIGRAM(S): 150 TABLET ORAL at 11:30

## 2019-03-29 RX ADMIN — Medication 1 MILLIGRAM(S): at 11:31

## 2019-03-29 RX ADMIN — Medication 25 MICROGRAM(S): at 05:55

## 2019-03-29 RX ADMIN — OXYCODONE HYDROCHLORIDE 10 MILLIGRAM(S): 5 TABLET ORAL at 06:30

## 2019-03-29 RX ADMIN — Medication 1 GRAM(S): at 05:54

## 2019-03-29 RX ADMIN — Medication 650 MILLIGRAM(S): at 11:31

## 2019-03-29 RX ADMIN — NYSTATIN CREAM 1 APPLICATION(S): 100000 CREAM TOPICAL at 17:30

## 2019-03-29 RX ADMIN — OXYCODONE HYDROCHLORIDE 10 MILLIGRAM(S): 5 TABLET ORAL at 10:00

## 2019-03-29 RX ADMIN — Medication 100 MILLIGRAM(S): at 05:55

## 2019-03-29 RX ADMIN — Medication 650 MILLIGRAM(S): at 00:15

## 2019-03-29 RX ADMIN — Medication 650 MILLIGRAM(S): at 11:32

## 2019-03-29 RX ADMIN — CELECOXIB 200 MILLIGRAM(S): 200 CAPSULE ORAL at 11:32

## 2019-03-29 RX ADMIN — OXYCODONE HYDROCHLORIDE 10 MILLIGRAM(S): 5 TABLET ORAL at 05:55

## 2019-03-29 RX ADMIN — LOSARTAN POTASSIUM 100 MILLIGRAM(S): 100 TABLET, FILM COATED ORAL at 05:55

## 2019-03-29 RX ADMIN — OXYCODONE HYDROCHLORIDE 10 MILLIGRAM(S): 5 TABLET ORAL at 09:15

## 2019-03-29 NOTE — PROGRESS NOTE ADULT - EXTREMITIES COMMENTS
right stump with no erythema/ edema/ induration/ purulence. Packed
Left stump with no edema/ erythema/ pus
pain left stump

## 2019-03-29 NOTE — PROGRESS NOTE ADULT - SUBJECTIVE AND OBJECTIVE BOX
NERISSA, ТАТЬЯНА  69y, Female      OVERNIGHT EVENTS:    no fevers    VITALS:  T(F): 96.4, Max: 99.3 (03-28-19 @ 12:30)  HR: 74  BP: 120/70  RR: 18Vital Signs Last 24 Hrs  T(C): 35.8 (29 Mar 2019 05:27), Max: 37.4 (28 Mar 2019 12:30)  T(F): 96.4 (29 Mar 2019 05:27), Max: 99.3 (28 Mar 2019 12:30)  HR: 74 (29 Mar 2019 05:50) (71 - 101)  BP: 120/70 (29 Mar 2019 05:50) (88/52 - 125/70)  BP(mean): --  RR: 18 (29 Mar 2019 05:27) (16 - 18)  SpO2: 97% (28 Mar 2019 16:17) (97% - 97%)    TESTS & MEASUREMENTS:                        11.4   7.39  )-----------( 184      ( 29 Mar 2019 06:37 )             34.5     03-29    140  |  101  |  24<H>  ----------------------------<  104<H>  3.9   |  24  |  0.7    Ca    9.1      29 Mar 2019 06:37  Mg     1.8     03-29    TPro  6.2  /  Alb  3.7  /  TBili  0.5  /  DBili  x   /  AST  13  /  ALT  11  /  AlkPhos  92  03-29    LIVER FUNCTIONS - ( 29 Mar 2019 06:37 )  Alb: 3.7 g/dL / Pro: 6.2 g/dL / ALK PHOS: 92 U/L / ALT: 11 U/L / AST: 13 U/L / GGT: x                   RADIOLOGY & ADDITIONAL TESTS:    ANTIBIOTICS:  cefTRIAXone   IVPB 2 Gram(s) IV Intermittent every 24 hours  fluconAZOLE   Tablet 200 milliGRAM(s) Oral daily

## 2019-03-29 NOTE — PROGRESS NOTE ADULT - REASON FOR ADMISSION
Left stump Cellulitis

## 2019-03-29 NOTE — PROGRESS NOTE ADULT - PROVIDER SPECIALTY LIST ADULT
Cardiology
Infectious Disease
Internal Medicine
Surgery
Internal Medicine

## 2019-03-29 NOTE — PROGRESS NOTE ADULT - SUBJECTIVE AND OBJECTIVE BOX
BRIGIDOBETH, ТАТЬЯНА  69y  Female  HPI:  69 year old female with a PMH of depression, Anxiety, HTN, DLD, Hypothyroid, Osteoarthritis with a left AKA performed in 1969 presents here c/o pain swelling and redness of the stump x 1 week. Patient denies fever chills cough or any other associated symptoms. (18 Mar 2019 15:07)    MEDICATIONS  (STANDING):  acetaminophen   Tablet .. 650 milliGRAM(s) Oral every 6 hours  cefTRIAXone   IVPB 2 Gram(s) IV Intermittent every 24 hours  celecoxib 200 milliGRAM(s) Oral daily  docusate sodium 100 milliGRAM(s) Oral three times a day  fluconAZOLE   Tablet 200 milliGRAM(s) Oral daily  folic acid 1 milliGRAM(s) Oral daily  heparin  Injectable 5000 Unit(s) SubCutaneous every 8 hours  hydrochlorothiazide 25 milliGRAM(s) Oral daily  lactobacillus acidophilus 1 Tablet(s) Oral daily  levothyroxine 25 MICROGram(s) Oral daily  losartan 100 milliGRAM(s) Oral daily  melatonin 3 milliGRAM(s) Oral at bedtime  nystatin Powder 1 Application(s) Topical two times a day  pantoprazole    Tablet 40 milliGRAM(s) Oral before breakfast  potassium chloride   Powder 10 milliEquivalent(s) Oral daily  senna 2 Tablet(s) Oral at bedtime  sertraline 100 milliGRAM(s) Oral daily  simvastatin 40 milliGRAM(s) Oral at bedtime  sucralfate 1 Gram(s) Oral four times a day    MEDICATIONS  (PRN):  diphenhydrAMINE 25 milliGRAM(s) Oral every 6 hours PRN Rash and/or Itching  oxyCODONE    IR 10 milliGRAM(s) Oral every 4 hours PRN Severe Pain (7 - 10)  oxyCODONE    IR 5 milliGRAM(s) Oral every 4 hours PRN Moderate Pain (4 - 6)    INTERVAL EVENTS: Patient seen today without distress, ambulated without prosthetic with walker.    T(C): 35.8 (03-29-19 @ 05:27), Max: 37.4 (03-28-19 @ 12:30)  HR: 74 (03-29-19 @ 05:50) (71 - 101)  BP: 120/70 (03-29-19 @ 05:50) (88/52 - 125/70)  RR: 18 (03-29-19 @ 05:27) (16 - 18)  SpO2: 97% (03-28-19 @ 16:17) (97% - 97%)  Wt(kg): --Vital Signs Last 24 Hrs  T(C): 35.8 (29 Mar 2019 05:27), Max: 37.4 (28 Mar 2019 12:30)  T(F): 96.4 (29 Mar 2019 05:27), Max: 99.3 (28 Mar 2019 12:30)  HR: 74 (29 Mar 2019 05:50) (71 - 101)  BP: 120/70 (29 Mar 2019 05:50) (88/52 - 125/70)  BP(mean): --  RR: 18 (29 Mar 2019 05:27) (16 - 18)  SpO2: 97% (28 Mar 2019 16:17) (97% - 97%)    PHYSICAL EXAM:  GENERAL: NAD  NECK: Supple, No JVD  CHEST/LUNG: Clear; No  wheezing  HEART: S1, S2, Regular rate and rhythm;   ABDOMEN: Soft, Nontender, Nondistended; Bowel sounds present  EXTREMITIES: No edema  SKIN: Surgical site covered    LABS:  Labs:                        11.4   7.39  )-----------( 184      ( 29 Mar 2019 06:37 )             34.5             03-29    140  |  101  |  24<H>  ----------------------------<  104<H>  3.9   |  24  |  0.7    Ca    9.1      29 Mar 2019 06:37  Mg     1.8     03-29    TPro  6.2  /  Alb  3.7  /  TBili  0.5  /  DBili  x   /  AST  13  /  ALT  11  /  AlkPhos  92  03-29    LIVER FUNCTIONS - ( 29 Mar 2019 06:37 )  Alb: 3.7 g/dL / Pro: 6.2 g/dL / ALK PHOS: 92 U/L / ALT: 11 U/L / AST: 13 U/L / GGT: x                           Surgical Pathology Report (03.27.19 @ 11:08)    Surgical Pathology Report:   ACCESSION No:  [id number]    NERISSAТАТЬЯНА                            1        Surgical Final Report          Final Diagnosis  Left femur stump:  - Healing surgical wound site with ulcer and granulation tissue  reaction:  - Bone marrow margin, free of osteomyelitis.    Verified by: Jose A Romeo M.D.  (Electronic Signature)  Reported on: 03/28/19 17:03 EDT, [address] [place] [id number]  _________________________________________________________________    Clinical History  Closure of left above knee amputation stump    Specimen(s) Submitted  Left femur stump    Gross Description  The specimen is received in formalin, and labeled "left femur  stump" and consists of multiple irregular fragments of tan red  and tan white bony tissue measuring 6 x 5 x 2.5 cm in aggregate.  Representative sections are submitted after decalcification. (2  blocks)    Specimen was received and underwent gross examination at [facility], [address] [place] [id number].    03/27/19 16:28 rebel    Perioperative Diagnosis  Necrotic left stump            RADIOLOGY & ADDITIONAL TESTS:

## 2019-03-29 NOTE — PROGRESS NOTE ADULT - ASSESSMENT
· Assessment		  69yF    Chronic pain  Depression/Anxiety  Osteoarthritis  Hypothyroid  HLD   Essential hypertension    Admitted with CELLULITIS of L stump  lactic acidosis 2.3 Sepsis ruled out on admission   US Surrounding the left above-the-knee amputation stump, there is a 3.5 x 3.9 x 3.4 cm complex multiseptated, nonvascular fluid collection with thick wall, consistent with an abscess s/p I&D with a negative wound cultures  CT and xray with osteomyelitis.  MRSA nasal PCR neg  Bcx NGTD  ESR 47   CRP 5.28 mg/dL   s/p Debridement of soft tissue of left lower extremity 22-Mar-2019  WCx NTD      - For pruritic fungal rash groins/axilla--> fluconazole PO 200mg x 3 days, topical nystatin powder  - Ceftriaxone 2g q24h for OM x 6 weeks   - ID follow-up [id number] [phone] NGTD  -recall prn please

## 2019-03-29 NOTE — PROGRESS NOTE ADULT - ASSESSMENT
70 yo F PMH depression, Anxiety, HTN, DLD, Hypothyroid, Osteoarthritis with left AKA 1969 presents here c/o pain swelling and redness of the stump x 1 week. Patient denies fever chills cough or any other associated symptoms    Left Stump cellulitis and abscess with Acute OM s/p I&D 3/21 and 3/22 by surgery	  ·	on Rocephin (3/19- ), PICC leandra in place  ·	post revision and closure        results of path noted, ? cultures   ·	continue pain rx    HTN, hx CAD with stent  ·	Stable, no further complaints  ·	Continue with Losartan 100 and Hydrochlorothiazide 25, Patient was also on Coreg 12.5 ? no longer taking    Suspected folic acid deficiency  ·	continue folic acid supplementation    Patient has a long standing hx of Depression, appears to have adjustment issue. Patient follows psych in the community. Continue Zoloft. Emotional support.    DVT Prophylaxis -Heparin Sub Q   Diet: regular  Activity: walker  Disposition: to discharge to SNF once authorization obtained.

## 2019-03-30 ENCOUNTER — OUTPATIENT (OUTPATIENT)
Dept: OUTPATIENT SERVICES | Facility: HOSPITAL | Age: 69
LOS: 1 days | Discharge: HOME | End: 2019-03-30

## 2019-03-30 DIAGNOSIS — Z98.890 OTHER SPECIFIED POSTPROCEDURAL STATES: Chronic | ICD-10-CM

## 2019-04-02 DIAGNOSIS — D64.9 ANEMIA, UNSPECIFIED: ICD-10-CM

## 2019-04-02 DIAGNOSIS — I10 ESSENTIAL (PRIMARY) HYPERTENSION: ICD-10-CM

## 2019-04-02 DIAGNOSIS — R76.11 NONSPECIFIC REACTION TO TUBERCULIN SKIN TEST WITHOUT ACTIVE TUBERCULOSIS: ICD-10-CM

## 2019-04-08 DIAGNOSIS — I25.10 ATHEROSCLEROTIC HEART DISEASE OF NATIVE CORONARY ARTERY WITHOUT ANGINA PECTORIS: ICD-10-CM

## 2019-04-08 DIAGNOSIS — I96 GANGRENE, NOT ELSEWHERE CLASSIFIED: ICD-10-CM

## 2019-04-08 DIAGNOSIS — T87.44 INFECTION OF AMPUTATION STUMP, LEFT LOWER EXTREMITY: ICD-10-CM

## 2019-04-08 DIAGNOSIS — E87.6 HYPOKALEMIA: ICD-10-CM

## 2019-04-08 DIAGNOSIS — Z98.61 CORONARY ANGIOPLASTY STATUS: ICD-10-CM

## 2019-04-08 DIAGNOSIS — Z96.651 PRESENCE OF RIGHT ARTIFICIAL KNEE JOINT: ICD-10-CM

## 2019-04-08 DIAGNOSIS — L03.116 CELLULITIS OF LEFT LOWER LIMB: ICD-10-CM

## 2019-04-08 DIAGNOSIS — R21 RASH AND OTHER NONSPECIFIC SKIN ERUPTION: ICD-10-CM

## 2019-04-08 DIAGNOSIS — F41.9 ANXIETY DISORDER, UNSPECIFIED: ICD-10-CM

## 2019-04-08 DIAGNOSIS — E03.9 HYPOTHYROIDISM, UNSPECIFIED: ICD-10-CM
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
## 2019-04-08 DIAGNOSIS — F32.9 MAJOR DEPRESSIVE DISORDER, SINGLE EPISODE, UNSPECIFIED: ICD-10-CM

## 2019-04-08 DIAGNOSIS — E53.8 DEFICIENCY OF OTHER SPECIFIED B GROUP VITAMINS: ICD-10-CM

## 2019-04-08 DIAGNOSIS — I10 ESSENTIAL (PRIMARY) HYPERTENSION: ICD-10-CM

## 2019-04-08 DIAGNOSIS — B35.6 TINEA CRURIS: ICD-10-CM

## 2019-04-08 DIAGNOSIS — G89.29 OTHER CHRONIC PAIN: ICD-10-CM

## 2019-04-08 DIAGNOSIS — L02.416 CUTANEOUS ABSCESS OF LEFT LOWER LIMB: ICD-10-CM

## 2019-04-08 DIAGNOSIS — M86.18 OTHER ACUTE OSTEOMYELITIS, OTHER SITE: ICD-10-CM

## 2019-04-08 DIAGNOSIS — E78.5 HYPERLIPIDEMIA, UNSPECIFIED: ICD-10-CM

## 2019-04-08 DIAGNOSIS — T87.54 NECROSIS OF AMPUTATION STUMP, LEFT LOWER EXTREMITY: ICD-10-CM

## 2019-04-08 DIAGNOSIS — E87.2 ACIDOSIS: ICD-10-CM

## 2019-04-25 ENCOUNTER — OUTPATIENT (OUTPATIENT)
Dept: OUTPATIENT SERVICES | Facility: HOSPITAL | Age: 69
LOS: 1 days | Discharge: HOME | End: 2019-04-25

## 2019-04-25 DIAGNOSIS — Z98.890 OTHER SPECIFIED POSTPROCEDURAL STATES: Chronic | ICD-10-CM

## 2019-04-25 DIAGNOSIS — I50.9 HEART FAILURE, UNSPECIFIED: ICD-10-CM

## 2019-04-25 DIAGNOSIS — A09 INFECTIOUS GASTROENTERITIS AND COLITIS, UNSPECIFIED: ICD-10-CM

## 2019-05-09 ENCOUNTER — OUTPATIENT (OUTPATIENT)
Dept: OUTPATIENT SERVICES | Facility: HOSPITAL | Age: 69
LOS: 1 days | Discharge: HOME | End: 2019-05-09

## 2019-05-09 DIAGNOSIS — I10 ESSENTIAL (PRIMARY) HYPERTENSION: ICD-10-CM

## 2019-05-09 DIAGNOSIS — Z98.890 OTHER SPECIFIED POSTPROCEDURAL STATES: Chronic | ICD-10-CM

## 2019-07-13 ENCOUNTER — OUTPATIENT (OUTPATIENT)
Dept: OUTPATIENT SERVICES | Facility: HOSPITAL | Age: 69
LOS: 1 days | Discharge: HOME | End: 2019-07-13

## 2019-07-13 DIAGNOSIS — N39.0 URINARY TRACT INFECTION, SITE NOT SPECIFIED: ICD-10-CM

## 2019-07-13 DIAGNOSIS — Z98.890 OTHER SPECIFIED POSTPROCEDURAL STATES: Chronic | ICD-10-CM

## 2019-07-16 ENCOUNTER — OUTPATIENT (OUTPATIENT)
Dept: OUTPATIENT SERVICES | Facility: HOSPITAL | Age: 69
LOS: 1 days | Discharge: HOME | End: 2019-07-16

## 2019-07-16 DIAGNOSIS — Z98.890 OTHER SPECIFIED POSTPROCEDURAL STATES: Chronic | ICD-10-CM

## 2019-07-16 DIAGNOSIS — R79.9 ABNORMAL FINDING OF BLOOD CHEMISTRY, UNSPECIFIED: ICD-10-CM

## 2019-07-16 DIAGNOSIS — D50.9 IRON DEFICIENCY ANEMIA, UNSPECIFIED: ICD-10-CM

## 2019-07-18 NOTE — PRE-ANESTHESIA EVALUATION ADULT - TEMPERATURE IN CELSIUS (DEGREES C)
Lumbarsacral spine xray negative for acute changes, multiple degenerative changes at this time. Stop ibuprofen. Medrol dose pack as instructed, take with food. Increase oral fluids. Rest and avoid heavy lifting and bending.   Alternate ice and heat to
36.4

## 2019-07-25 ENCOUNTER — OUTPATIENT (OUTPATIENT)
Dept: OUTPATIENT SERVICES | Facility: HOSPITAL | Age: 69
LOS: 1 days | Discharge: HOME | End: 2019-07-25

## 2019-07-25 DIAGNOSIS — Z98.890 OTHER SPECIFIED POSTPROCEDURAL STATES: Chronic | ICD-10-CM

## 2019-07-25 DIAGNOSIS — R79.9 ABNORMAL FINDING OF BLOOD CHEMISTRY, UNSPECIFIED: ICD-10-CM

## 2019-07-29 ENCOUNTER — OUTPATIENT (OUTPATIENT)
Dept: OUTPATIENT SERVICES | Facility: HOSPITAL | Age: 69
LOS: 1 days | Discharge: HOME | End: 2019-07-29

## 2019-07-29 DIAGNOSIS — Z98.890 OTHER SPECIFIED POSTPROCEDURAL STATES: Chronic | ICD-10-CM

## 2019-07-29 DIAGNOSIS — D64.9 ANEMIA, UNSPECIFIED: ICD-10-CM

## 2019-07-31 NOTE — H&P PST ADULT - BMI (KG/M2)
"0856: To PACU from OR via bed, sleeping, respirations spontaneous and non-labored via OPA. Chest circumferential surgical dressings c/d/i. HOB elevated.  0907: Rouses spontaneously, grimaces with \"stinging\" pain, but returns immediately to sleep so not medicated at this time.    0910: c/o \"heaviness\" feeling at surgical site, takes sips of water but remains at POSS=3 so not medicated as yet.  0925: Sleeping,  Not roused at this time.  0940: more awake, c/o severe surgical pain, plan analgesia  0948: Given IS and instructed on use with goal of 2650cc, pt currently inhaling volumes of approx 1000cc.   0955: Pain decreasing but not yet tolerable  1000: Pt now inhaling volumes of 1250cc with IS use.  1010: Pt states pain now tolerable. No change in surgical site assessment.  1025: Meets criteria to transfer to Stage 2      " 25.6

## 2019-08-01 ENCOUNTER — OUTPATIENT (OUTPATIENT)
Dept: OUTPATIENT SERVICES | Facility: HOSPITAL | Age: 69
LOS: 1 days | Discharge: HOME | End: 2019-08-01

## 2019-08-01 DIAGNOSIS — Z98.890 OTHER SPECIFIED POSTPROCEDURAL STATES: Chronic | ICD-10-CM

## 2019-08-01 DIAGNOSIS — D64.9 ANEMIA, UNSPECIFIED: ICD-10-CM

## 2019-08-13 ENCOUNTER — OUTPATIENT (OUTPATIENT)
Dept: OUTPATIENT SERVICES | Facility: HOSPITAL | Age: 69
LOS: 1 days | Discharge: HOME | End: 2019-08-13

## 2019-08-13 DIAGNOSIS — D64.9 ANEMIA, UNSPECIFIED: ICD-10-CM

## 2019-08-13 DIAGNOSIS — Z98.890 OTHER SPECIFIED POSTPROCEDURAL STATES: Chronic | ICD-10-CM

## 2019-08-13 DIAGNOSIS — R79.9 ABNORMAL FINDING OF BLOOD CHEMISTRY, UNSPECIFIED: ICD-10-CM

## 2019-09-25 ENCOUNTER — OUTPATIENT (OUTPATIENT)
Dept: OUTPATIENT SERVICES | Facility: HOSPITAL | Age: 69
LOS: 1 days | Discharge: HOME | End: 2019-09-25

## 2019-09-25 DIAGNOSIS — Z98.890 OTHER SPECIFIED POSTPROCEDURAL STATES: Chronic | ICD-10-CM

## 2019-09-26 DIAGNOSIS — I10 ESSENTIAL (PRIMARY) HYPERTENSION: ICD-10-CM

## 2019-09-26 DIAGNOSIS — E04.1 NONTOXIC SINGLE THYROID NODULE: ICD-10-CM

## 2019-09-26 DIAGNOSIS — Z09 ENCOUNTER FOR FOLLOW-UP EXAMINATION AFTER COMPLETED TREATMENT FOR CONDITIONS OTHER THAN MALIGNANT NEOPLASM: ICD-10-CM

## 2019-09-26 DIAGNOSIS — I25.10 ATHEROSCLEROTIC HEART DISEASE OF NATIVE CORONARY ARTERY WITHOUT ANGINA PECTORIS: ICD-10-CM

## 2019-09-26 DIAGNOSIS — Z89.619 ACQUIRED ABSENCE OF UNSPECIFIED LEG ABOVE KNEE: ICD-10-CM

## 2019-09-26 DIAGNOSIS — R63.4 ABNORMAL WEIGHT LOSS: ICD-10-CM

## 2019-09-26 DIAGNOSIS — E78.5 HYPERLIPIDEMIA, UNSPECIFIED: ICD-10-CM

## 2019-10-02 ENCOUNTER — OUTPATIENT (OUTPATIENT)
Dept: OUTPATIENT SERVICES | Facility: HOSPITAL | Age: 69
LOS: 1 days | Discharge: HOME | End: 2019-10-02

## 2019-10-02 DIAGNOSIS — Z89.619 ACQUIRED ABSENCE OF UNSPECIFIED LEG ABOVE KNEE: ICD-10-CM

## 2019-10-02 DIAGNOSIS — Z09 ENCOUNTER FOR FOLLOW-UP EXAMINATION AFTER COMPLETED TREATMENT FOR CONDITIONS OTHER THAN MALIGNANT NEOPLASM: ICD-10-CM

## 2019-10-02 DIAGNOSIS — Z98.890 OTHER SPECIFIED POSTPROCEDURAL STATES: Chronic | ICD-10-CM

## 2019-10-02 DIAGNOSIS — R63.4 ABNORMAL WEIGHT LOSS: ICD-10-CM

## 2019-10-02 DIAGNOSIS — I10 ESSENTIAL (PRIMARY) HYPERTENSION: ICD-10-CM

## 2019-10-02 DIAGNOSIS — E04.1 NONTOXIC SINGLE THYROID NODULE: ICD-10-CM

## 2019-10-02 DIAGNOSIS — E78.5 HYPERLIPIDEMIA, UNSPECIFIED: ICD-10-CM

## 2019-10-12 NOTE — PROGRESS NOTE ADULT - EYES
Patient has what she describes as flat red small areas on her operative leg. Non painful non itchy. Her leg hurts in general but not where the rash is, just in the \"front of the leg\". There are about 20 of these pink-red rash she describes as \"measles like\" from groin to toes. No fevers, chills, cool leg, numbness, tingling. She has stable swelling in her groin that she is not concerned about. No new lotions or soaps. No where else on her body.    Told her to try benadryl 25mg po Q 4h for rash to see if that improves. If not then see urgent care or PCP. She lives in [place] and prefers to see someone local.    Vania Yeager PA-C     EOMI; PERRL; no drainage or redness

## 2019-10-24 ENCOUNTER — OUTPATIENT (OUTPATIENT)
Dept: OUTPATIENT SERVICES | Facility: HOSPITAL | Age: 69
LOS: 1 days | Discharge: HOME | End: 2019-10-24
Payer: MEDICARE

## 2019-10-24 DIAGNOSIS — M54.2 CERVICALGIA: ICD-10-CM

## 2019-10-24 DIAGNOSIS — Z98.890 OTHER SPECIFIED POSTPROCEDURAL STATES: Chronic | ICD-10-CM

## 2019-10-24 PROCEDURE — 72050 X-RAY EXAM NECK SPINE 4/5VWS: CPT | Mod: 26
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
## 2019-11-12 ENCOUNTER — OUTPATIENT (OUTPATIENT)
Dept: OUTPATIENT SERVICES | Facility: HOSPITAL | Age: 69
LOS: 1 days | Discharge: HOME | End: 2019-11-12
Payer: MEDICARE

## 2019-11-12 DIAGNOSIS — Z12.31 ENCOUNTER FOR SCREENING MAMMOGRAM FOR MALIGNANT NEOPLASM OF BREAST: ICD-10-CM

## 2019-11-12 DIAGNOSIS — Z98.890 OTHER SPECIFIED POSTPROCEDURAL STATES: Chronic | ICD-10-CM

## 2019-11-12 PROCEDURE — 77063 BREAST TOMOSYNTHESIS BI: CPT | Mod: 26

## 2019-11-12 PROCEDURE — 77067 SCR MAMMO BI INCL CAD: CPT | Mod: 26

## 2019-11-29 ENCOUNTER — APPOINTMENT (OUTPATIENT)
Dept: CARDIOLOGY | Facility: CLINIC | Age: 69
End: 2019-11-29

## 2019-12-13 ENCOUNTER — APPOINTMENT (OUTPATIENT)
Dept: CARDIOLOGY | Facility: CLINIC | Age: 69
End: 2019-12-13
Payer: MEDICARE

## 2019-12-13 PROCEDURE — 93000 ELECTROCARDIOGRAM COMPLETE: CPT

## 2019-12-13 PROCEDURE — 99214 OFFICE O/P EST MOD 30 MIN: CPT

## 2019-12-16 ENCOUNTER — OUTPATIENT (OUTPATIENT)
Dept: OUTPATIENT SERVICES | Facility: HOSPITAL | Age: 69
LOS: 1 days | Discharge: HOME | End: 2019-12-16
Payer: MEDICARE

## 2019-12-16 DIAGNOSIS — Z98.890 OTHER SPECIFIED POSTPROCEDURAL STATES: Chronic | ICD-10-CM

## 2019-12-16 DIAGNOSIS — M25.519 PAIN IN UNSPECIFIED SHOULDER: ICD-10-CM

## 2019-12-16 PROCEDURE — 73030 X-RAY EXAM OF SHOULDER: CPT | Mod: 26,LT,RT

## 2019-12-27 ENCOUNTER — LABORATORY RESULT (OUTPATIENT)
Age: 69
End: 2019-12-27

## 2019-12-27 ENCOUNTER — OUTPATIENT (OUTPATIENT)
Dept: OUTPATIENT SERVICES | Facility: HOSPITAL | Age: 69
LOS: 1 days | Discharge: HOME | End: 2019-12-27

## 2019-12-27 DIAGNOSIS — Z98.890 OTHER SPECIFIED POSTPROCEDURAL STATES: Chronic | ICD-10-CM

## 2019-12-27 DIAGNOSIS — E78.00 PURE HYPERCHOLESTEROLEMIA, UNSPECIFIED: ICD-10-CM

## 2019-12-27 DIAGNOSIS — R73.9 HYPERGLYCEMIA, UNSPECIFIED: ICD-10-CM

## 2019-12-27 DIAGNOSIS — E03.9 HYPOTHYROIDISM, UNSPECIFIED: ICD-10-CM

## 2019-12-27 DIAGNOSIS — R79.89 OTHER SPECIFIED ABNORMAL FINDINGS OF BLOOD CHEMISTRY: ICD-10-CM

## 2020-01-02 ENCOUNTER — APPOINTMENT (OUTPATIENT)
Dept: CARDIOLOGY | Facility: CLINIC | Age: 70
End: 2020-01-02

## 2020-01-17 ENCOUNTER — APPOINTMENT (OUTPATIENT)
Dept: CARDIOLOGY | Facility: CLINIC | Age: 70
End: 2020-01-17
Payer: MEDICARE

## 2020-01-17 PROCEDURE — 93000 ELECTROCARDIOGRAM COMPLETE: CPT

## 2020-01-17 PROCEDURE — 99213 OFFICE O/P EST LOW 20 MIN: CPT

## 2020-01-24 ENCOUNTER — OUTPATIENT (OUTPATIENT)
Dept: OUTPATIENT SERVICES | Facility: HOSPITAL | Age: 70
LOS: 1 days | Discharge: HOME | End: 2020-01-24

## 2020-01-24 DIAGNOSIS — Z98.890 OTHER SPECIFIED POSTPROCEDURAL STATES: Chronic | ICD-10-CM

## 2020-01-24 DIAGNOSIS — M50.01 CERVICAL DISC DISORDER WITH MYELOPATHY, HIGH CERVICAL REGION: ICD-10-CM

## 2020-02-11 ENCOUNTER — LABORATORY RESULT (OUTPATIENT)
Age: 70
End: 2020-02-11

## 2020-02-11 ENCOUNTER — APPOINTMENT (OUTPATIENT)
Dept: CARDIOLOGY | Facility: CLINIC | Age: 70
End: 2020-02-11
Payer: MEDICARE

## 2020-02-11 PROCEDURE — 93306 TTE W/DOPPLER COMPLETE: CPT

## 2020-02-15 NOTE — PATIENT PROFILE ADULT. - EXTENSIONS OF SELF_ADULT
Follow up with Dr. Goss in 1-2 weeks. Call the office at  to schedule your appointment. You may shower; Ambulate as tolerated, but no heavy lifting (>10lbs) or strenuous exercise. You may resume regular diet. Call the office if you experience increasing pain, nausea, vomiting, swelling, redness, or drainage from incision site, temperature >101.4F.    Call your Nephrologist and Dr. Goss's office if you have problems with dialysis including prolonged bleeding, difficulty cannulating, pain with dialysis, numbness/tingling in hand, or swelling in arm.
None

## 2020-02-26 ENCOUNTER — APPOINTMENT (OUTPATIENT)
Dept: ORTHOPEDIC SURGERY | Facility: CLINIC | Age: 70
End: 2020-02-26

## 2020-02-26 ENCOUNTER — OUTPATIENT (OUTPATIENT)
Dept: OUTPATIENT SERVICES | Facility: HOSPITAL | Age: 70
LOS: 1 days | Discharge: HOME | End: 2020-02-26
Payer: MEDICARE

## 2020-02-26 DIAGNOSIS — Z98.890 OTHER SPECIFIED POSTPROCEDURAL STATES: Chronic | ICD-10-CM

## 2020-02-26 PROCEDURE — 73562 X-RAY EXAM OF KNEE 3: CPT | Mod: 26,RT

## 2020-02-26 PROCEDURE — 73610 X-RAY EXAM OF ANKLE: CPT | Mod: 26,RT

## 2020-04-15 ENCOUNTER — APPOINTMENT (OUTPATIENT)
Dept: ORTHOPEDIC SURGERY | Facility: CLINIC | Age: 70
End: 2020-04-15

## 2020-06-12 ENCOUNTER — APPOINTMENT (OUTPATIENT)
Dept: CARDIOLOGY | Facility: CLINIC | Age: 70
End: 2020-06-12
Payer: MEDICARE

## 2020-06-12 PROCEDURE — 93000 ELECTROCARDIOGRAM COMPLETE: CPT

## 2020-06-12 PROCEDURE — 99213 OFFICE O/P EST LOW 20 MIN: CPT

## 2020-08-24 ENCOUNTER — TRANSCRIPTION ENCOUNTER (OUTPATIENT)
Age: 70
End: 2020-08-24

## 2020-09-30 ENCOUNTER — OUTPATIENT (OUTPATIENT)
Dept: OUTPATIENT SERVICES | Facility: HOSPITAL | Age: 70
LOS: 1 days | Discharge: HOME | End: 2020-09-30

## 2020-09-30 DIAGNOSIS — S78.011A COMPLETE TRAUMATIC AMPUTATION AT RIGHT HIP JOINT, INITIAL ENCOUNTER: ICD-10-CM

## 2020-09-30 DIAGNOSIS — S78.011D: ICD-10-CM

## 2020-09-30 DIAGNOSIS — Z98.890 OTHER SPECIFIED POSTPROCEDURAL STATES: Chronic | ICD-10-CM

## 2020-10-12 ENCOUNTER — RECORD ABSTRACTING (OUTPATIENT)
Age: 70
End: 2020-10-12

## 2020-10-12 DIAGNOSIS — B19.9: ICD-10-CM
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
## 2021-03-09 ENCOUNTER — APPOINTMENT (OUTPATIENT)
Dept: CARDIOLOGY | Facility: CLINIC | Age: 71
End: 2021-03-09
Payer: MEDICARE

## 2021-03-09 VITALS
SYSTOLIC BLOOD PRESSURE: 152 MMHG | BODY MASS INDEX: 26.87 KG/M2 | WEIGHT: 146 LBS | HEIGHT: 62 IN | DIASTOLIC BLOOD PRESSURE: 88 MMHG

## 2021-03-09 PROCEDURE — 93000 ELECTROCARDIOGRAM COMPLETE: CPT

## 2021-03-09 PROCEDURE — 99214 OFFICE O/P EST MOD 30 MIN: CPT

## 2021-03-09 PROCEDURE — 99072 ADDL SUPL MATRL&STAF TM PHE: CPT

## 2021-03-09 RX ORDER — PREGABALIN 50 MG/1
50 CAPSULE ORAL TWICE DAILY
Refills: 0 | Status: DISCONTINUED | COMMUNITY
End: 2021-03-09

## 2021-03-09 RX ORDER — CARVEDILOL 12.5 MG/1
12.5 TABLET, FILM COATED ORAL TWICE DAILY
Refills: 0 | Status: DISCONTINUED | COMMUNITY
End: 2021-03-09

## 2021-03-09 RX ORDER — MELOXICAM 7.5 MG/1
7.5 TABLET ORAL DAILY
Refills: 0 | Status: DISCONTINUED | COMMUNITY
End: 2021-03-09

## 2021-03-10 NOTE — HISTORY OF PRESENT ILLNESS
[FreeTextEntry1] : 71-yo female with h/o HTN, hyperlipidemia. Non-obstructive CAD (60% mid LAD stenosis on C in 2007).\par \par H/o hepatitis C, left BKA (trauma).\par \par S/p right TKR, rehab. S/p left LE stump infection and surgery. \par \par Patient has experienced several episodes of left-sided chest pain at rest. She has been mostly home-bound since the beginning of the pandemic.\par \par 02/11/2020  \par Findings Normal EF\par Grade 1 diastolic 1 dysfunction\par Mild AI, MR  \par

## 2021-03-10 NOTE — PHYSICAL EXAM
[General Appearance - Well Developed] : well developed [Normal Appearance] : normal appearance [Well Groomed] : well groomed [General Appearance - Well Nourished] : well nourished [No Deformities] : no deformities [General Appearance - In No Acute Distress] : no acute distress [Normal Conjunctiva] : the conjunctiva exhibited no abnormalities [Respiration, Rhythm And Depth] : normal respiratory rhythm and effort [Exaggerated Use Of Accessory Muscles For Inspiration] : no accessory muscle use [Auscultation Breath Sounds / Voice Sounds] : lungs were clear to auscultation bilaterally [Heart Rate And Rhythm] : heart rate and rhythm were normal [Heart Sounds] : normal S1 and S2 [Murmurs] : no murmurs present [Edema] : no peripheral edema present [Bowel Sounds] : normal bowel sounds [Abdomen Soft] : soft [Abdomen Tenderness] : non-tender [Cyanosis, Localized] : no localized cyanosis [Skin Color & Pigmentation] : normal skin color and pigmentation [] : no rash [Oriented To Time, Place, And Person] : oriented to person, place, and time

## 2021-03-10 NOTE — ASSESSMENT
[FreeTextEntry1] : H/o non-obstructive CAD. Episodes of chest pain now.\par HTN is uncontrolled again.\par \par Plan:\par Continue treatment.\par Add HCTZ 12.5 mg in the morning.\par Blood work ordered.\par Adenosine MPI.\par F/u in 3 months if MPI normal.\par \par Adelfo Dunn MD\par

## 2021-03-17 ENCOUNTER — TRANSCRIPTION ENCOUNTER (OUTPATIENT)
Age: 71
End: 2021-03-17

## 2021-03-29 LAB
ALBUMIN SERPL ELPH-MCNC: 4.9 G/DL
ALP BLD-CCNC: 118 U/L
ALT SERPL-CCNC: 22 U/L
ANION GAP SERPL CALC-SCNC: 14 MMOL/L
AST SERPL-CCNC: 24 U/L
BASOPHILS # BLD AUTO: 0.04 K/UL
BASOPHILS NFR BLD AUTO: 0.6 %
BILIRUB SERPL-MCNC: 0.8 MG/DL
BUN SERPL-MCNC: 18 MG/DL
CALCIUM SERPL-MCNC: 10 MG/DL
CHLORIDE SERPL-SCNC: 99 MMOL/L
CHOLEST SERPL-MCNC: 165 MG/DL
CO2 SERPL-SCNC: 26 MMOL/L
CREAT SERPL-MCNC: 0.7 MG/DL
EOSINOPHIL # BLD AUTO: 0.18 K/UL
EOSINOPHIL NFR BLD AUTO: 2.5 %
ESTIMATED AVERAGE GLUCOSE: 105 MG/DL
GLUCOSE SERPL-MCNC: 99 MG/DL
HBA1C MFR BLD HPLC: 5.3 %
HCT VFR BLD CALC: 41.1 %
HDLC SERPL-MCNC: 98 MG/DL
HGB BLD-MCNC: 13.3 G/DL
IMM GRANULOCYTES NFR BLD AUTO: 0.3 %
LDLC SERPL CALC-MCNC: 62 MG/DL
LYMPHOCYTES # BLD AUTO: 2.21 K/UL
LYMPHOCYTES NFR BLD AUTO: 31.1 %
MAN DIFF?: NORMAL
MCHC RBC-ENTMCNC: 29.5 PG
MCHC RBC-ENTMCNC: 32.4 G/DL
MCV RBC AUTO: 91.1 FL
MONOCYTES # BLD AUTO: 0.6 K/UL
MONOCYTES NFR BLD AUTO: 8.4 %
NEUTROPHILS # BLD AUTO: 4.06 K/UL
NEUTROPHILS NFR BLD AUTO: 57.1 %
NONHDLC SERPL-MCNC: 67 MG/DL
PLATELET # BLD AUTO: 191 K/UL
POTASSIUM SERPL-SCNC: 3.6 MMOL/L
PROT SERPL-MCNC: 7.8 G/DL
RBC # BLD: 4.51 M/UL
RBC # FLD: 14.5 %
SODIUM SERPL-SCNC: 139 MMOL/L
TRIGL SERPL-MCNC: 42 MG/DL
TSH SERPL-ACNC: 2.95 UIU/ML
WBC # FLD AUTO: 7.11 K/UL

## 2021-04-16 ENCOUNTER — EMERGENCY (EMERGENCY)
Facility: HOSPITAL | Age: 71
LOS: 0 days | Discharge: HOME | End: 2021-04-16
Attending: EMERGENCY MEDICINE | Admitting: EMERGENCY MEDICINE
Payer: MEDICARE

## 2021-04-16 ENCOUNTER — RESULT REVIEW (OUTPATIENT)
Age: 71
End: 2021-04-16

## 2021-04-16 ENCOUNTER — OUTPATIENT (OUTPATIENT)
Dept: OUTPATIENT SERVICES | Facility: HOSPITAL | Age: 71
LOS: 1 days | Discharge: HOME | End: 2021-04-16
Payer: MEDICARE

## 2021-04-16 VITALS
DIASTOLIC BLOOD PRESSURE: 80 MMHG | HEART RATE: 74 BPM | OXYGEN SATURATION: 97 % | TEMPERATURE: 97 F | SYSTOLIC BLOOD PRESSURE: 146 MMHG

## 2021-04-16 VITALS
WEIGHT: 139.99 LBS | SYSTOLIC BLOOD PRESSURE: 165 MMHG | OXYGEN SATURATION: 98 % | DIASTOLIC BLOOD PRESSURE: 85 MMHG | RESPIRATION RATE: 18 BRPM | HEART RATE: 83 BPM | TEMPERATURE: 97 F | HEIGHT: 62 IN

## 2021-04-16 DIAGNOSIS — Z98.890 OTHER SPECIFIED POSTPROCEDURAL STATES: Chronic | ICD-10-CM

## 2021-04-16 DIAGNOSIS — E78.5 HYPERLIPIDEMIA, UNSPECIFIED: ICD-10-CM

## 2021-04-16 DIAGNOSIS — F41.9 ANXIETY DISORDER, UNSPECIFIED: ICD-10-CM

## 2021-04-16 DIAGNOSIS — E03.9 HYPOTHYROIDISM, UNSPECIFIED: ICD-10-CM

## 2021-04-16 DIAGNOSIS — G47.00 INSOMNIA, UNSPECIFIED: ICD-10-CM

## 2021-04-16 DIAGNOSIS — T81.89XA OTHER COMPLICATIONS OF PROCEDURES, NOT ELSEWHERE CLASSIFIED, INITIAL ENCOUNTER: ICD-10-CM

## 2021-04-16 DIAGNOSIS — I10 ESSENTIAL (PRIMARY) HYPERTENSION: ICD-10-CM
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
## 2021-04-16 DIAGNOSIS — M19.90 UNSPECIFIED OSTEOARTHRITIS, UNSPECIFIED SITE: ICD-10-CM

## 2021-04-16 DIAGNOSIS — F32.9 MAJOR DEPRESSIVE DISORDER, SINGLE EPISODE, UNSPECIFIED: ICD-10-CM
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
## 2021-04-16 DIAGNOSIS — Z79.899 OTHER LONG TERM (CURRENT) DRUG THERAPY: ICD-10-CM

## 2021-04-16 LAB
ALBUMIN SERPL ELPH-MCNC: 4.1 G/DL — SIGNIFICANT CHANGE UP (ref 3.5–5.2)
ALP SERPL-CCNC: 125 U/L — HIGH (ref 30–115)
ALT FLD-CCNC: 18 U/L — SIGNIFICANT CHANGE UP (ref 0–41)
ANION GAP SERPL CALC-SCNC: 9 MMOL/L — SIGNIFICANT CHANGE UP (ref 7–14)
APTT BLD: 37.8 SEC — SIGNIFICANT CHANGE UP (ref 27–39.2)
AST SERPL-CCNC: 20 U/L — SIGNIFICANT CHANGE UP (ref 0–41)
BASOPHILS # BLD AUTO: 0.04 K/UL — SIGNIFICANT CHANGE UP (ref 0–0.2)
BASOPHILS NFR BLD AUTO: 0.8 % — SIGNIFICANT CHANGE UP (ref 0–1)
BILIRUB SERPL-MCNC: 0.3 MG/DL — SIGNIFICANT CHANGE UP (ref 0.2–1.2)
BLD GP AB SCN SERPL QL: SIGNIFICANT CHANGE UP
BUN SERPL-MCNC: 18 MG/DL — SIGNIFICANT CHANGE UP (ref 10–20)
CALCIUM SERPL-MCNC: 9.1 MG/DL — SIGNIFICANT CHANGE UP (ref 8.5–10.1)
CHLORIDE SERPL-SCNC: 105 MMOL/L — SIGNIFICANT CHANGE UP (ref 98–110)
CO2 SERPL-SCNC: 25 MMOL/L — SIGNIFICANT CHANGE UP (ref 17–32)
CREAT SERPL-MCNC: 0.6 MG/DL — LOW (ref 0.7–1.5)
EOSINOPHIL # BLD AUTO: 0.15 K/UL — SIGNIFICANT CHANGE UP (ref 0–0.7)
EOSINOPHIL NFR BLD AUTO: 2.9 % — SIGNIFICANT CHANGE UP (ref 0–8)
GLUCOSE SERPL-MCNC: 155 MG/DL — HIGH (ref 70–99)
HCT VFR BLD CALC: 37.8 % — SIGNIFICANT CHANGE UP (ref 37–47)
HGB BLD-MCNC: 12.3 G/DL — SIGNIFICANT CHANGE UP (ref 12–16)
IMM GRANULOCYTES NFR BLD AUTO: 0.2 % — SIGNIFICANT CHANGE UP (ref 0.1–0.3)
INR BLD: 1.06 RATIO — SIGNIFICANT CHANGE UP (ref 0.65–1.3)
LACTATE SERPL-SCNC: 1.4 MMOL/L — SIGNIFICANT CHANGE UP (ref 0.7–2)
LYMPHOCYTES # BLD AUTO: 1.25 K/UL — SIGNIFICANT CHANGE UP (ref 1.2–3.4)
LYMPHOCYTES # BLD AUTO: 23.8 % — SIGNIFICANT CHANGE UP (ref 20.5–51.1)
MCHC RBC-ENTMCNC: 28.9 PG — SIGNIFICANT CHANGE UP (ref 27–31)
MCHC RBC-ENTMCNC: 32.5 G/DL — SIGNIFICANT CHANGE UP (ref 32–37)
MCV RBC AUTO: 88.7 FL — SIGNIFICANT CHANGE UP (ref 81–99)
MONOCYTES # BLD AUTO: 0.4 K/UL — SIGNIFICANT CHANGE UP (ref 0.1–0.6)
MONOCYTES NFR BLD AUTO: 7.6 % — SIGNIFICANT CHANGE UP (ref 1.7–9.3)
NEUTROPHILS # BLD AUTO: 3.41 K/UL — SIGNIFICANT CHANGE UP (ref 1.4–6.5)
NEUTROPHILS NFR BLD AUTO: 64.7 % — SIGNIFICANT CHANGE UP (ref 42.2–75.2)
NRBC # BLD: 0 /100 WBCS — SIGNIFICANT CHANGE UP (ref 0–0)
PLATELET # BLD AUTO: 182 K/UL — SIGNIFICANT CHANGE UP (ref 130–400)
POTASSIUM SERPL-MCNC: 3.5 MMOL/L — SIGNIFICANT CHANGE UP (ref 3.5–5)
POTASSIUM SERPL-SCNC: 3.5 MMOL/L — SIGNIFICANT CHANGE UP (ref 3.5–5)
PROT SERPL-MCNC: 6.7 G/DL — SIGNIFICANT CHANGE UP (ref 6–8)
PROTHROM AB SERPL-ACNC: 12.2 SEC — SIGNIFICANT CHANGE UP (ref 9.95–12.87)
RBC # BLD: 4.26 M/UL — SIGNIFICANT CHANGE UP (ref 4.2–5.4)
RBC # FLD: 13.6 % — SIGNIFICANT CHANGE UP (ref 11.5–14.5)
SODIUM SERPL-SCNC: 139 MMOL/L — SIGNIFICANT CHANGE UP (ref 135–146)
WBC # BLD: 5.26 K/UL — SIGNIFICANT CHANGE UP (ref 4.8–10.8)
WBC # FLD AUTO: 5.26 K/UL — SIGNIFICANT CHANGE UP (ref 4.8–10.8)

## 2021-04-16 PROCEDURE — 93018 CV STRESS TEST I&R ONLY: CPT

## 2021-04-16 PROCEDURE — 78452 HT MUSCLE IMAGE SPECT MULT: CPT | Mod: 26

## 2021-04-16 PROCEDURE — G1004: CPT

## 2021-04-16 PROCEDURE — 99285 EMERGENCY DEPT VISIT HI MDM: CPT

## 2021-04-16 PROCEDURE — 72193 CT PELVIS W/DYE: CPT | Mod: 26,MG

## 2021-04-16 NOTE — CONSULT NOTE ADULT - ASSESSMENT
71F w/PMHx of HTN, HLD, hypothyroidism, anxiety, depression, chronic pain, hx of left sided AKA with revision and closure and multiple debridements presents with left groin wound that has been present for ~2 months.     Plan:   -Please obtain CT pelvis to evaluate for presence of possible deep abscess not appreciable on physical exam   -Treatment plan dependent on imaging results   -Plan discussed with Dr. Calles  71F w/PMHx of HTN, HLD, hypothyroidism, anxiety, depression, chronic pain, hx of left sided AKA with revision and closure and multiple debridements presents with left groin wound that has been present for ~2 months.     Plan:   - No abscess or drainable collection on exam or CT scan   - Patient can be discharged home, no antibiotics needed at this time   - Follow up with Dr. Calles in the clinic

## 2021-04-16 NOTE — ED PROVIDER NOTE - CLINICAL SUMMARY MEDICAL DECISION MAKING FREE TEXT BOX
Patient was signed out to me by Dr. Mcconnell pending CT scan and surgery recommendations. CT findings reviewed and discussed with patient and her family in ED. Patient was evaluated and cleared by surgery for outpatient follow up. patient remained stable in ED, improved well, results of the diagnostic studies reviewed and discussed with patient, Patient remained awake, alert, and comfortable, tolerated PO. Discussed with patient in detail about the need for close outpatient follow up and the need to return to ED for any persistent, or worsening symptoms, for any new symptoms/concerns. patient verbalized understanding and agreed. patient is given detail aftercare instructions and is instructed well to f/u as outpatient for further care.

## 2021-04-16 NOTE — ED PROVIDER NOTE - PHYSICAL EXAMINATION
VITAL SIGNS: I have reviewed nursing notes and confirm.  CONSTITUTIONAL: Well-developed; well-nourished; in no acute distress.   SKIN:  callous wound to left inguinal region. not actively drainage, no surrounding erythema  HEAD: Normocephalic; atraumatic.  EYES: conjunctiva and sclera clear.  ENT: No nasal discharge; airway clear.  CARD: S1, S2 normal; no murmurs, gallops, or rubs. Regular rate and rhythm.   RESP: No wheezes, rales or rhonchi.  ABD: Normal bowel sounds; soft; non-distended; non-tender  EXT: Normal ROM.  No clubbing, cyanosis or edema.   NEURO: Alert, oriented, grossly unremarkable

## 2021-04-16 NOTE — ED PROVIDER NOTE - CARE PROVIDER_API CALL
Pam Calles  SURGERY  [address] [place] [address]  Phone: [phone]  Fax: [phone]  Follow Up Time:

## 2021-04-16 NOTE — ED PROVIDER NOTE - NS ED ROS FT
.MS:  No myalgia, muscle weakness, joint pain or back pain.  Neuro:  No headache or weakness.  No LOC.  Skin:  + wound   Endocrine: No history of thyroid disease or diabetes.  Except as documented in the HPI,  all other systems are negative.

## 2021-04-16 NOTE — CONSULT NOTE ADULT - ATTENDING COMMENTS
above noted discussed case with surgical resident abdomen soft groin ulcer chronic no abscess ct scan noted will follow pt in office

## 2021-04-16 NOTE — ED PROVIDER NOTE - PATIENT PORTAL LINK FT
You can access the FollowMyHealth Patient Portal offered by [facility] by registering at the following website: [email or web address]. By joining [employer]’s FollowMyHealth portal, you will also be able to view your health information using other applications (apps) compatible with our system.

## 2021-04-16 NOTE — ED ADULT TRIAGE NOTE - CHIEF COMPLAINT QUOTE
70 yo F PMH of L AKA with pressure wound in groin area from prosthesis. Patient states the wound began bleeding more and "wanted to get it checked out"
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
## 2021-04-16 NOTE — ED ADULT NURSE NOTE - OBJECTIVE STATEMENT
patient presents to the ed with c/o L AKA with pressure wound in groin area from prosthesis. pt c/o bleeding patient presents to the ed with c/o L AKA with pressure wound in groin area from prosthesis. pt c/o bleeding. prosthesis at bedside. uses cane

## 2021-04-16 NOTE — ED PROVIDER NOTE - OBJECTIVE STATEMENT
Pt is a 72y/o female with a pmhx of HTN, HLD presents today for eval of left inguinal wound that has been present for 6 months with associated drainage that has been apparently worsening over the past 1 week. Pt was seen by her surgeon Dr. Calles 2 months ago for this issue.  Pt denies fever, chills, weakness, numbness, CP, SOB.

## 2021-04-16 NOTE — ED PROVIDER NOTE - PROGRESS NOTE DETAILS
surg aware, will see pt I have personally performed a history and physical exam on this patient and personally directed the management of the patient. 72yo woman remote L AKBETH presents with chronic wound of the R upper thigh/medial buttock likely due to poorly fitting prosthetic. Pt has had wound infex before and has had surgery w Dr Calles; he last saw her a couple of months ago and suspected she might need new prosthesis due to this chronic inflammation. Pt denies acute sx, says wound sometimes opens and drains but never heals completely. VS, exam as noted; pt is well appearing and looks younger than stated age. There is an approx 3x3cm calloused area as above, with a central fissue but no active drainage. The area is tender but not fluctuant. Will check labs and get surgery eval as Dr Calles has been following the wound. seen by surgery, now recommending CT pelvis with IV contrast ENdorsed to Dr Carlson to f/u CT and dispo. called radiology to obtain patient CT findings, patient and her family was updated on the efforts of obtaining the CT reading. ct neg for abscess will dec with f/u for Dr. Jerome, pt made aware and agreeable to plan

## 2021-04-16 NOTE — ED ADULT NURSE NOTE - CHIEF COMPLAINT QUOTE
72 yo F PMH of L AKA with pressure wound in groin area from prosthesis. Patient states the wound began bleeding more and "wanted to get it checked out"

## 2021-04-22 ENCOUNTER — OUTPATIENT (OUTPATIENT)
Dept: OUTPATIENT SERVICES | Facility: HOSPITAL | Age: 71
LOS: 1 days | Discharge: HOME | End: 2021-04-22
Payer: MEDICARE

## 2021-04-22 DIAGNOSIS — Z98.890 OTHER SPECIFIED POSTPROCEDURAL STATES: Chronic | ICD-10-CM

## 2021-04-22 DIAGNOSIS — M25.511 PAIN IN RIGHT SHOULDER: ICD-10-CM

## 2021-04-22 PROCEDURE — 73221 MRI JOINT UPR EXTREM W/O DYE: CPT | Mod: 26,RT

## 2021-06-22 ENCOUNTER — APPOINTMENT (OUTPATIENT)
Dept: CARDIOLOGY | Facility: CLINIC | Age: 71
End: 2021-06-22

## 2021-08-03 NOTE — PATIENT PROFILE ADULT. - NS PRO PT RIGHT SUPPORT PERSON
Declines Azathioprine Pregnancy And Lactation Text: This medication is Pregnancy Category D and isn't considered safe during pregnancy. It is unknown if this medication is excreted in breast milk.

## 2021-10-19 ENCOUNTER — APPOINTMENT (OUTPATIENT)
Dept: CARDIOLOGY | Facility: CLINIC | Age: 71
End: 2021-10-19

## 2021-12-28 ENCOUNTER — APPOINTMENT (OUTPATIENT)
Dept: CARDIOLOGY | Facility: CLINIC | Age: 71
End: 2021-12-28
Payer: MEDICARE

## 2021-12-28 VITALS
TEMPERATURE: 98.1 F | DIASTOLIC BLOOD PRESSURE: 80 MMHG | SYSTOLIC BLOOD PRESSURE: 118 MMHG | WEIGHT: 154 LBS | HEIGHT: 62 IN | BODY MASS INDEX: 28.34 KG/M2 | HEART RATE: 73 BPM

## 2021-12-28 PROCEDURE — 93000 ELECTROCARDIOGRAM COMPLETE: CPT

## 2021-12-28 PROCEDURE — 99214 OFFICE O/P EST MOD 30 MIN: CPT

## 2021-12-28 RX ORDER — GABAPENTIN 600 MG/1
600 TABLET, COATED ORAL
Qty: 84 | Refills: 0 | Status: DISCONTINUED | COMMUNITY
Start: 2021-12-07

## 2021-12-28 RX ORDER — HYDROCHLOROTHIAZIDE 12.5 MG/1
12.5 CAPSULE ORAL
Qty: 28 | Refills: 0 | Status: DISCONTINUED | COMMUNITY
Start: 2021-12-07

## 2021-12-28 RX ORDER — KETOCONAZOLE 20 MG/G
2 CREAM TOPICAL
Qty: 60 | Refills: 0 | Status: DISCONTINUED | COMMUNITY
Start: 2021-10-24

## 2021-12-28 NOTE — HISTORY OF PRESENT ILLNESS
[FreeTextEntry1] : 71-yo female with h/o HTN, hyperlipidemia. Non-obstructive CAD (60% mid LAD stenosis on C in 2007).\par \par H/o hepatitis C, left BKA (trauma).\par \par S/p right TKR, rehab. S/p left LE stump infection and surgery. \par \par Patient denies CP now, c/o occasional short episodes of palpitations.\par \par 02/11/2020  \par Findings Normal EF\par Grade 1 diastolic 1 dysfunction\par Mild AI, MR  \par

## 2021-12-28 NOTE — REVIEW OF SYSTEMS
[Dyspnea on exertion] : not dyspnea during exertion [Chest Discomfort] : no chest discomfort [Lower Ext Edema] : no extremity edema [Palpitations] : palpitations [Syncope] : no syncope [Rash] : no rash [Anxiety] : no anxiety [Negative] : Neurological

## 2021-12-28 NOTE — PHYSICAL EXAM
[Well Developed] : well developed [Well Nourished] : well nourished [No Acute Distress] : no acute distress [Normal Conjunctiva] : normal conjunctiva [Normal Venous Pressure] : normal venous pressure [No Carotid Bruit] : no carotid bruit [Normal S1, S2] : normal S1, S2 [No Rub] : no rub [No Gallop] : no gallop [S4] : S4 [Clear Lung Fields] : clear lung fields [Good Air Entry] : good air entry [No Respiratory Distress] : no respiratory distress  [Soft] : abdomen soft [Non Tender] : non-tender [Normal Bowel Sounds] : normal bowel sounds [Normal Gait] : normal gait [No Edema] : no edema [No Cyanosis] : no cyanosis [No Clubbing] : no clubbing [No Varicosities] : no varicosities [No Rash] : no rash [Moves all extremities] : moves all extremities [No Focal Deficits] : no focal deficits [Normal Speech] : normal speech [Alert and Oriented] : alert and oriented [Normal memory] : normal memory [de-identified] : S/p left BKA

## 2021-12-28 NOTE — ASSESSMENT
[FreeTextEntry1] : H/o non-obstructive CAD. No evidence of ischemia.\par HTN is controlled now.\par ? not on a statin.\par \par Plan:\par Continue treatment.\par Fasting blood work ordered. Will start a statin after that.\par Holter monitor if palpitations become more frequent.\par F/u in 6 months.\par \par \par Adelfo Dunn MD\par

## 2021-12-30 LAB
ALBUMIN SERPL ELPH-MCNC: 4.7 G/DL
ALP BLD-CCNC: 127 U/L
ALT SERPL-CCNC: 18 U/L
ANION GAP SERPL CALC-SCNC: 15 MMOL/L
AST SERPL-CCNC: 19 U/L
BASOPHILS # BLD AUTO: 0.06 K/UL
BASOPHILS NFR BLD AUTO: 0.7 %
BILIRUB SERPL-MCNC: 0.7 MG/DL
BUN SERPL-MCNC: 21 MG/DL
CALCIUM SERPL-MCNC: 10.1 MG/DL
CHLORIDE SERPL-SCNC: 100 MMOL/L
CHOLEST SERPL-MCNC: 192 MG/DL
CO2 SERPL-SCNC: 24 MMOL/L
CREAT SERPL-MCNC: 0.8 MG/DL
EOSINOPHIL # BLD AUTO: 0.16 K/UL
EOSINOPHIL NFR BLD AUTO: 1.8 %
ESTIMATED AVERAGE GLUCOSE: 91 MG/DL
GLUCOSE SERPL-MCNC: 114 MG/DL
HBA1C MFR BLD HPLC: 4.8 %
HCT VFR BLD CALC: 43.7 %
HDLC SERPL-MCNC: 111 MG/DL
HGB BLD-MCNC: 14.2 G/DL
IMM GRANULOCYTES NFR BLD AUTO: 0.1 %
LDLC SERPL CALC-MCNC: 84 MG/DL
LYMPHOCYTES # BLD AUTO: 2.49 K/UL
LYMPHOCYTES NFR BLD AUTO: 28 %
MAN DIFF?: NORMAL
MCHC RBC-ENTMCNC: 30.1 PG
MCHC RBC-ENTMCNC: 32.5 G/DL
MCV RBC AUTO: 92.8 FL
MONOCYTES # BLD AUTO: 0.84 K/UL
MONOCYTES NFR BLD AUTO: 9.5 %
NEUTROPHILS # BLD AUTO: 5.32 K/UL
NEUTROPHILS NFR BLD AUTO: 59.9 %
NONHDLC SERPL-MCNC: 81 MG/DL
PLATELET # BLD AUTO: 277 K/UL
POTASSIUM SERPL-SCNC: 3.8 MMOL/L
PROT SERPL-MCNC: 7.6 G/DL
RBC # BLD: 4.71 M/UL
RBC # FLD: 13.8 %
SODIUM SERPL-SCNC: 139 MMOL/L
TRIGL SERPL-MCNC: 50 MG/DL
TSH SERPL-ACNC: 3.27 UIU/ML
WBC # FLD AUTO: 8.88 K/UL

## 2022-01-07 ENCOUNTER — APPOINTMENT (OUTPATIENT)
Dept: GASTROENTEROLOGY | Facility: CLINIC | Age: 72
End: 2022-01-07

## 2022-03-01 ENCOUNTER — OUTPATIENT (OUTPATIENT)
Dept: OUTPATIENT SERVICES | Facility: HOSPITAL | Age: 72
LOS: 1 days | Discharge: HOME | End: 2022-03-01

## 2022-03-01 DIAGNOSIS — Z98.890 OTHER SPECIFIED POSTPROCEDURAL STATES: Chronic | ICD-10-CM

## 2022-04-29 NOTE — OCCUPATIONAL THERAPY INITIAL EVALUATION ADULT - NS ASR FOLLOW COMMAND OT EVAL
Patient (s) 1 given copy of dc instructions and 0 paper script(s) and 0 electronic scripts. Patient (s)  verbalized understanding of instructions and script (s). Patient given a current medication reconciliation form and verbalized understanding of their medications. Patient (s) verbalized understanding of the importance of discussing medications with  his or her physician or clinic they will be following up with. Patient alert and oriented and in no acute distress. 100% of the time

## 2022-06-10 ENCOUNTER — EMERGENCY (EMERGENCY)
Facility: HOSPITAL | Age: 72
LOS: 0 days | Discharge: HOME | End: 2022-06-10
Attending: EMERGENCY MEDICINE | Admitting: EMERGENCY MEDICINE
Payer: MEDICARE

## 2022-06-10 VITALS
HEART RATE: 68 BPM | DIASTOLIC BLOOD PRESSURE: 80 MMHG | RESPIRATION RATE: 17 BRPM | SYSTOLIC BLOOD PRESSURE: 145 MMHG | TEMPERATURE: 98 F | HEIGHT: 62 IN | OXYGEN SATURATION: 97 %

## 2022-06-10 DIAGNOSIS — Z89.612 ACQUIRED ABSENCE OF LEFT LEG ABOVE KNEE: ICD-10-CM

## 2022-06-10 DIAGNOSIS — I25.2 OLD MYOCARDIAL INFARCTION: ICD-10-CM

## 2022-06-10 DIAGNOSIS — S70.312A ABRASION, LEFT THIGH, INITIAL ENCOUNTER: ICD-10-CM
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
## 2022-06-10 DIAGNOSIS — Z96.651 PRESENCE OF RIGHT ARTIFICIAL KNEE JOINT: Chronic | ICD-10-CM

## 2022-06-10 DIAGNOSIS — M79.89 OTHER SPECIFIED SOFT TISSUE DISORDERS: ICD-10-CM
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
## 2022-06-10 DIAGNOSIS — M79.605 PAIN IN LEFT LEG: ICD-10-CM

## 2022-06-10 DIAGNOSIS — F41.9 ANXIETY DISORDER, UNSPECIFIED: ICD-10-CM

## 2022-06-10 DIAGNOSIS — F32.A DEPRESSION, UNSPECIFIED: ICD-10-CM

## 2022-06-10 DIAGNOSIS — T85.79XA INFECTION AND INFLAMMATORY REACTION DUE TO OTHER INTERNAL PROSTHETIC DEVICES, IMPLANTS AND GRAFTS, INITIAL ENCOUNTER: ICD-10-CM

## 2022-06-10 DIAGNOSIS — Z96.651 PRESENCE OF RIGHT ARTIFICIAL KNEE JOINT: ICD-10-CM

## 2022-06-10 DIAGNOSIS — Z95.5 PRESENCE OF CORONARY ANGIOPLASTY IMPLANT AND GRAFT: ICD-10-CM

## 2022-06-10 DIAGNOSIS — Z98.890 OTHER SPECIFIED POSTPROCEDURAL STATES: Chronic | ICD-10-CM

## 2022-06-10 DIAGNOSIS — E78.5 HYPERLIPIDEMIA, UNSPECIFIED: ICD-10-CM

## 2022-06-10 DIAGNOSIS — M19.90 UNSPECIFIED OSTEOARTHRITIS, UNSPECIFIED SITE: ICD-10-CM

## 2022-06-10 DIAGNOSIS — G47.00 INSOMNIA, UNSPECIFIED: ICD-10-CM

## 2022-06-10 DIAGNOSIS — I10 ESSENTIAL (PRIMARY) HYPERTENSION: ICD-10-CM

## 2022-06-10 DIAGNOSIS — E03.9 HYPOTHYROIDISM, UNSPECIFIED: ICD-10-CM

## 2022-06-10 PROCEDURE — 99283 EMERGENCY DEPT VISIT LOW MDM: CPT

## 2022-06-10 NOTE — ED ADULT NURSE NOTE - INTERVENTIONS DEFINITIONS
[place] to call system/Call bell, personal items and telephone within reach/Instruct patient to call for assistance/Room bathroom lighting operational/Non-slip footwear when patient is off stretcher/Physically safe environment: no spills, clutter or unnecessary equipment/Stretcher in lowest position, wheels locked, appropriate side rails in place/Provide visual cue, wrist band, yellow gown, etc./Monitor gait and stability

## 2022-06-10 NOTE — ED PROVIDER NOTE - PATIENT PORTAL LINK FT
You can access the FollowMyHealth Patient Portal offered by [facility] by registering at the following website: [email or web address]. By joining [employer]’s FollowMyHealth portal, you will also be able to view your health information using other applications (apps) compatible with our system.

## 2022-06-10 NOTE — ED ADULT NURSE NOTE - CAS DISCH TRANSFER METHOD
Subjective   Joselito Merida is a 76 y.o. male.     History of Present Illness     COPD  Through spring he has had increased nasal congestion, postnasal drainage, and cough.  There has been no change in his shortness of breath with exertion.  There is no history of any other upper respiratory tract symptoms and he denies any chest pain, hemoptysis, fever or chills. He states he is able to walk on level ground with no symptoms but continues to have trouble with inclines, stairs, or lifting. He is following the treatment plan, including medications as directed. He remains on breo generally provided through samples.  He has not required a rescue inhaler for some time. He has a history of 40 pack years.    Diabetes  Current symptoms include paresthesia of the feet. Patient denies visual disturbances, polydipsia, polyuria, hypoglycemia and foot ulcerations. Evaluation to date has been: hemoglobin A1C. Home sugars: patient does not check sugars. Current treatments: metformin.  He has had no recent labs    Dyslipidemia  Compliance with treatment has been fair. The patient exercises intermittently. He is currently being prescribed the following medication for his dyslipidemia - pravastatin. Patient denies side effects associated with his medications.    Hypertension  Home blood pressure readings: not doing. Associated signs and symptoms: dyspnea. Patient denies: chest pain, palpitations, orthopnea, paroxysmal nocturnal dyspnea and peripheral edema. Current antihypertensive medications includes lisinopril. Medication compliance: taking as prescribed.     Benign Prostatic Hypertrophy  He continues to have nocturia x 0-2 with a mild decrease in stream and an occasional sense of incomplete voiding.  He denies any hesitancy, dysuria or hematuria.  He remains on tamsulosin with no apparent side effects    The following portions of the patient's history were reviewed and updated as appropriate: allergies, current medications, past  medical history, past social history and problem list.    Review of Systems   Constitutional: Negative for appetite change, chills, fatigue, fever and unexpected weight change.   HENT: Positive for congestion, postnasal drip, rhinorrhea and sneezing. Negative for ear pain, sinus pressure, sore throat and voice change.    Eyes: Negative for visual disturbance.   Respiratory: Positive for cough and shortness of breath. Negative for wheezing.    Cardiovascular: Negative for chest pain, palpitations and leg swelling.   Gastrointestinal: Negative for abdominal pain, blood in stool, constipation, diarrhea, nausea and vomiting.   Endocrine: Negative for polydipsia and polyuria.   Genitourinary: Negative for difficulty urinating, dysuria, frequency, hematuria and urgency.        Nocturia with decreased stream and occasional sense of incomplete voiding   Musculoskeletal: Negative for arthralgias and back pain.   Skin: Negative for rash.   Neurological: Positive for numbness. Negative for weakness and headaches.   Psychiatric/Behavioral: Positive for sleep disturbance. Negative for dysphoric mood and suicidal ideas. The patient is not nervous/anxious.      Objective   Physical Exam   Constitutional: He is oriented to person, place, and time. He appears well-developed and well-nourished. He is cooperative. He does not have a sickly appearance. No distress.   Well in general.  No apparent distress.  No pallor, cyanosis, diaphoresis, or jaundice   Eyes: Pupils are equal, round, and reactive to light. EOM are normal. No scleral icterus.   Neck: No JVD present. Carotid bruit is not present. No tracheal deviation present. No thyromegaly present.   Cardiovascular: Regular rhythm, S1 normal, S2 normal, normal heart sounds and intact distal pulses. Bradycardia present. Exam reveals no gallop.   No murmur heard.  Pulmonary/Chest: No accessory muscle usage. No respiratory distress. He has decreased breath sounds. He has wheezes. He has  no rales.   Pulmonary hyperinflation   Abdominal: Soft. Normal aorta and bowel sounds are normal. He exhibits no abdominal bruit and no mass. There is no hepatosplenomegaly. There is no tenderness. No hernia.   Lymphadenopathy:        Head (right side): No submandibular adenopathy present.        Head (left side): No submandibular adenopathy present.     He has no cervical adenopathy.   Neurological: He is alert and oriented to person, place, and time. He has normal strength and normal reflexes. He displays normal reflexes. A sensory deficit (decreased vibration sense both feet) is present. No cranial nerve deficit. He exhibits normal muscle tone. Coordination normal.   Skin: Skin is warm and dry. No rash noted. He is not diaphoretic. No pallor. Nails show no clubbing.   Psychiatric: He has a normal mood and affect. His behavior is normal.     Assessment/Plan   Problems Addressed this Visit        Cardiovascular and Mediastinum    Essential hypertension   Hypertension: at goal. Evidence of target organ damage: none.  Encouraged to continue to work on diet and exercise plan.   Continue current medication    Relevant Orders    CBC & Differential    Comprehensive Metabolic Panel    Mixed hyperlipidemia   As above.   Continue current medication.    Relevant Medications    pravastatin (PRAVACHOL) 40 MG tablet    Other Relevant Orders    Lipid Panel       Respiratory    Chronic seasonal allergic rhinitis  Reminded regarding allergen avoidance.  Continue current medication    COPD mixed type (CMS/HCC)   COPD is stable.  Encouraged to remain as active as symptoms allow for  Continue current medication       Digestive    Fatty liver, alcoholic    Vitamin B 12 deficiency  Continue supplementation with monitoring.    Relevant Orders    Vitamin B12       Endocrine    Type 2 diabetes mellitus with peripheral neuropathy (CMS/HCC)  Diabetes mellitus Type II, under excellent control.   Encouraged to continue to pursue ADA  diet  Encouraged aerobic exercise.  Continue current medication  Updated labs drawn.    Relevant Medications    metFORMIN (GLUCOPHAGE) 500 MG tablet    gabapentin (NEURONTIN) 600 MG tablet    Other Relevant Orders    Hemoglobin A1c    MicroAlbumin, Urine, Random -       Genitourinary    Benign non-nodular prostatic hyperplasia with lower urinary tract symptoms  Stable  Encouraged to report if this should change.  Continue current medication    Relevant Medications    tamsulosin (FLOMAX) 0.4 MG capsule 24 hr capsule       Other    Chronic insomnia    Relevant Medications    LORazepam (ATIVAN) 1 MG tablet    Former smoker    Healthcare maintenance  We will discuss an updated Tdap and Shingrix again at his return    Relevant Orders    Vitamin D 25 Hydroxy    Vasculogenic erectile dysfunction                     Private car

## 2022-06-10 NOTE — ED PROVIDER NOTE - OBJECTIVE STATEMENT
72 y.o. F, pmh of stent /MI,  HTN, presenting for LLE pain. Swelling hardening and pain, now causing painful ambulation to stump,. No fever chills N/V abdominal pain cp or sob

## 2022-06-10 NOTE — ED PROVIDER NOTE - NS ED ATTENDING STATEMENT MOD
This was a shared visit with the ALEJANDRO. I reviewed and verified the documentation and independently performed the documented:

## 2022-06-10 NOTE — ED PROVIDER NOTE - CARE PROVIDER_API CALL
Pam Calles)  Surgery  [address] [place] [address]  Phone: [phone]  Fax: [phone]  Follow Up Time:
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
## 2022-06-10 NOTE — ED PROVIDER NOTE - ATTENDING APP SHARED VISIT CONTRIBUTION OF CARE
72 F htn, mi, s/p L AKA, 2 years ago, now with orthotics, now with irritation posteriorly to the stump. no bleeding. no dc.  no fever.  concern for an abscess.  no evid of cellulitis, mild erythema, no fluctuance. no purulent material expressed.  local wound care. needs to be assessed for appropriate orthotic fit

## 2022-06-10 NOTE — ED PROVIDER NOTE - NSFOLLOWUPINSTRUCTIONS_ED_ALL_ED_FT
MAKE AN APPOINTMENT WITH DR GRANGER WITHIN 3 DAYS OF YOUR ER VISIT. USE THE SUPPLIES YOU WERE PROVIDED IN THE ER TO PERFORM WOUND CARE AND COVER THE AREA. PLEASE APPLY BACITRACIN ONCE A DAY.    Abrasion  ImageAn abrasion is a cut or a scrape on the outer surface of the skin. An abrasion does not go through all the layers of the skin. It is important to care for your abrasion properly to prevent infection.    What are the causes?  This condition is caused by falling on or gliding across the ground or another surface. When your skin rubs on something, the outer and inner layers of skin may rub off.    What are the signs or symptoms?  The main symptom of this condition is a cut or a scrape. The scrape may be bleeding, or it may appear red or pink. If the abrasion was caused by a fall, there may be a bruise under the cut or scrape.    How is this diagnosed?  An abrasion is diagnosed with a physical exam.    How is this treated?  Treatment for this condition depends on how large and deep the abrasion is. In most cases:    Your abrasion will be cleaned with water and mild soap. This is done to remove any dirt or debris (such as particles of glass or rock) that may be stuck in the wound.  An antibiotic ointment may be applied to the abrasion to help prevent infection.  A bandage (dressing) may be placed on the abrasion to keep it clean.    You may also need a tetanus shot.    Follow these instructions at home:  Medicines     Take or apply over-the-counter and prescription medicines only as told by your health care provider.  If you were prescribed an antibiotic medicine, apply it as told by your health care provider.  Wound care     Clean the wound 2–3 times a day, or as directed by your health care provider. To do this, wash the wound with mild soap and water, rinse off the soap, and pat the wound dry with a clean towel. Do not rub the wound.  Keep the dressing clean and dry as told by your health care provider.  There are many different ways to close and cover a wound. Follow instructions from your health care provider about:    Caring for your wound.  Changing and removing your dressing. You may have to change your dressing one or more times a day, or as directed by your health care provider.    Check your wound every day for signs of infection. Check for:    Redness, particularly a red streak that spreads out from the wound.  Swelling or increased pain.  Warmth.  Fluid, pus, or a bad smell.    If directed, put ice on the injured area to reduce pain and swelling:    Put ice in a plastic bag.   Place a towel between your skin and the bag.   Leave the ice on for 20 minutes, 2–3 times a day.    General instructions     Do not take baths, swim, or use a hot tub until your health care provider says it is okay to do so.  If possible, raise (elevate) the injured area above the level of your heart while you are sitting or lying down. This will reduce pain and swelling.  Keep all follow-up visits as directed by your health care provider. This is important.  Contact a health care provider if:  You received a tetanus shot, and you have swelling, severe pain, redness, or bleeding at the injection site.  Your pain is not controlled with medicine.  You have redness, swelling, or more pain at the site of your wound.  Get help right away if:  You have a red streak spreading away from your wound.  You have a fever.  You have fluid, blood, or pus coming from your wound.  You notice a bad smell coming from your wound or your dressing.  Summary  An abrasion is a cut or a scrape on the outer surface of the skin. An abrasion does not go through all the layers of the skin.  Care for your abrasion properly to prevent infection.  Clean the wound with mild soap and water 2–3 times a day. Follow instructions from your health care provider about taking medicines and changing your bandage (dressing).  Contact your health care provider if you have redness, swelling or more pain in the wound area.  Get help right away if you have a fever or if you have fluid, blood, pus, a bad smell, or a red streak coming from the wound.  This information is not intended to replace advice given to you by your health care provider. Make sure you discuss any questions you have with your health care provider.

## 2022-06-10 NOTE — ED PROVIDER NOTE - PHYSICAL EXAMINATION
Physical Exam    Vital Signs: I have reviewed the initial vital signs.  Constitutional: no acute distress  Eyes: Conjunctiva pink, Sclera clear  Ears: No hemotypanum, TM intact, with cone of light visualiuzed.  Cardiovascular: S1 and S2, regular rate, regular rhythm, well-perfused extremities, radial pulses equal and 2+,   Respiratory: unlabored respiratory effort, speaking in full sentences, handling oral secretions, clear to auscultation bilaterally no wheezing, rales and rhonchi  Gastrointestinal: soft, non-tender abdomen, no pulsatile mass, normal bowl sounds  Musculoskeletal: supple neck, no lower extremity edema, no midline tenderness, paraspinal tenderness, no gross bony deformities or swelling.  Integumentary: warm, dry, no rashes, lacerations,  Neurologic: awake, alert, cranial nerves II-XII grossly intact, extremities’ motor and sensory functions grossly intact, no nystagmus, tremors, fasiculationsm facial droop, no ataxia, no dysmetria  Psychiatric: appropriate mood, appropriate affect Physical Exam    Vital Signs: I have reviewed the initial vital signs.  Constitutional: no acute distress  Eyes: Conjunctiva pink, Sclera clear  Cardiovascular: S1 and S2  Gastrointestinal: soft, non-tender abdomen,  Musculoskeletal: supple neck, no lower extremity edema, no midline tenderness, paraspinal tenderness, no gross bony deformities or swelling.  Integumentary: warm, dry, no rashes, lacerations, small abraison to L inner thigh w/o purulence and drainage  Neurologic: awake, alert

## 2022-06-17 ENCOUNTER — APPOINTMENT (OUTPATIENT)
Dept: CARDIOLOGY | Facility: CLINIC | Age: 72
End: 2022-06-17

## 2022-07-18 ENCOUNTER — APPOINTMENT (OUTPATIENT)
Dept: RHEUMATOLOGY | Facility: CLINIC | Age: 72
End: 2022-07-18

## 2022-07-18 VITALS
HEART RATE: 73 BPM | DIASTOLIC BLOOD PRESSURE: 88 MMHG | HEIGHT: 63 IN | SYSTOLIC BLOOD PRESSURE: 152 MMHG | BODY MASS INDEX: 25.16 KG/M2 | WEIGHT: 142 LBS | OXYGEN SATURATION: 98 %

## 2022-07-18 DIAGNOSIS — I25.10 ATHEROSCLEROTIC HEART DISEASE OF NATIVE CORONARY ARTERY W/OUT ANGINA PECTORIS: ICD-10-CM

## 2022-07-18 DIAGNOSIS — Z86.79 PERSONAL HISTORY OF OTHER DISEASES OF THE CIRCULATORY SYSTEM: ICD-10-CM

## 2022-07-18 DIAGNOSIS — Z87.19 PERSONAL HISTORY OF OTHER DISEASES OF THE DIGESTIVE SYSTEM: ICD-10-CM

## 2022-07-18 PROCEDURE — 99204 OFFICE O/P NEW MOD 45 MIN: CPT

## 2022-07-18 RX ORDER — LOSARTAN POTASSIUM 100 MG/1
100 TABLET, FILM COATED ORAL
Qty: 90 | Refills: 1 | Status: DISCONTINUED | COMMUNITY
End: 2022-07-18

## 2022-07-18 RX ORDER — DULOXETINE HYDROCHLORIDE 20 MG/1
20 CAPSULE, DELAYED RELEASE PELLETS ORAL DAILY
Refills: 0 | Status: DISCONTINUED | COMMUNITY
End: 2022-07-18

## 2022-07-18 RX ORDER — LEVOTHYROXINE SODIUM 0.05 MG/1
50 TABLET ORAL DAILY
Refills: 0 | Status: DISCONTINUED | COMMUNITY
End: 2022-07-18

## 2022-07-18 RX ORDER — PANTOPRAZOLE 40 MG/1
40 TABLET, DELAYED RELEASE ORAL
Qty: 28 | Refills: 0 | Status: DISCONTINUED | COMMUNITY
Start: 2021-02-22 | End: 2022-07-18

## 2022-07-18 RX ORDER — HYDROCHLOROTHIAZIDE 12.5 MG/1
12.5 TABLET ORAL DAILY
Qty: 90 | Refills: 1 | Status: DISCONTINUED | COMMUNITY
Start: 2021-03-10 | End: 2022-07-18

## 2022-07-18 NOTE — ASSESSMENT
[FreeTextEntry1] : Osteoarthritis: With e/o significant OA seen on prior imaging of pt's shoulder and c-spine, and e/o extensive OA seen on hand exam today. Also s/p R knee replacement but pt continues to experience pain in the area of her pes anserine bursa, as well as R medial ankle pain after a fixation surgery, which may be neuropathic? Pt currently reports taking multiple medications for pain but has difficulty recalling the names or doses of her medications, or any prior side effects. She is not sure why previous medications for pain such as Lyrica were discontinued. \par - I asked pt to bring her medications with her to her next appointment. She says she currently takes her medications in a blister pack, several in the morning, two in the afternoon and one in the evening, but this quantity of medications does not match with her printed medication list from her pharmacy, which is also dated 11/2021 and may not reflect what she is actually taking currently. Pt's medication list dates back to before her most recent appointment with Dr. Ordoñez in 12/2021, when she was told to discontinue hydroxychloroquine\par - Increase duloxetine to 40 mg q day\par - Continue gabapentin 600 mg q day for now\par - Continue hydroxychloroquine for now\par - Continue diclofenac for now, although the goal is to discontinue this medication in the future as pt has h/o CAD. Diclofenac is also not on pt's medication list but she says she is taking it, so it is unclear. \par - f/u in 1 month

## 2022-07-18 NOTE — HISTORY OF PRESENT ILLNESS
[FreeTextEntry1] : Pt has had over 10 years of pain in her shoulders, neck, R distal medial knee, R medial malleolus, b/l CMCs, with hand stiffness and numbness in her hands. She previously saw Cipriano Ngo and Tiago, tried treatments in the past including \par - Lyrica up to 100 mg q day\par - Gabapentin - used to be on TID but now only takes it daily?\par - Duloxetine 20 mg - not sure why pt did not try higher dose. Prior notes say it may have made her sleepy?\par - Hydroxychloroquine 400 mg q day\par - Diclofenac  mg q day\par - Physical therapy\par - Steroid injections for shoulders, CMCs\par - Tylenol prn\par \par Pt says she currently takes gabapentin, duloxetine, hydroxychloroquine and diclofenac ER but continues to experience severe pain. 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

## 2022-08-12 ENCOUNTER — APPOINTMENT (OUTPATIENT)
Dept: NEUROLOGY | Facility: CLINIC | Age: 72
End: 2022-08-12

## 2022-08-22 ENCOUNTER — APPOINTMENT (OUTPATIENT)
Dept: RHEUMATOLOGY | Facility: CLINIC | Age: 72
End: 2022-08-22

## 2022-08-22 VITALS — OXYGEN SATURATION: 98 % | BODY MASS INDEX: 25.16 KG/M2 | WEIGHT: 142 LBS | HEIGHT: 63 IN | HEART RATE: 84 BPM

## 2022-08-22 RX ORDER — LATANOPROST/PF 0.005 %
0.01 DROPS OPHTHALMIC (EYE)
Qty: 2 | Refills: 0 | Status: DISCONTINUED | COMMUNITY
Start: 2021-02-14 | End: 2022-08-22

## 2022-09-07 ENCOUNTER — APPOINTMENT (OUTPATIENT)
Dept: RHEUMATOLOGY | Facility: CLINIC | Age: 72
End: 2022-09-07

## 2022-09-07 VITALS
BODY MASS INDEX: 25.16 KG/M2 | TEMPERATURE: 97.7 F | DIASTOLIC BLOOD PRESSURE: 90 MMHG | HEART RATE: 73 BPM | HEIGHT: 63 IN | WEIGHT: 142 LBS | SYSTOLIC BLOOD PRESSURE: 140 MMHG | OXYGEN SATURATION: 97 %

## 2022-09-07 DIAGNOSIS — Z79.899 OTHER LONG TERM (CURRENT) DRUG THERAPY: ICD-10-CM

## 2022-09-07 PROCEDURE — 20600 DRAIN/INJ JOINT/BURSA W/O US: CPT

## 2022-09-07 PROCEDURE — 99214 OFFICE O/P EST MOD 30 MIN: CPT | Mod: 25

## 2022-09-07 RX ORDER — DICLOFENAC SODIUM 100 MG/1
100 TABLET, FILM COATED, EXTENDED RELEASE ORAL
Refills: 0 | Status: DISCONTINUED | COMMUNITY
End: 2022-09-07

## 2022-09-07 RX ORDER — HYDROXYCHLOROQUINE SULFATE 200 MG/1
200 TABLET, FILM COATED ORAL
Qty: 56 | Refills: 0 | Status: DISCONTINUED | COMMUNITY
Start: 2021-12-07 | End: 2022-09-07

## 2022-09-07 RX ORDER — DULOXETINE HYDROCHLORIDE 40 MG/1
40 CAPSULE, DELAYED RELEASE PELLETS ORAL DAILY
Qty: 30 | Refills: 1 | Status: DISCONTINUED | COMMUNITY
Start: 2022-07-18 | End: 2022-09-07

## 2022-09-07 NOTE — PROCEDURE
[Today's Date:] : Date: [unfilled] [Patient] : the patient [Therapeutic] : therapeutic [#1 Site: ______] : #1 site identified in the [unfilled] [#2 Site: ___] : # 2 site identified in the [unfilled] [Ethyl Chloride] : ethyl chloride [Betadine] : with betadine solution [Chlorhexidine] : chlorhexidine [Tolerated Well] : the patient tolerated the procedure well [No Complications] : there were no complications [de-identified] : 28 gauge 0.5 inch [de-identified] : 0.5 mL of 2% lidocaine mixed with 40 mg/mL triamcinolone

## 2022-09-07 NOTE — HISTORY OF PRESENT ILLNESS
[FreeTextEntry1] : Pt has not noticed a difference in her joint symptoms on the higher duloxetine dose, although I called [facility] and they said that she is on 30 mg duloxetine and the 40 mg duloxetine dose is on hold? Pt feels like her joints have been feeling worse, including the R 2nd MCP and PIP, b/l CMCs, R pes anserine bursa, and b/l shoulders. She recently had b/l steroid injections with her ?PMD a few weeks ago, with some improvement.\par \par Previous HPI: Pt has had over 10 years of pain in her shoulders, neck, R distal medial knee, R medial malleolus, b/l CMCs, with hand stiffness and numbness in her hands. She previously saw Cipriano Ngo and Tiago, tried treatments in the past including \par - Lyrica up to 100 mg q day\par - Gabapentin - used to be on TID but now only takes it daily? Per [facility] she is prescribed for 600 mg TID and receives blister packs\par - Duloxetine 30 mg - not sure why pt did not try higher dose. Prior notes say it may have made her sleepy?\par - Hydroxychloroquine 400 mg q day\par - Diclofenac  mg q day\par - Physical therapy\par - Steroid injections for shoulders, CMCs\par - Tylenol prn\par - Tramadol - was taking it many years ago, prescribed by Dr. Ngo, she is not sure why it was stopped but thinks it may have helped?\par \par Pt says she currently takes gabapentin, duloxetine, hydroxychloroquine and diclofenac ER but continues to experience severe pain. The pain is severe throughout the day, with no particular time that is worse.\par \par Physical exam: GEN: AAO woman sitting on exam table in NAD\par SKIN: No rashes\par PULM: Clear to auscultation b/l\par CV: Regular rate and rhythm, no murmurs\par MSK:\par Neck: Limited flexion and extension\par Shoulders: Limited abduction to approx 120 degrees b/l, + Apley scratch test b/l\par Elbows: Full ROM b/l, no effusions\par Wrists: + Pain in R CMC with ROM, no effusions, full ROM b/l\par Hands: + Pronounced b/l CMC squaring, Jitendra's nodes and Heberden's nodes especially in R 2nd and 3rd PIPs\par Hips: Full ROM on R, pt declined L exam 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

## 2022-09-07 NOTE — ASSESSMENT
[FreeTextEntry1] : Osteoarthritis: With e/o significant OA seen on prior imaging of pt's shoulder and c-spine, and e/o extensive OA seen on hand exam today. Also s/p R knee replacement but pt continues to experience pain in the area of her pes anserine bursa, as well as R medial ankle pain after a fixation surgery, which may be neuropathic? Pt currently reports taking multiple medications for pain but has difficulty recalling the names or doses of her medications, or any prior side effects. She is not sure why previous medications for pain such as Lyrica were discontinued. \par - [facility] and confirmed her current medications with them - she is on duloxetine 30 mg q day and gabapentin 600 mg TID for pain, and receives her medications in blister packs.\par - Increase gabapentin to 800 mg TID\par - Injected R 2nd MCP and PIP with triamcinolone today\par - If increasing gabapentin does not help, would consider either starting tramadol prn or switching duloxetine to amitriptyline?\par - f/u in 1 month

## 2022-09-12 NOTE — PRE-OP CHECKLIST - LATEX ALLERGY
no
(1) Drift; limb holds 90 (or 45) degrees, but drifts down before full 10 seconds; does not hit bed or other support

## 2022-09-21 ENCOUNTER — INPATIENT (INPATIENT)
Facility: HOSPITAL | Age: 72
LOS: 6 days | Discharge: HOME | End: 2022-09-28
Attending: INTERNAL MEDICINE | Admitting: INTERNAL MEDICINE

## 2022-09-21 VITALS
HEART RATE: 71 BPM | TEMPERATURE: 98 F | DIASTOLIC BLOOD PRESSURE: 79 MMHG | HEIGHT: 62 IN | RESPIRATION RATE: 20 BRPM | SYSTOLIC BLOOD PRESSURE: 163 MMHG | OXYGEN SATURATION: 99 %

## 2022-09-21 DIAGNOSIS — I11.9 HYPERTENSIVE HEART DISEASE WITHOUT HEART FAILURE: ICD-10-CM

## 2022-09-21 DIAGNOSIS — E03.9 HYPOTHYROIDISM, UNSPECIFIED: ICD-10-CM
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
## 2022-09-21 DIAGNOSIS — Z96.651 PRESENCE OF RIGHT ARTIFICIAL KNEE JOINT: Chronic | ICD-10-CM

## 2022-09-21 DIAGNOSIS — Z95.5 PRESENCE OF CORONARY ANGIOPLASTY IMPLANT AND GRAFT: ICD-10-CM

## 2022-09-21 DIAGNOSIS — H81.11 BENIGN PAROXYSMAL VERTIGO, RIGHT EAR: ICD-10-CM

## 2022-09-21 DIAGNOSIS — H43.813 VITREOUS DEGENERATION, BILATERAL: ICD-10-CM
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
## 2022-09-21 DIAGNOSIS — S00.83XA CONTUSION OF OTHER PART OF HEAD, INITIAL ENCOUNTER: ICD-10-CM

## 2022-09-21 DIAGNOSIS — I25.10 ATHEROSCLEROTIC HEART DISEASE OF NATIVE CORONARY ARTERY WITHOUT ANGINA PECTORIS: ICD-10-CM

## 2022-09-21 DIAGNOSIS — E78.5 HYPERLIPIDEMIA, UNSPECIFIED: ICD-10-CM

## 2022-09-21 DIAGNOSIS — Z98.890 OTHER SPECIFIED POSTPROCEDURAL STATES: Chronic | ICD-10-CM

## 2022-09-21 DIAGNOSIS — Z89.612 ACQUIRED ABSENCE OF LEFT LEG ABOVE KNEE: ICD-10-CM

## 2022-09-21 DIAGNOSIS — R55 SYNCOPE AND COLLAPSE: ICD-10-CM

## 2022-09-21 DIAGNOSIS — F32.A DEPRESSION, UNSPECIFIED: ICD-10-CM

## 2022-09-21 DIAGNOSIS — I95.1 ORTHOSTATIC HYPOTENSION: ICD-10-CM
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
## 2022-09-21 DIAGNOSIS — Z96.651 PRESENCE OF RIGHT ARTIFICIAL KNEE JOINT: ICD-10-CM
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
## 2022-09-21 DIAGNOSIS — F41.9 ANXIETY DISORDER, UNSPECIFIED: ICD-10-CM

## 2022-09-21 DIAGNOSIS — M19.90 UNSPECIFIED OSTEOARTHRITIS, UNSPECIFIED SITE: ICD-10-CM

## 2022-09-21 LAB
ALBUMIN SERPL ELPH-MCNC: 4.4 G/DL — SIGNIFICANT CHANGE UP (ref 3.5–5.2)
ALP SERPL-CCNC: 117 U/L — HIGH (ref 30–115)
ALT FLD-CCNC: 15 U/L — SIGNIFICANT CHANGE UP (ref 0–41)
ANION GAP SERPL CALC-SCNC: 14 MMOL/L — SIGNIFICANT CHANGE UP (ref 7–14)
AST SERPL-CCNC: 20 U/L — SIGNIFICANT CHANGE UP (ref 0–41)
BASOPHILS # BLD AUTO: 0.05 K/UL — SIGNIFICANT CHANGE UP (ref 0–0.2)
BASOPHILS NFR BLD AUTO: 0.7 % — SIGNIFICANT CHANGE UP (ref 0–1)
BILIRUB SERPL-MCNC: 0.7 MG/DL — SIGNIFICANT CHANGE UP (ref 0.2–1.2)
BUN SERPL-MCNC: 14 MG/DL — SIGNIFICANT CHANGE UP (ref 10–20)
CALCIUM SERPL-MCNC: 9.7 MG/DL — SIGNIFICANT CHANGE UP (ref 8.4–10.4)
CHLORIDE SERPL-SCNC: 102 MMOL/L — SIGNIFICANT CHANGE UP (ref 98–110)
CO2 SERPL-SCNC: 23 MMOL/L — SIGNIFICANT CHANGE UP (ref 17–32)
CREAT SERPL-MCNC: 0.6 MG/DL — LOW (ref 0.7–1.5)
EGFR: 95 ML/MIN/1.73M2 — SIGNIFICANT CHANGE UP
EOSINOPHIL # BLD AUTO: 0.11 K/UL — SIGNIFICANT CHANGE UP (ref 0–0.7)
EOSINOPHIL NFR BLD AUTO: 1.5 % — SIGNIFICANT CHANGE UP (ref 0–8)
GLUCOSE SERPL-MCNC: 90 MG/DL — SIGNIFICANT CHANGE UP (ref 70–99)
HCT VFR BLD CALC: 39.6 % — SIGNIFICANT CHANGE UP (ref 37–47)
HGB BLD-MCNC: 13.6 G/DL — SIGNIFICANT CHANGE UP (ref 12–16)
IMM GRANULOCYTES NFR BLD AUTO: 0.3 % — SIGNIFICANT CHANGE UP (ref 0.1–0.3)
LYMPHOCYTES # BLD AUTO: 1.95 K/UL — SIGNIFICANT CHANGE UP (ref 1.2–3.4)
LYMPHOCYTES # BLD AUTO: 26.5 % — SIGNIFICANT CHANGE UP (ref 20.5–51.1)
MAGNESIUM SERPL-MCNC: 1.8 MG/DL — SIGNIFICANT CHANGE UP (ref 1.8–2.4)
MCHC RBC-ENTMCNC: 31.1 PG — HIGH (ref 27–31)
MCHC RBC-ENTMCNC: 34.3 G/DL — SIGNIFICANT CHANGE UP (ref 32–37)
MCV RBC AUTO: 90.6 FL — SIGNIFICANT CHANGE UP (ref 81–99)
MONOCYTES # BLD AUTO: 0.58 K/UL — SIGNIFICANT CHANGE UP (ref 0.1–0.6)
MONOCYTES NFR BLD AUTO: 7.9 % — SIGNIFICANT CHANGE UP (ref 1.7–9.3)
NEUTROPHILS # BLD AUTO: 4.65 K/UL — SIGNIFICANT CHANGE UP (ref 1.4–6.5)
NEUTROPHILS NFR BLD AUTO: 63.1 % — SIGNIFICANT CHANGE UP (ref 42.2–75.2)
NRBC # BLD: 0 /100 WBCS — SIGNIFICANT CHANGE UP (ref 0–0)
NT-PROBNP SERPL-SCNC: 113 PG/ML — SIGNIFICANT CHANGE UP (ref 0–300)
PLATELET # BLD AUTO: 221 K/UL — SIGNIFICANT CHANGE UP (ref 130–400)
POTASSIUM SERPL-MCNC: 3.8 MMOL/L — SIGNIFICANT CHANGE UP (ref 3.5–5)
POTASSIUM SERPL-SCNC: 3.8 MMOL/L — SIGNIFICANT CHANGE UP (ref 3.5–5)
PROT SERPL-MCNC: 7 G/DL — SIGNIFICANT CHANGE UP (ref 6–8)
RBC # BLD: 4.37 M/UL — SIGNIFICANT CHANGE UP (ref 4.2–5.4)
RBC # FLD: 14.4 % — SIGNIFICANT CHANGE UP (ref 11.5–14.5)
SARS-COV-2 RNA SPEC QL NAA+PROBE: SIGNIFICANT CHANGE UP
SODIUM SERPL-SCNC: 139 MMOL/L — SIGNIFICANT CHANGE UP (ref 135–146)
TROPONIN T SERPL-MCNC: <0.01 NG/ML — SIGNIFICANT CHANGE UP
WBC # BLD: 7.36 K/UL — SIGNIFICANT CHANGE UP (ref 4.8–10.8)
WBC # FLD AUTO: 7.36 K/UL — SIGNIFICANT CHANGE UP (ref 4.8–10.8)

## 2022-09-21 PROCEDURE — 72125 CT NECK SPINE W/O DYE: CPT | Mod: 26,MA

## 2022-09-21 PROCEDURE — 93010 ELECTROCARDIOGRAM REPORT: CPT

## 2022-09-21 PROCEDURE — 73130 X-RAY EXAM OF HAND: CPT | Mod: 26,LT

## 2022-09-21 PROCEDURE — 70450 CT HEAD/BRAIN W/O DYE: CPT | Mod: 26,MA

## 2022-09-21 PROCEDURE — 71045 X-RAY EXAM CHEST 1 VIEW: CPT | Mod: 26

## 2022-09-21 PROCEDURE — 99285 EMERGENCY DEPT VISIT HI MDM: CPT

## 2022-09-21 RX ORDER — SERTRALINE 25 MG/1
100 TABLET, FILM COATED ORAL DAILY
Refills: 0 | Status: DISCONTINUED | OUTPATIENT
Start: 2022-09-21 | End: 2022-09-28

## 2022-09-21 RX ORDER — CELECOXIB 200 MG/1
1 CAPSULE ORAL
Qty: 0 | Refills: 0 | DISCHARGE

## 2022-09-21 RX ORDER — GABAPENTIN 400 MG/1
600 CAPSULE ORAL THREE TIMES A DAY
Refills: 0 | Status: DISCONTINUED | OUTPATIENT
Start: 2022-09-21 | End: 2022-09-28

## 2022-09-21 RX ORDER — LOSARTAN POTASSIUM 100 MG/1
100 TABLET, FILM COATED ORAL DAILY
Refills: 0 | Status: DISCONTINUED | OUTPATIENT
Start: 2022-09-21 | End: 2022-09-24

## 2022-09-21 RX ORDER — LEVOTHYROXINE SODIUM 125 MCG
1 TABLET ORAL
Qty: 0 | Refills: 0 | DISCHARGE

## 2022-09-21 RX ORDER — DULOXETINE HYDROCHLORIDE 30 MG/1
1 CAPSULE, DELAYED RELEASE ORAL
Qty: 0 | Refills: 0 | DISCHARGE

## 2022-09-21 RX ORDER — ENOXAPARIN SODIUM 100 MG/ML
40 INJECTION SUBCUTANEOUS EVERY 24 HOURS
Refills: 0 | Status: DISCONTINUED | OUTPATIENT
Start: 2022-09-21 | End: 2022-09-28

## 2022-09-21 RX ORDER — AMLODIPINE BESYLATE 2.5 MG/1
10 TABLET ORAL DAILY
Refills: 0 | Status: DISCONTINUED | OUTPATIENT
Start: 2022-09-21 | End: 2022-09-28

## 2022-09-21 RX ORDER — POTASSIUM CHLORIDE 20 MEQ
1 PACKET (EA) ORAL
Qty: 0 | Refills: 0 | DISCHARGE

## 2022-09-21 RX ORDER — DULOXETINE HYDROCHLORIDE 30 MG/1
30 CAPSULE, DELAYED RELEASE ORAL DAILY
Refills: 0 | Status: DISCONTINUED | OUTPATIENT
Start: 2022-09-21 | End: 2022-09-28

## 2022-09-21 RX ORDER — LANOLIN ALCOHOL/MO/W.PET/CERES
1 CREAM (GRAM) TOPICAL
Qty: 0 | Refills: 0 | DISCHARGE

## 2022-09-21 RX ORDER — LEVOTHYROXINE SODIUM 125 MCG
100 TABLET ORAL DAILY
Refills: 0 | Status: DISCONTINUED | OUTPATIENT
Start: 2022-09-21 | End: 2022-09-28

## 2022-09-21 RX ORDER — HYDROCHLOROTHIAZIDE 25 MG
12.5 TABLET ORAL DAILY
Refills: 0 | Status: DISCONTINUED | OUTPATIENT
Start: 2022-09-21 | End: 2022-09-27

## 2022-09-21 RX ORDER — SIMVASTATIN 20 MG/1
40 TABLET, FILM COATED ORAL AT BEDTIME
Refills: 0 | Status: DISCONTINUED | OUTPATIENT
Start: 2022-09-21 | End: 2022-09-28

## 2022-09-21 RX ADMIN — GABAPENTIN 600 MILLIGRAM(S): 400 CAPSULE ORAL at 22:36

## 2022-09-21 RX ADMIN — SIMVASTATIN 40 MILLIGRAM(S): 20 TABLET, FILM COATED ORAL at 22:35

## 2022-09-21 NOTE — ED ADULT NURSE NOTE - NSIMPLEMENTINTERV_GEN_ALL_ED
Implemented All Fall Risk Interventions:  [place] to call system. Call bell, personal items and telephone within reach. Instruct patient to call for assistance. Room bathroom lighting operational. Non-slip footwear when patient is off stretcher. Physically safe environment: no spills, clutter or unnecessary equipment. Stretcher in lowest position, wheels locked, appropriate side rails in place. Provide visual cue, wrist band, yellow gown, etc. Monitor gait and stability. Monitor for mental status changes and reorient to person, place, and time. Review medications for side effects contributing to fall risk. Reinforce activity limits and safety measures with patient and family.

## 2022-09-21 NOTE — ED ADULT NURSE NOTE - OBJECTIVE STATEMENT
aox3, states she felt weak and dizzy causing her to fall with questionable loc.  pt states she isn't on any blood thinners.

## 2022-09-21 NOTE — ED ADULT TRIAGE NOTE - CCCP TRG CHIEF CMPLNT
“Patient's name, , procedure and correct site were confirmed during the [place] Timeout.” syncope/fall
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
## 2022-09-21 NOTE — ED PROVIDER NOTE - CLINICAL SUMMARY MEDICAL DECISION MAKING FREE TEXT BOX
Pt with syncope and fall, no traumatic injury on imaging, h/o cad with stent, will place in med tele

## 2022-09-21 NOTE — ED ADULT TRIAGE NOTE - CHIEF COMPLAINT QUOTE
Pt here with fall at home outside yesterday morning with syncopal event. hematoma to R forehead + syncopal event. No ac use. denies vomiting. EKG done
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
## 2022-09-21 NOTE — ED PROVIDER NOTE - PHYSICAL EXAMINATION
Physical Exam    Vital Signs: I have reviewed the initial vital signs.  Constitutional: well-nourished, appears stated age, no acute distress  Head: NC, contusion and bruising noted to forehead.   Eyes: Conjunctiva pink, Sclera clear, PERRLA, EOMI.  Cardiovascular: S1 and S2, regular rate, regular rhythm, well-perfused extremities, radial pulses equal and 2+  Respiratory: unlabored respiratory effort, clear to auscultation bilaterally no wheezing, rales and rhonchi  Gastrointestinal: soft, non-tender abdomen, no pulsatile mass, normal bowl sounds  Musculoskeletal: supple neck, no lower extremity edema, no midline tenderness. TTP of the L hand at base of 4th phalanx, no bruising, swelling or crepitus noted  Integumentary: warm, dry, no rash  Neurologic: awake, alert, cranial nerves II-XII grossly intact, extremities’ motor and sensory functions grossly intact  Psychiatric: appropriate mood, appropriate affect

## 2022-09-21 NOTE — H&P ADULT - NSHPPHYSICALEXAM_GEN_ALL_CORE
PHYSICAL EXAM:  GENERAL: NAD, AAO x 4, 72y F  HEAD:  right forehead ecchymosis, with mild swelling   EYES: EOMI, conjunctiva and sclera white  NECK: Supple, No JVD  CHEST/LUNG: Clear to auscultation bilaterally; No wheeze; No crackles; No accessory muscles used  HEART: Regular rate and rhythm; No murmurs;   ABDOMEN: Soft, Nontender, Nondistended; Bowel sounds present; No guarding, No organomegaly  EXTREMITIES:  2+ Peripheral Pulses, no edema on RLE. Prosthesis on the LLE   NEUROLOGY: non-focal

## 2022-09-21 NOTE — H&P ADULT - TIME BILLING
Patient seen this am in the ED. Patient recounted event of fall, states she turned became dizzy and found herself on the ground. Patient never braced herself, no injuries noted to UE.      frontal hematoma  MS at baseline  lungs clear  heart S1S2  abdomen BS+, soft nontender  extremities trace LE edema, LLE AKA    < from: CT Cervical Spine No Cont (09.21.22 @ 12:50) >      IMPRESSION:    CT HEAD:  No acute intracranial pathology or hemorrhage. No acute calvarial   fracture.    CT CERVICAL SPINE:  No acute fracture or subluxation.    Multilevel degenerative changes as above.    --- End of Report ---      < end of copied text >    A/P  Syncope, CAD hx  - continue cardiac monitor  - trend troponins  - obtain cardiology evaluation  - check orthostatic pulse and BP    Hypothyroidism  - on replacement    OA  hx of AKA post trauma  - pain rx  - PT rehab evaluation

## 2022-09-21 NOTE — ED PROVIDER NOTE - NS ED ROS FT
Constitutional: (-) fever  Eyes/ENT: (-) blurry vision, (-) epistaxis  Cardiovascular: (-) chest pain, (+) syncope  Respiratory: (-) cough, (-) shortness of breath  Gastrointestinal: (-) vomiting, (-) diarrhea  Musculoskeletal: (-) neck pain, (-) back pain, (+) joint pain  Integumentary: (-) rash, (-) edema  Neurological: (-) headache, (-) altered mental status  Psychiatric: (-) hallucinations  Allergic/Immunologic: (-) pruritus

## 2022-09-21 NOTE — ED PROVIDER NOTE - ATTENDING APP SHARED VISIT CONTRIBUTION OF CARE
73 yo f with pmh of hypothyroidism, anxiety, htn, cad with stent f/u with dr. tovar, presents with c/o syncope and fall yesterday.  pt denies asa or ac use.  says she was at home when she started to feel faint and thinks she had loc and hit her head.  mild forehead pain, no neck pain, no cp, no abd pain.  also c/o L hand pain from fall.  pt feels at baseline currently.  exam: nad, forehead hematoma, mildly ttp, perrl, eomi, mmm, rrr, ctab, abd soft, nt, nd aox3, mild swelling at head of 3rd metacarpal, with mild erythema imp: pt with syncope and fall, head trauma, ct head and cspine, labs, ekg,

## 2022-09-21 NOTE — H&P ADULT - NSHPLABSRESULTS_GEN_ALL_CORE
LABS:                        13.6   7.36  )-----------( 221      ( 21 Sep 2022 13:50 )             39.6       09-21    139  |  102  |  14  ----------------------------<  90  3.8   |  23  |  0.6<L>    Ca    9.7      21 Sep 2022 13:50  Mg     1.8     09-21    TPro  7.0  /  Alb  4.4  /  TBili  0.7  /  DBili  x   /  AST  20  /  ALT  15  /  AlkPhos  117<H>  09-21            Lactate Trend      CARDIAC MARKERS ( 21 Sep 2022 13:50 )  x     / <0.01 ng/mL / x     / x     / x        RADIOLOGY:  < from: Xray Chest 1 View- PORTABLE-Urgent (09.21.22 @ 14:11) >    IMPRESSION:    Left perihilar opacities. No pneumothorax.    Stable cardiomediastinal silhouette.    Unchanged osseous structures.      < end of copied text >    < from: CT Cervical Spine No Cont (09.21.22 @ 12:50) >    IMPRESSION:    CT HEAD:  No acute intracranial pathology or hemorrhage. No acute calvarial   fracture.    CT CERVICAL SPINE:  No acute fracture or subluxation.    Multilevel degenerative changes as above.

## 2022-09-21 NOTE — ED PROVIDER NOTE - OBJECTIVE STATEMENT
Pt is a 72 year old female with PMH noted in chart presents to ED with complaints of syncope with head trauma. Pt states was walking outisde on patio, felt lightheaded, possibly passed out or tripped, resulting in head trauma. Pt was able to ambulate under own power. Pt states woke up tday and noted bruising and swelling to forehead. Pt states also injured th L hand when fell. pain is mild, non radiating with no alleviating factors. Denies headache, dizziness, change in viison, chest pain, sob, abdominal pain, NVCD

## 2022-09-22 LAB
A1C WITH ESTIMATED AVERAGE GLUCOSE RESULT: 5.2 % — SIGNIFICANT CHANGE UP (ref 4–5.6)
ALBUMIN SERPL ELPH-MCNC: 4.3 G/DL — SIGNIFICANT CHANGE UP (ref 3.5–5.2)
ALP SERPL-CCNC: 120 U/L — HIGH (ref 30–115)
ALT FLD-CCNC: 14 U/L — SIGNIFICANT CHANGE UP (ref 0–41)
ANION GAP SERPL CALC-SCNC: 16 MMOL/L — HIGH (ref 7–14)
AST SERPL-CCNC: 16 U/L — SIGNIFICANT CHANGE UP (ref 0–41)
BASOPHILS # BLD AUTO: 0.04 K/UL — SIGNIFICANT CHANGE UP (ref 0–0.2)
BASOPHILS NFR BLD AUTO: 0.7 % — SIGNIFICANT CHANGE UP (ref 0–1)
BILIRUB SERPL-MCNC: 0.4 MG/DL — SIGNIFICANT CHANGE UP (ref 0.2–1.2)
BUN SERPL-MCNC: 22 MG/DL — HIGH (ref 10–20)
CALCIUM SERPL-MCNC: 9.6 MG/DL — SIGNIFICANT CHANGE UP (ref 8.4–10.4)
CHLORIDE SERPL-SCNC: 103 MMOL/L — SIGNIFICANT CHANGE UP (ref 98–110)
CHOLEST SERPL-MCNC: 209 MG/DL — HIGH
CO2 SERPL-SCNC: 23 MMOL/L — SIGNIFICANT CHANGE UP (ref 17–32)
CREAT SERPL-MCNC: 0.8 MG/DL — SIGNIFICANT CHANGE UP (ref 0.7–1.5)
EGFR: 78 ML/MIN/1.73M2 — SIGNIFICANT CHANGE UP
EOSINOPHIL # BLD AUTO: 0.1 K/UL — SIGNIFICANT CHANGE UP (ref 0–0.7)
EOSINOPHIL NFR BLD AUTO: 1.6 % — SIGNIFICANT CHANGE UP (ref 0–8)
ESTIMATED AVERAGE GLUCOSE: 103 MG/DL — SIGNIFICANT CHANGE UP (ref 68–114)
GLUCOSE SERPL-MCNC: 109 MG/DL — HIGH (ref 70–99)
HCT VFR BLD CALC: 40.1 % — SIGNIFICANT CHANGE UP (ref 37–47)
HCV AB S/CO SERPL IA: 60.81 COI — HIGH
HCV AB SERPL-IMP: REACTIVE
HDLC SERPL-MCNC: 108 MG/DL — SIGNIFICANT CHANGE UP
HGB BLD-MCNC: 13.6 G/DL — SIGNIFICANT CHANGE UP (ref 12–16)
IMM GRANULOCYTES NFR BLD AUTO: 0.3 % — SIGNIFICANT CHANGE UP (ref 0.1–0.3)
LIPID PNL WITH DIRECT LDL SERPL: 91 MG/DL — SIGNIFICANT CHANGE UP
LYMPHOCYTES # BLD AUTO: 1.67 K/UL — SIGNIFICANT CHANGE UP (ref 1.2–3.4)
LYMPHOCYTES # BLD AUTO: 27.5 % — SIGNIFICANT CHANGE UP (ref 20.5–51.1)
MAGNESIUM SERPL-MCNC: 1.9 MG/DL — SIGNIFICANT CHANGE UP (ref 1.8–2.4)
MCHC RBC-ENTMCNC: 31.3 PG — HIGH (ref 27–31)
MCHC RBC-ENTMCNC: 33.9 G/DL — SIGNIFICANT CHANGE UP (ref 32–37)
MCV RBC AUTO: 92.2 FL — SIGNIFICANT CHANGE UP (ref 81–99)
MONOCYTES # BLD AUTO: 0.55 K/UL — SIGNIFICANT CHANGE UP (ref 0.1–0.6)
MONOCYTES NFR BLD AUTO: 9.1 % — SIGNIFICANT CHANGE UP (ref 1.7–9.3)
NEUTROPHILS # BLD AUTO: 3.69 K/UL — SIGNIFICANT CHANGE UP (ref 1.4–6.5)
NEUTROPHILS NFR BLD AUTO: 60.8 % — SIGNIFICANT CHANGE UP (ref 42.2–75.2)
NON HDL CHOLESTEROL: 101 MG/DL — SIGNIFICANT CHANGE UP
NRBC # BLD: 0 /100 WBCS — SIGNIFICANT CHANGE UP (ref 0–0)
PLATELET # BLD AUTO: 211 K/UL — SIGNIFICANT CHANGE UP (ref 130–400)
POTASSIUM SERPL-MCNC: 3.9 MMOL/L — SIGNIFICANT CHANGE UP (ref 3.5–5)
POTASSIUM SERPL-SCNC: 3.9 MMOL/L — SIGNIFICANT CHANGE UP (ref 3.5–5)
PROT SERPL-MCNC: 7.1 G/DL — SIGNIFICANT CHANGE UP (ref 6–8)
RBC # BLD: 4.35 M/UL — SIGNIFICANT CHANGE UP (ref 4.2–5.4)
RBC # FLD: 14.5 % — SIGNIFICANT CHANGE UP (ref 11.5–14.5)
SODIUM SERPL-SCNC: 142 MMOL/L — SIGNIFICANT CHANGE UP (ref 135–146)
TRIGL SERPL-MCNC: 60 MG/DL — SIGNIFICANT CHANGE UP
TROPONIN T SERPL-MCNC: <0.01 NG/ML — SIGNIFICANT CHANGE UP
TROPONIN T SERPL-MCNC: <0.01 NG/ML — SIGNIFICANT CHANGE UP
TSH SERPL-MCNC: 3.13 UIU/ML — SIGNIFICANT CHANGE UP (ref 0.27–4.2)
WBC # BLD: 6.07 K/UL — SIGNIFICANT CHANGE UP (ref 4.8–10.8)
WBC # FLD AUTO: 6.07 K/UL — SIGNIFICANT CHANGE UP (ref 4.8–10.8)

## 2022-09-22 PROCEDURE — 93010 ELECTROCARDIOGRAM REPORT: CPT

## 2022-09-22 RX ORDER — ONDANSETRON 8 MG/1
4 TABLET, FILM COATED ORAL ONCE
Refills: 0 | Status: COMPLETED | OUTPATIENT
Start: 2022-09-22 | End: 2022-09-22

## 2022-09-22 RX ADMIN — Medication 100 MICROGRAM(S): at 06:59

## 2022-09-22 RX ADMIN — DULOXETINE HYDROCHLORIDE 30 MILLIGRAM(S): 30 CAPSULE, DELAYED RELEASE ORAL at 14:52

## 2022-09-22 RX ADMIN — SIMVASTATIN 40 MILLIGRAM(S): 20 TABLET, FILM COATED ORAL at 21:52

## 2022-09-22 RX ADMIN — GABAPENTIN 600 MILLIGRAM(S): 400 CAPSULE ORAL at 14:53

## 2022-09-22 RX ADMIN — LOSARTAN POTASSIUM 100 MILLIGRAM(S): 100 TABLET, FILM COATED ORAL at 06:59

## 2022-09-22 RX ADMIN — SERTRALINE 100 MILLIGRAM(S): 25 TABLET, FILM COATED ORAL at 14:52

## 2022-09-22 RX ADMIN — ONDANSETRON 4 MILLIGRAM(S): 8 TABLET, FILM COATED ORAL at 23:21

## 2022-09-22 RX ADMIN — AMLODIPINE BESYLATE 10 MILLIGRAM(S): 2.5 TABLET ORAL at 07:00

## 2022-09-22 RX ADMIN — Medication 12.5 MILLIGRAM(S): at 06:59

## 2022-09-22 RX ADMIN — GABAPENTIN 600 MILLIGRAM(S): 400 CAPSULE ORAL at 21:52

## 2022-09-22 RX ADMIN — GABAPENTIN 600 MILLIGRAM(S): 400 CAPSULE ORAL at 06:59

## 2022-09-22 RX ADMIN — ENOXAPARIN SODIUM 40 MILLIGRAM(S): 100 INJECTION SUBCUTANEOUS at 14:52

## 2022-09-22 NOTE — PATIENT PROFILE ADULT - FALL HARM RISK - HARM RISK INTERVENTIONS

## 2022-09-23 LAB
ALBUMIN SERPL ELPH-MCNC: 4.2 G/DL — SIGNIFICANT CHANGE UP (ref 3.5–5.2)
ALP SERPL-CCNC: 108 U/L — SIGNIFICANT CHANGE UP (ref 30–115)
ALT FLD-CCNC: 12 U/L — SIGNIFICANT CHANGE UP (ref 0–41)
ANION GAP SERPL CALC-SCNC: 11 MMOL/L — SIGNIFICANT CHANGE UP (ref 7–14)
AST SERPL-CCNC: 14 U/L — SIGNIFICANT CHANGE UP (ref 0–41)
BASOPHILS # BLD AUTO: 0.05 K/UL — SIGNIFICANT CHANGE UP (ref 0–0.2)
BASOPHILS NFR BLD AUTO: 0.8 % — SIGNIFICANT CHANGE UP (ref 0–1)
BILIRUB SERPL-MCNC: 0.5 MG/DL — SIGNIFICANT CHANGE UP (ref 0.2–1.2)
BUN SERPL-MCNC: 18 MG/DL — SIGNIFICANT CHANGE UP (ref 10–20)
CALCIUM SERPL-MCNC: 9.4 MG/DL — SIGNIFICANT CHANGE UP (ref 8.4–10.5)
CHLORIDE SERPL-SCNC: 102 MMOL/L — SIGNIFICANT CHANGE UP (ref 98–110)
CO2 SERPL-SCNC: 26 MMOL/L — SIGNIFICANT CHANGE UP (ref 17–32)
CREAT SERPL-MCNC: 0.6 MG/DL — LOW (ref 0.7–1.5)
EGFR: 95 ML/MIN/1.73M2 — SIGNIFICANT CHANGE UP
EOSINOPHIL # BLD AUTO: 0.12 K/UL — SIGNIFICANT CHANGE UP (ref 0–0.7)
EOSINOPHIL NFR BLD AUTO: 1.9 % — SIGNIFICANT CHANGE UP (ref 0–8)
GLUCOSE SERPL-MCNC: 96 MG/DL — SIGNIFICANT CHANGE UP (ref 70–99)
HCT VFR BLD CALC: 40.7 % — SIGNIFICANT CHANGE UP (ref 37–47)
HCV RNA FLD QL NAA+PROBE: SIGNIFICANT CHANGE UP
HCV RNA SPEC QL PROBE+SIG AMP: SIGNIFICANT CHANGE UP
HGB BLD-MCNC: 13.6 G/DL — SIGNIFICANT CHANGE UP (ref 12–16)
IMM GRANULOCYTES NFR BLD AUTO: 0.3 % — SIGNIFICANT CHANGE UP (ref 0.1–0.3)
LYMPHOCYTES # BLD AUTO: 1.89 K/UL — SIGNIFICANT CHANGE UP (ref 1.2–3.4)
LYMPHOCYTES # BLD AUTO: 30.1 % — SIGNIFICANT CHANGE UP (ref 20.5–51.1)
MAGNESIUM SERPL-MCNC: 1.8 MG/DL — SIGNIFICANT CHANGE UP (ref 1.8–2.4)
MCHC RBC-ENTMCNC: 30.4 PG — SIGNIFICANT CHANGE UP (ref 27–31)
MCHC RBC-ENTMCNC: 33.4 G/DL — SIGNIFICANT CHANGE UP (ref 32–37)
MCV RBC AUTO: 91.1 FL — SIGNIFICANT CHANGE UP (ref 81–99)
MONOCYTES # BLD AUTO: 0.52 K/UL — SIGNIFICANT CHANGE UP (ref 0.1–0.6)
MONOCYTES NFR BLD AUTO: 8.3 % — SIGNIFICANT CHANGE UP (ref 1.7–9.3)
NEUTROPHILS # BLD AUTO: 3.68 K/UL — SIGNIFICANT CHANGE UP (ref 1.4–6.5)
NEUTROPHILS NFR BLD AUTO: 58.6 % — SIGNIFICANT CHANGE UP (ref 42.2–75.2)
NRBC # BLD: 0 /100 WBCS — SIGNIFICANT CHANGE UP (ref 0–0)
PHOSPHATE SERPL-MCNC: 3.6 MG/DL — SIGNIFICANT CHANGE UP (ref 2.1–4.9)
PLATELET # BLD AUTO: 208 K/UL — SIGNIFICANT CHANGE UP (ref 130–400)
POTASSIUM SERPL-MCNC: 4 MMOL/L — SIGNIFICANT CHANGE UP (ref 3.5–5)
POTASSIUM SERPL-SCNC: 4 MMOL/L — SIGNIFICANT CHANGE UP (ref 3.5–5)
PROT SERPL-MCNC: 6.9 G/DL — SIGNIFICANT CHANGE UP (ref 6–8)
RBC # BLD: 4.47 M/UL — SIGNIFICANT CHANGE UP (ref 4.2–5.4)
RBC # FLD: 14.2 % — SIGNIFICANT CHANGE UP (ref 11.5–14.5)
SODIUM SERPL-SCNC: 139 MMOL/L — SIGNIFICANT CHANGE UP (ref 135–146)
WBC # BLD: 6.28 K/UL — SIGNIFICANT CHANGE UP (ref 4.8–10.8)
WBC # FLD AUTO: 6.28 K/UL — SIGNIFICANT CHANGE UP (ref 4.8–10.8)

## 2022-09-23 RX ORDER — ACETAMINOPHEN 500 MG
650 TABLET ORAL EVERY 6 HOURS
Refills: 0 | Status: DISCONTINUED | OUTPATIENT
Start: 2022-09-23 | End: 2022-09-28

## 2022-09-23 RX ORDER — MECLIZINE HCL 12.5 MG
12.5 TABLET ORAL ONCE
Refills: 0 | Status: COMPLETED | OUTPATIENT
Start: 2022-09-23 | End: 2022-09-23

## 2022-09-23 RX ADMIN — GABAPENTIN 600 MILLIGRAM(S): 400 CAPSULE ORAL at 13:16

## 2022-09-23 RX ADMIN — GABAPENTIN 600 MILLIGRAM(S): 400 CAPSULE ORAL at 06:34

## 2022-09-23 RX ADMIN — Medication 100 MICROGRAM(S): at 06:33

## 2022-09-23 RX ADMIN — SERTRALINE 100 MILLIGRAM(S): 25 TABLET, FILM COATED ORAL at 13:16

## 2022-09-23 RX ADMIN — ENOXAPARIN SODIUM 40 MILLIGRAM(S): 100 INJECTION SUBCUTANEOUS at 13:22

## 2022-09-23 RX ADMIN — Medication 650 MILLIGRAM(S): at 18:44

## 2022-09-23 RX ADMIN — Medication 12.5 MILLIGRAM(S): at 06:33

## 2022-09-23 RX ADMIN — Medication 12.5 MILLIGRAM(S): at 22:04

## 2022-09-23 RX ADMIN — AMLODIPINE BESYLATE 10 MILLIGRAM(S): 2.5 TABLET ORAL at 06:34

## 2022-09-23 RX ADMIN — GABAPENTIN 600 MILLIGRAM(S): 400 CAPSULE ORAL at 22:04

## 2022-09-23 RX ADMIN — LOSARTAN POTASSIUM 100 MILLIGRAM(S): 100 TABLET, FILM COATED ORAL at 06:33

## 2022-09-23 RX ADMIN — SIMVASTATIN 40 MILLIGRAM(S): 20 TABLET, FILM COATED ORAL at 22:04

## 2022-09-23 RX ADMIN — DULOXETINE HYDROCHLORIDE 30 MILLIGRAM(S): 30 CAPSULE, DELAYED RELEASE ORAL at 13:15
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
## 2022-09-23 NOTE — PHYSICAL THERAPY INITIAL EVALUATION ADULT - PATIENT PROFILE REVIEW, REHAB EVAL
Chart reviewed. Unable to complete PT IE 2/2 no activity orders in medical chart. Current order is for bed rest. Please update and PT will evaluate and treat accordingly./yes
yes
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
## 2022-09-23 NOTE — PHYSICAL THERAPY INITIAL EVALUATION ADULT - TRANSFER SAFETY CONCERNS NOTED: SIT/STAND, REHAB EVAL
Pt c/o worsening dizziness. BP's 135/70, 140/71, and 127/74 taken supine, sitting at EOB, and in standing respectively.

## 2022-09-23 NOTE — PHYSICAL THERAPY INITIAL EVALUATION ADULT - NSPTDISCHREC_GEN_A_CORE
Will continue to monitor pt status. Mobility limited by pt c/o dizziness at this time./Sub-acute Rehab/Home PT
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
## 2022-09-23 NOTE — PHYSICAL THERAPY INITIAL EVALUATION ADULT - GENERAL OBSERVATIONS, REHAB EVAL
1025 - 1059 Pt rec semifowler in bed with +L AKA prosthetic already donned, +bed alarm, in NAD. Pt c/o dizziness t/o session in every position, although worsened in standing. Pt requested cold pack at end of session for headache, provided wrapped in sheet.

## 2022-09-23 NOTE — PHYSICAL THERAPY INITIAL EVALUATION ADULT - GAIT DEVIATIONS NOTED, PT EVAL
Decreased speed, distance limited by pt c/o dizziness./decreased isaac/decreased step length/decreased stride length

## 2022-09-23 NOTE — PHYSICAL THERAPY INITIAL EVALUATION ADULT - GAIT TRAINING, PT EVAL
1 week: Amb 100 ft with RW and LLE prosthetic with supervision, and negotiate 10 steps with step to step pattern and 1 handrail, with supervision.

## 2022-09-24 LAB
ALBUMIN SERPL ELPH-MCNC: 4.2 G/DL — SIGNIFICANT CHANGE UP (ref 3.5–5.2)
ALP SERPL-CCNC: 107 U/L — SIGNIFICANT CHANGE UP (ref 30–115)
ALT FLD-CCNC: 12 U/L — SIGNIFICANT CHANGE UP (ref 0–41)
ANION GAP SERPL CALC-SCNC: 14 MMOL/L — SIGNIFICANT CHANGE UP (ref 7–14)
AST SERPL-CCNC: 14 U/L — SIGNIFICANT CHANGE UP (ref 0–41)
BASOPHILS # BLD AUTO: 0.05 K/UL — SIGNIFICANT CHANGE UP (ref 0–0.2)
BASOPHILS NFR BLD AUTO: 0.7 % — SIGNIFICANT CHANGE UP (ref 0–1)
BILIRUB SERPL-MCNC: 0.6 MG/DL — SIGNIFICANT CHANGE UP (ref 0.2–1.2)
BUN SERPL-MCNC: 19 MG/DL — SIGNIFICANT CHANGE UP (ref 10–20)
CALCIUM SERPL-MCNC: 9.2 MG/DL — SIGNIFICANT CHANGE UP (ref 8.4–10.5)
CHLORIDE SERPL-SCNC: 99 MMOL/L — SIGNIFICANT CHANGE UP (ref 98–110)
CO2 SERPL-SCNC: 23 MMOL/L — SIGNIFICANT CHANGE UP (ref 17–32)
CREAT SERPL-MCNC: 0.6 MG/DL — LOW (ref 0.7–1.5)
EGFR: 95 ML/MIN/1.73M2 — SIGNIFICANT CHANGE UP
EOSINOPHIL # BLD AUTO: 0.15 K/UL — SIGNIFICANT CHANGE UP (ref 0–0.7)
EOSINOPHIL NFR BLD AUTO: 2.2 % — SIGNIFICANT CHANGE UP (ref 0–8)
GLUCOSE SERPL-MCNC: 100 MG/DL — HIGH (ref 70–99)
HCT VFR BLD CALC: 39.6 % — SIGNIFICANT CHANGE UP (ref 37–47)
HGB BLD-MCNC: 13.4 G/DL — SIGNIFICANT CHANGE UP (ref 12–16)
IMM GRANULOCYTES NFR BLD AUTO: 0.4 % — HIGH (ref 0.1–0.3)
LYMPHOCYTES # BLD AUTO: 2.05 K/UL — SIGNIFICANT CHANGE UP (ref 1.2–3.4)
LYMPHOCYTES # BLD AUTO: 29.7 % — SIGNIFICANT CHANGE UP (ref 20.5–51.1)
MAGNESIUM SERPL-MCNC: 1.9 MG/DL — SIGNIFICANT CHANGE UP (ref 1.8–2.4)
MCHC RBC-ENTMCNC: 30.4 PG — SIGNIFICANT CHANGE UP (ref 27–31)
MCHC RBC-ENTMCNC: 33.8 G/DL — SIGNIFICANT CHANGE UP (ref 32–37)
MCV RBC AUTO: 89.8 FL — SIGNIFICANT CHANGE UP (ref 81–99)
MONOCYTES # BLD AUTO: 0.63 K/UL — HIGH (ref 0.1–0.6)
MONOCYTES NFR BLD AUTO: 9.1 % — SIGNIFICANT CHANGE UP (ref 1.7–9.3)
NEUTROPHILS # BLD AUTO: 3.99 K/UL — SIGNIFICANT CHANGE UP (ref 1.4–6.5)
NEUTROPHILS NFR BLD AUTO: 57.9 % — SIGNIFICANT CHANGE UP (ref 42.2–75.2)
NRBC # BLD: 0 /100 WBCS — SIGNIFICANT CHANGE UP (ref 0–0)
PHOSPHATE SERPL-MCNC: 3.8 MG/DL — SIGNIFICANT CHANGE UP (ref 2.1–4.9)
PLATELET # BLD AUTO: 199 K/UL — SIGNIFICANT CHANGE UP (ref 130–400)
POTASSIUM SERPL-MCNC: 3.6 MMOL/L — SIGNIFICANT CHANGE UP (ref 3.5–5)
POTASSIUM SERPL-SCNC: 3.6 MMOL/L — SIGNIFICANT CHANGE UP (ref 3.5–5)
PROT SERPL-MCNC: 6.8 G/DL — SIGNIFICANT CHANGE UP (ref 6–8)
RBC # BLD: 4.41 M/UL — SIGNIFICANT CHANGE UP (ref 4.2–5.4)
RBC # FLD: 14 % — SIGNIFICANT CHANGE UP (ref 11.5–14.5)
SODIUM SERPL-SCNC: 136 MMOL/L — SIGNIFICANT CHANGE UP (ref 135–146)
WBC # BLD: 6.9 K/UL — SIGNIFICANT CHANGE UP (ref 4.8–10.8)
WBC # FLD AUTO: 6.9 K/UL — SIGNIFICANT CHANGE UP (ref 4.8–10.8)

## 2022-09-24 PROCEDURE — 99222 1ST HOSP IP/OBS MODERATE 55: CPT

## 2022-09-24 RX ORDER — LOSARTAN POTASSIUM 100 MG/1
50 TABLET, FILM COATED ORAL DAILY
Refills: 0 | Status: DISCONTINUED | OUTPATIENT
Start: 2022-09-24 | End: 2022-09-28

## 2022-09-24 RX ADMIN — ENOXAPARIN SODIUM 40 MILLIGRAM(S): 100 INJECTION SUBCUTANEOUS at 14:11

## 2022-09-24 RX ADMIN — DULOXETINE HYDROCHLORIDE 30 MILLIGRAM(S): 30 CAPSULE, DELAYED RELEASE ORAL at 11:19

## 2022-09-24 RX ADMIN — Medication 12.5 MILLIGRAM(S): at 06:08

## 2022-09-24 RX ADMIN — Medication 100 MICROGRAM(S): at 06:08

## 2022-09-24 RX ADMIN — LOSARTAN POTASSIUM 100 MILLIGRAM(S): 100 TABLET, FILM COATED ORAL at 06:08

## 2022-09-24 RX ADMIN — SERTRALINE 100 MILLIGRAM(S): 25 TABLET, FILM COATED ORAL at 11:19

## 2022-09-24 RX ADMIN — AMLODIPINE BESYLATE 10 MILLIGRAM(S): 2.5 TABLET ORAL at 06:08

## 2022-09-24 RX ADMIN — SIMVASTATIN 40 MILLIGRAM(S): 20 TABLET, FILM COATED ORAL at 21:48

## 2022-09-24 RX ADMIN — GABAPENTIN 600 MILLIGRAM(S): 400 CAPSULE ORAL at 22:20

## 2022-09-24 RX ADMIN — Medication 650 MILLIGRAM(S): at 18:14

## 2022-09-24 RX ADMIN — GABAPENTIN 600 MILLIGRAM(S): 400 CAPSULE ORAL at 14:11

## 2022-09-24 RX ADMIN — GABAPENTIN 600 MILLIGRAM(S): 400 CAPSULE ORAL at 06:08

## 2022-09-24 NOTE — CONSULT NOTE ADULT - ATTENDING COMMENTS
Patient examined 9/24/22.   She had syncope and head injury now right BPPV likely post traumatic.  Epley maneuver performed.  Black dots in right eye described as moving.  Visual disturbance post traumatic, possible AION.  Recommend carotid doppler, and ophthalmology evaluation.  Recommend hydration and telemetry due to syncope.  Vestibular rehab if vertigo recurs.
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
## 2022-09-25 LAB
ALBUMIN SERPL ELPH-MCNC: 4.2 G/DL — SIGNIFICANT CHANGE UP (ref 3.5–5.2)
ALP SERPL-CCNC: 109 U/L — SIGNIFICANT CHANGE UP (ref 30–115)
ALT FLD-CCNC: 13 U/L — SIGNIFICANT CHANGE UP (ref 0–41)
ANION GAP SERPL CALC-SCNC: 13 MMOL/L — SIGNIFICANT CHANGE UP (ref 7–14)
AST SERPL-CCNC: 16 U/L — SIGNIFICANT CHANGE UP (ref 0–41)
BASOPHILS # BLD AUTO: 0.05 K/UL — SIGNIFICANT CHANGE UP (ref 0–0.2)
BASOPHILS NFR BLD AUTO: 0.8 % — SIGNIFICANT CHANGE UP (ref 0–1)
BILIRUB SERPL-MCNC: 0.6 MG/DL — SIGNIFICANT CHANGE UP (ref 0.2–1.2)
BUN SERPL-MCNC: 20 MG/DL — SIGNIFICANT CHANGE UP (ref 10–20)
CALCIUM SERPL-MCNC: 9.4 MG/DL — SIGNIFICANT CHANGE UP (ref 8.4–10.5)
CHLORIDE SERPL-SCNC: 101 MMOL/L — SIGNIFICANT CHANGE UP (ref 98–110)
CO2 SERPL-SCNC: 25 MMOL/L — SIGNIFICANT CHANGE UP (ref 17–32)
CREAT SERPL-MCNC: 0.6 MG/DL — LOW (ref 0.7–1.5)
EGFR: 95 ML/MIN/1.73M2 — SIGNIFICANT CHANGE UP
EOSINOPHIL # BLD AUTO: 0.16 K/UL — SIGNIFICANT CHANGE UP (ref 0–0.7)
EOSINOPHIL NFR BLD AUTO: 2.6 % — SIGNIFICANT CHANGE UP (ref 0–8)
GLUCOSE SERPL-MCNC: 90 MG/DL — SIGNIFICANT CHANGE UP (ref 70–99)
HCT VFR BLD CALC: 39.6 % — SIGNIFICANT CHANGE UP (ref 37–47)
HGB BLD-MCNC: 13.8 G/DL — SIGNIFICANT CHANGE UP (ref 12–16)
IMM GRANULOCYTES NFR BLD AUTO: 0.2 % — SIGNIFICANT CHANGE UP (ref 0.1–0.3)
LYMPHOCYTES # BLD AUTO: 1.92 K/UL — SIGNIFICANT CHANGE UP (ref 1.2–3.4)
LYMPHOCYTES # BLD AUTO: 30.8 % — SIGNIFICANT CHANGE UP (ref 20.5–51.1)
MAGNESIUM SERPL-MCNC: 2 MG/DL — SIGNIFICANT CHANGE UP (ref 1.8–2.4)
MCHC RBC-ENTMCNC: 31.2 PG — HIGH (ref 27–31)
MCHC RBC-ENTMCNC: 34.8 G/DL — SIGNIFICANT CHANGE UP (ref 32–37)
MCV RBC AUTO: 89.4 FL — SIGNIFICANT CHANGE UP (ref 81–99)
MONOCYTES # BLD AUTO: 0.55 K/UL — SIGNIFICANT CHANGE UP (ref 0.1–0.6)
MONOCYTES NFR BLD AUTO: 8.8 % — SIGNIFICANT CHANGE UP (ref 1.7–9.3)
NEUTROPHILS # BLD AUTO: 3.54 K/UL — SIGNIFICANT CHANGE UP (ref 1.4–6.5)
NEUTROPHILS NFR BLD AUTO: 56.8 % — SIGNIFICANT CHANGE UP (ref 42.2–75.2)
NRBC # BLD: 0 /100 WBCS — SIGNIFICANT CHANGE UP (ref 0–0)
PHOSPHATE SERPL-MCNC: 4 MG/DL — SIGNIFICANT CHANGE UP (ref 2.1–4.9)
PLATELET # BLD AUTO: 198 K/UL — SIGNIFICANT CHANGE UP (ref 130–400)
POTASSIUM SERPL-MCNC: 3.7 MMOL/L — SIGNIFICANT CHANGE UP (ref 3.5–5)
POTASSIUM SERPL-SCNC: 3.7 MMOL/L — SIGNIFICANT CHANGE UP (ref 3.5–5)
PROT SERPL-MCNC: 6.8 G/DL — SIGNIFICANT CHANGE UP (ref 6–8)
RBC # BLD: 4.43 M/UL — SIGNIFICANT CHANGE UP (ref 4.2–5.4)
RBC # FLD: 13.9 % — SIGNIFICANT CHANGE UP (ref 11.5–14.5)
SODIUM SERPL-SCNC: 139 MMOL/L — SIGNIFICANT CHANGE UP (ref 135–146)
WBC # BLD: 6.23 K/UL — SIGNIFICANT CHANGE UP (ref 4.8–10.8)
WBC # FLD AUTO: 6.23 K/UL — SIGNIFICANT CHANGE UP (ref 4.8–10.8)

## 2022-09-25 RX ADMIN — ENOXAPARIN SODIUM 40 MILLIGRAM(S): 100 INJECTION SUBCUTANEOUS at 14:29

## 2022-09-25 RX ADMIN — SIMVASTATIN 40 MILLIGRAM(S): 20 TABLET, FILM COATED ORAL at 21:24

## 2022-09-25 RX ADMIN — Medication 100 MICROGRAM(S): at 05:48

## 2022-09-25 RX ADMIN — LOSARTAN POTASSIUM 50 MILLIGRAM(S): 100 TABLET, FILM COATED ORAL at 05:49

## 2022-09-25 RX ADMIN — DULOXETINE HYDROCHLORIDE 30 MILLIGRAM(S): 30 CAPSULE, DELAYED RELEASE ORAL at 13:00

## 2022-09-25 RX ADMIN — GABAPENTIN 600 MILLIGRAM(S): 400 CAPSULE ORAL at 05:48

## 2022-09-25 RX ADMIN — GABAPENTIN 600 MILLIGRAM(S): 400 CAPSULE ORAL at 14:29

## 2022-09-25 RX ADMIN — AMLODIPINE BESYLATE 10 MILLIGRAM(S): 2.5 TABLET ORAL at 05:48

## 2022-09-25 RX ADMIN — Medication 650 MILLIGRAM(S): at 13:02

## 2022-09-25 RX ADMIN — Medication 12.5 MILLIGRAM(S): at 05:48

## 2022-09-25 RX ADMIN — GABAPENTIN 600 MILLIGRAM(S): 400 CAPSULE ORAL at 21:24

## 2022-09-25 RX ADMIN — SERTRALINE 100 MILLIGRAM(S): 25 TABLET, FILM COATED ORAL at 13:00

## 2022-09-25 RX ADMIN — Medication 650 MILLIGRAM(S): at 20:44
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
## 2022-09-26 ENCOUNTER — APPOINTMENT (OUTPATIENT)
Dept: NEUROLOGY | Facility: CLINIC | Age: 72
End: 2022-09-26

## 2022-09-26 LAB
ALBUMIN SERPL ELPH-MCNC: 4.5 G/DL — SIGNIFICANT CHANGE UP (ref 3.5–5.2)
ALP SERPL-CCNC: 120 U/L — HIGH (ref 30–115)
ALT FLD-CCNC: 18 U/L — SIGNIFICANT CHANGE UP (ref 0–41)
ANION GAP SERPL CALC-SCNC: 13 MMOL/L — SIGNIFICANT CHANGE UP (ref 7–14)
AST SERPL-CCNC: 20 U/L — SIGNIFICANT CHANGE UP (ref 0–41)
BASOPHILS # BLD AUTO: 0.05 K/UL — SIGNIFICANT CHANGE UP (ref 0–0.2)
BASOPHILS NFR BLD AUTO: 0.8 % — SIGNIFICANT CHANGE UP (ref 0–1)
BILIRUB SERPL-MCNC: 0.4 MG/DL — SIGNIFICANT CHANGE UP (ref 0.2–1.2)
BUN SERPL-MCNC: 25 MG/DL — HIGH (ref 10–20)
CALCIUM SERPL-MCNC: 9.6 MG/DL — SIGNIFICANT CHANGE UP (ref 8.4–10.5)
CHLORIDE SERPL-SCNC: 100 MMOL/L — SIGNIFICANT CHANGE UP (ref 98–110)
CO2 SERPL-SCNC: 26 MMOL/L — SIGNIFICANT CHANGE UP (ref 17–32)
CREAT SERPL-MCNC: 0.6 MG/DL — LOW (ref 0.7–1.5)
EGFR: 95 ML/MIN/1.73M2 — SIGNIFICANT CHANGE UP
EOSINOPHIL # BLD AUTO: 0.17 K/UL — SIGNIFICANT CHANGE UP (ref 0–0.7)
EOSINOPHIL NFR BLD AUTO: 2.7 % — SIGNIFICANT CHANGE UP (ref 0–8)
GLUCOSE SERPL-MCNC: 97 MG/DL — SIGNIFICANT CHANGE UP (ref 70–99)
HCT VFR BLD CALC: 38.9 % — SIGNIFICANT CHANGE UP (ref 37–47)
HGB BLD-MCNC: 13.8 G/DL — SIGNIFICANT CHANGE UP (ref 12–16)
IMM GRANULOCYTES NFR BLD AUTO: 0.3 % — SIGNIFICANT CHANGE UP (ref 0.1–0.3)
LYMPHOCYTES # BLD AUTO: 1.96 K/UL — SIGNIFICANT CHANGE UP (ref 1.2–3.4)
LYMPHOCYTES # BLD AUTO: 31 % — SIGNIFICANT CHANGE UP (ref 20.5–51.1)
MAGNESIUM SERPL-MCNC: 1.9 MG/DL — SIGNIFICANT CHANGE UP (ref 1.8–2.4)
MCHC RBC-ENTMCNC: 31.9 PG — HIGH (ref 27–31)
MCHC RBC-ENTMCNC: 35.5 G/DL — SIGNIFICANT CHANGE UP (ref 32–37)
MCV RBC AUTO: 90 FL — SIGNIFICANT CHANGE UP (ref 81–99)
MONOCYTES # BLD AUTO: 0.6 K/UL — SIGNIFICANT CHANGE UP (ref 0.1–0.6)
MONOCYTES NFR BLD AUTO: 9.5 % — HIGH (ref 1.7–9.3)
NEUTROPHILS # BLD AUTO: 3.53 K/UL — SIGNIFICANT CHANGE UP (ref 1.4–6.5)
NEUTROPHILS NFR BLD AUTO: 55.7 % — SIGNIFICANT CHANGE UP (ref 42.2–75.2)
NRBC # BLD: 0 /100 WBCS — SIGNIFICANT CHANGE UP (ref 0–0)
PHOSPHATE SERPL-MCNC: 3.9 MG/DL — SIGNIFICANT CHANGE UP (ref 2.1–4.9)
PLATELET # BLD AUTO: 193 K/UL — SIGNIFICANT CHANGE UP (ref 130–400)
POTASSIUM SERPL-MCNC: 3.9 MMOL/L — SIGNIFICANT CHANGE UP (ref 3.5–5)
POTASSIUM SERPL-SCNC: 3.9 MMOL/L — SIGNIFICANT CHANGE UP (ref 3.5–5)
PROT SERPL-MCNC: 7.1 G/DL — SIGNIFICANT CHANGE UP (ref 6–8)
RBC # BLD: 4.32 M/UL — SIGNIFICANT CHANGE UP (ref 4.2–5.4)
RBC # FLD: 14.2 % — SIGNIFICANT CHANGE UP (ref 11.5–14.5)
SODIUM SERPL-SCNC: 139 MMOL/L — SIGNIFICANT CHANGE UP (ref 135–146)
WBC # BLD: 6.33 K/UL — SIGNIFICANT CHANGE UP (ref 4.8–10.8)
WBC # FLD AUTO: 6.33 K/UL — SIGNIFICANT CHANGE UP (ref 4.8–10.8)

## 2022-09-26 PROCEDURE — 99221 1ST HOSP IP/OBS SF/LOW 40: CPT

## 2022-09-26 PROCEDURE — 99232 SBSQ HOSP IP/OBS MODERATE 35: CPT

## 2022-09-26 RX ORDER — ONDANSETRON 8 MG/1
4 TABLET, FILM COATED ORAL ONCE
Refills: 0 | Status: COMPLETED | OUTPATIENT
Start: 2022-09-26 | End: 2022-09-26

## 2022-09-26 RX ADMIN — GABAPENTIN 600 MILLIGRAM(S): 400 CAPSULE ORAL at 14:33

## 2022-09-26 RX ADMIN — SERTRALINE 100 MILLIGRAM(S): 25 TABLET, FILM COATED ORAL at 14:32

## 2022-09-26 RX ADMIN — LOSARTAN POTASSIUM 50 MILLIGRAM(S): 100 TABLET, FILM COATED ORAL at 05:35

## 2022-09-26 RX ADMIN — GABAPENTIN 600 MILLIGRAM(S): 400 CAPSULE ORAL at 05:35

## 2022-09-26 RX ADMIN — GABAPENTIN 600 MILLIGRAM(S): 400 CAPSULE ORAL at 22:17

## 2022-09-26 RX ADMIN — Medication 650 MILLIGRAM(S): at 18:10

## 2022-09-26 RX ADMIN — ONDANSETRON 4 MILLIGRAM(S): 8 TABLET, FILM COATED ORAL at 11:25

## 2022-09-26 RX ADMIN — SIMVASTATIN 40 MILLIGRAM(S): 20 TABLET, FILM COATED ORAL at 22:16

## 2022-09-26 RX ADMIN — Medication 100 MICROGRAM(S): at 05:35

## 2022-09-26 RX ADMIN — AMLODIPINE BESYLATE 10 MILLIGRAM(S): 2.5 TABLET ORAL at 05:35

## 2022-09-26 RX ADMIN — ENOXAPARIN SODIUM 40 MILLIGRAM(S): 100 INJECTION SUBCUTANEOUS at 14:33

## 2022-09-26 RX ADMIN — DULOXETINE HYDROCHLORIDE 30 MILLIGRAM(S): 30 CAPSULE, DELAYED RELEASE ORAL at 14:33

## 2022-09-26 RX ADMIN — Medication 12.5 MILLIGRAM(S): at 05:35
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
## 2022-09-26 NOTE — CONSULT NOTE ADULT - ASSESSMENT
Assessment  1. Posterior vitreous detachment Right eye/left eye  2. Paramacular fine drusen OU  3. Trace epiretinal membrane nasal OU    Plan  Pt educated on the signs/sxs of Retinal Detachment.  Given the acute nature of the sxs, recommend f/u with Retina within 1 month unless   change in sxs or vision.  Appt made wit Dr. Ulisses Cortes MD for  October 28, 2022   done
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
## 2022-09-27 ENCOUNTER — TRANSCRIPTION ENCOUNTER (OUTPATIENT)
Age: 72
End: 2022-09-27

## 2022-09-27 LAB
ALBUMIN SERPL ELPH-MCNC: 4.2 G/DL — SIGNIFICANT CHANGE UP (ref 3.5–5.2)
ALP SERPL-CCNC: 113 U/L — SIGNIFICANT CHANGE UP (ref 30–115)
ALT FLD-CCNC: 19 U/L — SIGNIFICANT CHANGE UP (ref 0–41)
ANION GAP SERPL CALC-SCNC: 16 MMOL/L — HIGH (ref 7–14)
AST SERPL-CCNC: 22 U/L — SIGNIFICANT CHANGE UP (ref 0–41)
BASOPHILS # BLD AUTO: 0.05 K/UL — SIGNIFICANT CHANGE UP (ref 0–0.2)
BASOPHILS NFR BLD AUTO: 0.8 % — SIGNIFICANT CHANGE UP (ref 0–1)
BILIRUB SERPL-MCNC: 0.5 MG/DL — SIGNIFICANT CHANGE UP (ref 0.2–1.2)
BUN SERPL-MCNC: 18 MG/DL — SIGNIFICANT CHANGE UP (ref 10–20)
CALCIUM SERPL-MCNC: 9.7 MG/DL — SIGNIFICANT CHANGE UP (ref 8.4–10.4)
CHLORIDE SERPL-SCNC: 101 MMOL/L — SIGNIFICANT CHANGE UP (ref 98–110)
CO2 SERPL-SCNC: 24 MMOL/L — SIGNIFICANT CHANGE UP (ref 17–32)
CREAT SERPL-MCNC: 0.6 MG/DL — LOW (ref 0.7–1.5)
EGFR: 95 ML/MIN/1.73M2 — SIGNIFICANT CHANGE UP
EOSINOPHIL # BLD AUTO: 0.18 K/UL — SIGNIFICANT CHANGE UP (ref 0–0.7)
EOSINOPHIL NFR BLD AUTO: 2.8 % — SIGNIFICANT CHANGE UP (ref 0–8)
GLUCOSE SERPL-MCNC: 99 MG/DL — SIGNIFICANT CHANGE UP (ref 70–99)
HCT VFR BLD CALC: 40 % — SIGNIFICANT CHANGE UP (ref 37–47)
HGB BLD-MCNC: 13.6 G/DL — SIGNIFICANT CHANGE UP (ref 12–16)
IMM GRANULOCYTES NFR BLD AUTO: 0.5 % — HIGH (ref 0.1–0.3)
LYMPHOCYTES # BLD AUTO: 2.19 K/UL — SIGNIFICANT CHANGE UP (ref 1.2–3.4)
LYMPHOCYTES # BLD AUTO: 34 % — SIGNIFICANT CHANGE UP (ref 20.5–51.1)
MAGNESIUM SERPL-MCNC: 1.8 MG/DL — SIGNIFICANT CHANGE UP (ref 1.8–2.4)
MCHC RBC-ENTMCNC: 30.6 PG — SIGNIFICANT CHANGE UP (ref 27–31)
MCHC RBC-ENTMCNC: 34 G/DL — SIGNIFICANT CHANGE UP (ref 32–37)
MCV RBC AUTO: 90.1 FL — SIGNIFICANT CHANGE UP (ref 81–99)
MONOCYTES # BLD AUTO: 0.62 K/UL — HIGH (ref 0.1–0.6)
MONOCYTES NFR BLD AUTO: 9.6 % — HIGH (ref 1.7–9.3)
NEUTROPHILS # BLD AUTO: 3.37 K/UL — SIGNIFICANT CHANGE UP (ref 1.4–6.5)
NEUTROPHILS NFR BLD AUTO: 52.3 % — SIGNIFICANT CHANGE UP (ref 42.2–75.2)
NRBC # BLD: 0 /100 WBCS — SIGNIFICANT CHANGE UP (ref 0–0)
PHOSPHATE SERPL-MCNC: 3.6 MG/DL — SIGNIFICANT CHANGE UP (ref 2.1–4.9)
PLATELET # BLD AUTO: 204 K/UL — SIGNIFICANT CHANGE UP (ref 130–400)
POTASSIUM SERPL-MCNC: 4 MMOL/L — SIGNIFICANT CHANGE UP (ref 3.5–5)
POTASSIUM SERPL-SCNC: 4 MMOL/L — SIGNIFICANT CHANGE UP (ref 3.5–5)
PROT SERPL-MCNC: 7 G/DL — SIGNIFICANT CHANGE UP (ref 6–8)
RBC # BLD: 4.44 M/UL — SIGNIFICANT CHANGE UP (ref 4.2–5.4)
RBC # FLD: 13.8 % — SIGNIFICANT CHANGE UP (ref 11.5–14.5)
SODIUM SERPL-SCNC: 141 MMOL/L — SIGNIFICANT CHANGE UP (ref 135–146)
WBC # BLD: 6.44 K/UL — SIGNIFICANT CHANGE UP (ref 4.8–10.8)
WBC # FLD AUTO: 6.44 K/UL — SIGNIFICANT CHANGE UP (ref 4.8–10.8)

## 2022-09-27 PROCEDURE — 93306 TTE W/DOPPLER COMPLETE: CPT | Mod: 26

## 2022-09-27 PROCEDURE — 93880 EXTRACRANIAL BILAT STUDY: CPT | Mod: 26

## 2022-09-27 RX ORDER — ASPIRIN/CALCIUM CARB/MAGNESIUM 324 MG
81 TABLET ORAL DAILY
Refills: 0 | Status: DISCONTINUED | OUTPATIENT
Start: 2022-09-27 | End: 2022-09-28

## 2022-09-27 RX ORDER — METOPROLOL TARTRATE 50 MG
50 TABLET ORAL DAILY
Refills: 0 | Status: DISCONTINUED | OUTPATIENT
Start: 2022-09-27 | End: 2022-09-28

## 2022-09-27 RX ADMIN — SIMVASTATIN 40 MILLIGRAM(S): 20 TABLET, FILM COATED ORAL at 22:03

## 2022-09-27 RX ADMIN — LOSARTAN POTASSIUM 50 MILLIGRAM(S): 100 TABLET, FILM COATED ORAL at 06:11

## 2022-09-27 RX ADMIN — Medication 100 MICROGRAM(S): at 06:10

## 2022-09-27 RX ADMIN — Medication 12.5 MILLIGRAM(S): at 06:11

## 2022-09-27 RX ADMIN — Medication 650 MILLIGRAM(S): at 11:51

## 2022-09-27 RX ADMIN — AMLODIPINE BESYLATE 10 MILLIGRAM(S): 2.5 TABLET ORAL at 06:11

## 2022-09-27 RX ADMIN — GABAPENTIN 600 MILLIGRAM(S): 400 CAPSULE ORAL at 14:53

## 2022-09-27 RX ADMIN — GABAPENTIN 600 MILLIGRAM(S): 400 CAPSULE ORAL at 22:05

## 2022-09-27 RX ADMIN — DULOXETINE HYDROCHLORIDE 30 MILLIGRAM(S): 30 CAPSULE, DELAYED RELEASE ORAL at 11:49

## 2022-09-27 RX ADMIN — SERTRALINE 100 MILLIGRAM(S): 25 TABLET, FILM COATED ORAL at 11:49

## 2022-09-27 RX ADMIN — GABAPENTIN 600 MILLIGRAM(S): 400 CAPSULE ORAL at 06:11

## 2022-09-27 RX ADMIN — ENOXAPARIN SODIUM 40 MILLIGRAM(S): 100 INJECTION SUBCUTANEOUS at 14:53
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
## 2022-09-27 NOTE — DISCHARGE NOTE NURSING/CASE MANAGEMENT/SOCIAL WORK - NSDCVIVACCINE_GEN_ALL_CORE_FT
No Vaccines Administered. COVID-19, mRNA, LNP-S, PF, 30 mcg/0.3 mL dose, bradley-sucrose (Pfizer); 28-Sep-2022 16:04; Louissaint, Nancy (RN); Pf[employer] Inc; [id number] (Exp. Date: 07-Dec-2022); IntraMuscular; Deltoid Left.; 0.3 milliLiter(s);   influenza, high-dose, quadrivalent; 28-Sep-2022 15:50; Louissaint, Nancy (RN); [employer]; [id number] (Exp. Date: 30-Jun-2023); IntraMuscular; Deltoid Left.; 0.7 milliLiter(s); VIS (VIS Published: 06-Aug-2021, VIS Presented: 28-Sep-2022);

## 2022-09-27 NOTE — DISCHARGE NOTE NURSING/CASE MANAGEMENT/SOCIAL WORK - PATIENT PORTAL LINK FT
You can access the FollowMyHealth Patient Portal offered by [facility] by registering at the following website: [email or web address]. By joining [facility]’s FollowMyHealth portal, you will also be able to view your health information using other applications (apps) compatible with our system.

## 2022-09-27 NOTE — DISCHARGE NOTE NURSING/CASE MANAGEMENT/SOCIAL WORK - 
ADDITIONAL INFORMATION
Patient previously received Moderna dose 1 and 2 ,  last administered 03/01/2021. Patient also received Booster of Pfizer 09/28/2022

## 2022-09-28 ENCOUNTER — TRANSCRIPTION ENCOUNTER (OUTPATIENT)
Age: 72
End: 2022-09-28

## 2022-09-28 VITALS
DIASTOLIC BLOOD PRESSURE: 71 MMHG | HEART RATE: 71 BPM | RESPIRATION RATE: 18 BRPM | TEMPERATURE: 97 F | SYSTOLIC BLOOD PRESSURE: 121 MMHG

## 2022-09-28 LAB
ALBUMIN SERPL ELPH-MCNC: 4.2 G/DL — SIGNIFICANT CHANGE UP (ref 3.5–5.2)
ALP SERPL-CCNC: 106 U/L — SIGNIFICANT CHANGE UP (ref 30–115)
ALT FLD-CCNC: 18 U/L — SIGNIFICANT CHANGE UP (ref 0–41)
ANION GAP SERPL CALC-SCNC: 12 MMOL/L — SIGNIFICANT CHANGE UP (ref 7–14)
AST SERPL-CCNC: 20 U/L — SIGNIFICANT CHANGE UP (ref 0–41)
BASOPHILS # BLD AUTO: 0.04 K/UL — SIGNIFICANT CHANGE UP (ref 0–0.2)
BASOPHILS NFR BLD AUTO: 0.7 % — SIGNIFICANT CHANGE UP (ref 0–1)
BILIRUB SERPL-MCNC: 0.5 MG/DL — SIGNIFICANT CHANGE UP (ref 0.2–1.2)
BUN SERPL-MCNC: 22 MG/DL — HIGH (ref 10–20)
CALCIUM SERPL-MCNC: 9.2 MG/DL — SIGNIFICANT CHANGE UP (ref 8.4–10.5)
CHLORIDE SERPL-SCNC: 101 MMOL/L — SIGNIFICANT CHANGE UP (ref 98–110)
CO2 SERPL-SCNC: 25 MMOL/L — SIGNIFICANT CHANGE UP (ref 17–32)
CREAT SERPL-MCNC: 0.6 MG/DL — LOW (ref 0.7–1.5)
EGFR: 95 ML/MIN/1.73M2 — SIGNIFICANT CHANGE UP
EOSINOPHIL # BLD AUTO: 0.13 K/UL — SIGNIFICANT CHANGE UP (ref 0–0.7)
EOSINOPHIL NFR BLD AUTO: 2.2 % — SIGNIFICANT CHANGE UP (ref 0–8)
GLUCOSE SERPL-MCNC: 88 MG/DL — SIGNIFICANT CHANGE UP (ref 70–99)
HCT VFR BLD CALC: 39 % — SIGNIFICANT CHANGE UP (ref 37–47)
HGB BLD-MCNC: 13.5 G/DL — SIGNIFICANT CHANGE UP (ref 12–16)
IMM GRANULOCYTES NFR BLD AUTO: 0.2 % — SIGNIFICANT CHANGE UP (ref 0.1–0.3)
LYMPHOCYTES # BLD AUTO: 1.87 K/UL — SIGNIFICANT CHANGE UP (ref 1.2–3.4)
LYMPHOCYTES # BLD AUTO: 32.2 % — SIGNIFICANT CHANGE UP (ref 20.5–51.1)
MAGNESIUM SERPL-MCNC: 1.8 MG/DL — SIGNIFICANT CHANGE UP (ref 1.8–2.4)
MCHC RBC-ENTMCNC: 30.8 PG — SIGNIFICANT CHANGE UP (ref 27–31)
MCHC RBC-ENTMCNC: 34.6 G/DL — SIGNIFICANT CHANGE UP (ref 32–37)
MCV RBC AUTO: 89 FL — SIGNIFICANT CHANGE UP (ref 81–99)
MONOCYTES # BLD AUTO: 0.56 K/UL — SIGNIFICANT CHANGE UP (ref 0.1–0.6)
MONOCYTES NFR BLD AUTO: 9.6 % — HIGH (ref 1.7–9.3)
NEUTROPHILS # BLD AUTO: 3.2 K/UL — SIGNIFICANT CHANGE UP (ref 1.4–6.5)
NEUTROPHILS NFR BLD AUTO: 55.1 % — SIGNIFICANT CHANGE UP (ref 42.2–75.2)
NRBC # BLD: 0 /100 WBCS — SIGNIFICANT CHANGE UP (ref 0–0)
PHOSPHATE SERPL-MCNC: 3.8 MG/DL — SIGNIFICANT CHANGE UP (ref 2.1–4.9)
PLATELET # BLD AUTO: 195 K/UL — SIGNIFICANT CHANGE UP (ref 130–400)
POTASSIUM SERPL-MCNC: 3.9 MMOL/L — SIGNIFICANT CHANGE UP (ref 3.5–5)
POTASSIUM SERPL-SCNC: 3.9 MMOL/L — SIGNIFICANT CHANGE UP (ref 3.5–5)
PROT SERPL-MCNC: 6.8 G/DL — SIGNIFICANT CHANGE UP (ref 6–8)
RBC # BLD: 4.38 M/UL — SIGNIFICANT CHANGE UP (ref 4.2–5.4)
RBC # FLD: 13.7 % — SIGNIFICANT CHANGE UP (ref 11.5–14.5)
SODIUM SERPL-SCNC: 138 MMOL/L — SIGNIFICANT CHANGE UP (ref 135–146)
WBC # BLD: 5.81 K/UL — SIGNIFICANT CHANGE UP (ref 4.8–10.8)
WBC # FLD AUTO: 5.81 K/UL — SIGNIFICANT CHANGE UP (ref 4.8–10.8)

## 2022-09-28 PROCEDURE — 99238 HOSP IP/OBS DSCHRG MGMT 30/<: CPT | Mod: GC

## 2022-09-28 RX ORDER — DULOXETINE HYDROCHLORIDE 30 MG/1
1 CAPSULE, DELAYED RELEASE ORAL
Qty: 30 | Refills: 0
Start: 2022-09-28 | End: 2022-10-27

## 2022-09-28 RX ORDER — AMLODIPINE BESYLATE 2.5 MG/1
1 TABLET ORAL
Qty: 30 | Refills: 0
Start: 2022-09-28 | End: 2022-10-27

## 2022-09-28 RX ORDER — METOPROLOL TARTRATE 50 MG
1 TABLET ORAL
Qty: 0 | Refills: 0 | DISCHARGE
Start: 2022-09-28

## 2022-09-28 RX ORDER — LOSARTAN POTASSIUM 100 MG/1
1 TABLET, FILM COATED ORAL
Qty: 30 | Refills: 0
Start: 2022-09-28 | End: 2022-10-27

## 2022-09-28 RX ORDER — INFLUENZA VIRUS VACCINE 15; 15; 15; 15 UG/.5ML; UG/.5ML; UG/.5ML; UG/.5ML
0.7 SUSPENSION INTRAMUSCULAR ONCE
Refills: 0 | Status: DISCONTINUED | OUTPATIENT
Start: 2022-09-28 | End: 2022-09-28

## 2022-09-28 RX ORDER — INFLUENZA VIRUS VACCINE 15; 15; 15; 15 UG/.5ML; UG/.5ML; UG/.5ML; UG/.5ML
0.7 SUSPENSION INTRAMUSCULAR ONCE
Refills: 0 | Status: COMPLETED | OUTPATIENT
Start: 2022-09-28 | End: 2022-09-28

## 2022-09-28 RX ORDER — ASPIRIN/CALCIUM CARB/MAGNESIUM 324 MG
1 TABLET ORAL
Qty: 30 | Refills: 0
Start: 2022-09-28 | End: 2022-10-27

## 2022-09-28 RX ORDER — ASPIRIN/CALCIUM CARB/MAGNESIUM 324 MG
1 TABLET ORAL
Qty: 0 | Refills: 0 | DISCHARGE
Start: 2022-09-28

## 2022-09-28 RX ORDER — SIMVASTATIN 20 MG/1
1 TABLET, FILM COATED ORAL
Qty: 0 | Refills: 0 | DISCHARGE

## 2022-09-28 RX ORDER — BNT162B2 0.23 MG/2.25ML
0.3 INJECTION, SUSPENSION INTRAMUSCULAR ONCE
Refills: 0 | Status: COMPLETED | OUTPATIENT
Start: 2022-09-28 | End: 2022-09-28

## 2022-09-28 RX ORDER — SERTRALINE 25 MG/1
1 TABLET, FILM COATED ORAL
Qty: 30 | Refills: 0
Start: 2022-09-28 | End: 2022-10-27

## 2022-09-28 RX ORDER — SIMVASTATIN 20 MG/1
1 TABLET, FILM COATED ORAL
Qty: 30 | Refills: 0
Start: 2022-09-28 | End: 2022-10-27

## 2022-09-28 RX ORDER — GABAPENTIN 400 MG/1
1 CAPSULE ORAL
Qty: 0 | Refills: 0 | DISCHARGE

## 2022-09-28 RX ORDER — AMLODIPINE BESYLATE 2.5 MG/1
1 TABLET ORAL
Qty: 0 | Refills: 0 | DISCHARGE

## 2022-09-28 RX ORDER — DULOXETINE HYDROCHLORIDE 30 MG/1
1 CAPSULE, DELAYED RELEASE ORAL
Qty: 0 | Refills: 0 | DISCHARGE

## 2022-09-28 RX ORDER — METOPROLOL TARTRATE 50 MG
1 TABLET ORAL
Qty: 30 | Refills: 0
Start: 2022-09-28 | End: 2022-10-27

## 2022-09-28 RX ORDER — GABAPENTIN 400 MG/1
1 CAPSULE ORAL
Qty: 90 | Refills: 0
Start: 2022-09-28 | End: 2022-10-27

## 2022-09-28 RX ADMIN — GABAPENTIN 600 MILLIGRAM(S): 400 CAPSULE ORAL at 06:06

## 2022-09-28 RX ADMIN — Medication 50 MILLIGRAM(S): at 06:03

## 2022-09-28 RX ADMIN — Medication 100 MICROGRAM(S): at 06:04

## 2022-09-28 RX ADMIN — AMLODIPINE BESYLATE 10 MILLIGRAM(S): 2.5 TABLET ORAL at 06:04

## 2022-09-28 RX ADMIN — ENOXAPARIN SODIUM 40 MILLIGRAM(S): 100 INJECTION SUBCUTANEOUS at 15:50

## 2022-09-28 RX ADMIN — LOSARTAN POTASSIUM 50 MILLIGRAM(S): 100 TABLET, FILM COATED ORAL at 06:04

## 2022-09-28 RX ADMIN — DULOXETINE HYDROCHLORIDE 30 MILLIGRAM(S): 30 CAPSULE, DELAYED RELEASE ORAL at 13:16

## 2022-09-28 RX ADMIN — GABAPENTIN 600 MILLIGRAM(S): 400 CAPSULE ORAL at 13:17

## 2022-09-28 RX ADMIN — INFLUENZA VIRUS VACCINE 0.7 MILLILITER(S): 15; 15; 15; 15 SUSPENSION INTRAMUSCULAR at 15:50

## 2022-09-28 RX ADMIN — BNT162B2 0.3 MILLILITER(S): 0.23 INJECTION, SUSPENSION INTRAMUSCULAR at 16:04

## 2022-09-28 RX ADMIN — SERTRALINE 100 MILLIGRAM(S): 25 TABLET, FILM COATED ORAL at 13:16

## 2022-09-28 RX ADMIN — Medication 81 MILLIGRAM(S): at 13:17

## 2022-09-28 NOTE — DISCHARGE NOTE PROVIDER - NSDCFUSCHEDAPPT_GEN_ALL_CORE_FT
Adelfo Dunn  [facility]  CARDIOLOGY [address]  Scheduled Appointment: 10/07/2022    Lisa Ramos  [facility]  RHEUM [address]  Scheduled Appointment: 10/10/2022    Forrest Ortega  [facility]  NEUROLOGY [address]  Scheduled Appointment: 11/07/2022
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
## 2022-09-28 NOTE — DISCHARGE NOTE PROVIDER - CARE PROVIDER_API CALL
Ulisses Cortes)  Ophthalmology  [address] [facility], [address] [place] [address]  Phone: [phone]  Fax: [phone]  Follow Up Time: 1 month    Adelfo Dunn)  Cardiovascular Disease; Nuclear Cardiology  [address] [facility], [address]  [place] [address]  Phone: [phone]  Fax: [phone]  Follow Up Time: 1 [place] [address]  Phone: [phone]  Fax: [phone]  Follow Up Time: 2 weeks
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
## 2022-09-28 NOTE — DISCHARGE NOTE PROVIDER - NSDCMRMEDTOKEN_GEN_ALL_CORE_FT
amLODIPine 5 mg oral tablet: 1 tab(s) orally once a day   aspirin 81 mg oral tablet, chewable: 1 tab(s) orally once a day  DULoxetine 30 mg oral delayed release capsule: 1 tab(s) orally once a day  gabapentin 600 mg oral tablet: 1 tab(s) orally 3 times a day  levothyroxine 100 mcg (0.1 mg) oral tablet: 1 tab(s) orally once a day  losartan 50 mg oral tablet: 1 tab(s) orally once a day   metoprolol succinate 50 mg oral tablet, extended release: 1 tab(s) orally once a day  sertraline 100 mg oral tablet: 1 tab(s) orally once a day  simvastatin 40 mg oral tablet: 1 tab(s) orally once a day (at bedtime)

## 2022-09-28 NOTE — DISCHARGE NOTE PROVIDER - CARE PROVIDERS DIRECT ADDRESSES
,DirectAddress_Unknown,[email or web address]

## 2022-09-28 NOTE — DISCHARGE NOTE PROVIDER - PROVIDER TOKENS
PROVIDER:[TOKEN:[[id number]],FOLLOWUP:[1 month]],PROVIDER:[TOKEN:[[id number]],FOLLOWUP:[1 week]],PROVIDER:[TOKEN:[[id number]],FOLLOWUP:[2 weeks]]

## 2022-09-30 NOTE — CDI QUERY NOTE - NSCDIOTHERTXTBX_GEN_ALL_CORE_HH
CLINICAL INDICATORS    9/21 H&P Adult: …Presents to hospital complaining of syncope and fall yesterday. She was standing when she felt dizzy, then lost consciousness and fell on the floor hitting her head on the right side. On the floor for 15 minutes, and felt palpitations and left sided chest pain post syncopal episode…Assessment: # syncope, likely vasovagal, r/o cardiogenic cause…      9/24 Consult Note Adult-Neurology Resident/Attending: … Assessment: her dizziness could be due to post concussive BPPV. Kailyn-Hallpike showed right sided BPPV, Eply maneuver performed at bedside with immediate improvement. Recommendations: - Orthostatic positive: recommend SUKUMAR stocking and abdominal binder if patient is symptomatic. - Avoid dehydration, hypotension. If recurs consider vestibular rehab…      9/26 Progress Note Adult-Internal Medicine Attending: …  ROS: has had vertiginous dizziness for months. Interval Events: still c/o dizziness… Assessment: Syncope, Orthostasis.  - Neuro eval noted …      9/26 Consult Note Adult-Cardiology Resident/Attending: … …Cardiac history:  Troponin negative x 3. EKG normal sinus rhythm. No significant telemetry events. Orthostasis negative as per documentation in the chart. …      Impression: Syncope, by history most consistent with vasovagal.      9/23-9/24 Nursing flow sheet Orthostatic Blood pressure: …  Lying 129/66- 135/70, Sitting 117//71, Standing 127//74…          Based on your professional judgment and the clinical indicators, please clarify if syncope can be further specified as:      •   Syncope associated with benign paroxysmal positional vertigo  •   Syncope associated with orthostatic hypotension  •   Other(please specify):  •   Clinically unable to further specify the underlying cause of the syncope      Thank you,  Karlie Hudson, RN, BSN, CCDS, CCS  [phone]

## 2022-10-07 ENCOUNTER — APPOINTMENT (OUTPATIENT)
Dept: CARDIOLOGY | Facility: CLINIC | Age: 72
End: 2022-10-07

## 2022-10-07 ENCOUNTER — RESULT CHARGE (OUTPATIENT)
Age: 72
End: 2022-10-07

## 2022-10-07 VITALS
DIASTOLIC BLOOD PRESSURE: 80 MMHG | TEMPERATURE: 97.8 F | SYSTOLIC BLOOD PRESSURE: 118 MMHG | WEIGHT: 157 LBS | RESPIRATION RATE: 14 BRPM | BODY MASS INDEX: 27.82 KG/M2 | HEIGHT: 63 IN | HEART RATE: 65 BPM

## 2022-10-07 DIAGNOSIS — E78.5 HYPERLIPIDEMIA, UNSPECIFIED: ICD-10-CM

## 2022-10-07 PROCEDURE — 93000 ELECTROCARDIOGRAM COMPLETE: CPT

## 2022-10-07 PROCEDURE — 99214 OFFICE O/P EST MOD 30 MIN: CPT | Mod: 25

## 2022-10-07 RX ORDER — AMLODIPINE BESYLATE 10 MG/1
10 TABLET ORAL DAILY
Qty: 90 | Refills: 1 | Status: DISCONTINUED | COMMUNITY
End: 2022-10-07

## 2022-10-07 RX ORDER — SERTRALINE HYDROCHLORIDE 100 MG/1
100 TABLET, FILM COATED ORAL DAILY
Refills: 0 | Status: DISCONTINUED | COMMUNITY

## 2022-10-07 RX ORDER — DULOXETINE HYDROCHLORIDE 30 MG/1
30 CAPSULE, DELAYED RELEASE PELLETS ORAL
Qty: 30 | Refills: 0 | Status: DISCONTINUED | COMMUNITY
Start: 2022-09-07 | End: 2022-10-07

## 2022-10-07 RX ORDER — LOSARTAN POTASSIUM 100 MG/1
100 TABLET, FILM COATED ORAL DAILY
Qty: 1 | Refills: 0 | Status: DISCONTINUED | COMMUNITY
Start: 2022-09-07 | End: 2022-10-07

## 2022-10-07 NOTE — PHYSICAL EXAM
[Well Developed] : well developed [Well Nourished] : well nourished [No Acute Distress] : no acute distress [Normal Conjunctiva] : normal conjunctiva [Normal Venous Pressure] : normal venous pressure [No Carotid Bruit] : no carotid bruit [Normal S1, S2] : normal S1, S2 [No Rub] : no rub [No Gallop] : no gallop [S4] : S4 [Clear Lung Fields] : clear lung fields [Good Air Entry] : good air entry [No Respiratory Distress] : no respiratory distress  [Soft] : abdomen soft [Non Tender] : non-tender [Normal Bowel Sounds] : normal bowel sounds [Normal Gait] : normal gait [No Edema] : no edema [No Cyanosis] : no cyanosis [No Clubbing] : no clubbing [No Varicosities] : no varicosities [No Rash] : no rash [Moves all extremities] : moves all extremities [No Focal Deficits] : no focal deficits [Normal Speech] : normal speech [Alert and Oriented] : alert and oriented [Normal memory] : normal memory [de-identified] : S/p left BKA

## 2022-10-07 NOTE — BRIEF OPERATIVE NOTE - IV INFUSIONS - COLLOIDS
0 Hydroxyzine Pregnancy And Lactation Text: This medication is not safe during pregnancy and should not be taken. It is also excreted in breast milk and breast feeding isn't recommended.

## 2022-10-07 NOTE — REVIEW OF SYSTEMS
[Palpitations] : palpitations [Negative] : Neurological [Dyspnea on exertion] : not dyspnea during exertion [Chest Discomfort] : no chest discomfort [Lower Ext Edema] : no extremity edema [Syncope] : syncope [Rash] : no rash [Anxiety] : no anxiety

## 2022-10-07 NOTE — ASSESSMENT
[FreeTextEntry1] : H/o non-obstructive CAD. No evidence of ischemia repeatedly.\par HTN is controlled now.\par Recent episode of syncope.\par Non-cardiac chest pain.\par \par Plan:\par Continue treatment.\par Patient advised to increase fluid intake.\par Carotid duplex.\par F/u in 2 months.\par \par \par Adelfo Dunn MD\par

## 2022-10-10 ENCOUNTER — APPOINTMENT (OUTPATIENT)
Dept: RHEUMATOLOGY | Facility: CLINIC | Age: 72
End: 2022-10-10

## 2022-10-24 ENCOUNTER — APPOINTMENT (OUTPATIENT)
Dept: CARDIOLOGY | Facility: CLINIC | Age: 72
End: 2022-10-24

## 2022-10-24 ENCOUNTER — RX RENEWAL (OUTPATIENT)
Age: 72
End: 2022-10-24

## 2022-11-07 ENCOUNTER — APPOINTMENT (OUTPATIENT)
Dept: NEUROLOGY | Facility: CLINIC | Age: 72
End: 2022-11-07

## 2022-11-14 ENCOUNTER — OUTPATIENT (OUTPATIENT)
Dept: OUTPATIENT SERVICES | Facility: HOSPITAL | Age: 72
LOS: 1 days | Discharge: HOME | End: 2022-11-14

## 2022-11-14 DIAGNOSIS — Z98.890 OTHER SPECIFIED POSTPROCEDURAL STATES: Chronic | ICD-10-CM

## 2022-11-14 DIAGNOSIS — I25.10 ATHEROSCLEROTIC HEART DISEASE OF NATIVE CORONARY ARTERY WITHOUT ANGINA PECTORIS: ICD-10-CM

## 2022-11-14 DIAGNOSIS — Z96.651 PRESENCE OF RIGHT ARTIFICIAL KNEE JOINT: Chronic | ICD-10-CM

## 2022-11-14 PROCEDURE — 70450 CT HEAD/BRAIN W/O DYE: CPT | Mod: 26

## 2022-12-09 ENCOUNTER — RESULT CHARGE (OUTPATIENT)
Age: 72
End: 2022-12-09

## 2022-12-09 ENCOUNTER — APPOINTMENT (OUTPATIENT)
Dept: CARDIOLOGY | Facility: CLINIC | Age: 72
End: 2022-12-09

## 2022-12-09 VITALS
HEART RATE: 71 BPM | HEIGHT: 63 IN | WEIGHT: 156 LBS | DIASTOLIC BLOOD PRESSURE: 80 MMHG | BODY MASS INDEX: 27.64 KG/M2 | SYSTOLIC BLOOD PRESSURE: 140 MMHG

## 2022-12-09 DIAGNOSIS — Z00.00 ENCOUNTER FOR GENERAL ADULT MEDICAL EXAMINATION W/OUT ABNORMAL FINDINGS: ICD-10-CM

## 2022-12-09 PROCEDURE — 93000 ELECTROCARDIOGRAM COMPLETE: CPT

## 2022-12-09 PROCEDURE — 99213 OFFICE O/P EST LOW 20 MIN: CPT | Mod: 25

## 2022-12-09 RX ORDER — AMOXICILLIN 500 MG/1
500 CAPSULE ORAL
Qty: 21 | Refills: 0 | Status: DISCONTINUED | COMMUNITY
Start: 2022-07-26

## 2022-12-09 RX ORDER — GABAPENTIN 600 MG/1
600 TABLET, COATED ORAL 3 TIMES DAILY
Refills: 0 | Status: DISCONTINUED | COMMUNITY
End: 2022-12-09

## 2022-12-09 NOTE — REVIEW OF SYSTEMS
[Palpitations] : palpitations [Syncope] : syncope [Negative] : Neurological [Dyspnea on exertion] : not dyspnea during exertion [Chest Discomfort] : no chest discomfort [Lower Ext Edema] : no extremity edema [Rash] : no rash [Anxiety] : no anxiety

## 2022-12-09 NOTE — PHYSICAL EXAM
[Well Developed] : well developed [Well Nourished] : well nourished [No Acute Distress] : no acute distress [Normal Conjunctiva] : normal conjunctiva [Normal Venous Pressure] : normal venous pressure [No Carotid Bruit] : no carotid bruit [Normal S1, S2] : normal S1, S2 [No Rub] : no rub [No Gallop] : no gallop [S4] : S4 [Clear Lung Fields] : clear lung fields [Good Air Entry] : good air entry [No Respiratory Distress] : no respiratory distress  [Soft] : abdomen soft [Non Tender] : non-tender [Normal Bowel Sounds] : normal bowel sounds [Normal Gait] : normal gait [No Edema] : no edema [No Cyanosis] : no cyanosis [No Clubbing] : no clubbing [No Varicosities] : no varicosities [No Rash] : no rash [Moves all extremities] : moves all extremities [No Focal Deficits] : no focal deficits [Normal Speech] : normal speech [Alert and Oriented] : alert and oriented [Normal memory] : normal memory [de-identified] : S/p left BKA

## 2022-12-09 NOTE — ASSESSMENT
[FreeTextEntry1] : H/o non-obstructive CAD. No evidence of ischemia repeatedly.\par HTN is controlled now.\par Recent episode of syncope.\par Non-cardiac chest pain.\par \par Plan:\par Continue treatment.\par Patient advised to increase fluid intake.\par Fasting blood work ordered.\par F/u in 3 months.\par \par \par Adelfo Dunn MD\par

## 2022-12-09 NOTE — HISTORY OF PRESENT ILLNESS
[FreeTextEntry1] : 72-yo female with h/o HTN, hyperlipidemia. Non-obstructive CAD (60% mid LAD stenosis on [facility] in 2007).\par \par H/o hepatitis C, left BKA (trauma).\par \par S/p right TKR\par \par Recent evaluation at [facility] after passing out. Patient states she was standing outside, turned around and passed out. She was found to have orthostatic hypotension and vitreous detachment. ECHO showed LVEF 62%, CTH was negative.\par \par Patient denies dizziness, palpitations now. She continues to c/o occasional positional left-sided chest pain.\par

## 2022-12-09 NOTE — CARDIOLOGY SUMMARY
[de-identified] : 12/09/22:\par SR, LAFB, poor R wave progression. [de-identified] : 04/16/21:\par No ischemia. [de-identified] : Carotid duplex: Mild bilateral plaque.

## 2023-01-05 LAB
ALBUMIN SERPL ELPH-MCNC: 4.6 G/DL
ALP BLD-CCNC: 112 U/L
ALT SERPL-CCNC: 20 U/L
ANION GAP SERPL CALC-SCNC: 14 MMOL/L
AST SERPL-CCNC: 22 U/L
BASOPHILS # BLD AUTO: 0.06 K/UL
BASOPHILS NFR BLD AUTO: 1 %
BILIRUB SERPL-MCNC: 0.5 MG/DL
BUN SERPL-MCNC: 23 MG/DL
CALCIUM SERPL-MCNC: 9.8 MG/DL
CHLORIDE SERPL-SCNC: 102 MMOL/L
CHOLEST SERPL-MCNC: 194 MG/DL
CO2 SERPL-SCNC: 24 MMOL/L
CREAT SERPL-MCNC: 0.7 MG/DL
EGFR: 91 ML/MIN/1.73M2
EOSINOPHIL # BLD AUTO: 0.14 K/UL
EOSINOPHIL NFR BLD AUTO: 2.3 %
ESTIMATED AVERAGE GLUCOSE: 103 MG/DL
GLUCOSE SERPL-MCNC: 89 MG/DL
HBA1C MFR BLD HPLC: 5.2 %
HCT VFR BLD CALC: 36.9 %
HDLC SERPL-MCNC: 105 MG/DL
HGB BLD-MCNC: 12.6 G/DL
IMM GRANULOCYTES NFR BLD AUTO: 0.2 %
LDLC SERPL CALC-MCNC: 80 MG/DL
LYMPHOCYTES # BLD AUTO: 1.61 K/UL
LYMPHOCYTES NFR BLD AUTO: 25.9 %
MAN DIFF?: NORMAL
MCHC RBC-ENTMCNC: 31 PG
MCHC RBC-ENTMCNC: 34.1 G/DL
MCV RBC AUTO: 90.7 FL
MONOCYTES # BLD AUTO: 0.44 K/UL
MONOCYTES NFR BLD AUTO: 7.1 %
NEUTROPHILS # BLD AUTO: 3.95 K/UL
NEUTROPHILS NFR BLD AUTO: 63.5 %
NONHDLC SERPL-MCNC: 89 MG/DL
PLATELET # BLD AUTO: 204 K/UL
POTASSIUM SERPL-SCNC: 3.7 MMOL/L
PROT SERPL-MCNC: 7.1 G/DL
RBC # BLD: 4.07 M/UL
RBC # FLD: 13.2 %
SODIUM SERPL-SCNC: 140 MMOL/L
TRIGL SERPL-MCNC: 49 MG/DL
TSH SERPL-ACNC: 1.79 UIU/ML
WBC # FLD AUTO: 6.21 K/UL

## 2023-01-11 ENCOUNTER — APPOINTMENT (OUTPATIENT)
Dept: OPHTHALMOLOGY | Facility: CLINIC | Age: 73
End: 2023-01-11
Payer: MEDICARE

## 2023-01-11 ENCOUNTER — OUTPATIENT (OUTPATIENT)
Dept: OUTPATIENT SERVICES | Facility: HOSPITAL | Age: 73
LOS: 1 days | Discharge: HOME | End: 2023-01-11

## 2023-01-11 DIAGNOSIS — Z98.890 OTHER SPECIFIED POSTPROCEDURAL STATES: Chronic | ICD-10-CM

## 2023-01-11 DIAGNOSIS — Z96.651 PRESENCE OF RIGHT ARTIFICIAL KNEE JOINT: Chronic | ICD-10-CM

## 2023-01-11 PROCEDURE — 92014 COMPRE OPH EXAM EST PT 1/>: CPT

## 2023-01-11 PROCEDURE — 92134 CPTRZ OPH DX IMG PST SGM RTA: CPT | Mod: 26

## 2023-03-10 ENCOUNTER — APPOINTMENT (OUTPATIENT)
Dept: CARDIOLOGY | Facility: CLINIC | Age: 73
End: 2023-03-10
Payer: MEDICARE

## 2023-03-10 ENCOUNTER — RESULT CHARGE (OUTPATIENT)
Age: 73
End: 2023-03-10

## 2023-03-10 VITALS
HEIGHT: 63 IN | SYSTOLIC BLOOD PRESSURE: 160 MMHG | WEIGHT: 155 LBS | HEART RATE: 79 BPM | DIASTOLIC BLOOD PRESSURE: 80 MMHG | BODY MASS INDEX: 27.46 KG/M2

## 2023-03-10 PROCEDURE — 93000 ELECTROCARDIOGRAM COMPLETE: CPT

## 2023-03-10 PROCEDURE — 99214 OFFICE O/P EST MOD 30 MIN: CPT | Mod: 25

## 2023-03-10 RX ORDER — AMLODIPINE BESYLATE 5 MG/1
5 TABLET ORAL DAILY
Refills: 0 | Status: DISCONTINUED | COMMUNITY
End: 2023-03-10

## 2023-03-10 RX ORDER — LEVOTHYROXINE SODIUM 0.1 MG/1
100 TABLET ORAL DAILY
Refills: 0 | Status: DISCONTINUED | COMMUNITY
End: 2023-03-10

## 2023-03-11 NOTE — PHYSICAL EXAM
[Well Developed] : well developed [Well Nourished] : well nourished [No Acute Distress] : no acute distress [Normal Conjunctiva] : normal conjunctiva [Normal Venous Pressure] : normal venous pressure [No Carotid Bruit] : no carotid bruit [Normal S1, S2] : normal S1, S2 [No Rub] : no rub [S4] : S4 [No Gallop] : no gallop [Clear Lung Fields] : clear lung fields [Good Air Entry] : good air entry [No Respiratory Distress] : no respiratory distress  [Soft] : abdomen soft [Non Tender] : non-tender [Normal Bowel Sounds] : normal bowel sounds [Normal Gait] : normal gait [No Edema] : no edema [No Cyanosis] : no cyanosis [No Clubbing] : no clubbing [No Rash] : no rash [No Varicosities] : no varicosities [Moves all extremities] : moves all extremities [No Focal Deficits] : no focal deficits [Normal Speech] : normal speech [Alert and Oriented] : alert and oriented [Normal memory] : normal memory [de-identified] : S/p left BKA

## 2023-03-11 NOTE — ASSESSMENT
[FreeTextEntry1] : H/o non-obstructive CAD. No evidence of ischemia repeatedly.\par HTN is uncontrolled due to non-compliance with medications.\par Recent episode of syncope vs mechanical fall again.\par Non-cardiac chest pain.\par \par Plan:\par Restart Losartan and Metoprolol. Will hold Amlodipine for now.\par Patient advised to increase fluid intake.\par F/u in 1 month.\par \par \par Adelfo Dunn MD\par

## 2023-03-11 NOTE — CARDIOLOGY SUMMARY
[de-identified] : 3/10/23:\par SR, LAFB, poor R wave progression [de-identified] : 04/16/21:\par No ischemia. [de-identified] : Carotid duplex: Mild bilateral plaque.

## 2023-03-11 NOTE — REVIEW OF SYSTEMS
[Syncope] : syncope [Negative] : Neurological [Chest Discomfort] : chest discomfort [Dyspnea on exertion] : not dyspnea during exertion [Lower Ext Edema] : no extremity edema [Palpitations] : no palpitations [Rash] : no rash [Anxiety] : no anxiety
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
## 2023-03-22 ENCOUNTER — EMERGENCY (EMERGENCY)
Facility: HOSPITAL | Age: 73
LOS: 0 days | Discharge: HOME | End: 2023-03-22
Attending: EMERGENCY MEDICINE
Payer: MEDICARE

## 2023-03-22 VITALS
OXYGEN SATURATION: 96 % | RESPIRATION RATE: 18 BRPM | HEART RATE: 83 BPM | SYSTOLIC BLOOD PRESSURE: 185 MMHG | TEMPERATURE: 98 F | DIASTOLIC BLOOD PRESSURE: 95 MMHG

## 2023-03-22 VITALS
TEMPERATURE: 98 F | WEIGHT: 141.98 LBS | DIASTOLIC BLOOD PRESSURE: 101 MMHG | OXYGEN SATURATION: 96 % | HEIGHT: 63 IN | SYSTOLIC BLOOD PRESSURE: 188 MMHG | RESPIRATION RATE: 18 BRPM | HEART RATE: 76 BPM

## 2023-03-22 DIAGNOSIS — Z98.890 OTHER SPECIFIED POSTPROCEDURAL STATES: Chronic | ICD-10-CM

## 2023-03-22 DIAGNOSIS — Z79.02 LONG TERM (CURRENT) USE OF ANTITHROMBOTICS/ANTIPLATELETS: ICD-10-CM

## 2023-03-22 DIAGNOSIS — L98.499 NON-PRESSURE CHRONIC ULCER OF SKIN OF OTHER SITES WITH UNSPECIFIED SEVERITY: ICD-10-CM

## 2023-03-22 DIAGNOSIS — Z79.82 LONG TERM (CURRENT) USE OF ASPIRIN: ICD-10-CM

## 2023-03-22 DIAGNOSIS — F41.9 ANXIETY DISORDER, UNSPECIFIED: ICD-10-CM

## 2023-03-22 DIAGNOSIS — M19.90 UNSPECIFIED OSTEOARTHRITIS, UNSPECIFIED SITE: ICD-10-CM

## 2023-03-22 DIAGNOSIS — E78.5 HYPERLIPIDEMIA, UNSPECIFIED: ICD-10-CM

## 2023-03-22 DIAGNOSIS — Z96.651 PRESENCE OF RIGHT ARTIFICIAL KNEE JOINT: ICD-10-CM

## 2023-03-22 DIAGNOSIS — I10 ESSENTIAL (PRIMARY) HYPERTENSION: ICD-10-CM

## 2023-03-22 DIAGNOSIS — F32.A DEPRESSION, UNSPECIFIED: ICD-10-CM

## 2023-03-22 DIAGNOSIS — I25.10 ATHEROSCLEROTIC HEART DISEASE OF NATIVE CORONARY ARTERY WITHOUT ANGINA PECTORIS: ICD-10-CM

## 2023-03-22 DIAGNOSIS — E03.9 HYPOTHYROIDISM, UNSPECIFIED: ICD-10-CM

## 2023-03-22 DIAGNOSIS — Z96.651 PRESENCE OF RIGHT ARTIFICIAL KNEE JOINT: Chronic | ICD-10-CM

## 2023-03-22 PROCEDURE — 99282 EMERGENCY DEPT VISIT SF MDM: CPT

## 2023-03-22 PROCEDURE — 99284 EMERGENCY DEPT VISIT MOD MDM: CPT

## 2023-03-22 NOTE — ED PROVIDER NOTE - PHYSICAL EXAMINATION
Gen: NAD, AOx3  Head: NCAT  HEENT: PERRL, oral mucosa moist, normal conjunctiva, oropharynx clear without exudate or erythema  Lung: CTAB, no respiratory distress, no wheezing, rales, rhonchi  CV: normal s1/s2, rrr, Normal perfusion, pulses 2+ throughout  Abd: soft, NTND, no CVA tenderness  Genitourinary: no pelvic tenderness, 1cm ulcer to L groin with no active drainage/edema/erythema/crepitus/streaking   MSK: No edema, no visible deformities, full range of motion in all 4 extremities  Neuro: CN II-XII grossly intact, No focal neurologic deficits  Skin: No rash   Psych: normal affect
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
## 2023-03-22 NOTE — ED PROVIDER NOTE - NSFOLLOWUPINSTRUCTIONS_ED_ALL_ED_FT
Please follow up with your primary care physician within 24-72 hours and return immediately if symptoms worsen.          Chronic Wounds    WHAT YOU NEED TO KNOW:    What is a chronic wound? A chronic wound is a wound that does not heal completely in 6 weeks. A wound is an injury that causes a break in the skin. There may also be damage to nearby tissues. Examples of wounds that can become chronic are deep ulcers (open sores), large burns, and infected cuts.    What leads to a chronic wound? Conditions that slow or stop the healing process may lead to a chronic wound. These may include any of the following:   •Poor blood supply or low oxygen can be caused by low blood pressure, or blocked or narrowed blood vessels. This is more likely if you smoke or have heart or blood vessel disease. Blood, heart, kidney, and lung disease can also decrease the oxygen supply to tissues.       •An infection occurs when bacteria get into your wound. Objects in the wound, such as glass or metal, may bring bacteria into your wound. Dead tissue in your wound may give bacteria a place to grow. Diseases such as diabetes can also increase your risk for infection.      •A weak immune system may lead to a chronic wound. Radiation treatments, poor nutrition, and certain medicines, such as steroids, weaken the immune system. Diseases such as cancer and diabetes can weaken your immune system and make it hard to fight an infection.       •Swelling in the tissues around your wound can cause pressure that decreases blood flow to the area. Tissue swelling may happen with traumatic injuries. It can also happen with conditions that cause decreased blood flow to the area, such as heart failure or blood vessel problems.       What signs and symptoms may happen with a chronic wound?   •Fever      •The area around your wound is red and warm to the touch      •Milky, yellow, green, or brown pus in the wound       •Bleeding, swelling, or pain in the affected area      •Trouble moving the affected area      •Wound has become larger or deeper      •Dark or black skin around the wound       How is a chronic wound diagnosed? Your healthcare provider will ask about your wound. He or she will ask about your health, the medicines you take, and any past surgeries. He or she will examine the wound and the area around it. Your healthcare provider will check to see how deep the wound is and look for signs of infection. You may also need any of the following:   •Blood tests are done to see if you have an infection.       •A wound culture is a test of fluid or tissue used to find the cause of your infection.       •An x-ray is a picture of your bones and tissues in the wound area. You may need to have an x-ray if the wound is near a joint or bone. Healthcare providers look for broken bones, or foreign objects such as glass or metal.      How is a chronic wound treated? Your treatment depends on where your wound is located and how severe it is. If a medical condition such as diabetes is delaying wound healing, it is important to treat the condition. Healthcare providers may change your treatment over time if your wound still does not heal. Your treatment may also change as your wound heals. You may need any of the following:   •Antibiotics may be given to prevent or treat an infection caused by bacteria.      •Wound care: ?Cleansing is done by flushing the wound with sterile fluid. Healthcare providers may use a large syringe with a needle or catheter (tube) tip. They may also use a liquid that kills germs.       ?Debridement is done to remove anything from the wound that can delay healing and lead to infection. This includes dead tissue, and objects such as small rocks and dirt. Your healthcare provider may cut out the damaged areas in or around the wound. He or she may also drain the wound to clean out pus. Moist bandages may be placed inside the wound, or bandages that contain enzymes may be used. Hydrotherapy (whirlpool treatment) uses water to clean wounds. It may be used to clean and debride burn wounds.      ?Dressings are used to protect the wound and promote wound healing. Many kinds of dressings can be used for chronic wounds. An elastic bandage may be wrapped around the wound area to put light pressure on it. The light pressure helps to decrease swelling in tissues around the wound area. Dressings may be in the form of bandages, gauze, films, gels, or foams. They may contain substances to help your wound heal faster.       ?Negative pressure wound therapy (NPWT) may also be done. This therapy is also called wound vacuum, or wound vac therapy. A vacuum device uses suction to remove fluid and waste from your wound and pull the edges closer together. NPWT may also increase blood flow and new tissue growth in the wound.       •Hyperbaric oxygen therapy is used to get more oxygen into the area of the wound. The oxygen is given under pressure inside of a hyperbaric or pressure chamber. You may need to have this therapy more than once.      What do I need to know about wound care?   •Wash your hands before and after you take care of your wound.      • Keep the bandage clean and dry. Do not stop using the bandage on your wound unless your healthcare provider says it is okay.      •Clean the wound and change the dressing as often as directed by your healthcare provider.       What can I do to help my wound heal?   •Eat healthy foods and drink liquids as directed. Healthy foods give your body the nutrients it needs to heal your wound. Liquids prevent dehydration that can decrease the blood supply to your wound. Healthy foods include fruits, vegetables, grains (breads and cereals), dairy, and protein foods. Protein foods include meat, fish, nuts, and soy products. Protein, calories, vitamin C, and zinc help wounds heal. Ask your healthcare provider for more information about the foods you should eat to improve healing.       •Do not smoke. If you smoke, it is never too late to quit. Smoking delays wound healing. Smoking also increases your risk for infection after surgery. Ask your healthcare provider for information if you need help quitting.      How can I prevent wounds caused by pressure? Pressure wounds can develop when blood flow to an area is blocked. For example, you sit or lie in the same position without moving and put pressure on your heels. You can prevent pressure wounds by doing any of the following:  •Change your position every 15 minutes while you are sitting.       •Change your position every 2 hours while you lie in bed.      •Prop your legs on pillows to lift your heels while you are lying down.      •Check your skin or have someone else check your skin daily. Check the areas that are common to pressure wounds, such as elbows, heels, and buttocks. Common early signs of pressure wounds are open sores, blisters, or changes in color or temperature.      When should I contact my healthcare provider?   •You have a fever.       •You have increased or new pain, swelling, redness, or bleeding in or around your wound.      •You have pus or a foul odor coming from your wound.      •Your skin itches or has a rash.      •You have open sores, blisters, or changes in the color or temperature of your skin.       •You have questions or concerns about your condition or care.       CARE AGREEMENT:    You have the right to help plan your care. Learn about your health condition and how it may be treated. Discuss treatment options with your healthcare providers to decide what care you want to receive. You always have the right to refuse treatment.        © [employer] 1973, 2023           back to top                          © [employer] 1973, 2023

## 2023-03-22 NOTE — ED PROVIDER NOTE - ATTENDING APP SHARED VISIT CONTRIBUTION OF CARE
73-year-old female with past medical history of CAD, hypertension, hyperlipidemia, on Plavix, left AKA with prosthetic, presents with worsening left groin wound.  Patient says it is where her prosthetic rubs into her groin.  Patient has had wound intermittently for a few years, but worse over the last year.  Patient has been advised to follow-up with a wound doctor, high patient however does not know who to follow-up with.  Patient denies any fever or chills.  Patient says today she noticed some fluid and blood from the area, she covered it with a Band-Aid.  Exam: Left groin with a well demarcated ulcer, no active drainage or bleeding, impression: Patient with nonhealing ulcer likely secondary to chronic rubbing from prosthetic, patient has been advised to follow-up with wound doctor, will consult burn here to establish care

## 2023-03-22 NOTE — ED PROVIDER NOTE - OBJECTIVE STATEMENT
74 yo female with a pmh of htn, hld, cad, L aka presents for L groin wound. pt has noted the wound intermittently for the past 5 years and has had worsening over the past year. pt denies any other symptoms including fevers, chill, headache, recent illness/travel, cough, abdominal pain, chest pain, or SOB.

## 2023-03-22 NOTE — ED PROVIDER NOTE - PATIENT PORTAL LINK FT
You can access the FollowMyHealth Patient Portal offered by [facility] by registering at the following website: [email or web address]. By joining [employer]’s FollowMyHealth portal, you will also be able to view your health information using other applications (apps) compatible with our system.
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
## 2023-03-22 NOTE — ED PROVIDER NOTE - NSFOLLOWUPCLINICS_GEN_ALL_ED_FT
[facility]  Burn  [address], [address]  [place] [address]  Phone: [phone]  Fax:   Follow Up Time: 1-3 Days

## 2023-03-22 NOTE — ED ADULT NURSE NOTE - NSIMPLEMENTINTERV_GEN_ALL_ED
Implemented All Fall Risk Interventions:  [place] to call system. Call bell, personal items and telephone within reach. Instruct patient to call for assistance. Room bathroom lighting operational. Non-slip footwear when patient is off stretcher. Physically safe environment: no spills, clutter or unnecessary equipment. Stretcher in lowest position, wheels locked, appropriate side rails in place. Provide visual cue, wrist band, yellow gown, etc. Monitor gait and stability. Monitor for mental status changes and reorient to person, place, and time. Review medications for side effects contributing to fall risk. Reinforce activity limits and safety measures with patient and family.
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
## 2023-03-27 LAB
ALBUMIN SERPL ELPH-MCNC: 4.1 G/DL
ALP BLD-CCNC: 133 U/L
ALT SERPL-CCNC: 24 U/L
ANION GAP SERPL CALC-SCNC: 11 MMOL/L
AST SERPL-CCNC: 27 U/L
BASOPHILS # BLD AUTO: 0.08 K/UL
BASOPHILS NFR BLD AUTO: 1.2 %
BILIRUB SERPL-MCNC: 0.4 MG/DL
BUN SERPL-MCNC: 16 MG/DL
CALCIUM SERPL-MCNC: 9.6 MG/DL
CHLORIDE SERPL-SCNC: 101 MMOL/L
CHOLEST SERPL-MCNC: 204 MG/DL
CO2 SERPL-SCNC: 24 MMOL/L
CREAT SERPL-MCNC: 0.6 MG/DL
EGFR: 94 ML/MIN/1.73M2
EOSINOPHIL # BLD AUTO: 0.19 K/UL
EOSINOPHIL NFR BLD AUTO: 2.8 %
ESTIMATED AVERAGE GLUCOSE: 103 MG/DL
GLUCOSE SERPL-MCNC: 86 MG/DL
HBA1C MFR BLD HPLC: 5.2 %
HCT VFR BLD CALC: 40.5 %
HDLC SERPL-MCNC: 97 MG/DL
HGB BLD-MCNC: 13.1 G/DL
IMM GRANULOCYTES NFR BLD AUTO: 0.3 %
LDLC SERPL CALC-MCNC: 98 MG/DL
LYMPHOCYTES # BLD AUTO: 1.67 K/UL
LYMPHOCYTES NFR BLD AUTO: 24.5 %
MAN DIFF?: NORMAL
MCHC RBC-ENTMCNC: 29.6 PG
MCHC RBC-ENTMCNC: 32.3 G/DL
MCV RBC AUTO: 91.4 FL
MONOCYTES # BLD AUTO: 0.5 K/UL
MONOCYTES NFR BLD AUTO: 7.3 %
NEUTROPHILS # BLD AUTO: 4.36 K/UL
NEUTROPHILS NFR BLD AUTO: 63.9 %
NONHDLC SERPL-MCNC: 107 MG/DL
PLATELET # BLD AUTO: 263 K/UL
POTASSIUM SERPL-SCNC: 4.6 MMOL/L
PROT SERPL-MCNC: 6.9 G/DL
RBC # BLD: 4.43 M/UL
RBC # FLD: 14.2 %
SODIUM SERPL-SCNC: 136 MMOL/L
TRIGL SERPL-MCNC: 47 MG/DL
TSH SERPL-ACNC: 7.59 UIU/ML
WBC # FLD AUTO: 6.82 K/UL

## 2023-03-30 ENCOUNTER — OUTPATIENT (OUTPATIENT)
Dept: OUTPATIENT SERVICES | Facility: HOSPITAL | Age: 73
LOS: 1 days | End: 2023-03-30
Payer: MEDICARE

## 2023-03-30 ENCOUNTER — APPOINTMENT (OUTPATIENT)
Dept: BURN CARE | Facility: CLINIC | Age: 73
End: 2023-03-30
Payer: MEDICARE

## 2023-03-30 VITALS — DIASTOLIC BLOOD PRESSURE: 90 MMHG | SYSTOLIC BLOOD PRESSURE: 181 MMHG | HEART RATE: 78 BPM | TEMPERATURE: 97.9 F

## 2023-03-30 DIAGNOSIS — Z98.890 OTHER SPECIFIED POSTPROCEDURAL STATES: Chronic | ICD-10-CM

## 2023-03-30 DIAGNOSIS — Z96.651 PRESENCE OF RIGHT ARTIFICIAL KNEE JOINT: Chronic | ICD-10-CM

## 2023-03-30 DIAGNOSIS — Z00.8 ENCOUNTER FOR OTHER GENERAL EXAMINATION: ICD-10-CM

## 2023-03-30 PROCEDURE — 87186 SC STD MICRODIL/AGAR DIL: CPT

## 2023-03-30 PROCEDURE — 87070 CULTURE OTHR SPECIMN AEROBIC: CPT

## 2023-03-30 PROCEDURE — 87077 CULTURE AEROBIC IDENTIFY: CPT

## 2023-03-30 PROCEDURE — 11042 DBRDMT SUBQ TIS 1ST 20SQCM/<: CPT

## 2023-03-30 PROCEDURE — 17250 CHEM CAUT OF GRANLTJ TISSUE: CPT | Mod: 59
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
## 2023-03-30 NOTE — ASSESSMENT
[FreeTextEntry1] : The left groin wound due to prosthesis post left AKA measures 1x1cm and has adherent devitalized tissue and hypertrophic granulation tissue.  Excisional debridement with scissors was performed on devitalized tissue down  to and including subcutaneous tissue. Silver nitrate was applied to the hypertrophic granulation tissue.  A wound culture was obtained. The patient was instructed to clean  the wound with soap and water. Continue local wound care.  Follow up 1 week.  [Wound Care] : wound care
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
## 2023-03-30 NOTE — PHYSICAL EXAM
[Healing] : healing [Size%: ______] : Size: [unfilled]% [3] : 3 out of 10 [Abnormal] : abnormal [Medium] : medium [] : no [de-identified] : The left groin wound due to prosthesis post left AKA measures 1x1cm and has adherent devitalized tissue and hypertrophic granulation tissue.  Excisional debridement with scissors was performed on devitalized tissue down  to and including subcutaneous tissue. Silver nitrate was applied to the hypertrophic granulation tissue.  A wound culture was obtained. The patient was instructed to clean  the wound with soap and water. Continue local wound care.  Follow up 1 week.  [TWNoteComboBox1] : bandaid

## 2023-03-31 DIAGNOSIS — S31.104D UNSPECIFIED OPEN WOUND OF ABDOMINAL WALL, LEFT LOWER QUADRANT WITHOUT PENETRATION INTO PERITONEAL CAVITY, SUBSEQUENT ENCOUNTER: ICD-10-CM
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
## 2023-03-31 DIAGNOSIS — Z89.612 ACQUIRED ABSENCE OF LEFT LEG ABOVE KNEE: ICD-10-CM
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
## 2023-04-01 LAB — BACTERIA SPEC CULT: ABNORMAL

## 2023-04-06 ENCOUNTER — APPOINTMENT (OUTPATIENT)
Dept: BURN CARE | Facility: CLINIC | Age: 73
End: 2023-04-06
Payer: MEDICARE

## 2023-04-06 ENCOUNTER — OUTPATIENT (OUTPATIENT)
Dept: OUTPATIENT SERVICES | Facility: HOSPITAL | Age: 73
LOS: 1 days | End: 2023-04-06
Payer: MEDICARE

## 2023-04-06 VITALS — SYSTOLIC BLOOD PRESSURE: 157 MMHG | TEMPERATURE: 98 F | DIASTOLIC BLOOD PRESSURE: 100 MMHG | HEART RATE: 78 BPM

## 2023-04-06 DIAGNOSIS — Z96.651 PRESENCE OF RIGHT ARTIFICIAL KNEE JOINT: Chronic | ICD-10-CM

## 2023-04-06 DIAGNOSIS — Z98.890 OTHER SPECIFIED POSTPROCEDURAL STATES: Chronic | ICD-10-CM

## 2023-04-06 DIAGNOSIS — Z00.8 ENCOUNTER FOR OTHER GENERAL EXAMINATION: ICD-10-CM

## 2023-04-06 PROCEDURE — 17250 CHEM CAUT OF GRANLTJ TISSUE: CPT | Mod: 59

## 2023-04-06 PROCEDURE — 87077 CULTURE AEROBIC IDENTIFY: CPT

## 2023-04-06 PROCEDURE — 87186 SC STD MICRODIL/AGAR DIL: CPT

## 2023-04-06 PROCEDURE — 11042 DBRDMT SUBQ TIS 1ST 20SQCM/<: CPT

## 2023-04-06 PROCEDURE — 87070 CULTURE OTHR SPECIMN AEROBIC: CPT

## 2023-04-06 NOTE — PHYSICAL EXAM
[Healing] : healing [Size%: ______] : Size: [unfilled]% [3] : 3 out of 10 [Abnormal] : abnormal [Medium] : medium [] : no [de-identified] : The left groin wound due to prosthesis post left AKA measures 1x1cm and has adherent devitalized tissue and hypertrophic granulation tissue.  Excisional debridement with scissors was performed on devitalized tissue down  to and including subcutaneous tissue. Silver nitrate was applied to the hypertrophic granulation tissue.  . The patient was instructed to clean  the wound with soap and water. Continue local wound care.  Follow up 1 week.   Will debride. [TWNoteComboBox1] : bandaid

## 2023-04-06 NOTE — ASSESSMENT
[FreeTextEntry1] : The left groin wound due to prosthesis post left AKA measures 1x1cm and has adherent devitalized tissue and hypertrophic granulation tissue.  Excisional debridement with scissors was performed on devitalized tissue down  to and including subcutaneous tissue. Silver nitrate was applied to the hypertrophic granulation tissue.  . The patient was instructed to clean  the wound with soap and water. Continue local wound care.  Follow up 1 week.   Will debride. [Wound Care] : wound care
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
## 2023-04-07 DIAGNOSIS — S31.109D UNSPECIFIED OPEN WOUND OF ABDOMINAL WALL, UNSPECIFIED QUADRANT WITHOUT PENETRATION INTO PERITONEAL CAVITY, SUBSEQUENT ENCOUNTER: ICD-10-CM

## 2023-04-07 DIAGNOSIS — Z96.652 PRESENCE OF LEFT ARTIFICIAL KNEE JOINT: ICD-10-CM
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
## 2023-04-09 LAB — BACTERIA SPEC CULT: ABNORMAL

## 2023-04-11 ENCOUNTER — INPATIENT (INPATIENT)
Facility: HOSPITAL | Age: 73
LOS: 10 days | Discharge: HOME | DRG: 901 | End: 2023-04-22
Attending: PLASTIC SURGERY | Admitting: PLASTIC SURGERY
Payer: MEDICARE

## 2023-04-11 ENCOUNTER — APPOINTMENT (OUTPATIENT)
Dept: CARDIOLOGY | Facility: CLINIC | Age: 73
End: 2023-04-11

## 2023-04-11 VITALS
DIASTOLIC BLOOD PRESSURE: 95 MMHG | OXYGEN SATURATION: 98 % | TEMPERATURE: 98 F | HEIGHT: 63 IN | RESPIRATION RATE: 20 BRPM | SYSTOLIC BLOOD PRESSURE: 204 MMHG | WEIGHT: 139.99 LBS | HEART RATE: 74 BPM

## 2023-04-11 DIAGNOSIS — Z96.651 PRESENCE OF RIGHT ARTIFICIAL KNEE JOINT: Chronic | ICD-10-CM

## 2023-04-11 DIAGNOSIS — Z98.890 OTHER SPECIFIED POSTPROCEDURAL STATES: Chronic | ICD-10-CM

## 2023-04-11 DIAGNOSIS — S31.109A UNSPECIFIED OPEN WOUND OF ABDOMINAL WALL, UNSPECIFIED QUADRANT WITHOUT PENETRATION INTO PERITONEAL CAVITY, INITIAL ENCOUNTER: ICD-10-CM

## 2023-04-11 LAB
ALBUMIN SERPL ELPH-MCNC: 4 G/DL — SIGNIFICANT CHANGE UP (ref 3.5–5.2)
ALP SERPL-CCNC: 126 U/L — HIGH (ref 30–115)
ALT FLD-CCNC: 16 U/L — SIGNIFICANT CHANGE UP (ref 0–41)
ANION GAP SERPL CALC-SCNC: 13 MMOL/L — SIGNIFICANT CHANGE UP (ref 7–14)
ANION GAP SERPL CALC-SCNC: 13 MMOL/L — SIGNIFICANT CHANGE UP (ref 7–14)
APTT BLD: 36.4 SEC — SIGNIFICANT CHANGE UP (ref 27–39.2)
AST SERPL-CCNC: 20 U/L — SIGNIFICANT CHANGE UP (ref 0–41)
BASOPHILS # BLD AUTO: 0.05 K/UL — SIGNIFICANT CHANGE UP (ref 0–0.2)
BASOPHILS NFR BLD AUTO: 0.7 % — SIGNIFICANT CHANGE UP (ref 0–1)
BILIRUB SERPL-MCNC: 0.5 MG/DL — SIGNIFICANT CHANGE UP (ref 0.2–1.2)
BLD GP AB SCN SERPL QL: SIGNIFICANT CHANGE UP
BUN SERPL-MCNC: 15 MG/DL — SIGNIFICANT CHANGE UP (ref 10–20)
BUN SERPL-MCNC: 18 MG/DL — SIGNIFICANT CHANGE UP (ref 10–20)
CALCIUM SERPL-MCNC: 9 MG/DL — SIGNIFICANT CHANGE UP (ref 8.4–10.5)
CALCIUM SERPL-MCNC: 9.2 MG/DL — SIGNIFICANT CHANGE UP (ref 8.4–10.4)
CHLORIDE SERPL-SCNC: 104 MMOL/L — SIGNIFICANT CHANGE UP (ref 98–110)
CHLORIDE SERPL-SCNC: 105 MMOL/L — SIGNIFICANT CHANGE UP (ref 98–110)
CO2 SERPL-SCNC: 21 MMOL/L — SIGNIFICANT CHANGE UP (ref 17–32)
CO2 SERPL-SCNC: 22 MMOL/L — SIGNIFICANT CHANGE UP (ref 17–32)
CREAT SERPL-MCNC: 0.6 MG/DL — LOW (ref 0.7–1.5)
CREAT SERPL-MCNC: 0.6 MG/DL — LOW (ref 0.7–1.5)
EGFR: 94 ML/MIN/1.73M2 — SIGNIFICANT CHANGE UP
EGFR: 94 ML/MIN/1.73M2 — SIGNIFICANT CHANGE UP
EOSINOPHIL # BLD AUTO: 0.14 K/UL — SIGNIFICANT CHANGE UP (ref 0–0.7)
EOSINOPHIL NFR BLD AUTO: 1.9 % — SIGNIFICANT CHANGE UP (ref 0–8)
GLUCOSE SERPL-MCNC: 122 MG/DL — HIGH (ref 70–99)
GLUCOSE SERPL-MCNC: 93 MG/DL — SIGNIFICANT CHANGE UP (ref 70–99)
HCT VFR BLD CALC: 37.2 % — SIGNIFICANT CHANGE UP (ref 37–47)
HCT VFR BLD CALC: 38.6 % — SIGNIFICANT CHANGE UP (ref 37–47)
HGB BLD-MCNC: 12.6 G/DL — SIGNIFICANT CHANGE UP (ref 12–16)
HGB BLD-MCNC: 12.8 G/DL — SIGNIFICANT CHANGE UP (ref 12–16)
IMM GRANULOCYTES NFR BLD AUTO: 0.3 % — SIGNIFICANT CHANGE UP (ref 0.1–0.3)
INR BLD: 1.03 RATIO — SIGNIFICANT CHANGE UP (ref 0.65–1.3)
LYMPHOCYTES # BLD AUTO: 1.66 K/UL — SIGNIFICANT CHANGE UP (ref 1.2–3.4)
LYMPHOCYTES # BLD AUTO: 22.3 % — SIGNIFICANT CHANGE UP (ref 20.5–51.1)
MAGNESIUM SERPL-MCNC: 1.7 MG/DL — LOW (ref 1.8–2.4)
MCHC RBC-ENTMCNC: 29.6 PG — SIGNIFICANT CHANGE UP (ref 27–31)
MCHC RBC-ENTMCNC: 29.8 PG — SIGNIFICANT CHANGE UP (ref 27–31)
MCHC RBC-ENTMCNC: 33.2 G/DL — SIGNIFICANT CHANGE UP (ref 32–37)
MCHC RBC-ENTMCNC: 33.9 G/DL — SIGNIFICANT CHANGE UP (ref 32–37)
MCV RBC AUTO: 87.9 FL — SIGNIFICANT CHANGE UP (ref 81–99)
MCV RBC AUTO: 89.1 FL — SIGNIFICANT CHANGE UP (ref 81–99)
MONOCYTES # BLD AUTO: 0.57 K/UL — SIGNIFICANT CHANGE UP (ref 0.1–0.6)
MONOCYTES NFR BLD AUTO: 7.6 % — SIGNIFICANT CHANGE UP (ref 1.7–9.3)
NEUTROPHILS # BLD AUTO: 5.02 K/UL — SIGNIFICANT CHANGE UP (ref 1.4–6.5)
NEUTROPHILS NFR BLD AUTO: 67.2 % — SIGNIFICANT CHANGE UP (ref 42.2–75.2)
NRBC # BLD: 0 /100 WBCS — SIGNIFICANT CHANGE UP (ref 0–0)
NRBC # BLD: 0 /100 WBCS — SIGNIFICANT CHANGE UP (ref 0–0)
PHOSPHATE SERPL-MCNC: 2.8 MG/DL — SIGNIFICANT CHANGE UP (ref 2.1–4.9)
PLATELET # BLD AUTO: 195 K/UL — SIGNIFICANT CHANGE UP (ref 130–400)
PLATELET # BLD AUTO: 200 K/UL — SIGNIFICANT CHANGE UP (ref 130–400)
PMV BLD: 10.5 FL — HIGH (ref 7.4–10.4)
PMV BLD: 10.6 FL — HIGH (ref 7.4–10.4)
POTASSIUM SERPL-MCNC: 3 MMOL/L — LOW (ref 3.5–5)
POTASSIUM SERPL-MCNC: 3.9 MMOL/L — SIGNIFICANT CHANGE UP (ref 3.5–5)
POTASSIUM SERPL-SCNC: 3 MMOL/L — LOW (ref 3.5–5)
POTASSIUM SERPL-SCNC: 3.9 MMOL/L — SIGNIFICANT CHANGE UP (ref 3.5–5)
PROT SERPL-MCNC: 6.8 G/DL — SIGNIFICANT CHANGE UP (ref 6–8)
PROTHROM AB SERPL-ACNC: 11.7 SEC — SIGNIFICANT CHANGE UP (ref 9.95–12.87)
RBC # BLD: 4.23 M/UL — SIGNIFICANT CHANGE UP (ref 4.2–5.4)
RBC # BLD: 4.33 M/UL — SIGNIFICANT CHANGE UP (ref 4.2–5.4)
RBC # FLD: 14.2 % — SIGNIFICANT CHANGE UP (ref 11.5–14.5)
RBC # FLD: 14.3 % — SIGNIFICANT CHANGE UP (ref 11.5–14.5)
SARS-COV-2 RNA SPEC QL NAA+PROBE: SIGNIFICANT CHANGE UP
SODIUM SERPL-SCNC: 139 MMOL/L — SIGNIFICANT CHANGE UP (ref 135–146)
SODIUM SERPL-SCNC: 139 MMOL/L — SIGNIFICANT CHANGE UP (ref 135–146)
WBC # BLD: 7.46 K/UL — SIGNIFICANT CHANGE UP (ref 4.8–10.8)
WBC # BLD: 7.73 K/UL — SIGNIFICANT CHANGE UP (ref 4.8–10.8)
WBC # FLD AUTO: 7.46 K/UL — SIGNIFICANT CHANGE UP (ref 4.8–10.8)
WBC # FLD AUTO: 7.73 K/UL — SIGNIFICANT CHANGE UP (ref 4.8–10.8)

## 2023-04-11 PROCEDURE — 87075 CULTR BACTERIA EXCEPT BLOOD: CPT

## 2023-04-11 PROCEDURE — 87070 CULTURE OTHR SPECIMN AEROBIC: CPT

## 2023-04-11 PROCEDURE — 97110 THERAPEUTIC EXERCISES: CPT | Mod: GP

## 2023-04-11 PROCEDURE — 99222 1ST HOSP IP/OBS MODERATE 55: CPT

## 2023-04-11 PROCEDURE — 88304 TISSUE EXAM BY PATHOLOGIST: CPT

## 2023-04-11 PROCEDURE — 93005 ELECTROCARDIOGRAM TRACING: CPT

## 2023-04-11 PROCEDURE — 97162 PT EVAL MOD COMPLEX 30 MIN: CPT | Mod: GP

## 2023-04-11 PROCEDURE — 36415 COLL VENOUS BLD VENIPUNCTURE: CPT

## 2023-04-11 PROCEDURE — 97535 SELF CARE MNGMENT TRAINING: CPT | Mod: GO

## 2023-04-11 PROCEDURE — 84100 ASSAY OF PHOSPHORUS: CPT

## 2023-04-11 PROCEDURE — 87186 SC STD MICRODIL/AGAR DIL: CPT

## 2023-04-11 PROCEDURE — 71045 X-RAY EXAM CHEST 1 VIEW: CPT | Mod: 26

## 2023-04-11 PROCEDURE — 83735 ASSAY OF MAGNESIUM: CPT

## 2023-04-11 PROCEDURE — 80048 BASIC METABOLIC PNL TOTAL CA: CPT

## 2023-04-11 PROCEDURE — 85027 COMPLETE CBC AUTOMATED: CPT

## 2023-04-11 PROCEDURE — 87077 CULTURE AEROBIC IDENTIFY: CPT

## 2023-04-11 PROCEDURE — 99285 EMERGENCY DEPT VISIT HI MDM: CPT

## 2023-04-11 PROCEDURE — 97530 THERAPEUTIC ACTIVITIES: CPT | Mod: GP

## 2023-04-11 PROCEDURE — 97116 GAIT TRAINING THERAPY: CPT | Mod: GP

## 2023-04-11 PROCEDURE — 93010 ELECTROCARDIOGRAM REPORT: CPT

## 2023-04-11 PROCEDURE — 97166 OT EVAL MOD COMPLEX 45 MIN: CPT | Mod: GO

## 2023-04-11 RX ORDER — HYDRALAZINE HCL 50 MG
25 TABLET ORAL ONCE
Refills: 0 | Status: DISCONTINUED | OUTPATIENT
Start: 2023-04-11 | End: 2023-04-11

## 2023-04-11 RX ORDER — METOPROLOL TARTRATE 50 MG
50 TABLET ORAL DAILY
Refills: 0 | Status: DISCONTINUED | OUTPATIENT
Start: 2023-04-11 | End: 2023-04-12

## 2023-04-11 RX ORDER — GABAPENTIN 400 MG/1
800 CAPSULE ORAL THREE TIMES A DAY
Refills: 0 | Status: DISCONTINUED | OUTPATIENT
Start: 2023-04-11 | End: 2023-04-12

## 2023-04-11 RX ORDER — POTASSIUM CHLORIDE 20 MEQ
20 PACKET (EA) ORAL
Refills: 0 | Status: COMPLETED | OUTPATIENT
Start: 2023-04-11 | End: 2023-04-11

## 2023-04-11 RX ORDER — DULOXETINE HYDROCHLORIDE 30 MG/1
30 CAPSULE, DELAYED RELEASE ORAL DAILY
Refills: 0 | Status: DISCONTINUED | OUTPATIENT
Start: 2023-04-11 | End: 2023-04-12

## 2023-04-11 RX ORDER — HYDRALAZINE HCL 50 MG
10 TABLET ORAL ONCE
Refills: 0 | Status: COMPLETED | OUTPATIENT
Start: 2023-04-11 | End: 2023-04-11

## 2023-04-11 RX ORDER — ACETAMINOPHEN 500 MG
650 TABLET ORAL EVERY 6 HOURS
Refills: 0 | Status: DISCONTINUED | OUTPATIENT
Start: 2023-04-11 | End: 2023-04-12

## 2023-04-11 RX ORDER — SIMVASTATIN 20 MG/1
40 TABLET, FILM COATED ORAL AT BEDTIME
Refills: 0 | Status: DISCONTINUED | OUTPATIENT
Start: 2023-04-11 | End: 2023-04-12

## 2023-04-11 RX ORDER — SODIUM CHLORIDE 9 MG/ML
1000 INJECTION, SOLUTION INTRAVENOUS
Refills: 0 | Status: DISCONTINUED | OUTPATIENT
Start: 2023-04-11 | End: 2023-04-12

## 2023-04-11 RX ORDER — AMPICILLIN SODIUM AND SULBACTAM SODIUM 250; 125 MG/ML; MG/ML
INJECTION, POWDER, FOR SUSPENSION INTRAMUSCULAR; INTRAVENOUS
Refills: 0 | Status: DISCONTINUED | OUTPATIENT
Start: 2023-04-11 | End: 2023-04-12

## 2023-04-11 RX ORDER — POLYETHYLENE GLYCOL 3350 17 G/17G
17 POWDER, FOR SOLUTION ORAL DAILY
Refills: 0 | Status: DISCONTINUED | OUTPATIENT
Start: 2023-04-11 | End: 2023-04-12

## 2023-04-11 RX ORDER — MORPHINE SULFATE 50 MG/1
2 CAPSULE, EXTENDED RELEASE ORAL EVERY 6 HOURS
Refills: 0 | Status: DISCONTINUED | OUTPATIENT
Start: 2023-04-11 | End: 2023-04-12

## 2023-04-11 RX ORDER — CHLORHEXIDINE GLUCONATE 213 G/1000ML
1 SOLUTION TOPICAL DAILY
Refills: 0 | Status: DISCONTINUED | OUTPATIENT
Start: 2023-04-11 | End: 2023-04-12

## 2023-04-11 RX ORDER — SENNA PLUS 8.6 MG/1
2 TABLET ORAL AT BEDTIME
Refills: 0 | Status: DISCONTINUED | OUTPATIENT
Start: 2023-04-11 | End: 2023-04-12

## 2023-04-11 RX ORDER — PANTOPRAZOLE SODIUM 20 MG/1
40 TABLET, DELAYED RELEASE ORAL
Refills: 0 | Status: DISCONTINUED | OUTPATIENT
Start: 2023-04-11 | End: 2023-04-12

## 2023-04-11 RX ORDER — ENOXAPARIN SODIUM 100 MG/ML
40 INJECTION SUBCUTANEOUS EVERY 24 HOURS
Refills: 0 | Status: DISCONTINUED | OUTPATIENT
Start: 2023-04-11 | End: 2023-04-12

## 2023-04-11 RX ORDER — ASPIRIN/CALCIUM CARB/MAGNESIUM 324 MG
81 TABLET ORAL DAILY
Refills: 0 | Status: DISCONTINUED | OUTPATIENT
Start: 2023-04-11 | End: 2023-04-12

## 2023-04-11 RX ORDER — SERTRALINE 25 MG/1
100 TABLET, FILM COATED ORAL DAILY
Refills: 0 | Status: DISCONTINUED | OUTPATIENT
Start: 2023-04-11 | End: 2023-04-12

## 2023-04-11 RX ORDER — METOPROLOL TARTRATE 50 MG
50 TABLET ORAL DAILY
Refills: 0 | Status: DISCONTINUED | OUTPATIENT
Start: 2023-04-11 | End: 2023-04-11

## 2023-04-11 RX ORDER — SACCHAROMYCES BOULARDII 250 MG
250 POWDER IN PACKET (EA) ORAL
Refills: 0 | Status: DISCONTINUED | OUTPATIENT
Start: 2023-04-11 | End: 2023-04-12

## 2023-04-11 RX ORDER — OXYCODONE AND ACETAMINOPHEN 5; 325 MG/1; MG/1
1 TABLET ORAL EVERY 6 HOURS
Refills: 0 | Status: DISCONTINUED | OUTPATIENT
Start: 2023-04-11 | End: 2023-04-12

## 2023-04-11 RX ORDER — AMLODIPINE BESYLATE 2.5 MG/1
10 TABLET ORAL DAILY
Refills: 0 | Status: DISCONTINUED | OUTPATIENT
Start: 2023-04-11 | End: 2023-04-12

## 2023-04-11 RX ORDER — LOSARTAN POTASSIUM 100 MG/1
50 TABLET, FILM COATED ORAL DAILY
Refills: 0 | Status: DISCONTINUED | OUTPATIENT
Start: 2023-04-11 | End: 2023-04-12

## 2023-04-11 RX ORDER — AMPICILLIN SODIUM AND SULBACTAM SODIUM 250; 125 MG/ML; MG/ML
3 INJECTION, POWDER, FOR SUSPENSION INTRAMUSCULAR; INTRAVENOUS EVERY 6 HOURS
Refills: 0 | Status: DISCONTINUED | OUTPATIENT
Start: 2023-04-11 | End: 2023-04-12

## 2023-04-11 RX ORDER — MAGNESIUM SULFATE 500 MG/ML
2 VIAL (ML) INJECTION ONCE
Refills: 0 | Status: COMPLETED | OUTPATIENT
Start: 2023-04-11 | End: 2023-04-11

## 2023-04-11 RX ORDER — POTASSIUM CHLORIDE 20 MEQ
20 PACKET (EA) ORAL ONCE
Refills: 0 | Status: COMPLETED | OUTPATIENT
Start: 2023-04-11 | End: 2023-04-11

## 2023-04-11 RX ORDER — AMPICILLIN SODIUM AND SULBACTAM SODIUM 250; 125 MG/ML; MG/ML
3 INJECTION, POWDER, FOR SUSPENSION INTRAMUSCULAR; INTRAVENOUS ONCE
Refills: 0 | Status: COMPLETED | OUTPATIENT
Start: 2023-04-11 | End: 2023-04-11

## 2023-04-11 RX ORDER — LEVOTHYROXINE SODIUM 125 MCG
50 TABLET ORAL DAILY
Refills: 0 | Status: DISCONTINUED | OUTPATIENT
Start: 2023-04-11 | End: 2023-04-12

## 2023-04-11 RX ADMIN — MORPHINE SULFATE 2 MILLIGRAM(S): 50 CAPSULE, EXTENDED RELEASE ORAL at 16:39

## 2023-04-11 RX ADMIN — Medication 250 MILLIGRAM(S): at 17:38

## 2023-04-11 RX ADMIN — ENOXAPARIN SODIUM 40 MILLIGRAM(S): 100 INJECTION SUBCUTANEOUS at 14:43

## 2023-04-11 RX ADMIN — Medication 50 MILLIEQUIVALENT(S): at 22:21

## 2023-04-11 RX ADMIN — GABAPENTIN 800 MILLIGRAM(S): 400 CAPSULE ORAL at 22:45

## 2023-04-11 RX ADMIN — MORPHINE SULFATE 2 MILLIGRAM(S): 50 CAPSULE, EXTENDED RELEASE ORAL at 17:41

## 2023-04-11 RX ADMIN — SODIUM CHLORIDE 75 MILLILITER(S): 9 INJECTION, SOLUTION INTRAVENOUS at 14:44

## 2023-04-11 RX ADMIN — Medication 50 MILLIEQUIVALENT(S): at 22:48

## 2023-04-11 RX ADMIN — Medication 25 GRAM(S): at 19:53

## 2023-04-11 RX ADMIN — Medication 10 MILLIGRAM(S): at 17:10

## 2023-04-11 RX ADMIN — AMLODIPINE BESYLATE 10 MILLIGRAM(S): 2.5 TABLET ORAL at 17:39

## 2023-04-11 RX ADMIN — SENNA PLUS 2 TABLET(S): 8.6 TABLET ORAL at 22:44

## 2023-04-11 RX ADMIN — AMPICILLIN SODIUM AND SULBACTAM SODIUM 200 GRAM(S): 250; 125 INJECTION, POWDER, FOR SUSPENSION INTRAMUSCULAR; INTRAVENOUS at 23:55

## 2023-04-11 RX ADMIN — AMPICILLIN SODIUM AND SULBACTAM SODIUM 200 GRAM(S): 250; 125 INJECTION, POWDER, FOR SUSPENSION INTRAMUSCULAR; INTRAVENOUS at 14:42

## 2023-04-11 RX ADMIN — SIMVASTATIN 40 MILLIGRAM(S): 20 TABLET, FILM COATED ORAL at 22:44

## 2023-04-11 RX ADMIN — AMPICILLIN SODIUM AND SULBACTAM SODIUM 200 GRAM(S): 250; 125 INJECTION, POWDER, FOR SUSPENSION INTRAMUSCULAR; INTRAVENOUS at 17:39

## 2023-04-11 NOTE — ED PROVIDER NOTE - PHYSICAL EXAMINATION
CONST: Well appearing in NAD  CARD: Normal S1 S2; Normal rate and rhythm  RESP: Equal BS B/L, No wheezes, rhonchi or rales. No distress  GI: Soft, non-tender, non-distended.  MS: L aka w/ prosthesis  SKIN: 2 cm ulcer L inguinal fold w. surrounding erythema. No fluctuance or crepitus.   NEURO: A&Ox3, No focal deficits.

## 2023-04-11 NOTE — ED ADULT NURSE NOTE - NSIMPLEMENTINTERV_GEN_ALL_ED
Implemented All Universal Safety Interventions:  [place] to call system. Call bell, personal items and telephone within reach. Instruct patient to call for assistance. Room bathroom lighting operational. Non-slip footwear when patient is off stretcher. Physically safe environment: no spills, clutter or unnecessary equipment. Stretcher in lowest position, wheels locked, appropriate side rails in place.

## 2023-04-11 NOTE — ED PROVIDER NOTE - PROGRESS NOTE DETAILS
I was directly involved in the management of this patient. Case was discussed with PA Fellow Melchor art, will evaluate, please admit to Dr. Dc
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
## 2023-04-11 NOTE — ED PROVIDER NOTE - NS ED ATTENDING STATEMENT MOD
Attending with I have seen and examined this patient and fully participated in the care of this patient as the teaching attending.  The service was shared with the ALEJANDRO.  I reviewed and verified the documentation and independently performed the documented:

## 2023-04-11 NOTE — ED ADULT NURSE NOTE - OBJECTIVE STATEMENT
Pt with C/O left side groin pain HX- left AKA  with prosthesis report wound on the left side groin ,send for IV antibiotic .

## 2023-04-11 NOTE — H&P ADULT - NSHPPHYSICALEXAM_GEN_ALL_CORE
PHYSICAL EXAM:  GENERAL: NAD, well-developed  HEAD:  Atraumatic, Normocephalic  EYES: EOMI, PERRLA,   CHEST/LUNG: Breathing comfortably on RA, b/l chest rise appreciated, speaking in full sentences  HEART: In no cardiopulmonary distress  PSYCH: AAOx3  SKIN: Left AKA with small wound ~1cm x 1cm with pink and moist base. Some induration noted with mild erythema. Patient endorses drainage to the site - none observed on PE. No active bleeding, malodor or purulence noted.

## 2023-04-11 NOTE — ED ADULT NURSE REASSESSMENT NOTE - NS ED NURSE REASSESS COMMENT FT1
Pt reassessed A/O times 4 assistance provide on the bed with 1 person assist ,C/O left side groin pain open wound , comfort provide pt is seen evaluate by ED attending did eat 755 of lunch safety precaution on progress on going nursing observation .

## 2023-04-11 NOTE — PATIENT PROFILE ADULT - FALL HARM RISK - HARM RISK INTERVENTIONS
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
## 2023-04-11 NOTE — H&P ADULT - NSHPLABSRESULTS_GEN_ALL_CORE
12.8   7.46  )-----------( 200      ( 11 Apr 2023 11:05 )             38.6   CBC Full  -  ( 11 Apr 2023 11:05 )  WBC Count : 7.46 K/uL  RBC Count : 4.33 M/uL  Hemoglobin : 12.8 g/dL  Hematocrit : 38.6 %  Platelet Count - Automated : 200 K/uL  Mean Cell Volume : 89.1 fL  Mean Cell Hemoglobin : 29.6 pg  Mean Cell Hemoglobin Concentration : 33.2 g/dL  Auto Neutrophil # : 5.02 K/uL  Auto Lymphocyte # : 1.66 K/uL  Auto Monocyte # : 0.57 K/uL  Auto Eosinophil # : 0.14 K/uL  Auto Basophil # : 0.05 K/uL  Auto Neutrophil % : 67.2 %  Auto Lymphocyte % : 22.3 %  Auto Monocyte % : 7.6 %  Auto Eosinophil % : 1.9 %  Auto Basophil % : 0.7 %

## 2023-04-11 NOTE — ED PROVIDER NOTE - ATTENDING CONTRIBUTION TO CARE
73-year-old female with past medical history of hypertension, hyperlipidemia, CAD, left AKA presents to the emergency department for admission for IV antibiotics and debridement of left groin wound.  Patient states she has had this wound for a year and she has not followed up with burn for evaluation of it.  Patient states that intermittently bleeds.  No fever.    Const: NAD  Eyes: PERRL, no conjunctival injection  HENT:  Neck supple without meningismus   CV: RRR, Warm, well-perfused extremities  RESP: CTA B/L, no tachypnea   GI: soft, non-tender, non-distended  MSK/skin: small wound to L groin, L aka   Neuro: Alert, CNs II-XII grossly intact. Sensation and motor function of extremities grossly intact.  Psych: Appropriate mood and affect.    labs, burn

## 2023-04-12 ENCOUNTER — RESULT REVIEW (OUTPATIENT)
Age: 73
End: 2023-04-12

## 2023-04-12 ENCOUNTER — TRANSCRIPTION ENCOUNTER (OUTPATIENT)
Age: 73
End: 2023-04-12

## 2023-04-12 DIAGNOSIS — S78.112A COMPLETE TRAUMATIC AMPUTATION AT LEVEL BETWEEN LEFT HIP AND KNEE, INITIAL ENCOUNTER: Chronic | ICD-10-CM

## 2023-04-12 DIAGNOSIS — G45.3 AMAUROSIS FUGAX: Chronic | ICD-10-CM

## 2023-04-12 LAB
ANION GAP SERPL CALC-SCNC: 13 MMOL/L — SIGNIFICANT CHANGE UP (ref 7–14)
BUN SERPL-MCNC: 10 MG/DL — SIGNIFICANT CHANGE UP (ref 10–20)
CALCIUM SERPL-MCNC: 8.9 MG/DL — SIGNIFICANT CHANGE UP (ref 8.4–10.5)
CHLORIDE SERPL-SCNC: 106 MMOL/L — SIGNIFICANT CHANGE UP (ref 98–110)
CO2 SERPL-SCNC: 21 MMOL/L — SIGNIFICANT CHANGE UP (ref 17–32)
CREAT SERPL-MCNC: 0.5 MG/DL — LOW (ref 0.7–1.5)
EGFR: 98 ML/MIN/1.73M2 — SIGNIFICANT CHANGE UP
GLUCOSE SERPL-MCNC: 96 MG/DL — SIGNIFICANT CHANGE UP (ref 70–99)
MAGNESIUM SERPL-MCNC: 2.2 MG/DL — SIGNIFICANT CHANGE UP (ref 1.8–2.4)
POTASSIUM SERPL-MCNC: 3.7 MMOL/L — SIGNIFICANT CHANGE UP (ref 3.5–5)
POTASSIUM SERPL-SCNC: 3.7 MMOL/L — SIGNIFICANT CHANGE UP (ref 3.5–5)
SODIUM SERPL-SCNC: 140 MMOL/L — SIGNIFICANT CHANGE UP (ref 135–146)

## 2023-04-12 PROCEDURE — 99232 SBSQ HOSP IP/OBS MODERATE 35: CPT | Mod: 25

## 2023-04-12 PROCEDURE — 12031 INTMD RPR S/A/T/EXT 2.5 CM/<: CPT

## 2023-04-12 PROCEDURE — 88304 TISSUE EXAM BY PATHOLOGIST: CPT | Mod: 26

## 2023-04-12 RX ORDER — GABAPENTIN 400 MG/1
800 CAPSULE ORAL THREE TIMES A DAY
Refills: 0 | Status: DISCONTINUED | OUTPATIENT
Start: 2023-04-12 | End: 2023-04-22

## 2023-04-12 RX ORDER — PANTOPRAZOLE SODIUM 20 MG/1
40 TABLET, DELAYED RELEASE ORAL
Refills: 0 | Status: DISCONTINUED | OUTPATIENT
Start: 2023-04-12 | End: 2023-04-22

## 2023-04-12 RX ORDER — AMLODIPINE BESYLATE 2.5 MG/1
10 TABLET ORAL DAILY
Refills: 0 | Status: DISCONTINUED | OUTPATIENT
Start: 2023-04-12 | End: 2023-04-22

## 2023-04-12 RX ORDER — SIMVASTATIN 20 MG/1
40 TABLET, FILM COATED ORAL AT BEDTIME
Refills: 0 | Status: DISCONTINUED | OUTPATIENT
Start: 2023-04-12 | End: 2023-04-22

## 2023-04-12 RX ORDER — MORPHINE SULFATE 50 MG/1
2 CAPSULE, EXTENDED RELEASE ORAL EVERY 6 HOURS
Refills: 0 | Status: DISCONTINUED | OUTPATIENT
Start: 2023-04-12 | End: 2023-04-12

## 2023-04-12 RX ORDER — CHLORHEXIDINE GLUCONATE 213 G/1000ML
1 SOLUTION TOPICAL DAILY
Refills: 0 | Status: DISCONTINUED | OUTPATIENT
Start: 2023-04-12 | End: 2023-04-22

## 2023-04-12 RX ORDER — ACETAMINOPHEN 500 MG
650 TABLET ORAL EVERY 6 HOURS
Refills: 0 | Status: DISCONTINUED | OUTPATIENT
Start: 2023-04-12 | End: 2023-04-22

## 2023-04-12 RX ORDER — ENOXAPARIN SODIUM 100 MG/ML
40 INJECTION SUBCUTANEOUS EVERY 24 HOURS
Refills: 0 | Status: DISCONTINUED | OUTPATIENT
Start: 2023-04-12 | End: 2023-04-22

## 2023-04-12 RX ORDER — SENNA PLUS 8.6 MG/1
2 TABLET ORAL AT BEDTIME
Refills: 0 | Status: DISCONTINUED | OUTPATIENT
Start: 2023-04-12 | End: 2023-04-22

## 2023-04-12 RX ORDER — LEVOTHYROXINE SODIUM 125 MCG
50 TABLET ORAL DAILY
Refills: 0 | Status: DISCONTINUED | OUTPATIENT
Start: 2023-04-12 | End: 2023-04-22

## 2023-04-12 RX ORDER — SODIUM CHLORIDE 9 MG/ML
1000 INJECTION, SOLUTION INTRAVENOUS
Refills: 0 | Status: DISCONTINUED | OUTPATIENT
Start: 2023-04-12 | End: 2023-04-12

## 2023-04-12 RX ORDER — METOPROLOL TARTRATE 50 MG
50 TABLET ORAL DAILY
Refills: 0 | Status: DISCONTINUED | OUTPATIENT
Start: 2023-04-12 | End: 2023-04-22

## 2023-04-12 RX ORDER — DULOXETINE HYDROCHLORIDE 30 MG/1
30 CAPSULE, DELAYED RELEASE ORAL DAILY
Refills: 0 | Status: DISCONTINUED | OUTPATIENT
Start: 2023-04-12 | End: 2023-04-22

## 2023-04-12 RX ORDER — AMPICILLIN SODIUM AND SULBACTAM SODIUM 250; 125 MG/ML; MG/ML
3 INJECTION, POWDER, FOR SUSPENSION INTRAMUSCULAR; INTRAVENOUS ONCE
Refills: 0 | Status: COMPLETED | OUTPATIENT
Start: 2023-04-12 | End: 2023-04-12

## 2023-04-12 RX ORDER — AMPICILLIN SODIUM AND SULBACTAM SODIUM 250; 125 MG/ML; MG/ML
3 INJECTION, POWDER, FOR SUSPENSION INTRAMUSCULAR; INTRAVENOUS EVERY 6 HOURS
Refills: 0 | Status: DISCONTINUED | OUTPATIENT
Start: 2023-04-12 | End: 2023-04-16

## 2023-04-12 RX ORDER — SACCHAROMYCES BOULARDII 250 MG
250 POWDER IN PACKET (EA) ORAL
Refills: 0 | Status: DISCONTINUED | OUTPATIENT
Start: 2023-04-12 | End: 2023-04-22

## 2023-04-12 RX ORDER — POLYETHYLENE GLYCOL 3350 17 G/17G
17 POWDER, FOR SOLUTION ORAL DAILY
Refills: 0 | Status: DISCONTINUED | OUTPATIENT
Start: 2023-04-12 | End: 2023-04-22

## 2023-04-12 RX ORDER — SERTRALINE 25 MG/1
100 TABLET, FILM COATED ORAL DAILY
Refills: 0 | Status: DISCONTINUED | OUTPATIENT
Start: 2023-04-12 | End: 2023-04-22

## 2023-04-12 RX ORDER — ASPIRIN/CALCIUM CARB/MAGNESIUM 324 MG
81 TABLET ORAL DAILY
Refills: 0 | Status: DISCONTINUED | OUTPATIENT
Start: 2023-04-12 | End: 2023-04-22

## 2023-04-12 RX ORDER — AMPICILLIN SODIUM AND SULBACTAM SODIUM 250; 125 MG/ML; MG/ML
INJECTION, POWDER, FOR SUSPENSION INTRAMUSCULAR; INTRAVENOUS
Refills: 0 | Status: DISCONTINUED | OUTPATIENT
Start: 2023-04-12 | End: 2023-04-16

## 2023-04-12 RX ORDER — LOSARTAN POTASSIUM 100 MG/1
50 TABLET, FILM COATED ORAL DAILY
Refills: 0 | Status: DISCONTINUED | OUTPATIENT
Start: 2023-04-12 | End: 2023-04-22

## 2023-04-12 RX ADMIN — Medication 81 MILLIGRAM(S): at 13:12

## 2023-04-12 RX ADMIN — SIMVASTATIN 40 MILLIGRAM(S): 20 TABLET, FILM COATED ORAL at 21:56

## 2023-04-12 RX ADMIN — Medication 250 MILLIGRAM(S): at 06:21

## 2023-04-12 RX ADMIN — SERTRALINE 100 MILLIGRAM(S): 25 TABLET, FILM COATED ORAL at 13:16

## 2023-04-12 RX ADMIN — LOSARTAN POTASSIUM 50 MILLIGRAM(S): 100 TABLET, FILM COATED ORAL at 06:21

## 2023-04-12 RX ADMIN — GABAPENTIN 800 MILLIGRAM(S): 400 CAPSULE ORAL at 13:14

## 2023-04-12 RX ADMIN — Medication 50 MICROGRAM(S): at 06:22

## 2023-04-12 RX ADMIN — AMPICILLIN SODIUM AND SULBACTAM SODIUM 200 GRAM(S): 250; 125 INJECTION, POWDER, FOR SUSPENSION INTRAMUSCULAR; INTRAVENOUS at 13:11

## 2023-04-12 RX ADMIN — AMLODIPINE BESYLATE 10 MILLIGRAM(S): 2.5 TABLET ORAL at 06:20

## 2023-04-12 RX ADMIN — ENOXAPARIN SODIUM 40 MILLIGRAM(S): 100 INJECTION SUBCUTANEOUS at 13:19

## 2023-04-12 RX ADMIN — Medication 50 MILLIGRAM(S): at 06:22

## 2023-04-12 RX ADMIN — AMPICILLIN SODIUM AND SULBACTAM SODIUM 200 GRAM(S): 250; 125 INJECTION, POWDER, FOR SUSPENSION INTRAMUSCULAR; INTRAVENOUS at 23:09

## 2023-04-12 RX ADMIN — Medication 20 MILLIEQUIVALENT(S): at 02:16

## 2023-04-12 RX ADMIN — POLYETHYLENE GLYCOL 3350 17 GRAM(S): 17 POWDER, FOR SOLUTION ORAL at 13:15

## 2023-04-12 RX ADMIN — DULOXETINE HYDROCHLORIDE 30 MILLIGRAM(S): 30 CAPSULE, DELAYED RELEASE ORAL at 13:13

## 2023-04-12 RX ADMIN — Medication 250 MILLIGRAM(S): at 21:56

## 2023-04-12 RX ADMIN — SENNA PLUS 2 TABLET(S): 8.6 TABLET ORAL at 21:56

## 2023-04-12 RX ADMIN — AMPICILLIN SODIUM AND SULBACTAM SODIUM 200 GRAM(S): 250; 125 INJECTION, POWDER, FOR SUSPENSION INTRAMUSCULAR; INTRAVENOUS at 06:22

## 2023-04-12 RX ADMIN — PANTOPRAZOLE SODIUM 40 MILLIGRAM(S): 20 TABLET, DELAYED RELEASE ORAL at 06:21

## 2023-04-12 RX ADMIN — GABAPENTIN 800 MILLIGRAM(S): 400 CAPSULE ORAL at 21:56

## 2023-04-12 RX ADMIN — GABAPENTIN 800 MILLIGRAM(S): 400 CAPSULE ORAL at 06:21

## 2023-04-12 RX ADMIN — AMPICILLIN SODIUM AND SULBACTAM SODIUM 200 GRAM(S): 250; 125 INJECTION, POWDER, FOR SUSPENSION INTRAMUSCULAR; INTRAVENOUS at 17:59

## 2023-04-12 RX ADMIN — CHLORHEXIDINE GLUCONATE 1 APPLICATION(S): 213 SOLUTION TOPICAL at 13:12

## 2023-04-12 NOTE — PHYSICAL THERAPY INITIAL EVALUATION ADULT - LEVEL OF INDEPENDENCE: STAIR NEGOTIATION, REHAB EVAL
NA. pt has decreased ambulation tolerance. Per pt, she had difficulty going up stairs when prosthesis prior to admission.

## 2023-04-12 NOTE — DISCHARGE NOTE NURSING/CASE MANAGEMENT/SOCIAL WORK - NSDCVIVACCINE_GEN_ALL_CORE_FT
COVID-19, mRNA, LNP-S, PF, 30 mcg/0.3 mL dose, bradley-sucrose (Pfizer); 28-Sep-2022 16:04; Louissaint, Nancy (RN); [employer]; [id number] (Exp. Date: 07-Dec-2022); IntraMuscular; Deltoid Left.; 0.3 milliLiter(s);   influenza, high-dose, quadrivalent; 28-Sep-2022 15:50; Louissaint, Nancy (RN); [employer]; [id number] (Exp. Date: 30-Jun-2023); IntraMuscular; Deltoid Left.; 0.7 milliLiter(s); VIS (VIS Published: 06-Aug-2021, VIS Presented: 28-Sep-2022);

## 2023-04-12 NOTE — PHYSICAL THERAPY INITIAL EVALUATION ADULT - NSPTDISCHREC_GEN_A_CORE
Patient requires assistance with functional mobility. Based on today's treatment session, PT recommends D/C to home with assistance vs rehabilitation facility (dependent on functional outcomes) when medically appropriate. PT recommends a rolling walker for D/C to home. Refer to treatment note (in Assessment & intervention section) for details. pt will need ambulance to get pt into home if dc'd to home/Home PT

## 2023-04-12 NOTE — PHYSICAL THERAPY INITIAL EVALUATION ADULT - PERTINENT HX OF CURRENT PROBLEM, REHAB EVAL
Patient is a 73y old  Female who presents with a chief complaint of non healing Left groin wound from prosthesis.  4/12 pt s/p debridement of left groin wound and primary closure

## 2023-04-12 NOTE — PHYSICAL THERAPY INITIAL EVALUATION ADULT - PLANNED THERAPY INTERVENTIONS, PT EVAL
STAIR ASSESSMENT AND TRAINING AS APPROPRIATE. Goal: TBD when stair status is assessed./balance training/gait training/transfer training

## 2023-04-12 NOTE — PROGRESS NOTE ADULT - ASSESSMENT
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     # Left groin wound from prosthetic  - 4/12 pt s/p debridement of left groin wound and primary closure    - Local wound care bid: wash wound with soap and water, apply ABD pads and tape  - Continue Unasyn IV  - Pain management: Tylenol, percocet, morphine   - OT/PT c/s     # Cards: Hx of HTN, HLD and CAD  - Continue with Losartan 50mg daily, Simvastatin 40mg daily, ASA 81 daily. Amlodipine 10mg daily, Metoprolol 50mg tab daily, and  HCTZ 12.5 daily   - Monitor BPs   - Diet DASH/TLC    # Endo: Hx of Hypothyroidism  - Continue with home medications: Levothyroxine 50 mcg      # Psych: Hx of depression and anxiety  - Continue with home medications: sertraline 100mg and duloxetine 30 mg daily    Miscellaneous:  - Ambulate as tolerated  - DVT/GI ppx  - DASH/TLC diet   - PT/OT c/s   - SW for discharge tomorrow:   Pt s/p high Left AKA with prosthesis, now with left groin nonhealing wound, s/p debridement and Primary closure today.  Pt will be not able to ambulate with prothesis until wound healed. Pt will require wheelchair  before discharge. Wheelchair will significantly pt's ability to  participate in her MRADL's and will be used at regular basis at home and outside. Pt's mobility limitation will be not resolved with a cane or walker.  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     # Left groin wound from prosthetic  - 4/12 pt s/p debridement of left groin wound and primary closure    - Local wound care bid: wash wound with soap and water, apply ABD pads and tape  - Continue Unasyn IV  - Pain management: Tylenol, percocet, morphine   - OT/PT c/s     # Cards: Hx of HTN, HLD and CAD  - Continue with Losartan 50mg daily, Simvastatin 40mg daily, ASA 81 daily. Amlodipine 10mg daily, Metoprolol 50mg tab daily, and  HCTZ 12.5 daily   - Monitor BPs   - Diet DASH/TLC    # Endo: Hx of Hypothyroidism  - Continue with home medications: Levothyroxine 50 mcg      # Psych: Hx of depression and anxiety  - Continue with home medications: sertraline 100mg and duloxetine 30 mg daily    Miscellaneous:  - Ambulate as tolerated  - DVT/GI ppx  - DASH/TLC diet   - PT/OT c/s   - SW for discharge tomorrow:   Pt s/p high Left AKA with prosthesis, now with left groin nonhealing wound, s/p debridement and Primary closure today.  Pt will be not able to ambulate with prothesis until wound healed. Pt will require wheelchair  before discharge. Wheelchair will significantly pt's ability to  participate in her MRADL's and will be used at regular basis at home and outside. Pt's mobility limitation will be not resolved with a cane or walker. Pt also will need bedside commode and shower chair at home. Pt will required home PT for physical therapy.

## 2023-04-12 NOTE — PHYSICAL THERAPY INITIAL EVALUATION ADULT - ADDITIONAL COMMENTS
pt lives with  in a house with  7 steps to enter and one flight to bedroom. Per pt,  prior to admission, she ambulated with rollator and assistance from  as needed.

## 2023-04-12 NOTE — DISCHARGE NOTE NURSING/CASE MANAGEMENT/SOCIAL WORK - PATIENT PORTAL LINK FT
You can access the FollowMyHealth Patient Portal offered by [facility] by registering at the following website: [email or web address]. By joining [facility]’s FollowMyHealth portal, you will also be able to view your health information using other applications (apps) compatible with our system.

## 2023-04-12 NOTE — BRIEF OPERATIVE NOTE - NSICDXBRIEFPROCEDURE_GEN_ALL_CORE_FT
PROCEDURES:  Selective debridement 12-Apr-2023 13:05:19 excisional debridement with scalpel left groin wound Jesús Dc
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
## 2023-04-12 NOTE — PROGRESS NOTE ADULT - SUBJECTIVE AND OBJECTIVE BOX
Patient is a 73y old  Female who presents with a chief complaint of Left groin wound from prosthetic (11 Apr 2023 12:34)      INTERVAL HPI/OVERNIGHT EVENTS:  ICU Vital Signs Last 24 Hrs  T(C): 35.9 (12 Apr 2023 13:30), Max: 36.6 (11 Apr 2023 16:28)  T(F): 96.7 (12 Apr 2023 13:30), Max: 97.8 (11 Apr 2023 16:28)  HR: 72 (12 Apr 2023 13:20) (66 - 97)  BP: 147/67 (12 Apr 2023 13:30) (115/58 - 204/103)  BP(mean): 96 (12 Apr 2023 13:30) (80 - 144)  RR: 18 (12 Apr 2023 13:30) (16 - 20)  SpO2: 96% (12 Apr 2023 13:30) (95% - 100%)    O2 Parameters below as of 12 Apr 2023 13:30  Patient On (Oxygen Delivery Method): nasal cannula  O2 Flow (L/min): 2    I&O's Summary    11 Apr 2023 07:01  -  12 Apr 2023 07:00  --------------------------------------------------------  IN: 400 mL / OUT: 350 mL / NET: 50 mL      Allergies  No Known Allergies    Lab Results:                        12.6   7.73  )-----------( 195      ( 11 Apr 2023 18:07 )             37.2     04-12    140  |  106  |  10  ----------------------------<  96  3.7   |  21  |  0.5<L>    Ca    8.9      12 Apr 2023 05:25  Phos  2.8     04-11  Mg     2.2     04-12    TPro  6.8  /  Alb  4.0  /  TBili  0.5  /  DBili  x   /  AST  20  /  ALT  16  /  AlkPhos  126<H>  04-11    PT/INR - ( 11 Apr 2023 11:05 )   PT: 11.70 sec;   INR: 1.03 ratio         PTT - ( 11 Apr 2023 11:05 )  PTT:36.4 sec    LIVER FUNCTIONS - ( 11 Apr 2023 11:05 )  Alb: 4.0 g/dL / Pro: 6.8 g/dL / ALK PHOS: 126 U/L / ALT: 16 U/L / AST: 20 U/L / GGT: x             MEDICATIONS  (STANDING):  amLODIPine   Tablet 10 milliGRAM(s) Oral daily  ampicillin/sulbactam  IVPB      ampicillin/sulbactam  IVPB 3 Gram(s) IV Intermittent every 6 hours  aspirin  chewable 81 milliGRAM(s) Oral daily  chlorhexidine 4% Liquid 1 Application(s) Topical daily  DULoxetine 30 milliGRAM(s) Oral daily  enoxaparin Injectable 40 milliGRAM(s) SubCutaneous every 24 hours  gabapentin 800 milliGRAM(s) Oral three times a day  lactated ringers. 1000 milliLiter(s) (75 mL/Hr) IV Continuous <Continuous>  levothyroxine 50 MICROGram(s) Oral daily  losartan 50 milliGRAM(s) Oral daily  metoprolol succinate ER 50 milliGRAM(s) Oral daily  pantoprazole    Tablet 40 milliGRAM(s) Oral before breakfast  polyethylene glycol 3350 17 Gram(s) Oral daily  saccharomyces boulardii 250 milliGRAM(s) Oral two times a day  senna 2 Tablet(s) Oral at bedtime  sertraline 100 milliGRAM(s) Oral daily  simvastatin 40 milliGRAM(s) Oral at bedtime    MEDICATIONS  (PRN):  acetaminophen     Tablet .. 650 milliGRAM(s) Oral every 6 hours PRN Temp greater or equal to 38C (100.4F), Mild Pain (1 - 3)  morphine  - Injectable 2 milliGRAM(s) IV Push every 6 hours PRN Severe Pain (7 - 10)  oxycodone    5 mG/acetaminophen 325 mG 1 Tablet(s) Oral every 6 hours PRN Moderate Pain (4 - 6)  silver nitrate Applicator 1 Application(s) Topical once PRN bleeding     Patient is a 73y old  Female who presents with a chief complaint of non healing Left groin wound from prosthesis.  4/12 pt s/p debridement of left groin wound and primary closure       Vital Signs Last 24 Hrs  T(C): 35.9 (12 Apr 2023 13:30), Max: 36.6 (11 Apr 2023 16:28)  T(F): 96.7 (12 Apr 2023 13:30), Max: 97.8 (11 Apr 2023 16:28)  HR: 72 (12 Apr 2023 13:20) (66 - 97)  BP: 147/67 (12 Apr 2023 13:30) (115/58 - 204/103)  BP(mean): 96 (12 Apr 2023 13:30) (80 - 144)  RR: 18 (12 Apr 2023 13:30) (16 - 20)  SpO2: 96% (12 Apr 2023 13:30) (95% - 100%)    O2 Parameters below as of 12 Apr 2023 13:30  Patient On (Oxygen Delivery Method): nasal cannula  O2 Flow (L/min): 2    I&O's Summary    11 Apr 2023 07:01  -  12 Apr 2023 07:00  --------------------------------------------------------  IN: 400 mL / OUT: 350 mL / NET: 50 mL      Allergies  No Known Allergies    Lab Results:                        12.6   7.73  )-----------( 195      ( 11 Apr 2023 18:07 )             37.2     04-12    140  |  106  |  10  ----------------------------<  96  3.7   |  21  |  0.5<L>    Ca    8.9      12 Apr 2023 05:25  Phos  2.8     04-11  Mg     2.2     04-12    TPro  6.8  /  Alb  4.0  /  TBili  0.5  /  DBili  x   /  AST  20  /  ALT  16  /  AlkPhos  126<H>  04-11    PT/INR - ( 11 Apr 2023 11:05 )   PT: 11.70 sec;   INR: 1.03 ratio         PTT - ( 11 Apr 2023 11:05 )  PTT:36.4 sec    LIVER FUNCTIONS - ( 11 Apr 2023 11:05 )  Alb: 4.0 g/dL / Pro: 6.8 g/dL / ALK PHOS: 126 U/L / ALT: 16 U/L / AST: 20 U/L / GGT: x             MEDICATIONS  (STANDING):  amLODIPine   Tablet 10 milliGRAM(s) Oral daily  ampicillin/sulbactam  IVPB      ampicillin/sulbactam  IVPB 3 Gram(s) IV Intermittent every 6 hours  aspirin  chewable 81 milliGRAM(s) Oral daily  chlorhexidine 4% Liquid 1 Application(s) Topical daily  DULoxetine 30 milliGRAM(s) Oral daily  enoxaparin Injectable 40 milliGRAM(s) SubCutaneous every 24 hours  gabapentin 800 milliGRAM(s) Oral three times a day  lactated ringers. 1000 milliLiter(s) (75 mL/Hr) IV Continuous <Continuous>  levothyroxine 50 MICROGram(s) Oral daily  losartan 50 milliGRAM(s) Oral daily  metoprolol succinate ER 50 milliGRAM(s) Oral daily  pantoprazole    Tablet 40 milliGRAM(s) Oral before breakfast  polyethylene glycol 3350 17 Gram(s) Oral daily  saccharomyces boulardii 250 milliGRAM(s) Oral two times a day  senna 2 Tablet(s) Oral at bedtime  sertraline 100 milliGRAM(s) Oral daily  simvastatin 40 milliGRAM(s) Oral at bedtime    MEDICATIONS  (PRN):  acetaminophen     Tablet .. 650 milliGRAM(s) Oral every 6 hours PRN Temp greater or equal to 38C (100.4F), Mild Pain (1 - 3)  morphine  - Injectable 2 milliGRAM(s) IV Push every 6 hours PRN Severe Pain (7 - 10)  oxycodone    5 mG/acetaminophen 325 mG 1 Tablet(s) Oral every 6 hours PRN Moderate Pain (4 - 6)  silver nitrate Applicator 1 Application(s) Topical once PRN bleeding    PHYSICAL EXAM:  GENERAL: seen post-op in chapo acute distress, alert, cooperative, laying in bed comfortably  HEAD:  Atraumatic, Normocephalic  EYES: EOMI, PERRLA, conjunctiva and sclera clear  NECK: Supple,   CHEST/LUNG:  B/L good air entry, no tachypnea/increased WOB/retractions.   HEART: Regular rate and rhythm;   ABDOM: soft, non tender  NEUROLOGY: AAOx3, non-focal,    SKIN/WOUND: left groin s/p debridement and primary closure, surgical incision site clean, no acute bleeding, no pus drainage.

## 2023-04-13 ENCOUNTER — TRANSCRIPTION ENCOUNTER (OUTPATIENT)
Age: 73
End: 2023-04-13

## 2023-04-13 RX ORDER — LOSARTAN POTASSIUM 100 MG/1
1 TABLET, FILM COATED ORAL
Qty: 30 | Refills: 0
Start: 2023-04-13 | End: 2023-05-12

## 2023-04-13 RX ORDER — SACCHAROMYCES BOULARDII 250 MG
1 POWDER IN PACKET (EA) ORAL
Qty: 1 | Refills: 0
Start: 2023-04-13

## 2023-04-13 RX ORDER — METOPROLOL TARTRATE 50 MG
1 TABLET ORAL
Qty: 30 | Refills: 0
Start: 2023-04-13 | End: 2023-05-12

## 2023-04-13 RX ORDER — ACETAMINOPHEN 500 MG
2 TABLET ORAL
Qty: 0 | Refills: 0 | DISCHARGE
Start: 2023-04-13

## 2023-04-13 RX ORDER — AMLODIPINE BESYLATE 2.5 MG/1
1 TABLET ORAL
Qty: 30 | Refills: 0
Start: 2023-04-13 | End: 2023-05-12

## 2023-04-13 RX ADMIN — Medication 250 MILLIGRAM(S): at 05:54

## 2023-04-13 RX ADMIN — PANTOPRAZOLE SODIUM 40 MILLIGRAM(S): 20 TABLET, DELAYED RELEASE ORAL at 05:54

## 2023-04-13 RX ADMIN — LOSARTAN POTASSIUM 50 MILLIGRAM(S): 100 TABLET, FILM COATED ORAL at 05:54

## 2023-04-13 RX ADMIN — POLYETHYLENE GLYCOL 3350 17 GRAM(S): 17 POWDER, FOR SOLUTION ORAL at 11:25

## 2023-04-13 RX ADMIN — Medication 250 MILLIGRAM(S): at 17:23

## 2023-04-13 RX ADMIN — Medication 50 MICROGRAM(S): at 05:54

## 2023-04-13 RX ADMIN — Medication 50 MILLIGRAM(S): at 05:54

## 2023-04-13 RX ADMIN — ENOXAPARIN SODIUM 40 MILLIGRAM(S): 100 INJECTION SUBCUTANEOUS at 14:22

## 2023-04-13 RX ADMIN — CHLORHEXIDINE GLUCONATE 1 APPLICATION(S): 213 SOLUTION TOPICAL at 11:25

## 2023-04-13 RX ADMIN — SENNA PLUS 2 TABLET(S): 8.6 TABLET ORAL at 23:00

## 2023-04-13 RX ADMIN — AMPICILLIN SODIUM AND SULBACTAM SODIUM 200 GRAM(S): 250; 125 INJECTION, POWDER, FOR SUSPENSION INTRAMUSCULAR; INTRAVENOUS at 23:46

## 2023-04-13 RX ADMIN — GABAPENTIN 800 MILLIGRAM(S): 400 CAPSULE ORAL at 22:59

## 2023-04-13 RX ADMIN — DULOXETINE HYDROCHLORIDE 30 MILLIGRAM(S): 30 CAPSULE, DELAYED RELEASE ORAL at 11:25

## 2023-04-13 RX ADMIN — Medication 81 MILLIGRAM(S): at 11:25

## 2023-04-13 RX ADMIN — SERTRALINE 100 MILLIGRAM(S): 25 TABLET, FILM COATED ORAL at 11:25

## 2023-04-13 RX ADMIN — AMLODIPINE BESYLATE 10 MILLIGRAM(S): 2.5 TABLET ORAL at 05:53

## 2023-04-13 RX ADMIN — AMPICILLIN SODIUM AND SULBACTAM SODIUM 200 GRAM(S): 250; 125 INJECTION, POWDER, FOR SUSPENSION INTRAMUSCULAR; INTRAVENOUS at 05:54

## 2023-04-13 RX ADMIN — SIMVASTATIN 40 MILLIGRAM(S): 20 TABLET, FILM COATED ORAL at 22:59

## 2023-04-13 RX ADMIN — AMPICILLIN SODIUM AND SULBACTAM SODIUM 200 GRAM(S): 250; 125 INJECTION, POWDER, FOR SUSPENSION INTRAMUSCULAR; INTRAVENOUS at 17:23

## 2023-04-13 RX ADMIN — GABAPENTIN 800 MILLIGRAM(S): 400 CAPSULE ORAL at 14:22

## 2023-04-13 RX ADMIN — GABAPENTIN 800 MILLIGRAM(S): 400 CAPSULE ORAL at 05:54

## 2023-04-13 RX ADMIN — AMPICILLIN SODIUM AND SULBACTAM SODIUM 200 GRAM(S): 250; 125 INJECTION, POWDER, FOR SUSPENSION INTRAMUSCULAR; INTRAVENOUS at 11:26

## 2023-04-13 NOTE — PROGRESS NOTE ADULT - SUBJECTIVE AND OBJECTIVE BOX
Patient is a 73y old  Female who presents with a chief complaint of non healing Left groin wound from prosthesis.  4/12 pt s/p debridement of left groin wound and primary closure      AM rounds:  afebrile, no acute events overnight     Vital Signs Last 24 Hrs  T(C): 36.3 (13 Apr 2023 08:45), Max: 36.3 (13 Apr 2023 08:45)  T(F): 97.4 (13 Apr 2023 08:45), Max: 97.4 (13 Apr 2023 08:45)  HR: 70 (13 Apr 2023 08:45) (70 - 78)  BP: 139/70 (13 Apr 2023 08:45) (117/66 - 147/67)  BP(mean): 90 (12 Apr 2023 15:15) (85 - 96)  RR: 18 (13 Apr 2023 08:45) (16 - 18)  SpO2: 97% (12 Apr 2023 23:00) (95% - 97%)    O2 Parameters below as of 12 Apr 2023 15:15  Patient On (Oxygen Delivery Method): nasal cannula  O2 Flow (L/min): 2    I&O's Summary    12 Apr 2023 07:01  -  13 Apr 2023 07:00  --------------------------------------------------------  IN: 0 mL / OUT: 550 mL / NET: -550 mL      Allergies  No Known Allergies      Lab Results:                        12.6   7.73  )-----------( 195      ( 11 Apr 2023 18:07 )             37.2     04-12    140  |  106  |  10  ----------------------------<  96  3.7   |  21  |  0.5<L>    Ca    8.9      12 Apr 2023 05:25  Phos  2.8     04-11  Mg     2.2     04-12    MEDICATIONS  (STANDING):  amLODIPine   Tablet 10 milliGRAM(s) Oral daily  ampicillin/sulbactam  IVPB      ampicillin/sulbactam  IVPB 3 Gram(s) IV Intermittent every 6 hours  aspirin  chewable 81 milliGRAM(s) Oral daily  chlorhexidine 4% Liquid 1 Application(s) Topical daily  DULoxetine 30 milliGRAM(s) Oral daily  enoxaparin Injectable 40 milliGRAM(s) SubCutaneous every 24 hours  gabapentin 800 milliGRAM(s) Oral three times a day  levothyroxine 50 MICROGram(s) Oral daily  losartan 50 milliGRAM(s) Oral daily  metoprolol succinate ER 50 milliGRAM(s) Oral daily  pantoprazole    Tablet 40 milliGRAM(s) Oral before breakfast  polyethylene glycol 3350 17 Gram(s) Oral daily  saccharomyces boulardii 250 milliGRAM(s) Oral two times a day  senna 2 Tablet(s) Oral at bedtime  sertraline 100 milliGRAM(s) Oral daily  simvastatin 40 milliGRAM(s) Oral at bedtime    MEDICATIONS  (PRN):  acetaminophen     Tablet .. 650 milliGRAM(s) Oral every 6 hours PRN Temp greater or equal to 38C (100.4F), Mild Pain (1 - 3)  morphine  - Injectable 2 milliGRAM(s) IV Push every 6 hours PRN Severe Pain (7 - 10)  oxycodone    5 mG/acetaminophen 325 mG 1 Tablet(s) Oral every 6 hours PRN Moderate Pain (4 - 6)  silver nitrate Applicator 1 Application(s) Topical once PRN bleeding    PHYSICAL EXAM:  GENERAL: seen post-op in chapo acute distress, alert, cooperative, laying in bed comfortably  HEAD:  Atraumatic, Normocephalic  EYES: EOMI, PERRLA, conjunctiva and sclera clear  NECK: Supple,   CHEST/LUNG:  B/L good air entry, no tachypnea/increased WOB/retractions.   HEART: Regular rate and rhythm;   ABDOM: soft, non tender  NEUROLOGY: AAOx3, non-focal,    SKIN/WOUND: left groin s/p debridement and primary closure, surgical incision site clean, no acute bleeding, no pus drainage. Dressing changed. Pt tolerated well.

## 2023-04-13 NOTE — PROGRESS NOTE ADULT - ASSESSMENT
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     # Left groin wound from prosthesis pressure:   - 4/12 pt s/p debridement of left groin wound and primary closure    - Local wound care bid: wash wound with soap and water, apply ABD pads and tape  - Continue Unasyn IV  - Pain management: Tylenol, percocet, morphine   - OT/PT c/s     # Cards: Hx of HTN, HLD and CAD  - Continue with Losartan 50mg daily, Simvastatin 40mg daily, ASA 81 daily. Amlodipine 10mg daily, Metoprolol 50mg tab daily, and  HCTZ 12.5 daily   - Monitor BPs   - Diet DASH/TLC    # Endo: Hx of Hypothyroidism  - Continue with home medications: Levothyroxine 50 mcg      # Psych: Hx of depression and anxiety  - Continue with home medications: sertraline 100mg and duloxetine 30 mg daily    Miscellaneous:  - Ambulate as tolerated  - DVT/GI ppx  - DASH/TLC diet   - PT/OT  - SW for discharge tomorrow:   Pt s/p high Left AKA with prosthesis, now with left groin nonhealing wound, s/p debridement and Primary closure today.  Pt will be not able to ambulate with prothesis until wound healed. Pt will require wheelchair  before discharge. Wheelchair will significantly pt's ability to  participate in her MRADL's and will be used at regular basis at home and outside. Pt's mobility limitation will be not resolved with a cane or walker. Pt also will need bedside commode and shower chair at home. Pt will required home PT for physical therapy.

## 2023-04-13 NOTE — DISCHARGE NOTE PROVIDER - NSDCCPGOAL_GEN_ALL_CORE_FT
Please advise   Last OV 01/15/2021  Next OV 08/23/2021      To get better and follow your care plan as instructed.

## 2023-04-13 NOTE — DISCHARGE NOTE PROVIDER - HOSPITAL COURSE
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 Patient was seen in the ED several weeks ago and told to go to Burn clinic for evaluation of wound. Patient was seen by Dr Dc on  4/6 and instructed to come in the following week for surgical debridement. Patient states she wears a prosthesis with causes friction and pressure to affected area. Patient reports she has tried many treatment options and none have seen to help heal the wound completely. Patient endorses pain and drainage from the area. Patient reports feeling chills.  Patient denies any other symptoms including fevers, chill, headache, recent illness/travel, cough, abdominal pain, chest pain, or SOB.     Pt was admitted to BURN service for further management.  Hospital course as following:    # Left groin wound from prosthesis pressure:   - 4/12 pt s/p debridement of left groin wound and primary closure    - Local wound care bid: wash wound with soap and water, apply ABD pads and tape  - Completed course of Unasyn IV -> will be discharged on Augmentin PO  - Pain management: Tylenol, percocet, morphine     # Cards: Hx of HTN, HLD and CAD  - vitals stable  - Continue with Losartan 50mg daily, Simvastatin 40mg daily, ASA 81 daily. Amlodipine 10mg daily, Metoprolol 50mg tab daily, and  HCTZ 12.5 daily   - Diet DASH/TLC    # Endo: Hx of Hypothyroidism  - Continue with home medications: Levothyroxine 50 mcg      # Psych: Hx of depression and anxiety  - Continue with home medications: sertraline 100mg and duloxetine 30 mg daily    Miscellaneous:  - Ambulate as tolerated  - DVT/GI ppx  - DASH/TLC diet   - Pt s/p high Left AKA with prosthesis, now with left groin nonhealing wound, s/p debridement and Primary closure today.  Pt will be not able to ambulate with prothesis until wound healed. Pt will require wheelchair  before discharge. Wheelchair will significantly pt's ability to  participate in her MRADL's and will be used at regular basis at home and outside. Pt's mobility limitation will be not resolved with a cane or walker. Pt also will need bedside commode and shower chair at home. Pt will required home PT for physical therapy.     Pt is stable to be discharge home with recommendations to continue wound care and follow up in BURN Clinic in one week for further recommendations.      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 Patient was seen in the ED several weeks ago and told to go to Burn clinic for evaluation of wound. Patient was seen by Dr Dc on  4/6 and instructed to come in the following week for surgical debridement. Patient states she wears a prosthesis with causes friction and pressure to affected area. Patient reports she has tried many treatment options and none have seen to help heal the wound completely. Patient endorses pain and drainage from the area. Patient reports feeling chills.  Patient denies any other symptoms including fevers, chill, headache, recent illness/travel, cough, abdominal pain, chest pain, or SOB.     Pt was admitted to BURN service for further management.  Hospital course as following:    # Left groin wound from prosthesis pressure:   - 4/12 pt s/p debridement of left groin wound and primary closure    - Local wound care bid: wash wound with soap and water, apply ABD pads and tape  - Completed course of Unasyn IV -> will be discharged on Augmentin PO  - Pain management: Tylenol, percocet, morphine     # Cards: Hx of HTN, HLD and CAD  - vitals stable  - Continue with Losartan 50mg daily, Simvastatin 40mg daily, ASA 81 daily. Amlodipine 10mg daily, Metoprolol 50mg tab daily, and  HCTZ 12.5 daily   - Diet DASH/TLC    # Endo: Hx of Hypothyroidism  - Continue with home medications: Levothyroxine 50 mcg      # Psych: Hx of depression and anxiety  - Continue with home medications: sertraline 100mg and duloxetine 30 mg daily    Miscellaneous:  - Ambulate as tolerated  - DVT/GI ppx  - DASH/TLC diet   - Pt s/p high Left AKA with prosthesis, now with left groin nonhealing wound, s/p debridement and Primary closure today.  Pt will be not able to ambulate with prothesis until wound healed. Pt will require wheelchair  before discharge. Wheelchair will significantly pt's ability to  participate in her MRADL's and will be used at regular basis at home and outside. Pt's mobility limitation will be not resolved with a cane or walker. Pt also will need bedside commode and shower chair at home. Pt will required home PT for physical therapy.     Pt is stable to be discharge home with recommendations to continue wound care and follow up in BURN Clinic in one week for further recommendations.      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 Patient was seen in the ED several weeks ago and told to go to Burn clinic for evaluation of wound. Patient was seen by Dr Dc on  4/6 and instructed to come in the following week for surgical debridement. Patient states she wears a prosthesis with causes friction and pressure to affected area. Patient reports she has tried many treatment options and none have seen to help heal the wound completely. Patient endorses pain and drainage from the area. Patient reports feeling chills.  Patient denies any other symptoms including fevers, chill, headache, recent illness/travel, cough, abdominal pain, chest pain, or SOB.     Pt was admitted to BURN service for further management.  Hospital course as following:    # Left groin wound from prosthesis pressure:   - 4/12 pt s/p debridement of left groin wound and primary closure    - Local wound care bid: wash wound with soap and water, apply ABD pads and tape  - bilateral  groin maceration due to yeast infection: wash bilateral groins with soap and water, apply nystatin powder, ant then apply dry gauze to keep groins dry  - Completed course of Unasyn IV -> then switch to Augmentin PO x7 days, no abx on discharge needed  - Pain management: Tylenol, percocet, morphine     # Cards: Hx of HTN, HLD and CAD  - vitals stable  - Continue with Losartan 50mg daily, Simvastatin 40mg daily, ASA 81 daily. Amlodipine 10mg daily, Metoprolol 50mg tab daily, and  HCTZ 12.5 daily   - Diet DASH/TLC    # Endo: Hx of Hypothyroidism  - Continue with home medications: Levothyroxine 50 mcg      # Psych: Hx of depression and anxiety  - Continue with home medications: sertraline 100mg and duloxetine 30 mg daily    Miscellaneous:  - Ambulate as tolerated  - DVT/GI ppx  - DASH/TLC diet   - Pt s/p high Left AKA with prosthesis, now with left groin nonhealing wound, s/p debridement and Primary closure today.  Pt will be not able to ambulate with prothesis until wound healed. Pt will require wheelchair  before discharge. Wheelchair will significantly pt's ability to  participate in her MRADL's and will be used at regular basis at home and outside. Pt's mobility limitation will be not resolved with a cane or walker. Pt also will need bedside commode and shower chair at home. Pt will required home PT for physical therapy.     Pt is stable to be discharge home with recommendations to continue wound care and follow up in BURN Clinic in one week for further recommendations.

## 2023-04-13 NOTE — DISCHARGE NOTE PROVIDER - NSDCFUSCHEDAPPT_GEN_ALL_CORE_FT
[facility] PreAdmits  Scheduled Appointment: 05/04/2023    [facility]  [address]  [address]  Scheduled Appointment: 05/04/2023    [facility] PreAdmits  Scheduled Appointment: 07/12/2023    Chad Horne  [facility] Physician [facility]  OPHTHALM  [address]  Scheduled Appointment: 07/12/2023

## 2023-04-13 NOTE — DISCHARGE NOTE PROVIDER - NSDCCPCAREPLAN_GEN_ALL_CORE_FT
PRINCIPAL DISCHARGE DIAGNOSIS  Diagnosis: Wound of left groin  Assessment and Plan of Treatment:      PRINCIPAL DISCHARGE DIAGNOSIS  Diagnosis: Wound of left groin  Assessment and Plan of Treatment: You underwent debridement of left groin wound with primary closure on 4/12/2023.    - LPlease, continue local wound care two times a day: wash wound with soap and water, apply ABD pads and tape  - continue antibiotics as prescribed  - take Tylenol or Ibuprofen for pain as needed  - ambulate as tolerate   - please, call [phone] to schedule follow up appointment with Dr Dc in BURN Clinic in one week after discharge        PRINCIPAL DISCHARGE DIAGNOSIS  Diagnosis: Wound of left groin  Assessment and Plan of Treatment: You underwent debridement of left groin wound with primary closure on 4/12/2023.    - Please, continue local wound care two times a day: wash wound with soap and water, apply ABD pads and tape  - bilateral  groin maceration due to yeast infection: wash bilateral groins with soap and water, apply nystatin powder, ant then apply dry gauze to keep groins dry two times a day  - no abx on discharge needed  - Pain management: Tylenol, percocet, morphine   - ambulate as tolerate   - please, call [phone] to schedule follow up appointment with Dr Dc in BURN Clinic in one week after discharge        PRINCIPAL DISCHARGE DIAGNOSIS  Diagnosis: Wound of left groin  Assessment and Plan of Treatment: You underwent debridement of left groin wound with primary closure on 4/12/2023.    - Please, continue local wound care two times a day: wash wound with soap and water, apply ABD pads and tape  - bilateral  groin maceration due to yeast infection: wash bilateral groins with soap and water, apply nystatin powder, ant then apply dry gauze to keep groins dry two times a day  - no abx on discharge needed  - Pain management: Tylenol, percocet, morphine   - ambulate as tolerate   - please, call [phone] to schedule follow up appointment with Dr Dc in BURN Clinic in one week after discharge         SECONDARY DISCHARGE DIAGNOSES  Diagnosis: Hypertension  Assessment and Plan of Treatment: Please follow up with your primary care physician prior to starting any medications

## 2023-04-13 NOTE — DISCHARGE NOTE PROVIDER - NSFOLLOWUPCLINICS_GEN_ALL_ED_FT
[facility]  Burn  [address], [address]  [place] [address]  Phone: [phone]  Fax:

## 2023-04-13 NOTE — DISCHARGE NOTE PROVIDER - NSDCCPTREATMENT_GEN_ALL_CORE_FT
PRINCIPAL PROCEDURE  Procedure: Selective debridement  Findings and Treatment: excisional debridement with scalpel left groin wound

## 2023-04-13 NOTE — DISCHARGE NOTE PROVIDER - NSDCMRMEDTOKEN_GEN_ALL_CORE_FT
amLODIPine 5 mg oral tablet: 1 tab(s) orally once a day   aspirin 81 mg oral tablet, chewable: 1 tab(s) orally once a day  DULoxetine 30 mg oral delayed release capsule: 1 tab(s) orally once a day  gabapentin 600 mg oral tablet: 1 tab(s) orally 3 times a day  levothyroxine 100 mcg (0.1 mg) oral tablet: 1 tab(s) orally once a day  losartan 50 mg oral tablet: 1 tab(s) orally once a day   metoprolol succinate 50 mg oral tablet, extended release: 1 tab(s) orally once a day  sertraline 100 mg oral tablet: 1 tab(s) orally once a day  simvastatin 40 mg oral tablet: 1 tab(s) orally once a day (at bedtime)   acetaminophen 325 mg oral tablet: 2 tab(s) orally every 6 hours As needed Temp greater or equal to 38C (100.4F), Mild Pain (1 - 3)  amLODIPine 5 mg oral tablet: 1 tab(s) orally once a day  amoxicillin-clavulanate 875 mg-125 mg oral tablet: 1 tab(s) orally 2 times a day  aspirin 81 mg oral tablet, chewable: 1 tab(s) orally once a day  Cozaar 50 mg oral tablet: 1 tab(s) orally once a day  DULoxetine 30 mg oral delayed release capsule: 1 tab(s) orally once a day  Florastor 250 mg oral capsule: 1 cap(s) orally 2 times a day  gabapentin 600 mg oral tablet: 1 tab(s) orally 3 times a day  levothyroxine 100 mcg (0.1 mg) oral tablet: 1 tab(s) orally once a day  metoprolol succinate 50 mg oral tablet, extended release: 1 tab(s) orally once a day  sertraline 100 mg oral tablet: 1 tab(s) orally once a day  simvastatin 40 mg oral tablet: 1 tab(s) orally once a day (at bedtime)   acetaminophen 325 mg oral tablet: 2 tab(s) orally every 6 hours As needed Temp greater or equal to 38C (100.4F), Mild Pain (1 - 3)  amLODIPine 5 mg oral tablet: 1 tab(s) orally once a day  aspirin 81 mg oral tablet, chewable: 1 tab(s) orally once a day  Cozaar 50 mg oral tablet: 1 tab(s) orally once a day  DULoxetine 30 mg oral delayed release capsule: 1 tab(s) orally once a day  Florastor 250 mg oral capsule: 1 cap(s) orally 2 times a day  gabapentin 600 mg oral tablet: 1 tab(s) orally 3 times a day  levothyroxine 100 mcg (0.1 mg) oral tablet: 1 tab(s) orally once a day  metoprolol succinate 50 mg oral tablet, extended release: 1 tab(s) orally once a day  sertraline 100 mg oral tablet: 1 tab(s) orally once a day  simvastatin 40 mg oral tablet: 1 tab(s) orally once a day (at bedtime)   acetaminophen 325 mg oral tablet: 2 tab(s) orally every 6 hours As needed Temp greater or equal to 38C (100.4F), Mild Pain (1 - 3)  amLODIPine 5 mg oral tablet: 1 tab(s) orally once a day  aspirin 81 mg oral tablet, chewable: 1 tab(s) orally once a day  Cozaar 50 mg oral tablet: 1 tab(s) orally once a day  DULoxetine 30 mg oral delayed release capsule: 1 tab(s) orally once a day  Florastor 250 mg oral capsule: 1 cap(s) orally 2 times a day  gabapentin 600 mg oral tablet: 1 tab(s) orally 3 times a day  levothyroxine 100 mcg (0.1 mg) oral tablet: 1 tab(s) orally once a day  metoprolol succinate 50 mg oral tablet, extended release: 1 tab(s) orally once a day  nystatin 100,000 units/g topical powder: Apply topically to affected area 2 times a day 1 Apply topically to affected area 2 times a day  sertraline 100 mg oral tablet: 1 tab(s) orally once a day  simvastatin 40 mg oral tablet: 1 tab(s) orally once a day (at bedtime)

## 2023-04-13 NOTE — DISCHARGE NOTE PROVIDER - CARE PROVIDER_API CALL
Jesús Dc)  Plastic Surgery  [address] [place] [address]  Phone: [phone]  Fax: [phone]  Follow Up Time:

## 2023-04-14 LAB
-  AMIKACIN: SIGNIFICANT CHANGE UP
-  AMOXICILLIN/CLAVULANIC ACID: SIGNIFICANT CHANGE UP
-  AMPICILLIN/SULBACTAM: SIGNIFICANT CHANGE UP
-  AMPICILLIN/SULBACTAM: SIGNIFICANT CHANGE UP
-  AMPICILLIN: SIGNIFICANT CHANGE UP
-  AMPICILLIN: SIGNIFICANT CHANGE UP
-  AZTREONAM: SIGNIFICANT CHANGE UP
-  CEFAZOLIN: SIGNIFICANT CHANGE UP
-  CEFAZOLIN: SIGNIFICANT CHANGE UP
-  CEFEPIME: SIGNIFICANT CHANGE UP
-  CEFOXITIN: SIGNIFICANT CHANGE UP
-  CEFTRIAXONE: SIGNIFICANT CHANGE UP
-  CIPROFLOXACIN: SIGNIFICANT CHANGE UP
-  CLINDAMYCIN: SIGNIFICANT CHANGE UP
-  ERTAPENEM: SIGNIFICANT CHANGE UP
-  ERYTHROMYCIN: SIGNIFICANT CHANGE UP
-  GENTAMICIN: SIGNIFICANT CHANGE UP
-  GENTAMICIN: SIGNIFICANT CHANGE UP
-  IMIPENEM: SIGNIFICANT CHANGE UP
-  LEVOFLOXACIN: SIGNIFICANT CHANGE UP
-  MEROPENEM: SIGNIFICANT CHANGE UP
-  OXACILLIN: SIGNIFICANT CHANGE UP
-  PENICILLIN: SIGNIFICANT CHANGE UP
-  PIPERACILLIN/TAZOBACTAM: SIGNIFICANT CHANGE UP
-  RIFAMPIN: SIGNIFICANT CHANGE UP
-  TETRACYCLINE: SIGNIFICANT CHANGE UP
-  TETRACYCLINE: SIGNIFICANT CHANGE UP
-  TOBRAMYCIN: SIGNIFICANT CHANGE UP
-  TRIMETHOPRIM/SULFAMETHOXAZOLE: SIGNIFICANT CHANGE UP
-  TRIMETHOPRIM/SULFAMETHOXAZOLE: SIGNIFICANT CHANGE UP
-  VANCOMYCIN: SIGNIFICANT CHANGE UP
-  VANCOMYCIN: SIGNIFICANT CHANGE UP
METHOD TYPE: SIGNIFICANT CHANGE UP

## 2023-04-14 PROCEDURE — 99024 POSTOP FOLLOW-UP VISIT: CPT

## 2023-04-14 PROCEDURE — 99232 SBSQ HOSP IP/OBS MODERATE 35: CPT | Mod: 24

## 2023-04-14 RX ADMIN — Medication 81 MILLIGRAM(S): at 11:52

## 2023-04-14 RX ADMIN — SERTRALINE 100 MILLIGRAM(S): 25 TABLET, FILM COATED ORAL at 11:52

## 2023-04-14 RX ADMIN — SIMVASTATIN 40 MILLIGRAM(S): 20 TABLET, FILM COATED ORAL at 21:36

## 2023-04-14 RX ADMIN — AMPICILLIN SODIUM AND SULBACTAM SODIUM 200 GRAM(S): 250; 125 INJECTION, POWDER, FOR SUSPENSION INTRAMUSCULAR; INTRAVENOUS at 23:15

## 2023-04-14 RX ADMIN — Medication 250 MILLIGRAM(S): at 17:14

## 2023-04-14 RX ADMIN — AMPICILLIN SODIUM AND SULBACTAM SODIUM 200 GRAM(S): 250; 125 INJECTION, POWDER, FOR SUSPENSION INTRAMUSCULAR; INTRAVENOUS at 11:51

## 2023-04-14 RX ADMIN — ENOXAPARIN SODIUM 40 MILLIGRAM(S): 100 INJECTION SUBCUTANEOUS at 13:44

## 2023-04-14 RX ADMIN — GABAPENTIN 800 MILLIGRAM(S): 400 CAPSULE ORAL at 05:20

## 2023-04-14 RX ADMIN — POLYETHYLENE GLYCOL 3350 17 GRAM(S): 17 POWDER, FOR SOLUTION ORAL at 11:53

## 2023-04-14 RX ADMIN — DULOXETINE HYDROCHLORIDE 30 MILLIGRAM(S): 30 CAPSULE, DELAYED RELEASE ORAL at 11:52

## 2023-04-14 RX ADMIN — GABAPENTIN 800 MILLIGRAM(S): 400 CAPSULE ORAL at 21:36

## 2023-04-14 RX ADMIN — AMLODIPINE BESYLATE 10 MILLIGRAM(S): 2.5 TABLET ORAL at 05:19

## 2023-04-14 RX ADMIN — CHLORHEXIDINE GLUCONATE 1 APPLICATION(S): 213 SOLUTION TOPICAL at 11:55

## 2023-04-14 RX ADMIN — Medication 50 MICROGRAM(S): at 05:19

## 2023-04-14 RX ADMIN — LOSARTAN POTASSIUM 50 MILLIGRAM(S): 100 TABLET, FILM COATED ORAL at 05:20

## 2023-04-14 RX ADMIN — AMPICILLIN SODIUM AND SULBACTAM SODIUM 200 GRAM(S): 250; 125 INJECTION, POWDER, FOR SUSPENSION INTRAMUSCULAR; INTRAVENOUS at 05:20

## 2023-04-14 RX ADMIN — AMPICILLIN SODIUM AND SULBACTAM SODIUM 200 GRAM(S): 250; 125 INJECTION, POWDER, FOR SUSPENSION INTRAMUSCULAR; INTRAVENOUS at 17:14

## 2023-04-14 RX ADMIN — PANTOPRAZOLE SODIUM 40 MILLIGRAM(S): 20 TABLET, DELAYED RELEASE ORAL at 05:20

## 2023-04-14 RX ADMIN — Medication 50 MILLIGRAM(S): at 05:19

## 2023-04-14 RX ADMIN — GABAPENTIN 800 MILLIGRAM(S): 400 CAPSULE ORAL at 16:16

## 2023-04-14 RX ADMIN — Medication 250 MILLIGRAM(S): at 11:53

## 2023-04-14 NOTE — PROGRESS NOTE ADULT - SUBJECTIVE AND OBJECTIVE BOX
Patient is a 73y old  Female who presents with a chief complaint of non healing Left groin wound from prosthesis.  4/12 pt s/p debridement of left groin wound and primary closure      AM rounds  afebrile, no acute events overnight      Vital Signs Last 24 Hrs  T(C): 36.9 (14 Apr 2023 15:27), Max: 37 (14 Apr 2023 05:10)  T(F): 98.4 (14 Apr 2023 15:27), Max: 98.6 (14 Apr 2023 05:10)  HR: 67 (14 Apr 2023 15:27) (61 - 119)  BP: 131/68 (14 Apr 2023 15:27) (126/81 - 169/67)  BP(mean): 89 (13 Apr 2023 16:51) (89 - 89)  ABP: --  ABP(mean): --  RR: 18 (14 Apr 2023 15:27) (18 - 20)  SpO2: 97% (14 Apr 2023 07:30) (96% - 99%)    O2 Parameters below as of 14 Apr 2023 07:30  Patient On (Oxygen Delivery Method): room air          I&O's Summary    14 Apr 2023 07:01  -  14 Apr 2023 16:03  --------------------------------------------------------  IN: 0 mL / OUT: 500 mL / NET: -500 mL          Allergies  No Known Allergies      MEDICATIONS  (STANDING):  amLODIPine   Tablet 10 milliGRAM(s) Oral daily  ampicillin/sulbactam  IVPB      ampicillin/sulbactam  IVPB 3 Gram(s) IV Intermittent every 6 hours  aspirin  chewable 81 milliGRAM(s) Oral daily  chlorhexidine 4% Liquid 1 Application(s) Topical daily  DULoxetine 30 milliGRAM(s) Oral daily  enoxaparin Injectable 40 milliGRAM(s) SubCutaneous every 24 hours  gabapentin 800 milliGRAM(s) Oral three times a day  levothyroxine 50 MICROGram(s) Oral daily  losartan 50 milliGRAM(s) Oral daily  metoprolol succinate ER 50 milliGRAM(s) Oral daily  pantoprazole    Tablet 40 milliGRAM(s) Oral before breakfast  polyethylene glycol 3350 17 Gram(s) Oral daily  saccharomyces boulardii 250 milliGRAM(s) Oral two times a day  senna 2 Tablet(s) Oral at bedtime  sertraline 100 milliGRAM(s) Oral daily  simvastatin 40 milliGRAM(s) Oral at bedtime    MEDICATIONS  (PRN):  acetaminophen     Tablet .. 650 milliGRAM(s) Oral every 6 hours PRN Temp greater or equal to 38C (100.4F), Mild Pain (1 - 3)  morphine  - Injectable 2 milliGRAM(s) IV Push every 6 hours PRN Severe Pain (7 - 10)  oxycodone    5 mG/acetaminophen 325 mG 1 Tablet(s) Oral every 6 hours PRN Moderate Pain (4 - 6)  silver nitrate Applicator 1 Application(s) Topical once PRN bleeding    PHYSICAL EXAM:  GENERAL: seen post-op in [place] acute distress, alert, cooperative, laying in bed comfortably  HEAD:  Atraumatic, Normocephalic  EYES: EOMI, PERRLA, conjunctiva and sclera clear  NECK: Supple,   CHEST/LUNG:  B/L good air entry, no tachypnea/increased WOB/retractions.   HEART: Regular rate and rhythm;   ABDOM: soft, non tender  NEUROLOGY: AAOx3, non-focal,    SKIN/WOUND: left groin s/p debridement and primary closure, surgical incision site clean, no acute bleeding, no pus drainage. Dressing changed. Pt tolerated well.

## 2023-04-14 NOTE — PROGRESS NOTE ADULT - NS ATTEND AMEND GEN_ALL_CORE FT
As above   Pt discussed and examined during daily rounds   Son at bedside     Left groin / thigh - sutures and staples at closure site intact ; mild ecchymosis   Sare cleaned - dressing placed     A/P   S/P merlin and closure of device related pressure ulceration - due to prosthesis   Discharge imminent  Wheelchair requested   Long discussion with pt and son regarding site care, discharge today or tomorrow and outpt f/u for suture/ staple removal   Advised contact with prosthetist for modification/ adjustment of prosthetic   Concerns addressed As above   Pt discussed and examined during daily rounds   Son at bedside     Left groin / thigh - sutures and staples at closure site intact ; mild ecchymosis   Site cleaned - dressing placed     A/P   S/P merlin and closure of device related pressure ulceration - due to prosthesis   Discharge imminent  Wheelchair requested   Long discussion with pt and son regarding site care, discharge today or tomorrow and outpt f/u for suture/ staple removal   Advised contact with prosthetist for modification/ adjustment of prosthetic   Concerns addressed

## 2023-04-14 NOTE — PROGRESS NOTE ADULT - ASSESSMENT
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     # Left groin wound from prosthesis pressure:   - 4/12 pt s/p debridement of left groin wound and primary closure    - Local wound care bid: wash wound with soap and water, apply ABD pads and tape  - Continue Unasyn IV  - Pain management: Tylenol, percocet, morphine   - OT/PT c/s     # Cards: Hx of HTN, HLD and CAD  - Continue with Losartan 50mg daily, Simvastatin 40mg daily, ASA 81 daily. Amlodipine 10mg daily, Metoprolol 50mg tab daily, and  HCTZ 12.5 daily   - Monitor BPs   - Diet DASH/TLC    # Endo: Hx of Hypothyroidism  - Continue with home medications: Levothyroxine 50 mcg      # Psych: Hx of depression and anxiety  - Continue with home medications: sertraline 100mg and duloxetine 30 mg daily    Miscellaneous:  - Ambulate as tolerated  - DVT/GI ppx  - DASH/TLC diet   - PT/OT       Pt s/p high Left AKA with prosthesis, now with left groin nonhealing wound, s/p debridement and Primary closure  Pt will be not able to ambulate with prothesis until wound healed. Pt will require wheelchair  before discharge. Wheelchair will significantly pt's ability to  participate in her MRADL's and will be used at regular basis at home and outside. Pt's mobility limitation will be not resolved with a cane or walker. Pt also will need bedside commode and shower chair at home. Pt will required home PT for physical therapy.   Discharge planning, f/u with SW tomorrow

## 2023-04-15 RX ADMIN — AMPICILLIN SODIUM AND SULBACTAM SODIUM 200 GRAM(S): 250; 125 INJECTION, POWDER, FOR SUSPENSION INTRAMUSCULAR; INTRAVENOUS at 17:19

## 2023-04-15 RX ADMIN — Medication 81 MILLIGRAM(S): at 13:01

## 2023-04-15 RX ADMIN — GABAPENTIN 800 MILLIGRAM(S): 400 CAPSULE ORAL at 06:13

## 2023-04-15 RX ADMIN — AMPICILLIN SODIUM AND SULBACTAM SODIUM 200 GRAM(S): 250; 125 INJECTION, POWDER, FOR SUSPENSION INTRAMUSCULAR; INTRAVENOUS at 06:12

## 2023-04-15 RX ADMIN — Medication 50 MILLIGRAM(S): at 06:13

## 2023-04-15 RX ADMIN — ENOXAPARIN SODIUM 40 MILLIGRAM(S): 100 INJECTION SUBCUTANEOUS at 13:01

## 2023-04-15 RX ADMIN — Medication 250 MILLIGRAM(S): at 17:19

## 2023-04-15 RX ADMIN — SIMVASTATIN 40 MILLIGRAM(S): 20 TABLET, FILM COATED ORAL at 21:26

## 2023-04-15 RX ADMIN — Medication 50 MICROGRAM(S): at 06:13

## 2023-04-15 RX ADMIN — AMLODIPINE BESYLATE 10 MILLIGRAM(S): 2.5 TABLET ORAL at 06:13

## 2023-04-15 RX ADMIN — POLYETHYLENE GLYCOL 3350 17 GRAM(S): 17 POWDER, FOR SOLUTION ORAL at 13:02

## 2023-04-15 RX ADMIN — DULOXETINE HYDROCHLORIDE 30 MILLIGRAM(S): 30 CAPSULE, DELAYED RELEASE ORAL at 13:02

## 2023-04-15 RX ADMIN — GABAPENTIN 800 MILLIGRAM(S): 400 CAPSULE ORAL at 13:02

## 2023-04-15 RX ADMIN — SERTRALINE 100 MILLIGRAM(S): 25 TABLET, FILM COATED ORAL at 13:02

## 2023-04-15 RX ADMIN — PANTOPRAZOLE SODIUM 40 MILLIGRAM(S): 20 TABLET, DELAYED RELEASE ORAL at 06:12

## 2023-04-15 RX ADMIN — LOSARTAN POTASSIUM 50 MILLIGRAM(S): 100 TABLET, FILM COATED ORAL at 06:13

## 2023-04-15 RX ADMIN — Medication 250 MILLIGRAM(S): at 06:13

## 2023-04-15 RX ADMIN — AMPICILLIN SODIUM AND SULBACTAM SODIUM 200 GRAM(S): 250; 125 INJECTION, POWDER, FOR SUSPENSION INTRAMUSCULAR; INTRAVENOUS at 23:23

## 2023-04-15 RX ADMIN — GABAPENTIN 800 MILLIGRAM(S): 400 CAPSULE ORAL at 21:23

## 2023-04-15 RX ADMIN — SENNA PLUS 2 TABLET(S): 8.6 TABLET ORAL at 21:23

## 2023-04-15 RX ADMIN — AMPICILLIN SODIUM AND SULBACTAM SODIUM 200 GRAM(S): 250; 125 INJECTION, POWDER, FOR SUSPENSION INTRAMUSCULAR; INTRAVENOUS at 13:01

## 2023-04-15 NOTE — PROGRESS NOTE ADULT - NS ATTEND AMEND GEN_ALL_CORE FT
As above   Pt discussed and examined during daily rounds       Left groin / thigh - sutures and staples at closure site intact ; mild resolving  ecchymosis   Site  cleaned - dressing placed     A/P   S/P merlin and closure of device related pressure ulceration - due to prosthesis   Discharge imminent pending wheelchair -  requested   discharge likely Monday and outpt f/u for suture/ staple removal   Advised contact with prosthetist for modification/ adjustment of prosthetic   Concerns addressed

## 2023-04-15 NOTE — PROGRESS NOTE ADULT - ASSESSMENT
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     # Left groin wound from prosthesis pressure:   - 4/12 pt s/p debridement of left groin wound and primary closure    - Local wound care bid: wash wound with soap and water, apply gauze, secure with tape, mesh underwear  - Continue Unasyn IV  - Pain management: Tylenol, percocet, morphine   - OT/PT c/s     # Cards: Hx of HTN, HLD and CAD  - Continue with Losartan 50mg daily, Simvastatin 40mg daily, ASA 81 daily. Amlodipine 10mg daily, Metoprolol 50mg tab daily, and  HCTZ 12.5 daily   - Monitor BPs   - Diet DASH/TLC    # Endo: Hx of Hypothyroidism  - Continue with home medications: Levothyroxine 50 mcg      # Psych: Hx of depression and anxiety  - Continue with home medications: sertraline 100mg and duloxetine 30 mg daily    Miscellaneous:  - Ambulate as tolerated  - DVT/GI ppx  - DASH/TLC diet   - PT/OT       Pt s/p high Left AKA with prosthesis, now with left groin nonhealing wound, s/p debridement and Primary closure  Pt will be not able to ambulate with prothesis until wound healed. Pt will require wheelchair  before discharge. Wheelchair will significantly pt's ability to  participate in her MRADL's and will be used at regular basis at home and outside. Pt's mobility limitation will be not resolved with a cane or walker. Pt also will need bedside commode and shower chair at home. Pt will required home PT for physical therapy.   Discharge planning, f/u with CM for wheelchair delivery confirmation

## 2023-04-15 NOTE — PROGRESS NOTE ADULT - SUBJECTIVE AND OBJECTIVE BOX
Patient is a 73y old  Female who presents with a chief complaint of Left groin wound from prosthetic (14 Apr 2023 15:58)  AM ROUNDS    Vital Signs Last 24 Hrs  T(C): 36.2 (15 Apr 2023 08:00), Max: 36.9 (14 Apr 2023 15:27)  T(F): 97.2 (15 Apr 2023 08:00), Max: 98.4 (14 Apr 2023 15:27)  HR: 60 (15 Apr 2023 08:00) (60 - 67)  BP: 175/94 (15 Apr 2023 08:00) (131/68 - 175/94)  RR: 18 (15 Apr 2023 08:00) (18 - 18)    MEDICATIONS  (STANDING):  amLODIPine   Tablet 10 milliGRAM(s) Oral daily  ampicillin/sulbactam  IVPB      ampicillin/sulbactam  IVPB 3 Gram(s) IV Intermittent every 6 hours  aspirin  chewable 81 milliGRAM(s) Oral daily  chlorhexidine 4% Liquid 1 Application(s) Topical daily  DULoxetine 30 milliGRAM(s) Oral daily  enoxaparin Injectable 40 milliGRAM(s) SubCutaneous every 24 hours  gabapentin 800 milliGRAM(s) Oral three times a day  levothyroxine 50 MICROGram(s) Oral daily  losartan 50 milliGRAM(s) Oral daily  metoprolol succinate ER 50 milliGRAM(s) Oral daily  pantoprazole    Tablet 40 milliGRAM(s) Oral before breakfast  polyethylene glycol 3350 17 Gram(s) Oral daily  saccharomyces boulardii 250 milliGRAM(s) Oral two times a day  senna 2 Tablet(s) Oral at bedtime  sertraline 100 milliGRAM(s) Oral daily  simvastatin 40 milliGRAM(s) Oral at bedtime    MEDICATIONS  (PRN):  acetaminophen     Tablet .. 650 milliGRAM(s) Oral every 6 hours PRN Temp greater or equal to 38C (100.4F), Mild Pain (1 - 3)  morphine  - Injectable 2 milliGRAM(s) IV Push every 6 hours PRN Severe Pain (7 - 10)  oxycodone    5 mG/acetaminophen 325 mG 1 Tablet(s) Oral every 6 hours PRN Moderate Pain (4 - 6)  silver nitrate Applicator 1 Application(s) Topical once PRN bleeding    PHYSICAL EXAM:  GENERAL: no acute distress, alert, cooperative, lying in bed comfortably  HEAD:  Atraumatic, Normocephalic  EYES: EOMI, PERRLA, conjunctiva and sclera clear  NECK: Supple,   CHEST/LUNG:  B/L good air entry, no tachypnea/increased WOB/retractions.   HEART: Regular rate and rhythm;   ABDOM: soft, non tender  NEUROLOGY: AAOx3, non-focal,    SKIN/WOUND: left groin s/p debridement and primary closure, surgical incision site clean, no acute bleeding, no pus drainage. Dressing changed. Pt tolerated well.

## 2023-04-16 PROCEDURE — 99232 SBSQ HOSP IP/OBS MODERATE 35: CPT | Mod: 24,GC

## 2023-04-16 RX ADMIN — PANTOPRAZOLE SODIUM 40 MILLIGRAM(S): 20 TABLET, DELAYED RELEASE ORAL at 05:32

## 2023-04-16 RX ADMIN — AMLODIPINE BESYLATE 10 MILLIGRAM(S): 2.5 TABLET ORAL at 05:34

## 2023-04-16 RX ADMIN — SENNA PLUS 2 TABLET(S): 8.6 TABLET ORAL at 21:29

## 2023-04-16 RX ADMIN — SERTRALINE 100 MILLIGRAM(S): 25 TABLET, FILM COATED ORAL at 11:38

## 2023-04-16 RX ADMIN — Medication 81 MILLIGRAM(S): at 11:39

## 2023-04-16 RX ADMIN — Medication 50 MICROGRAM(S): at 05:34

## 2023-04-16 RX ADMIN — Medication 1 TABLET(S): at 21:27

## 2023-04-16 RX ADMIN — GABAPENTIN 800 MILLIGRAM(S): 400 CAPSULE ORAL at 21:29

## 2023-04-16 RX ADMIN — DULOXETINE HYDROCHLORIDE 30 MILLIGRAM(S): 30 CAPSULE, DELAYED RELEASE ORAL at 11:39

## 2023-04-16 RX ADMIN — AMPICILLIN SODIUM AND SULBACTAM SODIUM 200 GRAM(S): 250; 125 INJECTION, POWDER, FOR SUSPENSION INTRAMUSCULAR; INTRAVENOUS at 11:43

## 2023-04-16 RX ADMIN — Medication 50 MILLIGRAM(S): at 05:34

## 2023-04-16 RX ADMIN — GABAPENTIN 800 MILLIGRAM(S): 400 CAPSULE ORAL at 05:34

## 2023-04-16 RX ADMIN — SIMVASTATIN 40 MILLIGRAM(S): 20 TABLET, FILM COATED ORAL at 21:29

## 2023-04-16 RX ADMIN — AMPICILLIN SODIUM AND SULBACTAM SODIUM 200 GRAM(S): 250; 125 INJECTION, POWDER, FOR SUSPENSION INTRAMUSCULAR; INTRAVENOUS at 05:33

## 2023-04-16 RX ADMIN — LOSARTAN POTASSIUM 50 MILLIGRAM(S): 100 TABLET, FILM COATED ORAL at 05:33

## 2023-04-16 RX ADMIN — ENOXAPARIN SODIUM 40 MILLIGRAM(S): 100 INJECTION SUBCUTANEOUS at 14:35

## 2023-04-16 RX ADMIN — GABAPENTIN 800 MILLIGRAM(S): 400 CAPSULE ORAL at 14:34

## 2023-04-16 RX ADMIN — Medication 250 MILLIGRAM(S): at 17:29

## 2023-04-16 RX ADMIN — Medication 250 MILLIGRAM(S): at 05:32

## 2023-04-16 RX ADMIN — POLYETHYLENE GLYCOL 3350 17 GRAM(S): 17 POWDER, FOR SOLUTION ORAL at 11:39

## 2023-04-16 NOTE — PROGRESS NOTE ADULT - SUBJECTIVE AND OBJECTIVE BOX
INTERVAL HISTORY:  no active complaints,   no fevers, chills overnight    Events past 24 hrs***  Vital Signs Last 24 Hrs  T(C): 36.7 (16 Apr 2023 07:00), Max: 36.7 (16 Apr 2023 07:00)  T(F): 98 (16 Apr 2023 07:00), Max: 98 (16 Apr 2023 07:00)  HR: 63 (16 Apr 2023 07:00) (57 - 63)  BP: 149/70 (16 Apr 2023 07:00) (113/60 - 149/72)  BP(mean): --  RR: 18 (16 Apr 2023 07:00) (18 - 18)  SpO2: 94% (16 Apr 2023 07:00) (94% - 94%)    Parameters below as of 16 Apr 2023 07:00  Patient On (Oxygen Delivery Method): room air      I&O's Detail    15 Apr 2023 07:01  -  16 Apr 2023 07:00  --------------------------------------------------------  IN:    Oral Fluid: 480 mL  Total IN: 480 mL    OUT:    Voided (mL): 900 mL  Total OUT: 900 mL    Total NET: -420 mL            MEDICATIONS  (STANDING):  amLODIPine   Tablet 10 milliGRAM(s) Oral daily  ampicillin/sulbactam  IVPB      ampicillin/sulbactam  IVPB 3 Gram(s) IV Intermittent every 6 hours  aspirin  chewable 81 milliGRAM(s) Oral daily  chlorhexidine 4% Liquid 1 Application(s) Topical daily  DULoxetine 30 milliGRAM(s) Oral daily  enoxaparin Injectable 40 milliGRAM(s) SubCutaneous every 24 hours  gabapentin 800 milliGRAM(s) Oral three times a day  levothyroxine 50 MICROGram(s) Oral daily  losartan 50 milliGRAM(s) Oral daily  metoprolol succinate ER 50 milliGRAM(s) Oral daily  pantoprazole    Tablet 40 milliGRAM(s) Oral before breakfast  polyethylene glycol 3350 17 Gram(s) Oral daily  saccharomyces boulardii 250 milliGRAM(s) Oral two times a day  senna 2 Tablet(s) Oral at bedtime  sertraline 100 milliGRAM(s) Oral daily  simvastatin 40 milliGRAM(s) Oral at bedtime    MEDICATIONS  (PRN):  acetaminophen     Tablet .. 650 milliGRAM(s) Oral every 6 hours PRN Temp greater or equal to 38C (100.4F), Mild Pain (1 - 3)  morphine  - Injectable 2 milliGRAM(s) IV Push every 6 hours PRN Severe Pain (7 - 10)  oxycodone    5 mG/acetaminophen 325 mG 1 Tablet(s) Oral every 6 hours PRN Moderate Pain (4 - 6)  silver nitrate Applicator 1 Application(s) Topical once PRN bleeding    Allergies    No Known Allergies      EXAM:    GENERAL: no acute distress, alert, cooperative, lying in bed comfortably  HEAD:  Atraumatic, Normocephalic  EYES: EOMI, PERRLA, conjunctiva and sclera clear  NECK: Supple,   CHEST/LUNG:  B/L good air entry, no tachypnea/increased WOB/retractions.   HEART: Regular rate and rhythm;   ABDOM: soft, non tender  NEUROLOGY: AAOx3, non-focal,    SKIN/WOUND: left groin s/p debridement and primary closure, surgical incision site clean, no acute bleeding, no pus drainage. Dressing changed. Pt tolerated well.   EXTREMITY: left aka moving all other extremeties appropriately.

## 2023-04-16 NOTE — PROGRESS NOTE ADULT - ATTENDING COMMENTS
As above   Pt discussed and examined during daily rounds       Left groin / thigh - sutures and staples at closure site intact ; mild resolving  ecchymosis , no bleeding   Site  cleaned - dressing placed     A/P   S/P merlin and closure of device related pressure ulceration - due to prosthesis   Mesh underwear to retain dressing better  Discharge imminent pending wheelchair -  requested   discharge likely tomorrow with  outpt f/u for suture/ staple removal   Advised contact with prosthetist for modification/ adjustment of prosthetic   Concerns addressed

## 2023-04-17 LAB
CULTURE RESULTS: SIGNIFICANT CHANGE UP
ORGANISM # SPEC MICROSCOPIC CNT: SIGNIFICANT CHANGE UP
SPECIMEN SOURCE: SIGNIFICANT CHANGE UP

## 2023-04-17 RX ADMIN — Medication 250 MILLIGRAM(S): at 17:09

## 2023-04-17 RX ADMIN — SENNA PLUS 2 TABLET(S): 8.6 TABLET ORAL at 21:17

## 2023-04-17 RX ADMIN — GABAPENTIN 800 MILLIGRAM(S): 400 CAPSULE ORAL at 13:01

## 2023-04-17 RX ADMIN — LOSARTAN POTASSIUM 50 MILLIGRAM(S): 100 TABLET, FILM COATED ORAL at 05:09

## 2023-04-17 RX ADMIN — DULOXETINE HYDROCHLORIDE 30 MILLIGRAM(S): 30 CAPSULE, DELAYED RELEASE ORAL at 13:01

## 2023-04-17 RX ADMIN — SIMVASTATIN 40 MILLIGRAM(S): 20 TABLET, FILM COATED ORAL at 21:17

## 2023-04-17 RX ADMIN — Medication 81 MILLIGRAM(S): at 13:01

## 2023-04-17 RX ADMIN — GABAPENTIN 800 MILLIGRAM(S): 400 CAPSULE ORAL at 05:08

## 2023-04-17 RX ADMIN — Medication 250 MILLIGRAM(S): at 05:08

## 2023-04-17 RX ADMIN — SERTRALINE 100 MILLIGRAM(S): 25 TABLET, FILM COATED ORAL at 13:01

## 2023-04-17 RX ADMIN — GABAPENTIN 800 MILLIGRAM(S): 400 CAPSULE ORAL at 21:18

## 2023-04-17 RX ADMIN — Medication 50 MICROGRAM(S): at 05:09

## 2023-04-17 RX ADMIN — Medication 1 TABLET(S): at 13:00

## 2023-04-17 RX ADMIN — Medication 50 MILLIGRAM(S): at 05:09

## 2023-04-17 RX ADMIN — AMLODIPINE BESYLATE 10 MILLIGRAM(S): 2.5 TABLET ORAL at 05:08

## 2023-04-17 RX ADMIN — ENOXAPARIN SODIUM 40 MILLIGRAM(S): 100 INJECTION SUBCUTANEOUS at 13:01

## 2023-04-17 RX ADMIN — Medication 1 TABLET(S): at 21:18

## 2023-04-17 RX ADMIN — PANTOPRAZOLE SODIUM 40 MILLIGRAM(S): 20 TABLET, DELAYED RELEASE ORAL at 05:08

## 2023-04-17 NOTE — PROGRESS NOTE ADULT - ASSESSMENT
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     # Left groin wound from prosthesis pressure:   - 4/12 pt s/p debridement of left groin wound and primary closure    - Local wound care bid: wash wound with soap and water, apply gauze, secure with tape, mesh underwear  - Continue Unasyn IV  - Pain management: Tylenol, percocet, morphine   - OT/PT c/s     # Cards: Hx of HTN, HLD and CAD  - Continue with Losartan 50mg daily, Simvastatin 40mg daily, ASA 81 daily. Amlodipine 10mg daily, Metoprolol 50mg tab daily, and  HCTZ 12.5 daily   - Monitor BPs   - Diet DASH/TLC    # Endo: Hx of Hypothyroidism  - Continue with home medications: Levothyroxine 50 mcg      # Psych: Hx of depression and anxiety  - Continue with home medications: sertraline 100mg and duloxetine 30 mg daily    Miscellaneous:  - Ambulate as tolerated  - DVT/GI ppx  - DASH/TLC diet   - PT/OT  -pending wheelchair for dc       Pt s/p high Left AKA with prosthesis, now with left groin nonhealing wound, s/p debridement and Primary closure  Pt will be not able to ambulate with prothesis until wound healed. Pt will require wheelchair  before discharge. Wheelchair will significantly pt's ability to  participate in her MRADL's and will be used at regular basis at home and outside. Pt's mobility limitation will be not resolved with a cane or walker. Pt also will need bedside commode and shower chair at home. Pt will required home PT for physical therapy.   Discharge planning, f/u with CM for wheelchair delivery confirmation

## 2023-04-17 NOTE — PROGRESS NOTE ADULT - SUBJECTIVE AND OBJECTIVE BOX
INTERVAL HISTORY:    Events past 24 hrs***    no acute overnight events,   resting comfortably.     Vital Signs Last 24 Hrs  T(C): 36.4 (17 Apr 2023 08:32), Max: 37 (16 Apr 2023 23:46)  T(F): 97.5 (17 Apr 2023 08:32), Max: 98.6 (16 Apr 2023 23:46)  HR: 57 (17 Apr 2023 08:32) (57 - 58)  BP: 117/70 (17 Apr 2023 08:32) (117/70 - 147/71)  BP(mean): --  RR: 18 (17 Apr 2023 08:32) (18 - 18)  SpO2: 96% (16 Apr 2023 23:46) (96% - 96%)    Parameters below as of 16 Apr 2023 23:46  Patient On (Oxygen Delivery Method): room air        Adult Advanced Hemodynamics Last 24 Hrs      MEDICATIONS  (STANDING):  amLODIPine   Tablet 10 milliGRAM(s) Oral daily  amoxicillin  875 milliGRAM(s)/clavulanate 1 Tablet(s) Oral every 12 hours  aspirin  chewable 81 milliGRAM(s) Oral daily  chlorhexidine 4% Liquid 1 Application(s) Topical daily  DULoxetine 30 milliGRAM(s) Oral daily  enoxaparin Injectable 40 milliGRAM(s) SubCutaneous every 24 hours  gabapentin 800 milliGRAM(s) Oral three times a day  levothyroxine 50 MICROGram(s) Oral daily  losartan 50 milliGRAM(s) Oral daily  metoprolol succinate ER 50 milliGRAM(s) Oral daily  pantoprazole    Tablet 40 milliGRAM(s) Oral before breakfast  polyethylene glycol 3350 17 Gram(s) Oral daily  saccharomyces boulardii 250 milliGRAM(s) Oral two times a day  senna 2 Tablet(s) Oral at bedtime  sertraline 100 milliGRAM(s) Oral daily  simvastatin 40 milliGRAM(s) Oral at bedtime    MEDICATIONS  (PRN):  acetaminophen     Tablet .. 650 milliGRAM(s) Oral every 6 hours PRN Temp greater or equal to 38C (100.4F), Mild Pain (1 - 3)  morphine  - Injectable 2 milliGRAM(s) IV Push every 6 hours PRN Severe Pain (7 - 10)  oxycodone    5 mG/acetaminophen 325 mG 1 Tablet(s) Oral every 6 hours PRN Moderate Pain (4 - 6)  silver nitrate Applicator 1 Application(s) Topical once PRN bleeding    Allergies    No Known Allergies    Intolerances        Lab Results:                CAPILLARY BLOOD GLUCOSE                  IMAGING STUDIES:       EXAM:    GENERAL: no acute distress, alert, cooperative, lying in bed comfortably  HEAD:  Atraumatic, Normocephalic  EYES: EOMI, PERRLA, conjunctiva and sclera clear  NECK: Supple,   CHEST/LUNG:  B/L good air entry, no tachypnea/increased WOB/retractions.   HEART: Regular rate and rhythm;   ABDOM: soft, non tender  NEUROLOGY: AAOx3, non-focal,    SKIN/WOUND: left groin s/p debridement and primary closure, surgical incision site clean, no acute bleeding, no pus drainage. Dressing changed. Pt tolerated well.   EXTREMITY: left aka moving all other extremeties appropriately.

## 2023-04-18 LAB — SURGICAL PATHOLOGY STUDY: SIGNIFICANT CHANGE UP

## 2023-04-18 PROCEDURE — 99232 SBSQ HOSP IP/OBS MODERATE 35: CPT | Mod: 24,GC

## 2023-04-18 PROCEDURE — 99024 POSTOP FOLLOW-UP VISIT: CPT | Mod: 24,GC

## 2023-04-18 RX ADMIN — Medication 1 TABLET(S): at 10:07

## 2023-04-18 RX ADMIN — Medication 81 MILLIGRAM(S): at 11:11

## 2023-04-18 RX ADMIN — Medication 1 TABLET(S): at 21:45

## 2023-04-18 RX ADMIN — Medication 250 MILLIGRAM(S): at 17:21

## 2023-04-18 RX ADMIN — GABAPENTIN 800 MILLIGRAM(S): 400 CAPSULE ORAL at 15:31

## 2023-04-18 RX ADMIN — GABAPENTIN 800 MILLIGRAM(S): 400 CAPSULE ORAL at 21:47

## 2023-04-18 RX ADMIN — SIMVASTATIN 40 MILLIGRAM(S): 20 TABLET, FILM COATED ORAL at 21:47

## 2023-04-18 RX ADMIN — SENNA PLUS 2 TABLET(S): 8.6 TABLET ORAL at 21:47

## 2023-04-18 RX ADMIN — ENOXAPARIN SODIUM 40 MILLIGRAM(S): 100 INJECTION SUBCUTANEOUS at 15:32

## 2023-04-18 RX ADMIN — POLYETHYLENE GLYCOL 3350 17 GRAM(S): 17 POWDER, FOR SOLUTION ORAL at 11:11

## 2023-04-18 RX ADMIN — SERTRALINE 100 MILLIGRAM(S): 25 TABLET, FILM COATED ORAL at 11:11

## 2023-04-18 RX ADMIN — DULOXETINE HYDROCHLORIDE 30 MILLIGRAM(S): 30 CAPSULE, DELAYED RELEASE ORAL at 11:11

## 2023-04-18 RX ADMIN — PANTOPRAZOLE SODIUM 40 MILLIGRAM(S): 20 TABLET, DELAYED RELEASE ORAL at 05:25

## 2023-04-18 NOTE — PROGRESS NOTE ADULT - NS ATTEND OPT1A GEN_ALL_CORE
Exam/Medical decision making
History/Exam/Medical decision making
History/Exam/Medical decision making

## 2023-04-18 NOTE — PROGRESS NOTE ADULT - NS ATTEND AMEND GEN_ALL_CORE FT
As above patient seen during daily rounds  No acute events overnight  No complaints  VSS    Exam:  Left groin-closure site-staples and sutures in place; no bleeding or drainage  Decreased swelling    Site irrigated dry dressing placed    A/P ;  Status post debridement and closure left groin-device related wound  Discharge home pending  Awaiting wheelchair  Continued monitoring and medical care as above  OOB to chair / PT/ OT

## 2023-04-18 NOTE — PROGRESS NOTE ADULT - SUBJECTIVE AND OBJECTIVE BOX
INTERVAL HISTORY:  pt seen at bedside with attending on AM rounds. no acute overnight issues. afebrile  dispo planning pending    Events past 24 hrs***  Vital Signs Last 24 Hrs  T(C): 36.6 (18 Apr 2023 08:22), Max: 37.1 (17 Apr 2023 16:06)  T(F): 97.9 (18 Apr 2023 08:22), Max: 98.7 (17 Apr 2023 16:06)  HR: 60 (18 Apr 2023 08:22) (60 - 63)  BP: 147/67 (18 Apr 2023 08:22) (124/71 - 147/67)  BP(mean): 95 (18 Apr 2023 05:20) (95 - 95)  RR: 18 (18 Apr 2023 08:22) (18 - 18)  SpO2: 96% (18 Apr 2023 08:22) (95% - 96%)    Parameters below as of 18 Apr 2023 08:22  Patient On (Oxygen Delivery Method): room air    I&O's Detail    17 Apr 2023 07:01  -  18 Apr 2023 07:00  --------------------------------------------------------  IN:    Oral Fluid: 520 mL  Total IN: 520 mL    OUT:    Voided (mL): 800 mL  Total OUT: 800 mL    Total NET: -280 mL      18 Apr 2023 07:01  -  18 Apr 2023 11:27  --------------------------------------------------------  IN:    Oral Fluid: 360 mL  Total IN: 360 mL    OUT:  Total OUT: 0 mL    Total NET: 360 mL    MEDICATIONS  (STANDING):  amLODIPine   Tablet 10 milliGRAM(s) Oral daily  amoxicillin  875 milliGRAM(s)/clavulanate 1 Tablet(s) Oral every 12 hours  aspirin  chewable 81 milliGRAM(s) Oral daily  chlorhexidine 4% Liquid 1 Application(s) Topical daily  DULoxetine 30 milliGRAM(s) Oral daily  enoxaparin Injectable 40 milliGRAM(s) SubCutaneous every 24 hours  gabapentin 800 milliGRAM(s) Oral three times a day  levothyroxine 50 MICROGram(s) Oral daily  losartan 50 milliGRAM(s) Oral daily  metoprolol succinate ER 50 milliGRAM(s) Oral daily  pantoprazole    Tablet 40 milliGRAM(s) Oral before breakfast  polyethylene glycol 3350 17 Gram(s) Oral daily  saccharomyces boulardii 250 milliGRAM(s) Oral two times a day  senna 2 Tablet(s) Oral at bedtime  sertraline 100 milliGRAM(s) Oral daily  simvastatin 40 milliGRAM(s) Oral at bedtime    MEDICATIONS  (PRN):  acetaminophen     Tablet .. 650 milliGRAM(s) Oral every 6 hours PRN Temp greater or equal to 38C (100.4F), Mild Pain (1 - 3)  morphine  - Injectable 2 milliGRAM(s) IV Push every 6 hours PRN Severe Pain (7 - 10)  oxycodone    5 mG/acetaminophen 325 mG 1 Tablet(s) Oral every 6 hours PRN Moderate Pain (4 - 6)  silver nitrate Applicator 1 Application(s) Topical once PRN bleeding    Allergies  No Known Allergies    PHYSICAL EXAM:   GENERAL: no acute distress, alert, cooperative, lying in bed comfortably  HEAD:  Atraumatic, Normocephalic  EYES: EOMI  CHEST/LUNG:  B/L good air entry, no tachypnea/increased WOB/retractions. no cardiopulmonary compromise  ABDOM: soft, non tender  NEUROLOGY: AAOx3, non-focal,  speaking clear and fluent sentences  SKIN/WOUND: left groin s/p debridement and primary closure, surgical incision site clean with staples in place, no acute bleeding, no pus drainage. Dressing changed. Pt tolerated well.   EXTREMITY: left aka moving all other extremities. no edema noted.

## 2023-04-18 NOTE — PROGRESS NOTE ADULT - ASSESSMENT
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     # Left groin wound from prosthesis pressure:   - 4/12 pt s/p debridement of left groin wound and primary closure    - Local wound care bid: wash wound with soap and water, apply gauze, secure with tape, mesh underwear  - Continue PO augmentin  - Pain management: Tylenol, percocet, morphine   - OT/PT c/s to be oob to chair daily    # Cards: Hx of HTN, HLD and CAD  - Continue with Losartan 50mg daily, Simvastatin 40mg daily, ASA 81 daily. Amlodipine 10mg daily, Metoprolol 50mg tab daily, and  HCTZ 12.5 daily   - Monitor BPs   - Diet DASH/TLC    # Endo: Hx of Hypothyroidism  - Continue with home medications: Levothyroxine 50 mcg      # Psych: Hx of depression and anxiety  - Continue with home medications: sertraline 100mg and duloxetine 30 mg daily    Miscellaneous:  - Ambulate as tolerated  - DVT/GI ppx  - DASH/TLC diet   - PT/OT  -pending wheelchair approval for dc     Pt s/p high Left AKA with prosthesis, now with left groin nonhealing wound, s/p debridement and Primary closure  Pt will be not able to ambulate with prothesis until wound healed. Pt will require wheelchair  before discharge. Wheelchair will significantly imrove pt's ability to participate in her MRADL's and will be used at regular basis at home and outside. Pt's mobility limitation will be not resolved with a cane or walker. Pt also will need bedside commode and shower chair at home. Pt will required home PT for physical therapy.   Discharge planning, f/u with CM for wheelchair delivery confirmation

## 2023-04-19 PROCEDURE — 99232 SBSQ HOSP IP/OBS MODERATE 35: CPT | Mod: 24

## 2023-04-19 PROCEDURE — 99024 POSTOP FOLLOW-UP VISIT: CPT

## 2023-04-19 RX ORDER — NYSTATIN CREAM 100000 [USP'U]/G
1 CREAM TOPICAL
Refills: 0 | Status: DISCONTINUED | OUTPATIENT
Start: 2023-04-19 | End: 2023-04-22

## 2023-04-19 RX ADMIN — Medication 50 MILLIGRAM(S): at 05:37

## 2023-04-19 RX ADMIN — GABAPENTIN 800 MILLIGRAM(S): 400 CAPSULE ORAL at 14:04

## 2023-04-19 RX ADMIN — Medication 50 MICROGRAM(S): at 08:34

## 2023-04-19 RX ADMIN — Medication 1 TABLET(S): at 12:07

## 2023-04-19 RX ADMIN — LOSARTAN POTASSIUM 50 MILLIGRAM(S): 100 TABLET, FILM COATED ORAL at 05:37

## 2023-04-19 RX ADMIN — Medication 1 TABLET(S): at 22:24

## 2023-04-19 RX ADMIN — POLYETHYLENE GLYCOL 3350 17 GRAM(S): 17 POWDER, FOR SOLUTION ORAL at 12:06

## 2023-04-19 RX ADMIN — SERTRALINE 100 MILLIGRAM(S): 25 TABLET, FILM COATED ORAL at 12:07

## 2023-04-19 RX ADMIN — SIMVASTATIN 40 MILLIGRAM(S): 20 TABLET, FILM COATED ORAL at 22:24

## 2023-04-19 RX ADMIN — CHLORHEXIDINE GLUCONATE 1 APPLICATION(S): 213 SOLUTION TOPICAL at 12:07

## 2023-04-19 RX ADMIN — GABAPENTIN 800 MILLIGRAM(S): 400 CAPSULE ORAL at 05:38

## 2023-04-19 RX ADMIN — ENOXAPARIN SODIUM 40 MILLIGRAM(S): 100 INJECTION SUBCUTANEOUS at 14:03

## 2023-04-19 RX ADMIN — GABAPENTIN 800 MILLIGRAM(S): 400 CAPSULE ORAL at 22:23

## 2023-04-19 RX ADMIN — Medication 81 MILLIGRAM(S): at 12:07

## 2023-04-19 RX ADMIN — Medication 250 MILLIGRAM(S): at 05:37

## 2023-04-19 RX ADMIN — DULOXETINE HYDROCHLORIDE 30 MILLIGRAM(S): 30 CAPSULE, DELAYED RELEASE ORAL at 12:06

## 2023-04-19 RX ADMIN — PANTOPRAZOLE SODIUM 40 MILLIGRAM(S): 20 TABLET, DELAYED RELEASE ORAL at 05:39

## 2023-04-19 RX ADMIN — Medication 250 MILLIGRAM(S): at 18:22

## 2023-04-19 RX ADMIN — AMLODIPINE BESYLATE 10 MILLIGRAM(S): 2.5 TABLET ORAL at 05:38

## 2023-04-19 NOTE — PROGRESS NOTE ADULT - SUBJECTIVE AND OBJECTIVE BOX
AM Rounds   INTERVAL HISTORY:      Vital Signs Last 24 Hrs  T(C): 36.5 (19 Apr 2023 07:38), Max: 36.7 (18 Apr 2023 15:30)  T(F): 97.7 (19 Apr 2023 07:38), Max: 98.1 (18 Apr 2023 15:30)  HR: 63 (19 Apr 2023 07:38) (63 - 69)  BP: 116/70 (19 Apr 2023 07:38) (116/70 - 146/66)  RR: 18 (19 Apr 2023 07:38) (16 - 18)  SpO2: 98% (19 Apr 2023 07:38) (98% - 98%)    Parameters below as of 19 Apr 2023 07:38  Patient On (Oxygen Delivery Method): room air      I&O's Detail    18 Apr 2023 07:01  -  19 Apr 2023 07:00  --------------------------------------------------------  IN:    Oral Fluid: 600 mL  Total IN: 600 mL    OUT:  Total OUT: 0 mL    Total NET: 600 mL            MEDICATIONS  (STANDING):  amLODIPine   Tablet 10 milliGRAM(s) Oral daily  amoxicillin  875 milliGRAM(s)/clavulanate 1 Tablet(s) Oral every 12 hours  aspirin  chewable 81 milliGRAM(s) Oral daily  chlorhexidine 4% Liquid 1 Application(s) Topical daily  DULoxetine 30 milliGRAM(s) Oral daily  enoxaparin Injectable 40 milliGRAM(s) SubCutaneous every 24 hours  gabapentin 800 milliGRAM(s) Oral three times a day  levothyroxine 50 MICROGram(s) Oral daily  losartan 50 milliGRAM(s) Oral daily  metoprolol succinate ER 50 milliGRAM(s) Oral daily  pantoprazole    Tablet 40 milliGRAM(s) Oral before breakfast  polyethylene glycol 3350 17 Gram(s) Oral daily  saccharomyces boulardii 250 milliGRAM(s) Oral two times a day  senna 2 Tablet(s) Oral at bedtime  sertraline 100 milliGRAM(s) Oral daily  simvastatin 40 milliGRAM(s) Oral at bedtime    MEDICATIONS  (PRN):  acetaminophen     Tablet .. 650 milliGRAM(s) Oral every 6 hours PRN Temp greater or equal to 38C (100.4F), Mild Pain (1 - 3)  morphine  - Injectable 2 milliGRAM(s) IV Push every 6 hours PRN Severe Pain (7 - 10)  oxycodone    5 mG/acetaminophen 325 mG 1 Tablet(s) Oral every 6 hours PRN Moderate Pain (4 - 6)  silver nitrate Applicator 1 Application(s) Topical once PRN bleeding    Allergies    No Known Allergies    Intolerances        Lab Results:                CAPILLARY BLOOD GLUCOSE                  IMAGING STUDIES:       EXAM:                     AM Rounds   INTERVAL HISTORY:  No acute events overnight. Afebrile  Patient seen at bedside. Dressing change performed. Patient tolerated well.  Plan for D/C tomorrow     Vital Signs Last 24 Hrs  T(C): 36.5 (19 Apr 2023 07:38), Max: 36.7 (18 Apr 2023 15:30)  T(F): 97.7 (19 Apr 2023 07:38), Max: 98.1 (18 Apr 2023 15:30)  HR: 63 (19 Apr 2023 07:38) (63 - 69)  BP: 116/70 (19 Apr 2023 07:38) (116/70 - 146/66)  RR: 18 (19 Apr 2023 07:38) (16 - 18)  SpO2: 98% (19 Apr 2023 07:38) (98% - 98%)    Parameters below as of 19 Apr 2023 07:38  Patient On (Oxygen Delivery Method): room air      I&O's Detail    18 Apr 2023 07:01  -  19 Apr 2023 07:00  --------------------------------------------------------  IN:    Oral Fluid: 600 mL  Total IN: 600 mL    OUT:  Total OUT: 0 mL    Total NET: 600 mL            MEDICATIONS  (STANDING):  amLODIPine   Tablet 10 milliGRAM(s) Oral daily  amoxicillin  875 milliGRAM(s)/clavulanate 1 Tablet(s) Oral every 12 hours  aspirin  chewable 81 milliGRAM(s) Oral daily  chlorhexidine 4% Liquid 1 Application(s) Topical daily  DULoxetine 30 milliGRAM(s) Oral daily  enoxaparin Injectable 40 milliGRAM(s) SubCutaneous every 24 hours  gabapentin 800 milliGRAM(s) Oral three times a day  levothyroxine 50 MICROGram(s) Oral daily  losartan 50 milliGRAM(s) Oral daily  metoprolol succinate ER 50 milliGRAM(s) Oral daily  pantoprazole    Tablet 40 milliGRAM(s) Oral before breakfast  polyethylene glycol 3350 17 Gram(s) Oral daily  saccharomyces boulardii 250 milliGRAM(s) Oral two times a day  senna 2 Tablet(s) Oral at bedtime  sertraline 100 milliGRAM(s) Oral daily  simvastatin 40 milliGRAM(s) Oral at bedtime    MEDICATIONS  (PRN):  acetaminophen     Tablet .. 650 milliGRAM(s) Oral every 6 hours PRN Temp greater or equal to 38C (100.4F), Mild Pain (1 - 3)  morphine  - Injectable 2 milliGRAM(s) IV Push every 6 hours PRN Severe Pain (7 - 10)  oxycodone    5 mG/acetaminophen 325 mG 1 Tablet(s) Oral every 6 hours PRN Moderate Pain (4 - 6)  silver nitrate Applicator 1 Application(s) Topical once PRN bleeding    Allergies    No Known Allergies      PHYSICAL EXAM:   GENERAL: no acute distress, alert, cooperative, lying in bed comfortably  HEAD:  Atraumatic, Normocephalic  CHEST/LUNG:  B/L good air entry, no tachypnea/increased WOB/retractions. no cardiopulmonary compromise  NEUROLOGY: AAOx3, non-focal,  speaking clear and fluent sentences  EXTREMITY: left aka moving all other extremities. no edema noted.   SKIN/WOUND: left groin s/p debridement and primary closure, surgical incision site clean with staples in place, no acute bleeding, no pus drainage. Dressing changed. Pt tolerated well.     Dressing change performed. Patient tolerated well.

## 2023-04-19 NOTE — PROGRESS NOTE ADULT - ASSESSMENT
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     # Left groin wound from prosthesis pressure:   - 4/12 pt s/p debridement of left groin wound and primary closure    - Local wound care bid: wash wound with soap and water, apply gauze, secure with tape, mesh underwear  - Continue PO augmentin  - Pain management: Tylenol, percocet, morphine   - OT/PT c/s to be oob to chair daily    # Cards: Hx of HTN, HLD and CAD  - Continue with Losartan 50mg daily, Simvastatin 40mg daily, ASA 81 daily. Amlodipine 10mg daily, Metoprolol 50mg tab daily, and  HCTZ 12.5 daily   - Monitor BPs   - Diet DASH/TLC    # Endo: Hx of Hypothyroidism  - Continue with home medications: Levothyroxine 50 mcg      # Psych: Hx of depression and anxiety  - Continue with home medications: sertraline 100mg and duloxetine 30 mg daily    Miscellaneous:  - Ambulate as tolerated  - DVT/GI ppx  - DASH/TLC diet   - PT/OT  -wheelchair approved, wheelchair to be delivered to hospital today. D/C planning for tomorrow     Plan of care discussed with patient, Concerns addressed.

## 2023-04-20 LAB
HCT VFR BLD CALC: 38.6 % — SIGNIFICANT CHANGE UP (ref 37–47)
HGB BLD-MCNC: 12.7 G/DL — SIGNIFICANT CHANGE UP (ref 12–16)
MCHC RBC-ENTMCNC: 29.7 PG — SIGNIFICANT CHANGE UP (ref 27–31)
MCHC RBC-ENTMCNC: 32.9 G/DL — SIGNIFICANT CHANGE UP (ref 32–37)
MCV RBC AUTO: 90.2 FL — SIGNIFICANT CHANGE UP (ref 81–99)
NRBC # BLD: 0 /100 WBCS — SIGNIFICANT CHANGE UP (ref 0–0)
PLATELET # BLD AUTO: 228 K/UL — SIGNIFICANT CHANGE UP (ref 130–400)
PMV BLD: 11.4 FL — HIGH (ref 7.4–10.4)
RBC # BLD: 4.28 M/UL — SIGNIFICANT CHANGE UP (ref 4.2–5.4)
RBC # FLD: 13.8 % — SIGNIFICANT CHANGE UP (ref 11.5–14.5)
WBC # BLD: 7.77 K/UL — SIGNIFICANT CHANGE UP (ref 4.8–10.8)
WBC # FLD AUTO: 7.77 K/UL — SIGNIFICANT CHANGE UP (ref 4.8–10.8)

## 2023-04-20 PROCEDURE — 99232 SBSQ HOSP IP/OBS MODERATE 35: CPT | Mod: 24,25

## 2023-04-20 RX ADMIN — SERTRALINE 100 MILLIGRAM(S): 25 TABLET, FILM COATED ORAL at 12:18

## 2023-04-20 RX ADMIN — PANTOPRAZOLE SODIUM 40 MILLIGRAM(S): 20 TABLET, DELAYED RELEASE ORAL at 06:15

## 2023-04-20 RX ADMIN — LOSARTAN POTASSIUM 50 MILLIGRAM(S): 100 TABLET, FILM COATED ORAL at 06:16

## 2023-04-20 RX ADMIN — Medication 50 MICROGRAM(S): at 06:15

## 2023-04-20 RX ADMIN — Medication 50 MILLIGRAM(S): at 06:16

## 2023-04-20 RX ADMIN — Medication 1 TABLET(S): at 10:48

## 2023-04-20 RX ADMIN — GABAPENTIN 800 MILLIGRAM(S): 400 CAPSULE ORAL at 13:30

## 2023-04-20 RX ADMIN — NYSTATIN CREAM 1 APPLICATION(S): 100000 CREAM TOPICAL at 17:15

## 2023-04-20 RX ADMIN — DULOXETINE HYDROCHLORIDE 30 MILLIGRAM(S): 30 CAPSULE, DELAYED RELEASE ORAL at 12:18

## 2023-04-20 RX ADMIN — Medication 250 MILLIGRAM(S): at 06:16

## 2023-04-20 RX ADMIN — Medication 250 MILLIGRAM(S): at 18:32

## 2023-04-20 RX ADMIN — Medication 81 MILLIGRAM(S): at 12:18

## 2023-04-20 RX ADMIN — Medication 650 MILLIGRAM(S): at 17:45

## 2023-04-20 RX ADMIN — ENOXAPARIN SODIUM 40 MILLIGRAM(S): 100 INJECTION SUBCUTANEOUS at 13:30

## 2023-04-20 RX ADMIN — GABAPENTIN 800 MILLIGRAM(S): 400 CAPSULE ORAL at 21:28

## 2023-04-20 RX ADMIN — AMLODIPINE BESYLATE 10 MILLIGRAM(S): 2.5 TABLET ORAL at 06:16

## 2023-04-20 RX ADMIN — SIMVASTATIN 40 MILLIGRAM(S): 20 TABLET, FILM COATED ORAL at 21:28

## 2023-04-20 RX ADMIN — GABAPENTIN 800 MILLIGRAM(S): 400 CAPSULE ORAL at 06:16

## 2023-04-20 RX ADMIN — NYSTATIN CREAM 1 APPLICATION(S): 100000 CREAM TOPICAL at 06:17

## 2023-04-20 RX ADMIN — Medication 650 MILLIGRAM(S): at 17:13

## 2023-04-20 NOTE — CDI QUERY NOTE - NSCDIOTHERTXTBX_GEN_ALL_CORE_HH
DOCUMENTATION CLARIFICATION FORM   Encounter #: [id number]                                Patient’s Name: Aminah Gonsales  Medical Record #: [id number]                             Admit Date: 2023  : 1950                                                   Discharged:   CDI Specialist/: Alok                             Contact #: [phone]    Dear Dr. Dc,             Date: 2023                  The Physician’s or Provider’s documentation of the patient’s presentation, evaluation and  medical management, as identified below, may support a diagnosis that is not documented in the medical record.  In order to accurately capture all diagnoses to the greatest degree of specificity reflecting the patient’s actual severity of illness, the documentation in this patient’s medical record requires additional clarification.  Please include more specific documentation, either known or suspected, of a corresponding diagnosis associated with the clinical information described below in your Progress Note and/or Discharge Summary.    CLINICAL INDICATORS  Documentation  •	 ED… left AKA presents to the emergency department for admission for IV antibiotics and debridement of left groin wound…states she has had this wound for a year and she has not followed up with burn for evaluation of it…states that intermittently bleeds.  •	 H&P… 73y F hx…L aric presents for chronic L groin wound from prosthetic… she wears a prosthesis with causes friction and pressure to affected area. Patient reports she has tried many treatment options, and none have seen to help heal the wound completely…PE:… Left AKA with small wound ~1cm x 1cm with pink and moist base. Some induration noted with mild erythema. Patient endorses drainage to the site - none observed on PE… Plan for surgical debridement  •	 Op Report…The skin around the wound was incised, and the wound was excisionally debrided to and including the fascia with scalpel…  •	 Burn…S/P merlin and closure of device related pressure ulceration - due to prosthesis    QUERY  Based on your professional judgment and the clinical indicators, please clarify if the pressure ulceration can be further specified as:  •	Stage 3 pressure ulcer due to prosthesis  •	Other (please specify):  •	Clinically unable to determine      Documentation clarification is required for compliance, accuracy in coding and billing, and reporting severity of illness, quality data and risk of mortality.  --------------------------------------------------------------------------------------------------------------------------------------------  DO NOT REMOVE THIS RECORD WITHOUT FIRST NOTIFYING THE CDI SPECIALIST  This form is NOT a part of the permanent Medical Record.

## 2023-04-20 NOTE — PROGRESS NOTE ADULT - NS ATTEND AMEND GEN_ALL_CORE FT
As above patient seen during daily rounds  No acute events overnight  reports sl discomfort and redness right groin     Exam:  Left groin-closure site-staples and sutures in place; no bleeding or drainage- dressing changed   Right groin erythema and superficial peeling skin   no evidence of abscess or deep tissue involvement       A/P :   Status post debridement and closure left groin-device related wound- dressing placed   Right groin likely yeast colonization - nystatin   Will discontinue abx   Wheelchair delivered   Discharge pending - pt  and son are not comfortable with discharge in light of right groin findings  Continued monitoring and medical care as above  Discharge home tomorrow once improved   OOB to chair / PT/ OT

## 2023-04-20 NOTE — PROGRESS NOTE ADULT - SUBJECTIVE AND OBJECTIVE BOX
Patient is a 73y old  Female who presents with a chief complaint of Left groin wound from prosthetic.  PD#8, s/p debridement and primary closure of left groin    AM rounds  Pt seen at bedside, c/o right groin redness  No acute events overnight  Pt afebrile    INTERVAL HPI/OVERNIGHT EVENTS:  ICU Vital Signs Last 24 Hrs  T(C): 36.6 (20 Apr 2023 07:50), Max: 36.7 (19 Apr 2023 18:49)  T(F): 97.9 (20 Apr 2023 07:50), Max: 98.1 (19 Apr 2023 18:49)  HR: 62 (20 Apr 2023 07:50) (58 - 65)  BP: 144/78 (20 Apr 2023 07:50) (111/61 - 144/78)  RR: 18 (20 Apr 2023 07:50) (18 - 18)  SpO2: 97% (20 Apr 2023 07:50) (95% - 97%)    O2 Parameters below as of 20 Apr 2023 07:50  Patient On (Oxygen Delivery Method): room air        Consultant(s) Notes Reviewed:  [x ] YES  [ ] NO    MEDICATIONS  (STANDING):  amLODIPine   Tablet 10 milliGRAM(s) Oral daily  amoxicillin  875 milliGRAM(s)/clavulanate 1 Tablet(s) Oral every 12 hours  aspirin  chewable 81 milliGRAM(s) Oral daily  chlorhexidine 4% Liquid 1 Application(s) Topical daily  DULoxetine 30 milliGRAM(s) Oral daily  enoxaparin Injectable 40 milliGRAM(s) SubCutaneous every 24 hours  gabapentin 800 milliGRAM(s) Oral three times a day  levothyroxine 50 MICROGram(s) Oral daily  losartan 50 milliGRAM(s) Oral daily  metoprolol succinate ER 50 milliGRAM(s) Oral daily  nystatin Powder 1 Application(s) Topical two times a day  pantoprazole    Tablet 40 milliGRAM(s) Oral before breakfast  polyethylene glycol 3350 17 Gram(s) Oral daily  saccharomyces boulardii 250 milliGRAM(s) Oral two times a day  senna 2 Tablet(s) Oral at bedtime  sertraline 100 milliGRAM(s) Oral daily  simvastatin 40 milliGRAM(s) Oral at bedtime    MEDICATIONS  (PRN):  acetaminophen     Tablet .. 650 milliGRAM(s) Oral every 6 hours PRN Temp greater or equal to 38C (100.4F), Mild Pain (1 - 3)        PHYSICAL EXAM:  GENERAL: no acute distress, alert, cooperative, AAOx3  CHEST/LUNG: breathing comfortably on room air, not in cardiopulmonary compromise  SKIN/WOUND: left groin s/p debridement and primary closure, surgical incision site clean, dry with staples in place, no acute bleeding, no pus drainage. Dressing changed. Pt tolerated well. Right groin with erythema and maceration extending to right labia, no open wounds, not tenderness to touch, no malodor  Care Discussed with Consultants/Other Providers [ x] YES  [ ] NO Patient is a 73y old  Female who presents with a chief complaint of Left groin wound from prosthetic.  PD#8, s/p debridement and primary closure of left groin      AM rounds  Pt seen at bedside, c/o right groin redness  No acute events overnight  Pt afebrile    INTERVAL HPI/OVERNIGHT EVENTS:  ICU Vital Signs Last 24 Hrs  T(C): 36.6 (20 Apr 2023 07:50), Max: 36.7 (19 Apr 2023 18:49)  T(F): 97.9 (20 Apr 2023 07:50), Max: 98.1 (19 Apr 2023 18:49)  HR: 62 (20 Apr 2023 07:50) (58 - 65)  BP: 144/78 (20 Apr 2023 07:50) (111/61 - 144/78)  RR: 18 (20 Apr 2023 07:50) (18 - 18)  SpO2: 97% (20 Apr 2023 07:50) (95% - 97%)    O2 Parameters below as of 20 Apr 2023 07:50  Patient On (Oxygen Delivery Method): room air    Culture - Surgical Swab (04.12.23 @ 12:07)    -  Amikacin: S <=16   -  Amoxicillin/Clavulanic Acid: S <=8/4   -  Ampicillin: S <=8 These ampicillin results predict results for amoxicillin   -  Ampicillin: S <=2 Predicts results to ampicillin/sulbactam, amoxacillin-clavulanate and  piperacillin-tazobactam.   -  Ampicillin/Sulbactam: R <=8/4   -  Ampicillin/Sulbactam: S <=4/2 Enterobacter, Klebsiella aerogenes, Citrobacter, and Serratia may develop resistance during prolonged therapy (3-4 days)   -  Aztreonam: S <=4   -  Cefazolin: S <=2 Enterobacter, Klebsiella aerogenes, Citrobacter, and Serratia may develop resistance during prolonged therapy (3-4 days)   -  Cefazolin: R <=4   -  Cefepime: S <=2   -  Cefoxitin: S <=8   -  Ceftriaxone: S <=1 Enterobacter, Klebsiella aerogenes, Citrobacter, and Serratia may develop resistance during prolonged therapy   -  Ciprofloxacin: S <=0.25   -  Clindamycin: S <=0.25   -  Ertapenem: S <=0.5   -  Erythromycin: S <=0.25   -  Gentamicin: R >8 Should not be used as monotherapy   -  Gentamicin: S <=2   -  Imipenem: S <=1   -  Levofloxacin: S <=0.5   -  Meropenem: S <=1   -  Oxacillin: R >2   -  Penicillin: R >8   -  Piperacillin/Tazobactam: S <=8   -  Rifampin: S <=1 Should not be used as monotherapy   -  Tetracycline: S <=1   -  Tetracycline: R >8   -  Tobramycin: S <=2   -  Trimethoprim/Sulfamethoxazole: S <=0.5/9.5   -  Trimethoprim/Sulfamethoxazole: S <=0.5/9.5   -  Vancomycin: S 2   -  Vancomycin: S 2   Specimen Source: .Surgical Swab None   Culture Results:   Moderate Escherichia coli  Few Enterococcus faecalis  Few Staphylococcus epidermidis   Organism Identification: Escherichia coli  Enterococcus faecalis  Staphylococcus epidermidis   Organism: Escherichia coli   Organism: Enterococcus faecalis   Organism: Staphylococcus epidermidis    Consultant(s) Notes Reviewed:  [x ] YES  [ ] NO    MEDICATIONS  (STANDING):  amLODIPine   Tablet 10 milliGRAM(s) Oral daily  amoxicillin  875 milliGRAM(s)/clavulanate 1 Tablet(s) Oral every 12 hours  aspirin  chewable 81 milliGRAM(s) Oral daily  chlorhexidine 4% Liquid 1 Application(s) Topical daily  DULoxetine 30 milliGRAM(s) Oral daily  enoxaparin Injectable 40 milliGRAM(s) SubCutaneous every 24 hours  gabapentin 800 milliGRAM(s) Oral three times a day  levothyroxine 50 MICROGram(s) Oral daily  losartan 50 milliGRAM(s) Oral daily  metoprolol succinate ER 50 milliGRAM(s) Oral daily  nystatin Powder 1 Application(s) Topical two times a day  pantoprazole    Tablet 40 milliGRAM(s) Oral before breakfast  polyethylene glycol 3350 17 Gram(s) Oral daily  saccharomyces boulardii 250 milliGRAM(s) Oral two times a day  senna 2 Tablet(s) Oral at bedtime  sertraline 100 milliGRAM(s) Oral daily  simvastatin 40 milliGRAM(s) Oral at bedtime    MEDICATIONS  (PRN):  acetaminophen     Tablet .. 650 milliGRAM(s) Oral every 6 hours PRN Temp greater or equal to 38C (100.4F), Mild Pain (1 - 3)        PHYSICAL EXAM:  GENERAL: no acute distress, alert, cooperative, AAOx3  CHEST/LUNG: breathing comfortably on room air, not in cardiopulmonary compromise  SKIN/WOUND: left groin s/p debridement and primary closure, surgical incision site clean, dry with staples in place, no acute bleeding, no pus drainage. Dressing changed. Pt tolerated well. Right groin with erythema and maceration extending to right labia, no open wounds, not tenderness to touch, no malodor  Care Discussed with Consultants/Other Providers [ x] YES  [ ] NO Patient is a 73y old  Female who presents with a chief complaint of Left groin wound from prosthetic.  PD#8, s/p debridement and primary closure of left groin      AM rounds  Pt seen at bedside, c/o right groin redness and discomfort   No acute events overnight  Pt afebrile    INTERVAL HPI/OVERNIGHT EVENTS:  ICU Vital Signs Last 24 Hrs  T(C): 36.6 (20 Apr 2023 07:50), Max: 36.7 (19 Apr 2023 18:49)  T(F): 97.9 (20 Apr 2023 07:50), Max: 98.1 (19 Apr 2023 18:49)  HR: 62 (20 Apr 2023 07:50) (58 - 65)  BP: 144/78 (20 Apr 2023 07:50) (111/61 - 144/78)  RR: 18 (20 Apr 2023 07:50) (18 - 18)  SpO2: 97% (20 Apr 2023 07:50) (95% - 97%)    O2 Parameters below as of 20 Apr 2023 07:50  Patient On (Oxygen Delivery Method): room air    Culture - Surgical Swab (04.12.23 @ 12:07)    -  Amikacin: S <=16   -  Amoxicillin/Clavulanic Acid: S <=8/4   -  Ampicillin: S <=8 These ampicillin results predict results for amoxicillin   -  Ampicillin: S <=2 Predicts results to ampicillin/sulbactam, amoxacillin-clavulanate and  piperacillin-tazobactam.   -  Ampicillin/Sulbactam: R <=8/4   -  Ampicillin/Sulbactam: S <=4/2 Enterobacter, Klebsiella aerogenes, Citrobacter, and Serratia may develop resistance during prolonged therapy (3-4 days)   -  Aztreonam: S <=4   -  Cefazolin: S <=2 Enterobacter, Klebsiella aerogenes, Citrobacter, and Serratia may develop resistance during prolonged therapy (3-4 days)   -  Cefazolin: R <=4   -  Cefepime: S <=2   -  Cefoxitin: S <=8   -  Ceftriaxone: S <=1 Enterobacter, Klebsiella aerogenes, Citrobacter, and Serratia may develop resistance during prolonged therapy   -  Ciprofloxacin: S <=0.25   -  Clindamycin: S <=0.25   -  Ertapenem: S <=0.5   -  Erythromycin: S <=0.25   -  Gentamicin: R >8 Should not be used as monotherapy   -  Gentamicin: S <=2   -  Imipenem: S <=1   -  Levofloxacin: S <=0.5   -  Meropenem: S <=1   -  Oxacillin: R >2   -  Penicillin: R >8   -  Piperacillin/Tazobactam: S <=8   -  Rifampin: S <=1 Should not be used as monotherapy   -  Tetracycline: S <=1   -  Tetracycline: R >8   -  Tobramycin: S <=2   -  Trimethoprim/Sulfamethoxazole: S <=0.5/9.5   -  Trimethoprim/Sulfamethoxazole: S <=0.5/9.5   -  Vancomycin: S 2   -  Vancomycin: S 2   Specimen Source: .Surgical Swab None   Culture Results:   Moderate Escherichia coli  Few Enterococcus faecalis  Few Staphylococcus epidermidis   Organism Identification: Escherichia coli  Enterococcus faecalis  Staphylococcus epidermidis   Organism: Escherichia coli   Organism: Enterococcus faecalis   Organism: Staphylococcus epidermidis    Consultant(s) Notes Reviewed:  [x ] YES  [ ] NO    MEDICATIONS  (STANDING):  amLODIPine   Tablet 10 milliGRAM(s) Oral daily  amoxicillin  875 milliGRAM(s)/clavulanate 1 Tablet(s) Oral every 12 hours  aspirin  chewable 81 milliGRAM(s) Oral daily  chlorhexidine 4% Liquid 1 Application(s) Topical daily  DULoxetine 30 milliGRAM(s) Oral daily  enoxaparin Injectable 40 milliGRAM(s) SubCutaneous every 24 hours  gabapentin 800 milliGRAM(s) Oral three times a day  levothyroxine 50 MICROGram(s) Oral daily  losartan 50 milliGRAM(s) Oral daily  metoprolol succinate ER 50 milliGRAM(s) Oral daily  nystatin Powder 1 Application(s) Topical two times a day  pantoprazole    Tablet 40 milliGRAM(s) Oral before breakfast  polyethylene glycol 3350 17 Gram(s) Oral daily  saccharomyces boulardii 250 milliGRAM(s) Oral two times a day  senna 2 Tablet(s) Oral at bedtime  sertraline 100 milliGRAM(s) Oral daily  simvastatin 40 milliGRAM(s) Oral at bedtime    MEDICATIONS  (PRN):  acetaminophen     Tablet .. 650 milliGRAM(s) Oral every 6 hours PRN Temp greater or equal to 38C (100.4F), Mild Pain (1 - 3)        PHYSICAL EXAM:  GENERAL: no acute distress, alert, cooperative, AAOx3  CHEST/LUNG: breathing comfortably on room air, not in cardiopulmonary compromise  SKIN/WOUND: left groin s/p debridement and primary closure, surgical incision site clean, dry with staples in place, no acute bleeding, no pus drainage. Dressing changed. Pt tolerated well.   Right groin with erythema and superficial skin maceration and peeling extending to right labia, no open wounds, no deep  tenderness, induration or fluctuance  to touch, no malodor  Care Discussed with Consultants/Other Providers [ x] YES  [ ] NO

## 2023-04-20 NOTE — PROGRESS NOTE ADULT - ASSESSMENT
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     # Left groin wound from prosthesis pressure:   - 4/12 pt s/p debridement of left groin wound and primary closure    - Local wound care bid: wash wound with soap and water, apply gauze, secure with tape, mesh underwear  - Continue PO augmentin  - Pain management: Tylenol, percocet, morphine   - OT/PT c/s to be oob to chair daily  - Right groin erythema: apply nystatin to right groin, monitor, continue florastor    # Cards: Hx of HTN, HLD and CAD  - Continue with Losartan 50mg daily, Simvastatin 40mg daily, ASA 81 daily. Amlodipine 10mg daily, Metoprolol 50mg tab daily, and  HCTZ 12.5 daily   - Monitor BPs   - Diet DASH/TLC    # Endo: Hx of Hypothyroidism  - Continue with home medications: Levothyroxine 50 mcg      # Psych: Hx of depression and anxiety  - Continue with home medications: sertraline 100mg and duloxetine 30 mg daily    Miscellaneous:  - Ambulate as tolerated  - DVT/GI ppx  - DASH/TLC diet   - PT/OT  -wheelchair and commode approved, . D/C planning in progress    Plan of care discussed with patient and son, concerns addressed.  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     # Left groin wound from prosthesis pressure:   - 4/12 pt s/p debridement of left groin wound and primary closure    - Local wound care bid: wash wound with soap and water, apply gauze, secure with tape, mesh underwear  - Continue PO augmentin  - Pain management: Tylenol, percocet, morphine   - OT/PT c/s to be oob to chair daily  - Right groin erythema: apply nystatin to right groin, monitor, continue florastor    # Cards: Hx of HTN, HLD and CAD  - Continue with Losartan 50mg daily, Simvastatin 40mg daily, ASA 81 daily. Amlodipine 10mg daily, Metoprolol 50mg tab daily, and  HCTZ 12.5 daily   - Monitor BPs   - Diet DASH/TLC    # Endo: Hx of Hypothyroidism  - Continue with home medications: Levothyroxine 50 mcg      # Psych: Hx of depression and anxiety  - Continue with home medications: sertraline 100mg and duloxetine 30 mg daily    Miscellaneous:  - Ambulate as tolerated  - DVT/GI ppx  - DASH/TLC diet   - PT/OT  - wheelchair and commode approved, . D/C planning in progress    Plan of care discussed with patient and son, concerns addressed.

## 2023-04-21 PROCEDURE — 99024 POSTOP FOLLOW-UP VISIT: CPT | Mod: 24,GC

## 2023-04-21 PROCEDURE — 99232 SBSQ HOSP IP/OBS MODERATE 35: CPT | Mod: 24,GC

## 2023-04-21 RX ORDER — FLUCONAZOLE 150 MG/1
150 TABLET ORAL ONCE
Refills: 0 | Status: COMPLETED | OUTPATIENT
Start: 2023-04-21 | End: 2023-04-21

## 2023-04-21 RX ADMIN — GABAPENTIN 800 MILLIGRAM(S): 400 CAPSULE ORAL at 13:04

## 2023-04-21 RX ADMIN — SIMVASTATIN 40 MILLIGRAM(S): 20 TABLET, FILM COATED ORAL at 21:19

## 2023-04-21 RX ADMIN — Medication 250 MILLIGRAM(S): at 17:20

## 2023-04-21 RX ADMIN — Medication 50 MICROGRAM(S): at 05:39

## 2023-04-21 RX ADMIN — FLUCONAZOLE 150 MILLIGRAM(S): 150 TABLET ORAL at 12:18

## 2023-04-21 RX ADMIN — PANTOPRAZOLE SODIUM 40 MILLIGRAM(S): 20 TABLET, DELAYED RELEASE ORAL at 05:40

## 2023-04-21 RX ADMIN — GABAPENTIN 800 MILLIGRAM(S): 400 CAPSULE ORAL at 06:02

## 2023-04-21 RX ADMIN — GABAPENTIN 800 MILLIGRAM(S): 400 CAPSULE ORAL at 21:19

## 2023-04-21 RX ADMIN — DULOXETINE HYDROCHLORIDE 30 MILLIGRAM(S): 30 CAPSULE, DELAYED RELEASE ORAL at 11:40

## 2023-04-21 RX ADMIN — LOSARTAN POTASSIUM 50 MILLIGRAM(S): 100 TABLET, FILM COATED ORAL at 05:41

## 2023-04-21 RX ADMIN — SERTRALINE 100 MILLIGRAM(S): 25 TABLET, FILM COATED ORAL at 11:45

## 2023-04-21 RX ADMIN — NYSTATIN CREAM 1 APPLICATION(S): 100000 CREAM TOPICAL at 17:20

## 2023-04-21 RX ADMIN — CHLORHEXIDINE GLUCONATE 1 APPLICATION(S): 213 SOLUTION TOPICAL at 11:43

## 2023-04-21 RX ADMIN — Medication 250 MILLIGRAM(S): at 06:02

## 2023-04-21 RX ADMIN — Medication 81 MILLIGRAM(S): at 11:40

## 2023-04-21 RX ADMIN — Medication 50 MILLIGRAM(S): at 05:40

## 2023-04-21 RX ADMIN — NYSTATIN CREAM 1 APPLICATION(S): 100000 CREAM TOPICAL at 05:41

## 2023-04-21 RX ADMIN — AMLODIPINE BESYLATE 10 MILLIGRAM(S): 2.5 TABLET ORAL at 05:40

## 2023-04-21 NOTE — PROGRESS NOTE ADULT - ASSESSMENT
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     # Left groin wound from prosthesis pressure:   - 4/12 pt s/p debridement of left groin wound and primary closure    - Local wound care bid: wash wound with soap and water, apply gauze, secure with tape, mesh underwear  - Continue PO augmentin  - Pain management: Tylenol, percocet, morphine   - OT/PT c/s to be oob to chair daily  - Right groin erythema: apply nystatin to right groin, monitor, continue florastor    # Cards: Hx of HTN, HLD and CAD  - Continue with Losartan 50mg daily, Simvastatin 40mg daily, ASA 81 daily. Amlodipine 10mg daily, Metoprolol 50mg tab daily, and  HCTZ 12.5 daily   - Monitor BPs   - Diet DASH/TLC    # Endo: Hx of Hypothyroidism  - Continue with home medications: Levothyroxine 50 mcg      # Psych: Hx of depression and anxiety  - Continue with home medications: sertraline 100mg and duloxetine 30 mg daily    Miscellaneous:  - Ambulate as tolerated  - DVT/GI ppx  - DASH/TLC diet   - PT/OT  - wheelchair and commode approved, . D/C planning in progress    Plan of care discussed with patient and son, concerns addressed.    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     # Left groin wound from prosthesis pressure:   - 4/12 pt s/p debridement of left groin wound and primary closure    - Local wound care bid: wash wound with soap and water, apply gauze, secure with tape, mesh underwear  - PO augmentin discontinued   - Pain management: Tylenol, percocet, morphine   - OT/PT c/s to be oob to chair daily  - Right groin erythema: apply nystatin to right groin, monitor, continue florastor  -    # Cards: Hx of HTN, HLD and CAD  - Continue with Losartan 50mg daily, Simvastatin 40mg daily, ASA 81 daily. Amlodipine 10mg daily, Metoprolol 50mg tab daily, and  HCTZ 12.5 daily   - Monitor BPs   - Diet DASH/TLC    # Endo: Hx of Hypothyroidism  - Continue with home medications: Levothyroxine 50 mcg      # Psych: Hx of depression and anxiety  - Continue with home medications: sertraline 100mg and duloxetine 30 mg daily    Miscellaneous:  - Ambulate as tolerated  - DVT/GI ppx  - DASH/TLC diet   - PT/OT  - wheelchair and commode approved, . D/C planning in progress    Plan of care discussed with patient and son, concerns addressed.

## 2023-04-21 NOTE — PROGRESS NOTE ADULT - NS ATTEND AMEND GEN_ALL_CORE FT
Patient seen during daily rounds  Son at bedside  No acute events overnight  closure intact    Bilateral groin erythema with superficial peeling of the skin;  mild superficial discomfort/ tenderness with cleaning   decreased on the right  No weeping or drainage    Healing left groin incision site  Likely fungal intertrigo  Continue nystatin  Keep area dry  Single dose p.o. antifungal discussed  Now off antibiotics  Discharge likely tomorrow discussed with patient and son they are not comfortable with discharge today due to discomfort

## 2023-04-21 NOTE — PROGRESS NOTE ADULT - NS ATTEND OPT1 GEN_ALL_CORE
I independently performed the documented:
I attest my time as attending is greater than 50% of the total combined time spent on qualifying patient care activities by the PA/NP and attending.
I independently performed the documented:
I attest my time as attending is greater than 50% of the total combined time spent on qualifying patient care activities by the PA/NP and attending.
I independently performed the documented:

## 2023-04-21 NOTE — PROGRESS NOTE ADULT - SUBJECTIVE AND OBJECTIVE BOX
INTERVAL HISTORY:    Events past 24 hrs***  Vital Signs Last 24 Hrs  T(C): 36.5 (21 Apr 2023 07:08), Max: 36.5 (20 Apr 2023 16:02)  T(F): 97.7 (21 Apr 2023 07:08), Max: 97.7 (20 Apr 2023 16:02)  HR: 62 (21 Apr 2023 07:08) (57 - 62)  BP: 134/75 (21 Apr 2023 07:08) (119/59 - 145/65)  BP(mean): --  RR: 20 (21 Apr 2023 07:08) (18 - 20)  SpO2: 94% (20 Apr 2023 16:02) (94% - 94%)    Parameters below as of 20 Apr 2023 16:02  Patient On (Oxygen Delivery Method): room air      20 Apr 2023 07:01  -  21 Apr 2023 07:00  --------------------------------------------------------  IN:    Oral Fluid: 480 mL  Total IN: 480 mL    OUT:  Total OUT: 0 mL    Total NET: 480 mL            MEDICATIONS  (STANDING):  amLODIPine   Tablet 10 milliGRAM(s) Oral daily  aspirin  chewable 81 milliGRAM(s) Oral daily  chlorhexidine 4% Liquid 1 Application(s) Topical daily  DULoxetine 30 milliGRAM(s) Oral daily  enoxaparin Injectable 40 milliGRAM(s) SubCutaneous every 24 hours  gabapentin 800 milliGRAM(s) Oral three times a day  levothyroxine 50 MICROGram(s) Oral daily  losartan 50 milliGRAM(s) Oral daily  metoprolol succinate ER 50 milliGRAM(s) Oral daily  nystatin Powder 1 Application(s) Topical two times a day  pantoprazole    Tablet 40 milliGRAM(s) Oral before breakfast  polyethylene glycol 3350 17 Gram(s) Oral daily  saccharomyces boulardii 250 milliGRAM(s) Oral two times a day  senna 2 Tablet(s) Oral at bedtime  sertraline 100 milliGRAM(s) Oral daily  simvastatin 40 milliGRAM(s) Oral at bedtime    MEDICATIONS  (PRN):  acetaminophen     Tablet .. 650 milliGRAM(s) Oral every 6 hours PRN Temp greater or equal to 38C (100.4F), Mild Pain (1 - 3)    Allergies    No Known Allergies    Intolerances        Lab Results:                        12.7   7.77  )-----------( 228      ( 20 Apr 2023 17:54 )             38.6           EXAM:       INTERVAL HISTORY:    Events past 24 hrs***  Vital Signs Last 24 Hrs  T(C): 36.5 (21 Apr 2023 07:08), Max: 36.5 (20 Apr 2023 16:02)  T(F): 97.7 (21 Apr 2023 07:08), Max: 97.7 (20 Apr 2023 16:02)  HR: 62 (21 Apr 2023 07:08) (57 - 62)  BP: 134/75 (21 Apr 2023 07:08) (119/59 - 145/65)  BP(mean): --  RR: 20 (21 Apr 2023 07:08) (18 - 20)  SpO2: 94% (20 Apr 2023 16:02) (94% - 94%)    Parameters below as of 20 Apr 2023 16:02  Patient On (Oxygen Delivery Method): room air      20 Apr 2023 07:01  -  21 Apr 2023 07:00  --------------------------------------------------------  IN:    Oral Fluid: 480 mL  Total IN: 480 mL    OUT:  Total OUT: 0 mL    Total NET: 480 mL            MEDICATIONS  (STANDING):  amLODIPine   Tablet 10 milliGRAM(s) Oral daily  aspirin  chewable 81 milliGRAM(s) Oral daily  chlorhexidine 4% Liquid 1 Application(s) Topical daily  DULoxetine 30 milliGRAM(s) Oral daily  enoxaparin Injectable 40 milliGRAM(s) SubCutaneous every 24 hours  gabapentin 800 milliGRAM(s) Oral three times a day  levothyroxine 50 MICROGram(s) Oral daily  losartan 50 milliGRAM(s) Oral daily  metoprolol succinate ER 50 milliGRAM(s) Oral daily  nystatin Powder 1 Application(s) Topical two times a day  pantoprazole    Tablet 40 milliGRAM(s) Oral before breakfast  polyethylene glycol 3350 17 Gram(s) Oral daily  saccharomyces boulardii 250 milliGRAM(s) Oral two times a day  senna 2 Tablet(s) Oral at bedtime  sertraline 100 milliGRAM(s) Oral daily  simvastatin 40 milliGRAM(s) Oral at bedtime    MEDICATIONS  (PRN):  acetaminophen     Tablet .. 650 milliGRAM(s) Oral every 6 hours PRN Temp greater or equal to 38C (100.4F), Mild Pain (1 - 3)    Allergies    No Known Allergies    Intolerances        Lab Results:                        12.7   7.77  )-----------( 228      ( 20 Apr 2023 17:54 )             38.6           EXAM:  PHYSICAL EXAM:  GENERAL: no acute distress, alert, cooperative, AAOx3  CHEST/LUNG: breathing comfortably on room air, not in cardiopulmonary compromise  SKIN/WOUND: left groin s/p debridement and primary closure, surgical incision site clean, dry with staples in place, no acute bleeding, no pus drainage. Dressing changed. Pt tolerated well.   Right groin with erythema and superficial skin maceration and peeling extending to right labia, no open wounds, no deep  tenderness, induration or fluctuance  to touch, no malodor  Care Discussed with Consultants/Other Providers [ x] YES  [ ] NO       INTERVAL HISTORY:    Events past 24 hrs***  Vital Signs Last 24 Hrs  T(C): 36.5 (21 Apr 2023 07:08), Max: 36.5 (20 Apr 2023 16:02)  T(F): 97.7 (21 Apr 2023 07:08), Max: 97.7 (20 Apr 2023 16:02)  HR: 62 (21 Apr 2023 07:08) (57 - 62)  BP: 134/75 (21 Apr 2023 07:08) (119/59 - 145/65)  BP(mean): --  RR: 20 (21 Apr 2023 07:08) (18 - 20)  SpO2: 94% (20 Apr 2023 16:02) (94% - 94%)    Parameters below as of 20 Apr 2023 16:02  Patient On (Oxygen Delivery Method): room air      20 Apr 2023 07:01  -  21 Apr 2023 07:00  --------------------------------------------------------  IN:    Oral Fluid: 480 mL  Total IN: 480 mL    OUT:  Total OUT: 0 mL    Total NET: 480 mL            MEDICATIONS  (STANDING):  amLODIPine   Tablet 10 milliGRAM(s) Oral daily  aspirin  chewable 81 milliGRAM(s) Oral daily  chlorhexidine 4% Liquid 1 Application(s) Topical daily  DULoxetine 30 milliGRAM(s) Oral daily  enoxaparin Injectable 40 milliGRAM(s) SubCutaneous every 24 hours  gabapentin 800 milliGRAM(s) Oral three times a day  levothyroxine 50 MICROGram(s) Oral daily  losartan 50 milliGRAM(s) Oral daily  metoprolol succinate ER 50 milliGRAM(s) Oral daily  nystatin Powder 1 Application(s) Topical two times a day  pantoprazole    Tablet 40 milliGRAM(s) Oral before breakfast  polyethylene glycol 3350 17 Gram(s) Oral daily  saccharomyces boulardii 250 milliGRAM(s) Oral two times a day  senna 2 Tablet(s) Oral at bedtime  sertraline 100 milliGRAM(s) Oral daily  simvastatin 40 milliGRAM(s) Oral at bedtime    MEDICATIONS  (PRN):  acetaminophen     Tablet .. 650 milliGRAM(s) Oral every 6 hours PRN Temp greater or equal to 38C (100.4F), Mild Pain (1 - 3)    Allergies    No Known Allergies    Intolerances        Lab Results:                        12.7   7.77  )-----------( 228      ( 20 Apr 2023 17:54 )             38.6           EXAM:  PHYSICAL EXAM:  GENERAL: no acute distress, alert, cooperative, AAOx3  CHEST/LUNG: breathing comfortably on room air, not in cardiopulmonary compromise  SKIN/WOUND: left groin s/p debridement and primary closure, surgical incision site clean, dry with staples in place, no acute bleeding, no pus drainage. Dressing changed. Pt tolerated well.  bilateral groin with sl erythema and superficial skin maceration and peeling extending to labia no open wounds, no deep  tenderness, induration or fluctuance  to touch, no malodor  Care Discussed with Consultants/Other Providers [ x] YES  [ ] NO

## 2023-04-22 VITALS
HEART RATE: 69 BPM | TEMPERATURE: 98 F | RESPIRATION RATE: 18 BRPM | OXYGEN SATURATION: 97 % | DIASTOLIC BLOOD PRESSURE: 61 MMHG | SYSTOLIC BLOOD PRESSURE: 132 MMHG

## 2023-04-22 PROCEDURE — 99238 HOSP IP/OBS DSCHRG MGMT 30/<: CPT | Mod: 24

## 2023-04-22 RX ORDER — NYSTATIN CREAM 100000 [USP'U]/G
1 CREAM TOPICAL
Qty: 60 | Refills: 3
Start: 2023-04-22

## 2023-04-22 RX ADMIN — DULOXETINE HYDROCHLORIDE 30 MILLIGRAM(S): 30 CAPSULE, DELAYED RELEASE ORAL at 11:40

## 2023-04-22 RX ADMIN — GABAPENTIN 800 MILLIGRAM(S): 400 CAPSULE ORAL at 14:06

## 2023-04-22 RX ADMIN — AMLODIPINE BESYLATE 10 MILLIGRAM(S): 2.5 TABLET ORAL at 05:30

## 2023-04-22 RX ADMIN — SERTRALINE 100 MILLIGRAM(S): 25 TABLET, FILM COATED ORAL at 11:39

## 2023-04-22 RX ADMIN — Medication 81 MILLIGRAM(S): at 11:38

## 2023-04-22 RX ADMIN — NYSTATIN CREAM 1 APPLICATION(S): 100000 CREAM TOPICAL at 05:29

## 2023-04-22 RX ADMIN — GABAPENTIN 800 MILLIGRAM(S): 400 CAPSULE ORAL at 05:31

## 2023-04-22 RX ADMIN — Medication 250 MILLIGRAM(S): at 05:32

## 2023-04-22 RX ADMIN — Medication 50 MILLIGRAM(S): at 05:32

## 2023-04-22 RX ADMIN — Medication 50 MICROGRAM(S): at 05:30

## 2023-04-22 RX ADMIN — LOSARTAN POTASSIUM 50 MILLIGRAM(S): 100 TABLET, FILM COATED ORAL at 05:32

## 2023-04-22 RX ADMIN — PANTOPRAZOLE SODIUM 40 MILLIGRAM(S): 20 TABLET, DELAYED RELEASE ORAL at 05:30

## 2023-04-22 NOTE — PROGRESS NOTE ADULT - SUBJECTIVE AND OBJECTIVE BOX
INTERVAL HISTORY:  no overnight events. afebrile. dispo planning today.    Events past 24 hrs***  Vital Signs Last 24 Hrs  T(C): 36.6 (22 Apr 2023 07:25), Max: 36.9 (22 Apr 2023 00:24)  T(F): 97.8 (22 Apr 2023 07:25), Max: 98.4 (22 Apr 2023 00:24)  HR: 78 (22 Apr 2023 07:25) (65 - 78)  BP: 124/75 (22 Apr 2023 07:25) (124/75 - 139/80)  BP(mean): 105 (21 Apr 2023 16:05) (105 - 105)  RR: 19 (22 Apr 2023 07:25) (19 - 19)  SpO2: 97% (22 Apr 2023 07:25) (94% - 97%)    Parameters below as of 22 Apr 2023 07:25  Patient On (Oxygen Delivery Method): room air    I&O's Detail    21 Apr 2023 07:01  -  22 Apr 2023 07:00  --------------------------------------------------------  IN:    Oral Fluid: 360 mL  Total IN: 360 mL    OUT:  Total OUT: 0 mL    Total NET: 360 mL    MEDICATIONS  (STANDING):  amLODIPine   Tablet 10 milliGRAM(s) Oral daily  aspirin  chewable 81 milliGRAM(s) Oral daily  chlorhexidine 4% Liquid 1 Application(s) Topical daily  DULoxetine 30 milliGRAM(s) Oral daily  enoxaparin Injectable 40 milliGRAM(s) SubCutaneous every 24 hours  gabapentin 800 milliGRAM(s) Oral three times a day  levothyroxine 50 MICROGram(s) Oral daily  losartan 50 milliGRAM(s) Oral daily  metoprolol succinate ER 50 milliGRAM(s) Oral daily  nystatin Powder 1 Application(s) Topical two times a day  pantoprazole    Tablet 40 milliGRAM(s) Oral before breakfast  polyethylene glycol 3350 17 Gram(s) Oral daily  saccharomyces boulardii 250 milliGRAM(s) Oral two times a day  senna 2 Tablet(s) Oral at bedtime  sertraline 100 milliGRAM(s) Oral daily  simvastatin 40 milliGRAM(s) Oral at bedtime    MEDICATIONS  (PRN):  acetaminophen     Tablet .. 650 milliGRAM(s) Oral every 6 hours PRN Temp greater or equal to 38C (100.4F), Mild Pain (1 - 3)    Allergies  No Known Allergies  Intolerances    Lab Results:                        12.7   7.77  )-----------( 228      ( 20 Apr 2023 17:54 )             38.6     EXAM:  PHYSICAL EXAM:  GENERAL: no acute distress, alert, cooperative, AAOx3  CHEST/LUNG: breathing comfortably on room air, not in cardiopulmonary compromise  SKIN/WOUND: left groin s/p debridement and primary closure, surgical incision site clean, dry with staples in place, no acute bleeding, no pus drainage. Dressing changed. Pt tolerated well.  bilateral groin with sl erythema and superficial skin maceration and peeling extending to labia no open wounds, no deep  tenderness, induration or fluctuance  to touch, no malodor

## 2023-04-22 NOTE — PROGRESS NOTE ADULT - REASON FOR ADMISSION
Left groin wound from prosthetic

## 2023-04-22 NOTE — PROGRESS NOTE ADULT - ASSESSMENT
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     # Left groin wound from prosthesis pressure:   - 4/12 pt s/p debridement of left groin wound and primary closure    - Local wound care bid: wash wound with soap and water, apply gauze, secure with tape, mesh underwear  - PO augmentin completed  - Pain management: Tylenol, percocet, morphine   - OT/PT c/s to be oob to chair daily  - Right groin erythema: apply nystatin to right groin, monitor, continue florastor    # Cards: Hx of HTN, HLD and CAD  - Continue with Losartan 50mg daily, Simvastatin 40mg daily, ASA 81 daily. Amlodipine 10mg daily, Metoprolol 50mg tab daily, and  HCTZ 12.5 daily   - Monitor BPs   - Diet DASH/TLC    # Endo: Hx of Hypothyroidism  - Continue with home medications: Levothyroxine 50 mcg      # Psych: Hx of depression and anxiety  - Continue with home medications: sertraline 100mg and duloxetine 30 mg daily    Miscellaneous:  - Ambulate as tolerated  - DVT/GI ppx  - DASH/TLC diet   - PT/OT  - wheelchair and commode approved, . D/C planning in progress    Plan of care discussed with patient and son, concerns addressed.

## 2023-04-23 ENCOUNTER — TRANSCRIPTION ENCOUNTER (OUTPATIENT)
Age: 73
End: 2023-04-23

## 2023-04-24 ENCOUNTER — TRANSCRIPTION ENCOUNTER (OUTPATIENT)
Age: 73
End: 2023-04-24

## 2023-04-25 ENCOUNTER — TRANSCRIPTION ENCOUNTER (OUTPATIENT)
Age: 73
End: 2023-04-25

## 2023-04-26 ENCOUNTER — TRANSCRIPTION ENCOUNTER (OUTPATIENT)
Age: 73
End: 2023-04-26
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
## 2023-04-26 DIAGNOSIS — Z79.82 LONG TERM (CURRENT) USE OF ASPIRIN: ICD-10-CM

## 2023-04-26 DIAGNOSIS — F32.A DEPRESSION, UNSPECIFIED: ICD-10-CM

## 2023-04-26 DIAGNOSIS — Z87.891 PERSONAL HISTORY OF NICOTINE DEPENDENCE: ICD-10-CM

## 2023-04-26 DIAGNOSIS — Z20.822 CONTACT WITH AND (SUSPECTED) EXPOSURE TO COVID-19: ICD-10-CM

## 2023-04-26 DIAGNOSIS — Z89.612 ACQUIRED ABSENCE OF LEFT LEG ABOVE KNEE: ICD-10-CM

## 2023-04-26 DIAGNOSIS — G47.00 INSOMNIA, UNSPECIFIED: ICD-10-CM

## 2023-04-26 DIAGNOSIS — E78.5 HYPERLIPIDEMIA, UNSPECIFIED: ICD-10-CM

## 2023-04-26 DIAGNOSIS — E03.9 HYPOTHYROIDISM, UNSPECIFIED: ICD-10-CM

## 2023-04-26 DIAGNOSIS — F41.9 ANXIETY DISORDER, UNSPECIFIED: ICD-10-CM

## 2023-04-26 DIAGNOSIS — B37.2 CANDIDIASIS OF SKIN AND NAIL: ICD-10-CM

## 2023-04-26 DIAGNOSIS — Z79.890 HORMONE REPLACEMENT THERAPY: ICD-10-CM

## 2023-04-26 DIAGNOSIS — I25.10 ATHEROSCLEROTIC HEART DISEASE OF NATIVE CORONARY ARTERY WITHOUT ANGINA PECTORIS: ICD-10-CM

## 2023-04-26 DIAGNOSIS — I10 ESSENTIAL (PRIMARY) HYPERTENSION: ICD-10-CM

## 2023-04-26 DIAGNOSIS — T88.8XXA OTHER SPECIFIED COMPLICATIONS OF SURGICAL AND MEDICAL CARE, NOT ELSEWHERE CLASSIFIED, INITIAL ENCOUNTER: ICD-10-CM

## 2023-04-26 DIAGNOSIS — L89.893 PRESSURE ULCER OF OTHER SITE, STAGE 3: ICD-10-CM

## 2023-05-03 ENCOUNTER — TRANSCRIPTION ENCOUNTER (OUTPATIENT)
Age: 73
End: 2023-05-03

## 2023-05-04 ENCOUNTER — APPOINTMENT (OUTPATIENT)
Dept: DERMATOLOGY | Facility: CLINIC | Age: 73
End: 2023-05-04

## 2023-05-04 ENCOUNTER — OUTPATIENT (OUTPATIENT)
Dept: OUTPATIENT SERVICES | Facility: HOSPITAL | Age: 73
LOS: 1 days | End: 2023-05-04
Payer: MEDICARE

## 2023-05-04 ENCOUNTER — APPOINTMENT (OUTPATIENT)
Dept: BURN CARE | Facility: CLINIC | Age: 73
End: 2023-05-04
Payer: MEDICARE

## 2023-05-04 VITALS — HEART RATE: 69 BPM | TEMPERATURE: 98.2 F | SYSTOLIC BLOOD PRESSURE: 153 MMHG | DIASTOLIC BLOOD PRESSURE: 94 MMHG

## 2023-05-04 DIAGNOSIS — Z00.8 ENCOUNTER FOR OTHER GENERAL EXAMINATION: ICD-10-CM

## 2023-05-04 DIAGNOSIS — S78.112A COMPLETE TRAUMATIC AMPUTATION AT LEVEL BETWEEN LEFT HIP AND KNEE, INITIAL ENCOUNTER: Chronic | ICD-10-CM

## 2023-05-04 DIAGNOSIS — Z98.890 OTHER SPECIFIED POSTPROCEDURAL STATES: Chronic | ICD-10-CM

## 2023-05-04 DIAGNOSIS — Z96.651 PRESENCE OF RIGHT ARTIFICIAL KNEE JOINT: Chronic | ICD-10-CM

## 2023-05-04 PROCEDURE — 99212 OFFICE O/P EST SF 10 MIN: CPT

## 2023-05-09 NOTE — REASON FOR VISIT
[Revisit] : revisit [Were you seen in the Emergency Room?] : seen in the emergency room [Were you admitted to the burn center at [facility]?] : not admitted to the burn center at [facility]

## 2023-05-09 NOTE — PHYSICAL EXAM
[Healing] : healing [Size%: ______] : Size: [unfilled]% [3] : 3 out of 10 [Abnormal] : abnormal [Medium] : medium [] : no [de-identified] : The left groin wound due to prosthesis post left AKA measures 1x1cm and is healing post debridement and closure with sutures and staples.  The patient was instructed to clean  the wound with soap and water. Continue local wound care with moisturizer and sunscreen. Follow up 2 - 4  weeks.  [TWNoteComboBox1] : bandaid

## 2023-05-09 NOTE — ASSESSMENT
[FreeTextEntry1] : The left groin wound due to prosthesis post left AKA measures 1x1cm and is healing post debridement and closure with sutures and staples.  The patient was instructed to clean  the wound with soap and water. Continue local wound care with moisturizer and sunscreen. Follow up 2 - 4  weeks.  [Wound Care] : wound care
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
## 2023-05-10 ENCOUNTER — TRANSCRIPTION ENCOUNTER (OUTPATIENT)
Age: 73
End: 2023-05-10

## 2023-05-11 ENCOUNTER — OUTPATIENT (OUTPATIENT)
Dept: OUTPATIENT SERVICES | Facility: HOSPITAL | Age: 73
LOS: 1 days | End: 2023-05-11
Payer: MEDICARE

## 2023-05-11 ENCOUNTER — APPOINTMENT (OUTPATIENT)
Dept: BURN CARE | Facility: CLINIC | Age: 73
End: 2023-05-11
Payer: MEDICARE

## 2023-05-11 VITALS — HEART RATE: 86 BPM | TEMPERATURE: 97.3 F | SYSTOLIC BLOOD PRESSURE: 148 MMHG | DIASTOLIC BLOOD PRESSURE: 107 MMHG

## 2023-05-11 DIAGNOSIS — Z00.8 ENCOUNTER FOR OTHER GENERAL EXAMINATION: ICD-10-CM

## 2023-05-11 DIAGNOSIS — Z98.890 OTHER SPECIFIED POSTPROCEDURAL STATES: Chronic | ICD-10-CM

## 2023-05-11 DIAGNOSIS — S78.112A COMPLETE TRAUMATIC AMPUTATION AT LEVEL BETWEEN LEFT HIP AND KNEE, INITIAL ENCOUNTER: Chronic | ICD-10-CM

## 2023-05-11 PROCEDURE — 99212 OFFICE O/P EST SF 10 MIN: CPT
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
## 2023-05-11 NOTE — PHYSICAL EXAM
[Healing] : healing [Size%: ______] : Size: [unfilled]% [3] : 3 out of 10 [Abnormal] : abnormal [Medium] : medium [] : no [de-identified] : The left groin wound due to prosthesis post left AKA measures 1x1cm and is healing post debridement and closure with sutures and staples.  Staples removed.   The patient was instructed to clean  the wound with soap and water. Continue local wound care with moisturizer and sunscreen. Follow up 2 - 4  weeks.  [TWNoteComboBox1] : bandaid

## 2023-05-11 NOTE — REASON FOR VISIT
[Revisit] : revisit [Were you seen in the Emergency Room?] : seen in the emergency room [Were you admitted to the burn center at [facility]?] : not admitted to the burn center at [facility]

## 2023-05-11 NOTE — ASSESSMENT
[FreeTextEntry1] : The left groin wound due to prosthesis post left AKA measures 1x1cm and is healing post debridement and closure with sutures and staples.  Staples removed.   The patient was instructed to clean  the wound with soap and water. Continue local wound care with moisturizer and sunscreen. Follow up 2 - 4  weeks.  [Wound Care] : wound care

## 2023-05-15 ENCOUNTER — EMERGENCY (EMERGENCY)
Facility: HOSPITAL | Age: 73
LOS: 0 days | Discharge: HOME | End: 2023-05-15
Attending: EMERGENCY MEDICINE
Payer: MEDICARE

## 2023-05-15 VITALS
RESPIRATION RATE: 18 BRPM | DIASTOLIC BLOOD PRESSURE: 121 MMHG | TEMPERATURE: 97 F | HEIGHT: 64 IN | SYSTOLIC BLOOD PRESSURE: 191 MMHG | OXYGEN SATURATION: 99 % | HEART RATE: 69 BPM | WEIGHT: 145.06 LBS
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
## 2023-05-15 DIAGNOSIS — Z98.890 OTHER SPECIFIED POSTPROCEDURAL STATES: Chronic | ICD-10-CM

## 2023-05-15 DIAGNOSIS — N89.8 OTHER SPECIFIED NONINFLAMMATORY DISORDERS OF VAGINA: ICD-10-CM

## 2023-05-15 DIAGNOSIS — Z98.890 OTHER SPECIFIED POSTPROCEDURAL STATES: ICD-10-CM
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
## 2023-05-15 DIAGNOSIS — T81.89XD OTHER COMPLICATIONS OF PROCEDURES, NOT ELSEWHERE CLASSIFIED, SUBSEQUENT ENCOUNTER: ICD-10-CM

## 2023-05-15 DIAGNOSIS — Z96.651 PRESENCE OF RIGHT ARTIFICIAL KNEE JOINT: Chronic | ICD-10-CM

## 2023-05-15 DIAGNOSIS — B37.31 ACUTE CANDIDIASIS OF VULVA AND VAGINA: ICD-10-CM

## 2023-05-15 DIAGNOSIS — Z89.612 ACQUIRED ABSENCE OF LEFT LEG ABOVE KNEE: ICD-10-CM
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
## 2023-05-15 DIAGNOSIS — S31.104D UNSPECIFIED OPEN WOUND OF ABDOMINAL WALL, LEFT LOWER QUADRANT WITHOUT PENETRATION INTO PERITONEAL CAVITY, SUBSEQUENT ENCOUNTER: ICD-10-CM

## 2023-05-15 DIAGNOSIS — S78.112A COMPLETE TRAUMATIC AMPUTATION AT LEVEL BETWEEN LEFT HIP AND KNEE, INITIAL ENCOUNTER: Chronic | ICD-10-CM

## 2023-05-15 DIAGNOSIS — S31.109D UNSPECIFIED OPEN WOUND OF ABDOMINAL WALL, UNSPECIFIED QUADRANT WITHOUT PENETRATION INTO PERITONEAL CAVITY, SUBSEQUENT ENCOUNTER: ICD-10-CM

## 2023-05-15 PROCEDURE — 99283 EMERGENCY DEPT VISIT LOW MDM: CPT

## 2023-05-15 PROCEDURE — 99284 EMERGENCY DEPT VISIT MOD MDM: CPT

## 2023-05-15 RX ORDER — FLUCONAZOLE 150 MG/1
150 TABLET ORAL ONCE
Refills: 0 | Status: COMPLETED | OUTPATIENT
Start: 2023-05-15 | End: 2023-05-15

## 2023-05-15 RX ORDER — MICONAZOLE NITRATE 2 %
1 CREAM (GRAM) TOPICAL
Qty: 1 | Refills: 0
Start: 2023-05-15 | End: 2023-05-17

## 2023-05-15 RX ADMIN — FLUCONAZOLE 150 MILLIGRAM(S): 150 TABLET ORAL at 12:39

## 2023-05-15 NOTE — ED ADULT TRIAGE NOTE - CHIEF COMPLAINT QUOTE
pt c/o vaginal burning, itching and redness, reports having a yeast infection recently pt c/o vaginal burning, itching and redness, reports having a yeast infection recently. pt has her blood pressure medications on her, but did not take them.

## 2023-05-15 NOTE — ED PROVIDER NOTE - PHYSICAL EXAMINATION
CONST: Well appearing in NAD  GI: Soft, non-tender, non-distended.  : outer labial skin is red and inflamed. No abscess or vesicles or ulcerations  SKIN: Warm, dry, no acute rashes. Good turgor  NEURO: A&Ox3, No focal deficits. Strength 5/5 with no sensory deficits.

## 2023-05-15 NOTE — ED ADULT NURSE NOTE - CHIEF COMPLAINT QUOTE
pt c/o vaginal burning, itching and redness, reports having a yeast infection recently. pt has her blood pressure medications on her, but did not take them.

## 2023-05-15 NOTE — ED ADULT TRIAGE NOTE - RESPIRATORY RATE (BREATHS/MIN)
[FreeTextEntry1] : 10-year-old female with  history of constipation with infrequent, hard to pass stools.  They did a trial of MiraLAX with much improvement, however family DC miralax and fiber and she is constipated again. The amount of fiber in her diet is likely inadequate and would need more. She would prefer to try magnesium.   Recommend:\par - Should add in 200 mg of magnesium and increase as needed to 600mg daily.  Would restart the 1 tablespoon of Benefiber per day\par - increase fiber and fluids in diet.\par - should contact PMD about possible blood in urine\par - Family instructed to call for lab results or if any questions or concerns. Family was asked to make a follow-up visit to be seen in 8 wks. will continue weaning MiraLAX if possible 18

## 2023-05-15 NOTE — ED PROVIDER NOTE - OBJECTIVE STATEMENT
Pt c/o vaginal irritation and dc x 2 weeks after being on abx for a leg wound. Denies fever, chills, urinary symptoms, abd pain
none

## 2023-05-15 NOTE — ED PROVIDER NOTE - PATIENT PORTAL LINK FT
You can access the FollowMyHealth Patient Portal offered by  by registering at the following website: [email or web address]. By joining [facility]’s FollowMyHealth portal, you will also be able to view your health information using other applications (apps) compatible with our system.

## 2023-05-15 NOTE — ED ADULT NURSE NOTE - NSFALLRISKINTERV_ED_ALL_ED
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
## 2023-05-15 NOTE — ED PROVIDER NOTE - NSFOLLOWUPCLINICS_GEN_ALL_ED_FT
No retinal tears or retinal detachment seen on clinical exam today. Reviewed the signs and symptoms of retinal tear/retinal detachment and the importance of calling for prompt evaluation should there be increasing floaters, new flashing lights, or decreasing peripheral vision in either eye at any time. Observation recommended. [facility]  OB/GYN  [address] [place] [address]  Phone: [phone]  Fax:   Follow Up Time: 4-6 Days

## 2023-05-15 NOTE — ED PROVIDER NOTE - CLINICAL SUMMARY MEDICAL DECISION MAKING FREE TEXT BOX
73-year-old female recent antibiotic use for leg wound infection now with vaginal irritation.  Consistent with vaginal candidiasis.  Will discharge on antifungal.  DC with follow-up.  Abdomen soft.

## 2023-05-25 ENCOUNTER — APPOINTMENT (OUTPATIENT)
Dept: BURN CARE | Facility: CLINIC | Age: 73
End: 2023-05-25
Payer: MEDICARE

## 2023-05-25 ENCOUNTER — OUTPATIENT (OUTPATIENT)
Dept: OUTPATIENT SERVICES | Facility: HOSPITAL | Age: 73
LOS: 1 days | End: 2023-05-25
Payer: MEDICARE

## 2023-05-25 VITALS — DIASTOLIC BLOOD PRESSURE: 92 MMHG | TEMPERATURE: 98 F | HEART RATE: 70 BPM | SYSTOLIC BLOOD PRESSURE: 135 MMHG

## 2023-05-25 DIAGNOSIS — S78.112A COMPLETE TRAUMATIC AMPUTATION AT LEVEL BETWEEN LEFT HIP AND KNEE, INITIAL ENCOUNTER: Chronic | ICD-10-CM

## 2023-05-25 DIAGNOSIS — Z98.890 OTHER SPECIFIED POSTPROCEDURAL STATES: Chronic | ICD-10-CM

## 2023-05-25 DIAGNOSIS — Z96.651 PRESENCE OF RIGHT ARTIFICIAL KNEE JOINT: Chronic | ICD-10-CM

## 2023-05-25 DIAGNOSIS — Z00.8 ENCOUNTER FOR OTHER GENERAL EXAMINATION: ICD-10-CM

## 2023-05-25 PROCEDURE — 99212 OFFICE O/P EST SF 10 MIN: CPT

## 2023-05-25 NOTE — PHYSICAL EXAM
[Healing] : healing [Size%: ______] : Size: [unfilled]% [3] : 3 out of 10 [Abnormal] : abnormal [Medium] : medium [] : no [de-identified] : The left groin wound due to prosthesis post left AKA measures 1x1cm and is healing post debridement and closure with sutures and staples.  Sutures  removed and steri strips applied.   The patient was instructed to clean  the wound with soap and water. Continue local wound care with moisturizer and sunscreen. Follow up 1  weeks.  [TWNoteComboBox1] : bandaid

## 2023-05-25 NOTE — ASSESSMENT
[FreeTextEntry1] : The left groin wound due to prosthesis post left AKA measures 1x1cm and is healing post debridement and closure with sutures and staples.  Sutures  removed and steri strips applied.   The patient was instructed to clean  the wound with soap and water. Continue local wound care with moisturizer and sunscreen. Follow up 1  weeks.  [Wound Care] : wound care

## 2023-05-25 NOTE — REASON FOR VISIT
[Revisit] : revisit [Were you seen in the Emergency Room?] : seen in the emergency room [Were you admitted to the burn center at [facility]?] : not admitted to the burn center at [facility]
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
## 2023-05-26 DIAGNOSIS — Z98.890 OTHER SPECIFIED POSTPROCEDURAL STATES: ICD-10-CM
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
## 2023-05-26 DIAGNOSIS — T81.89XD OTHER COMPLICATIONS OF PROCEDURES, NOT ELSEWHERE CLASSIFIED, SUBSEQUENT ENCOUNTER: ICD-10-CM

## 2023-05-26 DIAGNOSIS — S31.104D UNSPECIFIED OPEN WOUND OF ABDOMINAL WALL, LEFT LOWER QUADRANT WITHOUT PENETRATION INTO PERITONEAL CAVITY, SUBSEQUENT ENCOUNTER: ICD-10-CM
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
## 2023-05-26 DIAGNOSIS — Z89.612 ACQUIRED ABSENCE OF LEFT LEG ABOVE KNEE: ICD-10-CM

## 2023-06-01 ENCOUNTER — APPOINTMENT (OUTPATIENT)
Dept: BURN CARE | Facility: CLINIC | Age: 73
End: 2023-06-01
Payer: MEDICARE

## 2023-06-01 ENCOUNTER — OUTPATIENT (OUTPATIENT)
Dept: OUTPATIENT SERVICES | Facility: HOSPITAL | Age: 73
LOS: 1 days | End: 2023-06-01
Payer: MEDICARE

## 2023-06-01 VITALS — SYSTOLIC BLOOD PRESSURE: 122 MMHG | TEMPERATURE: 97.5 F | DIASTOLIC BLOOD PRESSURE: 84 MMHG | HEART RATE: 65 BPM

## 2023-06-01 DIAGNOSIS — S78.112A COMPLETE TRAUMATIC AMPUTATION AT LEVEL BETWEEN LEFT HIP AND KNEE, INITIAL ENCOUNTER: Chronic | ICD-10-CM

## 2023-06-01 DIAGNOSIS — Z96.651 PRESENCE OF RIGHT ARTIFICIAL KNEE JOINT: Chronic | ICD-10-CM

## 2023-06-01 DIAGNOSIS — Z98.890 OTHER SPECIFIED POSTPROCEDURAL STATES: Chronic | ICD-10-CM

## 2023-06-01 DIAGNOSIS — Z00.8 ENCOUNTER FOR OTHER GENERAL EXAMINATION: ICD-10-CM

## 2023-06-01 PROCEDURE — 99212 OFFICE O/P EST SF 10 MIN: CPT

## 2023-06-03 NOTE — REASON FOR VISIT
[Revisit] : revisit [Were you seen in the Emergency Room?] : seen in the emergency room [Were you admitted to the burn center at [facility]?] : not admitted to the burn center at [facility]

## 2023-06-03 NOTE — PHYSICAL EXAM
[Closed] : closed [Size%: ______] : Size: [unfilled]% [Infected?] : Infected: No [3] : 3 out of 10 [Abnormal] : abnormal [Medium] : medium [] : no [de-identified] : The left groin wound due to prosthesis post left AKA measures 1x1cm and is healed post debridement and closure with sutures and staples.     The patient was instructed to clean  the wound with soap and water. Continue local wound care with moisturizer and sunscreen. Follow up 1  month.   [TWNoteComboBox1] : bandaid

## 2023-06-03 NOTE — ASSESSMENT
[FreeTextEntry1] : The left groin wound due to prosthesis post left AKA measures 1x1cm and is healed post debridement and closure with sutures and staples.     The patient was instructed to clean  the wound with soap and water. Continue local wound care with moisturizer and sunscreen. Follow up 1  month.    [Wound Care] : wound care
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
## 2023-06-06 DIAGNOSIS — Z87.2 PERSONAL HISTORY OF DISEASES OF THE SKIN AND SUBCUTANEOUS TISSUE: ICD-10-CM

## 2023-06-06 DIAGNOSIS — Z09 ENCOUNTER FOR FOLLOW-UP EXAMINATION AFTER COMPLETED TREATMENT FOR CONDITIONS OTHER THAN MALIGNANT NEOPLASM: ICD-10-CM

## 2023-06-06 DIAGNOSIS — Z98.890 OTHER SPECIFIED POSTPROCEDURAL STATES: ICD-10-CM

## 2023-06-07 ENCOUNTER — APPOINTMENT (OUTPATIENT)
Dept: OBGYN | Facility: CLINIC | Age: 73
End: 2023-06-07
Payer: MEDICARE

## 2023-06-07 ENCOUNTER — OUTPATIENT (OUTPATIENT)
Dept: OUTPATIENT SERVICES | Facility: HOSPITAL | Age: 73
LOS: 1 days | End: 2023-06-07
Payer: MEDICARE

## 2023-06-07 VITALS
HEART RATE: 69 BPM | HEIGHT: 63 IN | WEIGHT: 145 LBS | DIASTOLIC BLOOD PRESSURE: 91 MMHG | BODY MASS INDEX: 25.69 KG/M2 | SYSTOLIC BLOOD PRESSURE: 186 MMHG

## 2023-06-07 DIAGNOSIS — N89.8 OTHER SPECIFIED NONINFLAMMATORY DISORDERS OF VAGINA: ICD-10-CM

## 2023-06-07 DIAGNOSIS — N76.0 ACUTE VAGINITIS: ICD-10-CM

## 2023-06-07 DIAGNOSIS — Z00.00 ENCOUNTER FOR GENERAL ADULT MEDICAL EXAMINATION WITHOUT ABNORMAL FINDINGS: ICD-10-CM

## 2023-06-07 DIAGNOSIS — Z98.890 OTHER SPECIFIED POSTPROCEDURAL STATES: Chronic | ICD-10-CM

## 2023-06-07 PROCEDURE — 87591 N.GONORRHOEAE DNA AMP PROB: CPT

## 2023-06-07 PROCEDURE — 87801 DETECT AGNT MULT DNA AMPLI: CPT

## 2023-06-07 PROCEDURE — 87661 TRICHOMONAS VAGINALIS AMPLIF: CPT

## 2023-06-07 PROCEDURE — 87491 CHLMYD TRACH DNA AMP PROBE: CPT

## 2023-06-07 PROCEDURE — 99203 OFFICE O/P NEW LOW 30 MIN: CPT

## 2023-06-07 PROCEDURE — 87798 DETECT AGENT NOS DNA AMP: CPT
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
## 2023-06-07 NOTE — PHYSICAL EXAM
[Chaperone Present] : A chaperone was present in the examining room during all aspects of the physical examination [Appropriately responsive] : appropriately responsive [Alert] : alert [Oriented x3] : oriented x3 [Vulvar Atrophy] : vulvar atrophy [Labia Majora Erythema] : erythema [Labia Minora] : normal [Normal] : normal [FreeTextEntry1] : erythema on labia majora  [FreeTextEntry4] : no discharge

## 2023-06-07 NOTE — PLAN
[FreeTextEntry1] : 72yo Postmenopausal for vaginal itching \par \par # Vaginal itching - vaginal infection vs vaginal atrophy vs dermatitis \par - Rx for betamethasone sent to pharmacy \par - f/u vaginitis, s/p monistat therapy \par \par #HCM \par - routine maintenance referrals given\par - encouraged RTC 1 mo for annual exam

## 2023-06-08 ENCOUNTER — APPOINTMENT (OUTPATIENT)
Dept: CARDIOLOGY | Facility: CLINIC | Age: 73
End: 2023-06-08
Payer: MEDICARE

## 2023-06-08 ENCOUNTER — RESULT CHARGE (OUTPATIENT)
Age: 73
End: 2023-06-08

## 2023-06-08 VITALS
HEIGHT: 63 IN | DIASTOLIC BLOOD PRESSURE: 92 MMHG | WEIGHT: 145 LBS | BODY MASS INDEX: 25.69 KG/M2 | SYSTOLIC BLOOD PRESSURE: 160 MMHG | HEART RATE: 71 BPM

## 2023-06-08 PROCEDURE — 99214 OFFICE O/P EST MOD 30 MIN: CPT | Mod: 25

## 2023-06-08 PROCEDURE — 93000 ELECTROCARDIOGRAM COMPLETE: CPT

## 2023-06-08 RX ORDER — AMOXICILLIN AND CLAVULANATE POTASSIUM 875; 125 MG/1; MG/1
875-125 TABLET, COATED ORAL
Qty: 14 | Refills: 0 | Status: DISCONTINUED | COMMUNITY
Start: 2023-04-04 | End: 2023-06-08

## 2023-06-08 RX ORDER — LOSARTAN POTASSIUM 50 MG/1
50 TABLET, FILM COATED ORAL DAILY
Qty: 90 | Refills: 1 | Status: DISCONTINUED | COMMUNITY
Start: 1900-01-01 | End: 2023-06-08

## 2023-06-08 NOTE — REVIEW OF SYSTEMS
[Chest Discomfort] : chest discomfort [Negative] : Neurological [Dyspnea on exertion] : not dyspnea during exertion [Lower Ext Edema] : no extremity edema [Palpitations] : no palpitations [Syncope] : no syncope [Rash] : no rash [Anxiety] : no anxiety

## 2023-06-08 NOTE — PHYSICAL EXAM
[Well Developed] : well developed [Well Nourished] : well nourished [No Acute Distress] : no acute distress [Normal Conjunctiva] : normal conjunctiva [Normal Venous Pressure] : normal venous pressure [No Carotid Bruit] : no carotid bruit [Normal S1, S2] : normal S1, S2 [No Rub] : no rub [No Gallop] : no gallop [S4] : S4 [Clear Lung Fields] : clear lung fields [Good Air Entry] : good air entry [No Respiratory Distress] : no respiratory distress  [Soft] : abdomen soft [Non Tender] : non-tender [Normal Bowel Sounds] : normal bowel sounds [Normal Gait] : normal gait [No Edema] : no edema [No Cyanosis] : no cyanosis [No Clubbing] : no clubbing [No Varicosities] : no varicosities [No Rash] : no rash [Moves all extremities] : moves all extremities [No Focal Deficits] : no focal deficits [Normal Speech] : normal speech [Alert and Oriented] : alert and oriented [Normal memory] : normal memory [de-identified] : S/p left BKA

## 2023-06-08 NOTE — CARDIOLOGY SUMMARY
[de-identified] : 6/8/23:\ALEX hutchins SR [de-identified] : 04/16/21:\par No ischemia. [de-identified] : Carotid duplex: Mild bilateral plaque.

## 2023-06-08 NOTE — ASSESSMENT
[FreeTextEntry1] : H/o non-obstructive CAD. No evidence of ischemia repeatedly.\par HTN is uncontrolled due to non-compliance with medications.\par Non-cardiac chest pain.\par Hyperlipidemia.\par \par Plan:\par Continue Metoprolol and Amlodipine.\par Restart HCTZ (add to Losartan).\par Restart Simvastatin.\par Low-salt diet d/w patient.\par F/u in 2 months.\par \par \par Adelfo Dunn MD\par

## 2023-06-08 NOTE — HISTORY OF PRESENT ILLNESS
[FreeTextEntry1] : 73-yo female with h/o HTN, hyperlipidemia. Non-obstructive CAD (60% mid LAD stenosis on [facility] in 2007).\par \par H/o hepatitis C, left BKA (trauma).\par \par S/p right TKR\par \par Recent evaluation at [facility] after passing out. Patient states she was standing outside, turned around and passed out. She was found to have orthostatic hypotension and vitreous detachment. ECHO showed LVEF 62%, CTH was negative.\par \par Patient presents for a follow up. She was hospitalized in April for L leg wound due to prosthesis pressure, s/p debridement and closure.\par \par She still c/o occasional dizziness and left-sided chest discomfort, denies SOB. \par \par BP is still elevated, she did not take her medications today.

## 2023-06-13 NOTE — ED ADULT TRIAGE NOTE - BP NONINVASIVE DIASTOLIC (MM HG)
Incoming refill request for: Adderall (15 mg)  Per PDMP last sold on: 4/18/23  Last seen by: Brooke Islas D.O. on: 11/17/22  Future appointment to see PCP on: none scheduled  Active medication agreement updated on: 11/22/22  Last Drug Screen Collected on: 11/17/22    Script pended, with a start date of: 6/13/23    PDMP review: no aberrant behavior noted      Criteria met. Forwarded to Physician/ERIKA for signature.   101

## 2023-07-06 ENCOUNTER — APPOINTMENT (OUTPATIENT)
Dept: BURN CARE | Facility: CLINIC | Age: 73
End: 2023-07-06
Payer: MEDICARE

## 2023-07-06 ENCOUNTER — OUTPATIENT (OUTPATIENT)
Dept: OUTPATIENT SERVICES | Facility: HOSPITAL | Age: 73
LOS: 1 days | End: 2023-07-06
Payer: MEDICARE

## 2023-07-06 VITALS — SYSTOLIC BLOOD PRESSURE: 140 MMHG | DIASTOLIC BLOOD PRESSURE: 86 MMHG | HEART RATE: 66 BPM | TEMPERATURE: 98 F

## 2023-07-06 DIAGNOSIS — Z98.890 OTHER SPECIFIED POSTPROCEDURAL STATES: Chronic | ICD-10-CM

## 2023-07-06 DIAGNOSIS — Z96.651 PRESENCE OF RIGHT ARTIFICIAL KNEE JOINT: Chronic | ICD-10-CM

## 2023-07-06 DIAGNOSIS — S78.112A COMPLETE TRAUMATIC AMPUTATION AT LEVEL BETWEEN LEFT HIP AND KNEE, INITIAL ENCOUNTER: Chronic | ICD-10-CM

## 2023-07-06 DIAGNOSIS — Z00.8 ENCOUNTER FOR OTHER GENERAL EXAMINATION: ICD-10-CM

## 2023-07-06 PROCEDURE — 99212 OFFICE O/P EST SF 10 MIN: CPT
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
## 2023-07-09 NOTE — ASSESSMENT
[FreeTextEntry1] : The left groin wound due to prosthesis post left AKA measures 1x1cm and is healed post debridement and closure with sutures and staples.     The patient was instructed to clean  the wound with soap and water. Continue local wound care with moisturizer and sunscreen. Follow up prn.  May start prosthesis.   [Wound Care] : wound care

## 2023-07-09 NOTE — PHYSICAL EXAM
[Closed] : closed [Size%: ______] : Size: [unfilled]% [Infected?] : Infected: No [3] : 3 out of 10 [Abnormal] : abnormal [Medium] : medium [] : no [de-identified] : The left groin wound due to prosthesis post left AKA measures 1x1cm and is healed post debridement and closure with sutures and staples.     The patient was instructed to clean  the wound with soap and water. Continue local wound care with moisturizer and sunscreen. Follow up prn.  May start prosthesis. [TWNoteComboBox1] : bandaid

## 2023-07-09 NOTE — REASON FOR VISIT
[Revisit] : revisit [Were you seen in the Emergency Room?] : seen in the emergency room [Were you admitted to the burn center at [facility]?] : not admitted to the burn center at [facility]

## 2023-07-10 DIAGNOSIS — Z09 ENCOUNTER FOR FOLLOW-UP EXAMINATION AFTER COMPLETED TREATMENT FOR CONDITIONS OTHER THAN MALIGNANT NEOPLASM: ICD-10-CM

## 2023-07-10 DIAGNOSIS — Z98.890 OTHER SPECIFIED POSTPROCEDURAL STATES: ICD-10-CM

## 2023-07-10 DIAGNOSIS — Z87.2 PERSONAL HISTORY OF DISEASES OF THE SKIN AND SUBCUTANEOUS TISSUE: ICD-10-CM

## 2023-07-10 DIAGNOSIS — Z89.612 ACQUIRED ABSENCE OF LEFT LEG ABOVE KNEE: ICD-10-CM

## 2023-07-12 ENCOUNTER — APPOINTMENT (OUTPATIENT)
Dept: OPHTHALMOLOGY | Facility: CLINIC | Age: 73
End: 2023-07-12

## 2023-07-20 ENCOUNTER — OUTPATIENT (OUTPATIENT)
Dept: OUTPATIENT SERVICES | Facility: HOSPITAL | Age: 73
LOS: 1 days | End: 2023-07-20
Payer: MEDICARE

## 2023-07-20 ENCOUNTER — RESULT REVIEW (OUTPATIENT)
Age: 73
End: 2023-07-20

## 2023-07-20 DIAGNOSIS — Z98.890 OTHER SPECIFIED POSTPROCEDURAL STATES: Chronic | ICD-10-CM

## 2023-07-20 DIAGNOSIS — Z12.31 ENCOUNTER FOR SCREENING MAMMOGRAM FOR MALIGNANT NEOPLASM OF BREAST: ICD-10-CM

## 2023-07-20 DIAGNOSIS — S78.112A COMPLETE TRAUMATIC AMPUTATION AT LEVEL BETWEEN LEFT HIP AND KNEE, INITIAL ENCOUNTER: Chronic | ICD-10-CM

## 2023-07-20 DIAGNOSIS — Z96.651 PRESENCE OF RIGHT ARTIFICIAL KNEE JOINT: Chronic | ICD-10-CM

## 2023-07-20 PROCEDURE — 77067 SCR MAMMO BI INCL CAD: CPT

## 2023-07-20 PROCEDURE — 77067 SCR MAMMO BI INCL CAD: CPT | Mod: 26

## 2023-07-20 PROCEDURE — 77063 BREAST TOMOSYNTHESIS BI: CPT | Mod: 26

## 2023-07-20 PROCEDURE — 77063 BREAST TOMOSYNTHESIS BI: CPT

## 2023-07-21 DIAGNOSIS — Z12.31 ENCOUNTER FOR SCREENING MAMMOGRAM FOR MALIGNANT NEOPLASM OF BREAST: ICD-10-CM

## 2023-07-28 ENCOUNTER — EMERGENCY (EMERGENCY)
Facility: HOSPITAL | Age: 73
LOS: 0 days | Discharge: HOME | End: 2023-07-28
Attending: EMERGENCY MEDICINE
Payer: MEDICARE

## 2023-07-28 VITALS
RESPIRATION RATE: 18 BRPM | WEIGHT: 149.91 LBS | TEMPERATURE: 98 F | HEIGHT: 61 IN | HEART RATE: 77 BPM | OXYGEN SATURATION: 98 % | DIASTOLIC BLOOD PRESSURE: 67 MMHG | SYSTOLIC BLOOD PRESSURE: 128 MMHG

## 2023-07-28 DIAGNOSIS — S78.112A COMPLETE TRAUMATIC AMPUTATION AT LEVEL BETWEEN LEFT HIP AND KNEE, INITIAL ENCOUNTER: Chronic | ICD-10-CM

## 2023-07-28 DIAGNOSIS — Z89.612 ACQUIRED ABSENCE OF LEFT LEG ABOVE KNEE: ICD-10-CM

## 2023-07-28 DIAGNOSIS — I10 ESSENTIAL (PRIMARY) HYPERTENSION: ICD-10-CM

## 2023-07-28 DIAGNOSIS — Z98.890 OTHER SPECIFIED POSTPROCEDURAL STATES: Chronic | ICD-10-CM

## 2023-07-28 DIAGNOSIS — G89.29 OTHER CHRONIC PAIN: ICD-10-CM
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
## 2023-07-28 DIAGNOSIS — R51.9 HEADACHE, UNSPECIFIED: ICD-10-CM

## 2023-07-28 DIAGNOSIS — I25.10 ATHEROSCLEROTIC HEART DISEASE OF NATIVE CORONARY ARTERY WITHOUT ANGINA PECTORIS: ICD-10-CM

## 2023-07-28 DIAGNOSIS — E78.5 HYPERLIPIDEMIA, UNSPECIFIED: ICD-10-CM

## 2023-07-28 DIAGNOSIS — M25.561 PAIN IN RIGHT KNEE: ICD-10-CM

## 2023-07-28 DIAGNOSIS — Z79.82 LONG TERM (CURRENT) USE OF ASPIRIN: ICD-10-CM

## 2023-07-28 DIAGNOSIS — Z96.651 PRESENCE OF RIGHT ARTIFICIAL KNEE JOINT: Chronic | ICD-10-CM

## 2023-07-28 DIAGNOSIS — S09.90XA UNSPECIFIED INJURY OF HEAD, INITIAL ENCOUNTER: ICD-10-CM

## 2023-07-28 DIAGNOSIS — M53.3 SACROCOCCYGEAL DISORDERS, NOT ELSEWHERE CLASSIFIED: ICD-10-CM
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
## 2023-07-28 PROCEDURE — 72170 X-RAY EXAM OF PELVIS: CPT

## 2023-07-28 PROCEDURE — 99284 EMERGENCY DEPT VISIT MOD MDM: CPT | Mod: 25

## 2023-07-28 PROCEDURE — 72125 CT NECK SPINE W/O DYE: CPT | Mod: MA

## 2023-07-28 PROCEDURE — 71045 X-RAY EXAM CHEST 1 VIEW: CPT

## 2023-07-28 PROCEDURE — 72125 CT NECK SPINE W/O DYE: CPT | Mod: 26,MA

## 2023-07-28 PROCEDURE — 71045 X-RAY EXAM CHEST 1 VIEW: CPT | Mod: 26

## 2023-07-28 PROCEDURE — 73562 X-RAY EXAM OF KNEE 3: CPT | Mod: 26,RT

## 2023-07-28 PROCEDURE — 99284 EMERGENCY DEPT VISIT MOD MDM: CPT

## 2023-07-28 PROCEDURE — 72170 X-RAY EXAM OF PELVIS: CPT | Mod: 26

## 2023-07-28 PROCEDURE — 73562 X-RAY EXAM OF KNEE 3: CPT | Mod: RT

## 2023-07-28 PROCEDURE — 70450 CT HEAD/BRAIN W/O DYE: CPT | Mod: MA

## 2023-07-28 PROCEDURE — 70450 CT HEAD/BRAIN W/O DYE: CPT | Mod: 26,MA

## 2023-07-28 RX ORDER — ACETAMINOPHEN 500 MG
975 TABLET ORAL ONCE
Refills: 0 | Status: COMPLETED | OUTPATIENT
Start: 2023-07-28 | End: 2023-07-28

## 2023-07-28 RX ADMIN — Medication 975 MILLIGRAM(S): at 14:43

## 2023-07-28 NOTE — ED PROVIDER NOTE - NSFOLLOWUPINSTRUCTIONS_ED_ALL_ED_FT
Follow up with the Concussion program if you are having headaches, dizziness, or other concerning symptoms over 1 week after the head injury.    Fall Prevention in the Home, Adult  Falls can cause injuries and can affect people from all age groups. There are many simple things that you can do to make your home safe and to help prevent falls. Ask for help when making these changes, if needed.    What actions can I take to prevent falls?  General instructions     Use good lighting in all rooms. Replace any light bulbs that burn out.  Turn on lights if it is dark. Use night-lights.  Place frequently used items in easy-to-reach places. Lower the shelves around your home if necessary.  Set up furniture so that there are clear paths around it. Avoid moving your furniture around.  Remove throw rugs and other tripping hazards from the floor.  Avoid walking on wet floors.  Fix any uneven floor surfaces.  Add color or contrast paint or tape to grab bars and handrails in your home. Place contrasting color strips on the first and last steps of stairways.  When you use a stepladder, make sure that it is completely opened and that the sides are firmly locked. Have someone hold the ladder while you are using it. Do not climb a closed stepladder.  Be aware of any and all pets.  What can I do in the bathroom?     Keep the floor dry. Immediately clean up any water that spills onto the floor.  Remove soap buildup in the tub or shower on a regular basis.  Use non-skid mats or decals on the floor of the tub or shower.  Attach bath mats securely with double-sided, non-slip rug tape.  If you need to sit down while you are in the shower, use a plastic, non-slip stool.  Image ImageInstall grab bars by the toilet and in the tub and shower. Do not use towel bars as grab bars.  What can I do in the bedroom?     Make sure that a bedside light is easy to reach.  Do not use oversized bedding that drapes onto the floor.  Have a firm chair that has side arms to use for getting dressed.  What can I do in the kitchen?     Clean up any spills right away.  If you need to reach for something above you, use a sturdy step stool that has a grab bar.  Keep electrical cables out of the way.  Do not use floor polish or wax that makes floors slippery. If you must use wax, make sure that it is non-skid floor wax.  What can I do in the stairways?     Do not leave any items on the stairs.  Make sure that you have a light switch at the top of the stairs and the bottom of the stairs. Have them installed if you do not have them.  Make sure that there are handrails on both sides of the stairs. Fix handrails that are broken or loose. Make sure that handrails are as long as the stairways.  Install non-slip stair treads on all stairs in your home.  Avoid having throw rugs at the top or bottom of stairways, or secure the rugs with carpet tape to prevent them from moving.  Choose a carpet design that does not hide the edge of steps on the stairway.  Check any carpeting to make sure that it is firmly attached to the stairs. Fix any carpet that is loose or worn.  What can I do on the outside of my home?     Use bright outdoor lighting.  Regularly repair the edges of walkways and driveways and fix any cracks.  Remove high doorway thresholds.  Trim any shrubbery on the main path into your home.  Regularly check that handrails are securely fastened and in good repair. Both sides of any steps should have handrails.  Install guardrails along the edges of any raised decks or porches.  Clear walkways of debris and clutter, including tools and rocks.  Have leaves, snow, and ice cleared regularly.  Use sand or salt on walkways during winter months.  In the garage, clean up any spills right away, including grease or oil spills.  What other actions can I take?     Wear closed-toe shoes that fit well and support your feet. Wear shoes that have rubber soles or low heels.  Use mobility aids as needed, such as canes, walkers, scooters, and crutches.  Review your medicines with your health care provider. Some medicines can cause dizziness or changes in blood pressure, which increase your risk of falling.  Talk with your health care provider about other ways that you can decrease your risk of falls. This may include working with a physical therapist or  to improve your strength, balance, and endurance.    Where to find more information  Centers for Disease Control and Prevention, [place]: https://www.cdc.gov  [employer]: https://iy9yrok.torri.nih.gov  Contact a health care provider if:  You are afraid of falling at home.  You feel weak, drowsy, or dizzy at home.  You fall at home.  Summary  There are many simple things that you can do to make your home safe and to help prevent falls.  Ways to make your home safe include removing tripping hazards and installing grab bars in the bathroom.  Ask for help when making these changes in your home.  This information is not intended to replace advice given to you by your health care provider. Make sure you discuss any questions you have with your health care provider.

## 2023-07-28 NOTE — ED PROVIDER NOTE - CLINICAL SUMMARY MEDICAL DECISION MAKING FREE TEXT BOX
PT with fall, imaging reassuring.  pt is advised to use her wheelchair until she obtains her new prosthesis.  Pt was given strict return to ED precautions and pt indicated that he/she understood.
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
## 2023-07-28 NOTE — ED PROVIDER NOTE - NSFOLLOWUPCLINICS_GEN_ALL_ED_FT
[facility] Concussion Program  Concussion Program  [address] [place] [address]   Phone: [phone]  Fax:

## 2023-07-28 NOTE — ED PROVIDER NOTE - PHYSICAL EXAMINATION
Physical Exam    Constitutional: No acute distress.   Head: Right head soft tissue swelling no lac  Eyes: Conjunctiva pink, Sclera clear, PERRLA, EOMI.  ENT: No sinus tenderness. No nasal discharge. No oropharyngeal erythema, edema, or exudates. Uvula midline.   Cardiovascular: Regular rate, regular rhythm. No noted murmurs rubs or gallops.  Respiratory: unlabored respiratory effort, clear to auscultation bilaterally no wheezing, rales or rhonchi  Gastrointestinal: Normal bowel sounds. soft, non distended, non-tender abdomen.   Musculoskeletal: supple neck, no midline tenderness. Left AKA. Right leg with surgical scars and chronic appearing deformity. No swelling, tenderness or skin changes. Tenderness to coccyx.   Integumentary: warm, dry, no rash  Neurologic: awake, alert, cranial nerves II-XII grossly intact, extremities’ motor and sensory functions grossly intact  Psychiatric: appropriate mood, appropriate affect

## 2023-07-28 NOTE — ED PROVIDER NOTE - ATTENDING APP SHARED VISIT CONTRIBUTION OF CARE
74 yo f with pmh of cad, htn, hld, L AKA, presents with fall.  pt admits hit head when she went backwards and landed on her buttock.  ?LOC.  pt admits supposed to be using her wheelchair since she has a malfunctioning aka prosthesis, but tried to walk with 1 leg and the walker.  denies neck pain, cp, back pain, abd pain, sob.  exam: nad, ncat, perrl, eomi, mmm, rrr, ctab, abd soft, nt, nd aox3, L aka imp: pt with mech fall with HT, ct head and xray

## 2023-07-28 NOTE — ED PROVIDER NOTE - PATIENT PORTAL LINK FT
You can access the FollowMyHealth Patient Portal offered by [facility] by registering at the following website: [email or web address]. By joining [employer]’s FollowMyHealth portal, you will also be able to view your health information using other applications (apps) compatible with our system.

## 2023-08-03 ENCOUNTER — APPOINTMENT (OUTPATIENT)
Dept: CARDIOLOGY | Facility: CLINIC | Age: 73
End: 2023-08-03
Payer: MEDICARE

## 2023-08-03 VITALS
WEIGHT: 142 LBS | BODY MASS INDEX: 25.16 KG/M2 | HEART RATE: 68 BPM | SYSTOLIC BLOOD PRESSURE: 130 MMHG | DIASTOLIC BLOOD PRESSURE: 76 MMHG | HEIGHT: 63 IN

## 2023-08-03 PROCEDURE — 99214 OFFICE O/P EST MOD 30 MIN: CPT | Mod: 25

## 2023-08-03 PROCEDURE — 93000 ELECTROCARDIOGRAM COMPLETE: CPT

## 2023-08-03 RX ORDER — SIMVASTATIN 40 MG/1
40 TABLET, FILM COATED ORAL
Qty: 90 | Refills: 1 | Status: DISCONTINUED | COMMUNITY
Start: 1900-01-01 | End: 2023-08-03

## 2023-08-03 NOTE — CARDIOLOGY SUMMARY
[de-identified] : 08/03/23: SR, LAFB, NSST changes. [de-identified] : 04/16/21:\par  No ischemia. [de-identified] : Carotid duplex: Mild bilateral plaque.

## 2023-08-03 NOTE — PHYSICAL EXAM
[Well Developed] : well developed [Well Nourished] : well nourished [No Acute Distress] : no acute distress [Normal Conjunctiva] : normal conjunctiva [Normal Venous Pressure] : normal venous pressure [No Carotid Bruit] : no carotid bruit [Normal S1, S2] : normal S1, S2 [No Rub] : no rub [No Gallop] : no gallop [S4] : S4 [Clear Lung Fields] : clear lung fields [Good Air Entry] : good air entry [No Respiratory Distress] : no respiratory distress  [Soft] : abdomen soft [Non Tender] : non-tender [Normal Bowel Sounds] : normal bowel sounds [Normal Gait] : normal gait [No Edema] : no edema [No Cyanosis] : no cyanosis [No Clubbing] : no clubbing [No Varicosities] : no varicosities [No Rash] : no rash [Moves all extremities] : moves all extremities [No Focal Deficits] : no focal deficits [Normal Speech] : normal speech [Alert and Oriented] : alert and oriented [Normal memory] : normal memory [de-identified] : S/p left BKA

## 2023-08-03 NOTE — ASSESSMENT
[FreeTextEntry1] : H/o non-obstructive CAD. No evidence of ischemia repeatedly. HTN is controlled now. Non-cardiac chest pain. Hyperlipidemia uncontrolled.  Plan: Continue Metoprolol and Amlodipine. Restart HCTZ (add to Losartan). Replace Simvastatin with Atorvastatin 40 mg daily. Fasting blood work ordered. Low-salt diet d/w patient. F/u in 4 months.   Adelfo Dunn MD
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
## 2023-08-10 ENCOUNTER — APPOINTMENT (OUTPATIENT)
Dept: NEUROLOGY | Facility: CLINIC | Age: 73
End: 2023-08-10
Payer: MEDICARE

## 2023-08-10 VITALS
SYSTOLIC BLOOD PRESSURE: 142 MMHG | HEART RATE: 68 BPM | HEIGHT: 63 IN | DIASTOLIC BLOOD PRESSURE: 94 MMHG | WEIGHT: 142 LBS | BODY MASS INDEX: 25.16 KG/M2

## 2023-08-10 DIAGNOSIS — M54.16 RADICULOPATHY, LUMBAR REGION: ICD-10-CM

## 2023-08-10 PROCEDURE — 99204 OFFICE O/P NEW MOD 45 MIN: CPT
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
## 2023-08-10 NOTE — PHYSICAL EXAM
[FreeTextEntry1] :  NEUROLOGICAL EXAMINATION:   Mental Status: Patient is not a good historian. There is no evident dementia but she is not sure why she's seeing me. No aphasia.   Cranial Nerves Cranial Nerves:   II, III, IV, VI: Pupils are equal, round, and reactive to light and accommodation. No evidence of afferent pupillary defect. Visual fields are full to confrontation. Eye movements are full without evidence of nystagmus or internuclear ophthalmoplegia. Funduscopic examination reveals sharp disc margins.   V: Normal jaw movements. Normal facial sensation.   VII: Normal facial motor testing.   VIII: Grossly normal hearing bilaterally.   IX, X: Palate moves symmetrically. No dysarthria.   XI: Normal shoulder shrug and sternocleidomastoid power.   XII: Tongue appears normal and protrudes in the midline.   Motor: AKA in left lower extremity. Decreased voluntary effort on the right for hip flexion, knee extension, and ankle dorsiflexion on the right.   Muscle Stretch Reflexes (right/left): Absent knee and ankle jerk on the right.   Sensation: Normal primary sensation on the right.  Gait and Station: She was in a wheelchair. Gait was not testable.

## 2023-08-10 NOTE — ASSESSMENT
[FreeTextEntry1] : Impression is that of right lumbar radiculopathy or neuropathy and syncope possibly related to orthostatic hypotension. I ordered an MRI of the brain and lumbar spine as well as EMG of the lower extremity for further evaluation. We will see patient back in follow up when work up is complete.  Total clinician time spent today on the patient is 40 minutes including preparing to see the patient, obtaining and/or reviewing and confirming history, performing medically necessary and appropriate examination, counseling and educating the patient and/or family, documenting clinical information in the EHR and communicating and/or referring to other healthcare professionals.  Entered by Silvia Roach acting as scribe for Dr. Peguero.   The documentation recorded by the scribe, in my presence, accurately reflects the service I personally performed, and the decisions made by me with my edits as appropriate.  Que Peguero MD, FAAN, FACP Diplomate American Board of Psychiatry & Neurology

## 2023-08-22 ENCOUNTER — APPOINTMENT (OUTPATIENT)
Dept: HOME HEALTH SERVICES | Facility: HOME HEALTH | Age: 73
End: 2023-08-22
Payer: MEDICARE

## 2023-08-22 VITALS
HEART RATE: 80 BPM | SYSTOLIC BLOOD PRESSURE: 160 MMHG | RESPIRATION RATE: 18 BRPM | OXYGEN SATURATION: 98 % | TEMPERATURE: 98 F | DIASTOLIC BLOOD PRESSURE: 90 MMHG

## 2023-08-22 DIAGNOSIS — Z71.89 OTHER SPECIFIED COUNSELING: ICD-10-CM

## 2023-08-22 DIAGNOSIS — M15.9 POLYOSTEOARTHRITIS, UNSPECIFIED: ICD-10-CM

## 2023-08-22 PROCEDURE — 99344 HOME/RES VST NEW MOD MDM 60: CPT | Mod: 25

## 2023-08-22 PROCEDURE — 99497 ADVNCD CARE PLAN 30 MIN: CPT

## 2023-08-22 NOTE — HEALTH RISK ASSESSMENT
[Some assistance needed] : walking [Independent] : managing medications [Full assistance needed] : managing finances [HRA Reviewed] : Health risk assessment reviewed [TimeGetUpGo] : 10 [de-identified] : with walker

## 2023-08-22 NOTE — HISTORY OF PRESENT ILLNESS
[ED visit needed] : ED visit needed [Patient] : patient [LastPVisitDate] : 06/2023 [FreeTextEntry4] :  [LastSpecialistVisitDate] : 08/2023 [FreeTextEntry1] : Left AKA and impaired mobility  [FreeTextEntry2] : 08/22/2023 COVID SCREEN: Patient or caregiver denies fever, cough, trouble breathing, rash or contact with someone who tested positive for COVID-19.   N95 mask, gloves, eye wear and gown (if indicated) used during visit: YES  Total face to face time with patient is 60 min.   Aminah Ann, 73 year old female with Left AKA, HTN, Hypothyroidism, left groin wound, HLD, depression and neuropathy presents for initial home visit and evaluation of medical conditions.   Today history provided by patient. Patient refusing health care proxy at this time.   Patient reports s/p fall 2 days ago + hitting head. Denies LOC. Now with headache, seeing floaters and left wrist pain. /90. No neuro deficits. Patient advised to go to ED for evaluation for head injury, offered EMS transport, patient refusing transport. Patient educated on risks, reports will go to ED by car service later today after getting showered. Patient educated on risks and advised risks related to delay in care.    Patient with left groin wound from prosthesis pressure, wound 3 cm X 1 cm, no drainage, redness or swelling noted to site. Patient educated to cleanse area and pat dry. Apply Bacitracin and Advised to follow up with Dr. Dc (plastic surgery) Wound was previously debrided, and staples removed.    Patient with left wrist pain s/p fall, will order X-ray.   Patient reports no weight loss >10 lbs in the past 6 months. No changes in dentition or swallowing reported, No changes in hearing or vision reported. No changes in Cognition reported. Patient denies any symptoms of depression or anxiety. Patient is ADL dependent and IADL dependent.   Patient's home environment is safe.   .

## 2023-08-22 NOTE — CHRONIC CARE ASSESSMENT
[Inadequate social support] : inadequate social support [PPS Score: ____] : Palliative Performance Scale (PPS) Score: [unfilled] [de-identified] : as tolerated [de-identified] : low sodium

## 2023-08-22 NOTE — PHYSICAL EXAM
[No Acute Distress] : no acute distress [Normal Voice/Communication] : normal voice communication [Normal Sclera/Conjunctiva] : normal sclera/conjunctiva [Normal Outer Ear/Nose] : the ears and nose were normal in appearance [No JVD] : no jugular venous distention [No Respiratory Distress] : no respiratory distress [Normal Rate] : heart rate was normal  [Normal S1, S2] : normal S1 and S2 [Normal Bowel Sounds] : normal bowel sounds [Non Tender] : non-tender [Soft] : abdomen soft [Normal Anterior Cervical Nodes] : no anterior cervical lymphadenopathy [No CVA Tenderness] : no ~M costovertebral angle tenderness [No Joint Swelling] : no joint swelling seen [No Motor Deficits] : the motor exam was normal [Oriented x3] : oriented to person, place, and time [Foot Ulcers] : no foot ulcers [de-identified] : Patient sitting upright in chair [de-identified] : Left Above Knee Amputation [de-identified] : Left groin 3cm X 1cm wound

## 2023-08-22 NOTE — REASON FOR VISIT
[Initial Evaluation] : an initial evaluation [Pre-Visit Preparation] : pre-visit preparation was done [FreeTextEntry1] : PMH HTN HLD, Hypothyroid, left groin wound, neuropathy and depression [FreeTextEntry2] : chart review

## 2023-08-22 NOTE — COUNSELING
[Overweight - ( BMI 25.1 - 29.9 )] : overweight -  ( BMI 25.1 - 29.9 ) [DASH diet recommended] : DASH diet recommended [Continue diet as tolerated] : continue diet as tolerated based on goals of care [Non - Smoker] : non-smoker [Medical alert] : medical alert [Use assistive device to avoid falls] : use assistive device to avoid falls [Remove clutter and unsafe carpeting to avoid falls] : remove clutter and unsafe carpeting to avoid falls [] : eye exam [Improve mobility] : improve mobility [Maintain functional ability] : maintain functional ability [Discussed disease trajectory with patient/caregiver] : discussed disease trajectory with patient/caregiver [Patient/Caregiver not ready to engage in discussion] : patient/caregiver not ready to engage in discussion

## 2023-08-23 ENCOUNTER — INPATIENT (INPATIENT)
Facility: HOSPITAL | Age: 73
LOS: 4 days | Discharge: HOME HEALTH | DRG: 262 | End: 2023-08-28
Attending: INTERNAL MEDICINE | Admitting: HOSPITALIST
Payer: MEDICARE

## 2023-08-23 VITALS
DIASTOLIC BLOOD PRESSURE: 85 MMHG | OXYGEN SATURATION: 99 % | SYSTOLIC BLOOD PRESSURE: 152 MMHG | WEIGHT: 141.98 LBS | RESPIRATION RATE: 17 BRPM | HEART RATE: 77 BPM | HEIGHT: 61 IN | TEMPERATURE: 98 F

## 2023-08-23 DIAGNOSIS — S78.112A COMPLETE TRAUMATIC AMPUTATION AT LEVEL BETWEEN LEFT HIP AND KNEE, INITIAL ENCOUNTER: Chronic | ICD-10-CM

## 2023-08-23 DIAGNOSIS — R55 SYNCOPE AND COLLAPSE: ICD-10-CM

## 2023-08-23 DIAGNOSIS — Z98.890 OTHER SPECIFIED POSTPROCEDURAL STATES: Chronic | ICD-10-CM

## 2023-08-23 DIAGNOSIS — Z96.651 PRESENCE OF RIGHT ARTIFICIAL KNEE JOINT: Chronic | ICD-10-CM

## 2023-08-23 LAB
ALBUMIN SERPL ELPH-MCNC: 4.1 G/DL — SIGNIFICANT CHANGE UP (ref 3.5–5.2)
ALP SERPL-CCNC: 144 U/L — HIGH (ref 30–115)
ALT FLD-CCNC: 15 U/L — SIGNIFICANT CHANGE UP (ref 0–41)
ANION GAP SERPL CALC-SCNC: 12 MMOL/L — SIGNIFICANT CHANGE UP (ref 7–14)
AST SERPL-CCNC: 17 U/L — SIGNIFICANT CHANGE UP (ref 0–41)
BASOPHILS # BLD AUTO: 0.05 K/UL — SIGNIFICANT CHANGE UP (ref 0–0.2)
BASOPHILS NFR BLD AUTO: 0.8 % — SIGNIFICANT CHANGE UP (ref 0–1)
BILIRUB SERPL-MCNC: 0.4 MG/DL — SIGNIFICANT CHANGE UP (ref 0.2–1.2)
BUN SERPL-MCNC: 15 MG/DL — SIGNIFICANT CHANGE UP (ref 10–20)
CALCIUM SERPL-MCNC: 9.4 MG/DL — SIGNIFICANT CHANGE UP (ref 8.4–10.5)
CHLORIDE SERPL-SCNC: 109 MMOL/L — SIGNIFICANT CHANGE UP (ref 98–110)
CO2 SERPL-SCNC: 23 MMOL/L — SIGNIFICANT CHANGE UP (ref 17–32)
CREAT SERPL-MCNC: 0.6 MG/DL — LOW (ref 0.7–1.5)
EGFR: 94 ML/MIN/1.73M2 — SIGNIFICANT CHANGE UP
EOSINOPHIL # BLD AUTO: 0.14 K/UL — SIGNIFICANT CHANGE UP (ref 0–0.7)
EOSINOPHIL NFR BLD AUTO: 2.2 % — SIGNIFICANT CHANGE UP (ref 0–8)
GLUCOSE SERPL-MCNC: 108 MG/DL — HIGH (ref 70–99)
HCT VFR BLD CALC: 38.4 % — SIGNIFICANT CHANGE UP (ref 37–47)
HGB BLD-MCNC: 12.7 G/DL — SIGNIFICANT CHANGE UP (ref 12–16)
IMM GRANULOCYTES NFR BLD AUTO: 0.3 % — SIGNIFICANT CHANGE UP (ref 0.1–0.3)
LYMPHOCYTES # BLD AUTO: 1.51 K/UL — SIGNIFICANT CHANGE UP (ref 1.2–3.4)
LYMPHOCYTES # BLD AUTO: 24.2 % — SIGNIFICANT CHANGE UP (ref 20.5–51.1)
MAGNESIUM SERPL-MCNC: 1.8 MG/DL — SIGNIFICANT CHANGE UP (ref 1.8–2.4)
MCHC RBC-ENTMCNC: 29.5 PG — SIGNIFICANT CHANGE UP (ref 27–31)
MCHC RBC-ENTMCNC: 33.1 G/DL — SIGNIFICANT CHANGE UP (ref 32–37)
MCV RBC AUTO: 89.3 FL — SIGNIFICANT CHANGE UP (ref 81–99)
MONOCYTES # BLD AUTO: 0.48 K/UL — SIGNIFICANT CHANGE UP (ref 0.1–0.6)
MONOCYTES NFR BLD AUTO: 7.7 % — SIGNIFICANT CHANGE UP (ref 1.7–9.3)
NEUTROPHILS # BLD AUTO: 4.05 K/UL — SIGNIFICANT CHANGE UP (ref 1.4–6.5)
NEUTROPHILS NFR BLD AUTO: 64.8 % — SIGNIFICANT CHANGE UP (ref 42.2–75.2)
NRBC # BLD: 0 /100 WBCS — SIGNIFICANT CHANGE UP (ref 0–0)
NT-PROBNP SERPL-SCNC: 101 PG/ML — SIGNIFICANT CHANGE UP (ref 0–300)
PLATELET # BLD AUTO: 194 K/UL — SIGNIFICANT CHANGE UP (ref 130–400)
PMV BLD: 10.7 FL — HIGH (ref 7.4–10.4)
POTASSIUM SERPL-MCNC: 4.1 MMOL/L — SIGNIFICANT CHANGE UP (ref 3.5–5)
POTASSIUM SERPL-SCNC: 4.1 MMOL/L — SIGNIFICANT CHANGE UP (ref 3.5–5)
PROT SERPL-MCNC: 6.7 G/DL — SIGNIFICANT CHANGE UP (ref 6–8)
RBC # BLD: 4.3 M/UL — SIGNIFICANT CHANGE UP (ref 4.2–5.4)
RBC # FLD: 14 % — SIGNIFICANT CHANGE UP (ref 11.5–14.5)
SODIUM SERPL-SCNC: 144 MMOL/L — SIGNIFICANT CHANGE UP (ref 135–146)
TROPONIN T SERPL-MCNC: <0.01 NG/ML — SIGNIFICANT CHANGE UP
WBC # BLD: 6.25 K/UL — SIGNIFICANT CHANGE UP (ref 4.8–10.8)
WBC # FLD AUTO: 6.25 K/UL — SIGNIFICANT CHANGE UP (ref 4.8–10.8)

## 2023-08-23 PROCEDURE — 99285 EMERGENCY DEPT VISIT HI MDM: CPT

## 2023-08-23 PROCEDURE — 33285 INSJ SUBQ CAR RHYTHM MNTR: CPT

## 2023-08-23 PROCEDURE — 97116 GAIT TRAINING THERAPY: CPT | Mod: GP

## 2023-08-23 PROCEDURE — 80053 COMPREHEN METABOLIC PANEL: CPT

## 2023-08-23 PROCEDURE — 85027 COMPLETE CBC AUTOMATED: CPT

## 2023-08-23 PROCEDURE — 80061 LIPID PANEL: CPT

## 2023-08-23 PROCEDURE — 82746 ASSAY OF FOLIC ACID SERUM: CPT

## 2023-08-23 PROCEDURE — C1764: CPT

## 2023-08-23 PROCEDURE — 72125 CT NECK SPINE W/O DYE: CPT | Mod: 26,MA

## 2023-08-23 PROCEDURE — 73130 X-RAY EXAM OF HAND: CPT | Mod: 26,LT

## 2023-08-23 PROCEDURE — 84443 ASSAY THYROID STIM HORMONE: CPT

## 2023-08-23 PROCEDURE — 84100 ASSAY OF PHOSPHORUS: CPT

## 2023-08-23 PROCEDURE — 83735 ASSAY OF MAGNESIUM: CPT

## 2023-08-23 PROCEDURE — 71260 CT THORAX DX C+: CPT | Mod: 26,MA

## 2023-08-23 PROCEDURE — 70450 CT HEAD/BRAIN W/O DYE: CPT | Mod: 26,MA

## 2023-08-23 PROCEDURE — 36415 COLL VENOUS BLD VENIPUNCTURE: CPT

## 2023-08-23 PROCEDURE — 71045 X-RAY EXAM CHEST 1 VIEW: CPT | Mod: 26

## 2023-08-23 PROCEDURE — 97162 PT EVAL MOD COMPLEX 30 MIN: CPT | Mod: GP

## 2023-08-23 PROCEDURE — 93306 TTE W/DOPPLER COMPLETE: CPT

## 2023-08-23 PROCEDURE — 82607 VITAMIN B-12: CPT

## 2023-08-23 PROCEDURE — 97166 OT EVAL MOD COMPLEX 45 MIN: CPT | Mod: GO

## 2023-08-23 PROCEDURE — 85025 COMPLETE CBC W/AUTO DIFF WBC: CPT

## 2023-08-23 PROCEDURE — 74177 CT ABD & PELVIS W/CONTRAST: CPT | Mod: 26,MA

## 2023-08-23 RX ORDER — ASPIRIN/CALCIUM CARB/MAGNESIUM 324 MG
81 TABLET ORAL DAILY
Refills: 0 | Status: DISCONTINUED | OUTPATIENT
Start: 2023-08-23 | End: 2023-08-28

## 2023-08-23 RX ORDER — DULOXETINE HYDROCHLORIDE 30 MG/1
30 CAPSULE, DELAYED RELEASE ORAL DAILY
Refills: 0 | Status: DISCONTINUED | OUTPATIENT
Start: 2023-08-23 | End: 2023-08-28

## 2023-08-23 RX ORDER — ACETAMINOPHEN 500 MG
650 TABLET ORAL EVERY 6 HOURS
Refills: 0 | Status: DISCONTINUED | OUTPATIENT
Start: 2023-08-23 | End: 2023-08-28

## 2023-08-23 RX ORDER — SIMVASTATIN 20 MG/1
40 TABLET, FILM COATED ORAL AT BEDTIME
Refills: 0 | Status: DISCONTINUED | OUTPATIENT
Start: 2023-08-23 | End: 2023-08-28

## 2023-08-23 RX ORDER — LEVOTHYROXINE SODIUM 125 MCG
100 TABLET ORAL DAILY
Refills: 0 | Status: DISCONTINUED | OUTPATIENT
Start: 2023-08-23 | End: 2023-08-28

## 2023-08-23 RX ORDER — METOPROLOL TARTRATE 50 MG
50 TABLET ORAL DAILY
Refills: 0 | Status: DISCONTINUED | OUTPATIENT
Start: 2023-08-23 | End: 2023-08-28

## 2023-08-23 RX ORDER — SERTRALINE 25 MG/1
100 TABLET, FILM COATED ORAL DAILY
Refills: 0 | Status: DISCONTINUED | OUTPATIENT
Start: 2023-08-23 | End: 2023-08-28

## 2023-08-23 RX ORDER — GABAPENTIN 400 MG/1
600 CAPSULE ORAL THREE TIMES A DAY
Refills: 0 | Status: DISCONTINUED | OUTPATIENT
Start: 2023-08-23 | End: 2023-08-28

## 2023-08-23 RX ORDER — LOSARTAN POTASSIUM 100 MG/1
50 TABLET, FILM COATED ORAL DAILY
Refills: 0 | Status: DISCONTINUED | OUTPATIENT
Start: 2023-08-23 | End: 2023-08-28

## 2023-08-23 RX ORDER — LEVOTHYROXINE SODIUM 125 MCG
1 TABLET ORAL
Qty: 0 | Refills: 0 | DISCHARGE

## 2023-08-23 RX ORDER — AMLODIPINE BESYLATE 2.5 MG/1
5 TABLET ORAL DAILY
Refills: 0 | Status: DISCONTINUED | OUTPATIENT
Start: 2023-08-23 | End: 2023-08-28

## 2023-08-23 RX ORDER — LANOLIN ALCOHOL/MO/W.PET/CERES
3 CREAM (GRAM) TOPICAL AT BEDTIME
Refills: 0 | Status: DISCONTINUED | OUTPATIENT
Start: 2023-08-23 | End: 2023-08-28

## 2023-08-23 RX ORDER — SODIUM CHLORIDE 9 MG/ML
500 INJECTION INTRAMUSCULAR; INTRAVENOUS; SUBCUTANEOUS ONCE
Refills: 0 | Status: COMPLETED | OUTPATIENT
Start: 2023-08-23 | End: 2023-08-23

## 2023-08-23 RX ADMIN — Medication 650 MILLIGRAM(S): at 23:27

## 2023-08-23 RX ADMIN — SODIUM CHLORIDE 500 MILLILITER(S): 9 INJECTION INTRAMUSCULAR; INTRAVENOUS; SUBCUTANEOUS at 10:59

## 2023-08-23 NOTE — ED ADULT NURSE REASSESSMENT NOTE - NS ED NURSE REASSESS COMMENT FT1
Pt received from previous shift RN. Aox4, voiced no complaints at this time. Safety precautions maintained.

## 2023-08-23 NOTE — ED PROVIDER NOTE - PHYSICAL EXAMINATION
Vital Signs: I have reviewed the initial vital signs.  Constitutional: NAD, well-nourished, appears stated age, no acute distress.  HEENT: Airway patent, moist MM, no erythema/swelling/deformity of oral structures. EOMI, PERRLA.  CV: regular rate, regular rhythm, well-perfused extremities, 2+ b/l DP and radial pulses equal.  Lungs: BCTA, no increased WOB.  ABD: NT, ND, no guarding or rebound, no pulsatile mass, no hernias.   MSK: Neck supple, nontender, nl ROM, no stepoff. Chest nontender. Back nontender. +L AKA; +left hand tenderness  INTEG: Skin warm, dry, no rash.  NEURO: A&Ox3, normal strength, nl sensation throughout, normal speech.   PSYCH: Calm, cooperative, normal affect and interaction.

## 2023-08-23 NOTE — H&P ADULT - NSHPLABSRESULTS_GEN_ALL_CORE
LABS:                          12.7   6.25  )-----------( 194      ( 23 Aug 2023 11:00 )             38.4     08-23    144  |  109  |  15  ----------------------------<  108<H>  4.1   |  23  |  0.6<L>    Ca    9.4      23 Aug 2023 11:00  Mg     1.8     08-23    TPro  6.7  /  Alb  4.1  /  TBili  0.4  /  DBili  x   /  AST  17  /  ALT  15  /  AlkPhos  144<H>  08-23
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
## 2023-08-23 NOTE — H&P ADULT - ATTENDING COMMENTS
Chart reviewed, patient examined. Pertinent results reviewed.  Case discussed with HO at bedside w patient; specialist f/u reviewed  HD#2    Episode rev'd. Patient with significant mobility problems (l AKA w no prosthesis) and has to do stairs at home. States she had slight vertiginous dizziness, lost balance, fell down the stairs, hit her head and had short LOC then. She states she had multiple falls.   SH: she lives alone, min contact w children.     she is awaiting a new LLE prosthesis.  NSR on telemetry monitor  PE: AAOx4  + scalp contusions; Neck NT  L: clear; HT: RRR, no MRG; + Ch wall TTP  ABD: benign  Ext: LLE AKA- skin clear  NEURO: no gross focal deficit.    I/P agree w A&P; probable mechanical fall- ongoing gait/balance issues w AKA   Rx vertigo; PT; and Cardio/EP to review.  Patient agrees w plan.   ? STRehab after PT eval- may only need 1-2 more days if tele is neg.
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
## 2023-08-23 NOTE — ED ADULT TRIAGE NOTE - CHIEF COMPLAINT QUOTE
Pt states she fell yesterday after having episode of dizziness. Pt denies a/c. states she hit her head. hx of htn. ekg done in triage

## 2023-08-23 NOTE — ED ADULT NURSE NOTE - NSFALLHARMRISKINTERV_ED_ALL_ED
Assistance OOB with selected safe patient handling equipment if applicable/Assistance with ambulation/Communicate risk of Fall with Harm to all staff, patient, and family/Monitor gait and stability/Provide visual cue: red socks, yellow wristband, yellow gown, etc/Reinforce activity limits and safety measures with patient and family/Bed in lowest position, wheels locked, appropriate side rails in place/Call bell, personal items and telephone in reach/Instruct patient to call for assistance before getting out of bed/chair/stretcher/Non-slip footwear applied when patient is off stretcher/[place] to call system/Physically safe environment - no spills, clutter or unnecessary equipment/Purposeful Proactive Rounding/Room/bathroom lighting operational, light cord in reach

## 2023-08-23 NOTE — H&P ADULT - NSHPPHYSICALEXAM_GEN_ALL_CORE
Constitutional: NAD, well-nourished, appears stated age, no acute distress.  HEENT: Airway patent, moist MM, no erythema/swelling/deformity of oral structures. EOMI, PERRLA.  CV: regular rate, regular rhythm, well-perfused extremities, 2+ b/l DP and radial pulses equal.  Lungs: BCTA, no increased WOB.  ABD: NT, ND, no guarding or rebound, no pulsatile mass, no hernias.   MSK: Neck supple, nontender, nl ROM, no stepoff. Chest nontender. Back nontender. +L AKA; +left hand tenderness  INTEG: Skin warm, dry, no rash.  NEURO: A&Ox3, normal strength, nl sensation throughout, normal speech.   PSYCH: Calm, cooperative, normal affect and interaction.

## 2023-08-23 NOTE — ED PROVIDER NOTE - CARE PLAN
1 Principal Discharge DX:	Syncope  Secondary Diagnosis:	Fall  Secondary Diagnosis:	CHI (closed head injury)

## 2023-08-23 NOTE — ED PROVIDER NOTE - CLINICAL SUMMARY MEDICAL DECISION MAKING FREE TEXT BOX
73-year-old female past medical history as above presents with dizziness after mechanical trip and fall after being hit by walker no other complaints right now denies any shortness of breath. i agree with exam as above. Labs and EKG were ordered and reviewed by me.  Imaging was ordered and reviewed by me..   Patient's records prior visits were reviewed.  Additional history was obtained from self. Chronic conditions affecting care -hypertension AKA CAD.  Escalation to admission/observation was considered.  Patient requires inpatient hospitalization - monitored setting.

## 2023-08-24 ENCOUNTER — NON-APPOINTMENT (OUTPATIENT)
Age: 73
End: 2023-08-24

## 2023-08-24 LAB
ALBUMIN SERPL ELPH-MCNC: 4 G/DL — SIGNIFICANT CHANGE UP (ref 3.5–5.2)
ALP SERPL-CCNC: 139 U/L — HIGH (ref 30–115)
ALT FLD-CCNC: 15 U/L — SIGNIFICANT CHANGE UP (ref 0–41)
ANION GAP SERPL CALC-SCNC: 11 MMOL/L — SIGNIFICANT CHANGE UP (ref 7–14)
AST SERPL-CCNC: 17 U/L — SIGNIFICANT CHANGE UP (ref 0–41)
BILIRUB SERPL-MCNC: 0.4 MG/DL — SIGNIFICANT CHANGE UP (ref 0.2–1.2)
BUN SERPL-MCNC: 17 MG/DL — SIGNIFICANT CHANGE UP (ref 10–20)
CALCIUM SERPL-MCNC: 9.1 MG/DL — SIGNIFICANT CHANGE UP (ref 8.4–10.5)
CHLORIDE SERPL-SCNC: 104 MMOL/L — SIGNIFICANT CHANGE UP (ref 98–110)
CHOLEST SERPL-MCNC: 145 MG/DL — SIGNIFICANT CHANGE UP
CO2 SERPL-SCNC: 23 MMOL/L — SIGNIFICANT CHANGE UP (ref 17–32)
CREAT SERPL-MCNC: 0.7 MG/DL — SIGNIFICANT CHANGE UP (ref 0.7–1.5)
EGFR: 91 ML/MIN/1.73M2 — SIGNIFICANT CHANGE UP
FOLATE SERPL-MCNC: 14.3 NG/ML — SIGNIFICANT CHANGE UP
GLUCOSE SERPL-MCNC: 98 MG/DL — SIGNIFICANT CHANGE UP (ref 70–99)
HCT VFR BLD CALC: 38.3 % — SIGNIFICANT CHANGE UP (ref 37–47)
HDLC SERPL-MCNC: 81 MG/DL — SIGNIFICANT CHANGE UP
HGB BLD-MCNC: 12.7 G/DL — SIGNIFICANT CHANGE UP (ref 12–16)
LIPID PNL WITH DIRECT LDL SERPL: 55 MG/DL — SIGNIFICANT CHANGE UP
MCHC RBC-ENTMCNC: 29.9 PG — SIGNIFICANT CHANGE UP (ref 27–31)
MCHC RBC-ENTMCNC: 33.2 G/DL — SIGNIFICANT CHANGE UP (ref 32–37)
MCV RBC AUTO: 90.1 FL — SIGNIFICANT CHANGE UP (ref 81–99)
NON HDL CHOLESTEROL: 64 MG/DL — SIGNIFICANT CHANGE UP
NRBC # BLD: 0 /100 WBCS — SIGNIFICANT CHANGE UP (ref 0–0)
PLATELET # BLD AUTO: 184 K/UL — SIGNIFICANT CHANGE UP (ref 130–400)
PMV BLD: 10.7 FL — HIGH (ref 7.4–10.4)
POTASSIUM SERPL-MCNC: 3.5 MMOL/L — SIGNIFICANT CHANGE UP (ref 3.5–5)
POTASSIUM SERPL-SCNC: 3.5 MMOL/L — SIGNIFICANT CHANGE UP (ref 3.5–5)
PROT SERPL-MCNC: 6.7 G/DL — SIGNIFICANT CHANGE UP (ref 6–8)
RBC # BLD: 4.25 M/UL — SIGNIFICANT CHANGE UP (ref 4.2–5.4)
RBC # FLD: 14 % — SIGNIFICANT CHANGE UP (ref 11.5–14.5)
SODIUM SERPL-SCNC: 138 MMOL/L — SIGNIFICANT CHANGE UP (ref 135–146)
TRIGL SERPL-MCNC: 44 MG/DL — SIGNIFICANT CHANGE UP
TSH SERPL-MCNC: 0.65 UIU/ML — SIGNIFICANT CHANGE UP (ref 0.27–4.2)
VIT B12 SERPL-MCNC: 377 PG/ML — SIGNIFICANT CHANGE UP (ref 232–1245)
WBC # BLD: 5.78 K/UL — SIGNIFICANT CHANGE UP (ref 4.8–10.8)
WBC # FLD AUTO: 5.78 K/UL — SIGNIFICANT CHANGE UP (ref 4.8–10.8)

## 2023-08-24 PROCEDURE — 99223 1ST HOSP IP/OBS HIGH 75: CPT | Mod: 57

## 2023-08-24 PROCEDURE — 99222 1ST HOSP IP/OBS MODERATE 55: CPT

## 2023-08-24 PROCEDURE — 93306 TTE W/DOPPLER COMPLETE: CPT | Mod: 26

## 2023-08-24 RX ADMIN — SIMVASTATIN 40 MILLIGRAM(S): 20 TABLET, FILM COATED ORAL at 21:37

## 2023-08-24 RX ADMIN — Medication 100 MICROGRAM(S): at 05:12

## 2023-08-24 RX ADMIN — GABAPENTIN 600 MILLIGRAM(S): 400 CAPSULE ORAL at 14:32

## 2023-08-24 RX ADMIN — Medication 81 MILLIGRAM(S): at 12:50

## 2023-08-24 RX ADMIN — LOSARTAN POTASSIUM 50 MILLIGRAM(S): 100 TABLET, FILM COATED ORAL at 05:12

## 2023-08-24 RX ADMIN — GABAPENTIN 600 MILLIGRAM(S): 400 CAPSULE ORAL at 21:37

## 2023-08-24 RX ADMIN — Medication 50 MILLIGRAM(S): at 05:12

## 2023-08-24 RX ADMIN — SERTRALINE 100 MILLIGRAM(S): 25 TABLET, FILM COATED ORAL at 12:49

## 2023-08-24 RX ADMIN — DULOXETINE HYDROCHLORIDE 30 MILLIGRAM(S): 30 CAPSULE, DELAYED RELEASE ORAL at 12:49

## 2023-08-24 RX ADMIN — GABAPENTIN 600 MILLIGRAM(S): 400 CAPSULE ORAL at 05:12

## 2023-08-24 RX ADMIN — AMLODIPINE BESYLATE 5 MILLIGRAM(S): 2.5 TABLET ORAL at 05:12

## 2023-08-24 NOTE — PHYSICAL THERAPY INITIAL EVALUATION ADULT - GENERAL OBSERVATIONS, REHAB EVAL
10:30-11:15  pt encountered in bed in NAD, alert and coooperative, VSS. She performed bed mobility, transfers, and ambulated with RW, and and attempted stair climbing with asssitance from PT, limited by c/o dizziness and generalized weakness. Pt would benefit from continued PT to restore prior level of mobility and safety.

## 2023-08-24 NOTE — PHYSICAL THERAPY INITIAL EVALUATION ADULT - STANDING BALANCE: STATIC
Final Anesthesia Post-op Assessment    Patient: Feliciano Hernandez  Procedure(s) Performed: LEFT CATARACT EXTRACTION WITH INTRAOCULAR LENS IMPLANTATION - LEFT  Anesthesia type: Monitor Anesthesia Care    Vital Last Value   Temperature 36.8 °C (98.2 °F) (08/20/18 0913)   Pulse 95 (08/20/18 0913)   Respiratory Rate 12 (08/20/18 0913)   Non-Invasive   Blood Pressure 137/87 (08/20/18 0913)   Arterial  Blood Pressure     Pulse Oximetry 98 % (08/20/18 0913)     Last 24 I/O:   Intake/Output Summary (Last 24 hours) at 08/20/18 0914  Last data filed at 08/20/18 0913   Gross per 24 hour   Intake              300 ml   Output                0 ml   Net              300 ml       PATIENT LOCATION: Day Surgery  POST-OP VITAL SIGNS: stable  LEVEL OF CONSCIOUSNESS: participates in exam, awake, oriented, answers questions appropriately and alert  RESPIRATORY STATUS: spontaneous ventilation, unassisted and room air  CARDIOVASCULAR: blood pressure returned to baseline and stable  HYDRATION: euvolemic    PAIN MANAGEMENT: well controlled  NAUSEA: None  AIRWAY PATENCY: patent  POST-OP ASSESSMENT: no complications, patient tolerated procedure well with no complications and sufficiently recovered from acute administration of anesthesia effects and able to participate in evaluation  COMPLICATIONS: none  HANDOFF:  Handoff to receiving nurse was performed and questions were answered      
with RW/good balance
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
## 2023-08-24 NOTE — CONSULT NOTE ADULT - ATTENDING COMMENTS
Agree with above  atypica chest pain reproducible likely musculoskeletal no evidence of ACS, trop negative  echo normal , multiple ischemic workup in  past negative  recurrent syncope with hx of orthostasis  plan as outlined above

## 2023-08-24 NOTE — CONSULT NOTE ADULT - ASSESSMENT
IMPRESSION: Rehab of L AKA / syncope, pending loop recorder / hypothyroidism, anxiety, HTN, DLD, Non-obstructive CAD, Osteoarthritis    PRECAUTIONS: [   ] Cardiac  [   ] Respiratory  [   ] Seizures [   ] Contact Isolation  [   ] Droplet Isolation  [   ] Other    Weight Bearing Status:     RECOMMENDATION:    Out of Bed to Chair     DVT/Decubiti Prophylaxis    REHAB PLAN:     [ x   ] Bedside P/T 3-5 times a week   [ x   ]   Bedside O/T  2-3 times a week             [    ] Speech Therapy               [    ]  No Rehab Therapy Indicated   Conditioning/ROM                                    ADL  Bed Mobility                                               Conditioning/ROM  Transfers                                                     Bed Mobility  Sitting /Standing Balance                         Transfers                                        Gait Training                                               Sitting/Standing Balance  Stair Training [   ]Applicable                    Home equipment Eval                                                                        Splinting  [   ] Only      GOALS:   ADL   [  x  ]   Independent                    Transfers  [  x  ] Independent                          Ambulation  [  x  ] Independent     [ x    ] With device                            [    ]  CG                                                         [    ]  CG                                                                  [    ] CG                            [    ] Min A                                                   [    ] Min A                                                              [    ] Min  A          DISCHARGE PLAN:   [    ]  Good candidate for Intensive Rehabilitation/Hospital based                                             Will tolerate 3hrs Intensive Rehab Daily                                       [     ]  Short Term Rehab in Skilled Nursing Facility                                       [     ]  Home with Outpatient or  services                                         [  x   ]  Possible Candidate for Intensive Hospital based Rehab
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
## 2023-08-24 NOTE — PHYSICAL THERAPY INITIAL EVALUATION ADULT - ADDITIONAL COMMENTS
pt lives in private home with her spouse, with several IVAN and 1 flight of stairs to bedroom and bath.  PTA Pt was independent for household ambulation with RW, and climbing steps with hand rail and 1 crutch.  She has not had a prosthesis since 4/23, because it was causing abrasion in her groin area and needs to be revised.

## 2023-08-24 NOTE — CONSULT NOTE ADULT - NS ATTEND AMEND GEN_ALL_CORE FT
complex patient   recurrent syncope   recommend ILR implant  We discussed the risks/benefits/alternatives, nature of procedure, and follow up care after device is implanted. We also discussed remote monitoring in details. Patient is in agreement with proceeding with the device implant. We discussed the risks of bleeding, hematoma, infection, device malfunction. Patient expressed understanding of the discussion. I answered all the questions in details.

## 2023-08-25 ENCOUNTER — TRANSCRIPTION ENCOUNTER (OUTPATIENT)
Age: 73
End: 2023-08-25

## 2023-08-25 LAB
ALBUMIN SERPL ELPH-MCNC: 4.1 G/DL — SIGNIFICANT CHANGE UP (ref 3.5–5.2)
ALP SERPL-CCNC: 144 U/L — HIGH (ref 30–115)
ALT FLD-CCNC: 14 U/L — SIGNIFICANT CHANGE UP (ref 0–41)
ANION GAP SERPL CALC-SCNC: 14 MMOL/L — SIGNIFICANT CHANGE UP (ref 7–14)
AST SERPL-CCNC: 16 U/L — SIGNIFICANT CHANGE UP (ref 0–41)
BASOPHILS # BLD AUTO: 0.04 K/UL — SIGNIFICANT CHANGE UP (ref 0–0.2)
BASOPHILS NFR BLD AUTO: 0.6 % — SIGNIFICANT CHANGE UP (ref 0–1)
BILIRUB SERPL-MCNC: 0.4 MG/DL — SIGNIFICANT CHANGE UP (ref 0.2–1.2)
BUN SERPL-MCNC: 21 MG/DL — HIGH (ref 10–20)
CALCIUM SERPL-MCNC: 9.5 MG/DL — SIGNIFICANT CHANGE UP (ref 8.4–10.5)
CHLORIDE SERPL-SCNC: 106 MMOL/L — SIGNIFICANT CHANGE UP (ref 98–110)
CO2 SERPL-SCNC: 21 MMOL/L — SIGNIFICANT CHANGE UP (ref 17–32)
CREAT SERPL-MCNC: 0.6 MG/DL — LOW (ref 0.7–1.5)
EGFR: 94 ML/MIN/1.73M2 — SIGNIFICANT CHANGE UP
EOSINOPHIL # BLD AUTO: 0.19 K/UL — SIGNIFICANT CHANGE UP (ref 0–0.7)
EOSINOPHIL NFR BLD AUTO: 2.8 % — SIGNIFICANT CHANGE UP (ref 0–8)
GLUCOSE SERPL-MCNC: 101 MG/DL — HIGH (ref 70–99)
HCT VFR BLD CALC: 40.5 % — SIGNIFICANT CHANGE UP (ref 37–47)
HGB BLD-MCNC: 13.3 G/DL — SIGNIFICANT CHANGE UP (ref 12–16)
IMM GRANULOCYTES NFR BLD AUTO: 0.3 % — SIGNIFICANT CHANGE UP (ref 0.1–0.3)
LYMPHOCYTES # BLD AUTO: 2 K/UL — SIGNIFICANT CHANGE UP (ref 1.2–3.4)
LYMPHOCYTES # BLD AUTO: 29.4 % — SIGNIFICANT CHANGE UP (ref 20.5–51.1)
MAGNESIUM SERPL-MCNC: 1.9 MG/DL — SIGNIFICANT CHANGE UP (ref 1.8–2.4)
MCHC RBC-ENTMCNC: 29.6 PG — SIGNIFICANT CHANGE UP (ref 27–31)
MCHC RBC-ENTMCNC: 32.8 G/DL — SIGNIFICANT CHANGE UP (ref 32–37)
MCV RBC AUTO: 90 FL — SIGNIFICANT CHANGE UP (ref 81–99)
MONOCYTES # BLD AUTO: 0.54 K/UL — SIGNIFICANT CHANGE UP (ref 0.1–0.6)
MONOCYTES NFR BLD AUTO: 7.9 % — SIGNIFICANT CHANGE UP (ref 1.7–9.3)
NEUTROPHILS # BLD AUTO: 4.01 K/UL — SIGNIFICANT CHANGE UP (ref 1.4–6.5)
NEUTROPHILS NFR BLD AUTO: 59 % — SIGNIFICANT CHANGE UP (ref 42.2–75.2)
NRBC # BLD: 0 /100 WBCS — SIGNIFICANT CHANGE UP (ref 0–0)
PHOSPHATE SERPL-MCNC: 4.2 MG/DL — SIGNIFICANT CHANGE UP (ref 2.1–4.9)
PLATELET # BLD AUTO: 186 K/UL — SIGNIFICANT CHANGE UP (ref 130–400)
PMV BLD: 10.4 FL — SIGNIFICANT CHANGE UP (ref 7.4–10.4)
POTASSIUM SERPL-MCNC: 4 MMOL/L — SIGNIFICANT CHANGE UP (ref 3.5–5)
POTASSIUM SERPL-SCNC: 4 MMOL/L — SIGNIFICANT CHANGE UP (ref 3.5–5)
PROT SERPL-MCNC: 6.8 G/DL — SIGNIFICANT CHANGE UP (ref 6–8)
RBC # BLD: 4.5 M/UL — SIGNIFICANT CHANGE UP (ref 4.2–5.4)
RBC # FLD: 13.9 % — SIGNIFICANT CHANGE UP (ref 11.5–14.5)
SODIUM SERPL-SCNC: 141 MMOL/L — SIGNIFICANT CHANGE UP (ref 135–146)
WBC # BLD: 6.8 K/UL — SIGNIFICANT CHANGE UP (ref 4.8–10.8)
WBC # FLD AUTO: 6.8 K/UL — SIGNIFICANT CHANGE UP (ref 4.8–10.8)

## 2023-08-25 PROCEDURE — 33285 INSJ SUBQ CAR RHYTHM MNTR: CPT

## 2023-08-25 RX ORDER — CEPHALEXIN 500 MG
500 CAPSULE ORAL ONCE
Refills: 0 | Status: COMPLETED | OUTPATIENT
Start: 2023-08-25 | End: 2023-08-25

## 2023-08-25 RX ORDER — ENOXAPARIN SODIUM 100 MG/ML
40 INJECTION SUBCUTANEOUS EVERY 24 HOURS
Refills: 0 | Status: DISCONTINUED | OUTPATIENT
Start: 2023-08-25 | End: 2023-08-28

## 2023-08-25 RX ADMIN — GABAPENTIN 600 MILLIGRAM(S): 400 CAPSULE ORAL at 22:13

## 2023-08-25 RX ADMIN — LOSARTAN POTASSIUM 50 MILLIGRAM(S): 100 TABLET, FILM COATED ORAL at 05:43

## 2023-08-25 RX ADMIN — Medication 650 MILLIGRAM(S): at 20:20

## 2023-08-25 RX ADMIN — SIMVASTATIN 40 MILLIGRAM(S): 20 TABLET, FILM COATED ORAL at 22:13

## 2023-08-25 RX ADMIN — Medication 50 MILLIGRAM(S): at 05:44

## 2023-08-25 RX ADMIN — ENOXAPARIN SODIUM 40 MILLIGRAM(S): 100 INJECTION SUBCUTANEOUS at 13:25

## 2023-08-25 RX ADMIN — Medication 81 MILLIGRAM(S): at 13:23

## 2023-08-25 RX ADMIN — GABAPENTIN 600 MILLIGRAM(S): 400 CAPSULE ORAL at 13:23

## 2023-08-25 RX ADMIN — Medication 100 MICROGRAM(S): at 05:44

## 2023-08-25 RX ADMIN — DULOXETINE HYDROCHLORIDE 30 MILLIGRAM(S): 30 CAPSULE, DELAYED RELEASE ORAL at 13:24

## 2023-08-25 RX ADMIN — Medication 500 MILLIGRAM(S): at 13:43

## 2023-08-25 RX ADMIN — GABAPENTIN 600 MILLIGRAM(S): 400 CAPSULE ORAL at 05:43

## 2023-08-25 RX ADMIN — Medication 650 MILLIGRAM(S): at 20:50

## 2023-08-25 RX ADMIN — SERTRALINE 100 MILLIGRAM(S): 25 TABLET, FILM COATED ORAL at 13:23

## 2023-08-25 RX ADMIN — AMLODIPINE BESYLATE 5 MILLIGRAM(S): 2.5 TABLET ORAL at 05:44

## 2023-08-25 NOTE — OCCUPATIONAL THERAPY INITIAL EVALUATION ADULT - LEVEL OF INDEPENDENCE: BED TO CHAIR, REHAB EVAL
squat pivot with breakdown steps, wc was placed next to Left side of the body without armrest removed/minimum assist (75% patients effort)

## 2023-08-25 NOTE — OCCUPATIONAL THERAPY INITIAL EVALUATION ADULT - LIVES WITH, PROFILE
Pt reported that she lives alone in a private house with 5 steps(up HR on left side) to enter to living room/kitchen area and another 7 steps(up with HR on left side) to bedroom/bathroom. +bath tub/alone

## 2023-08-25 NOTE — OCCUPATIONAL THERAPY INITIAL EVALUATION ADULT - RANGE OF MOTION EXAMINATION, UPPER EXTREMITY
Admission Medication History Completed by Pharmacy    See [facility] Admission Navigator for allergy information, preferred outpatient pharmacy, prior to admission medications and immunization status.     Medication History Sources:     Patient    Sure Scripts    Care Everywhere    Changes made to PTA medication list (reason):    Added:   o Thiamine 100mg tablet (per patient and Care Everywhere)  o Acetaminophen 500mg tablet (per patient and Care Everywhere)  o Folic acid 1mg tablet (per patient and Care Everywhere)  o Ondansetron 4mg tablet (per patient and Care Everywhere)    Deleted:   o Aspirin 81mg chewable tablet: 1qd (per patient)  o Diphenhydramine: 1qd prn (per patient)  o Ethyl chloride external spray: aaa tid prn for itching (per patient)  o Ibuprofen: no dose or instructions (per patient)  o Ivermectin 3mg tablet: no instructions (per patient)  o Permethrin 5% external cream: Apply to entire body from neck to feet for 1 dose. ()  o Prednisone 20mg tablet: Follow taper (per patient, old script)    Changed:   o Hydroxyzine 25mg tablet: 1TID prn for itching --> 1QID prn for itching (per patient and Sure Scripts)    Additional Information:    Patient was a good historian of her medications. She knew doses and the last time she took each.    Patient states that she can no longer take aspirin or ibuprofen so she has been taking acetaminophen.     Prior to Admission medications    Medication Sig Last Dose Taking? Auth Provider   acetaminophen (TYLENOL) 500 MG tablet Take 1,000 mg by mouth every 6 hours as needed for pain 10/2/2020 Yes Unknown, Entered By History   folic acid (FOLVITE) 1 MG tablet Take 1 mg by mouth daily 10/2/2020 Yes Unknown, Entered By History   hydrOXYzine (VISTARIL) 25 MG capsule Take 25 mg by mouth 4 times daily as needed for itching 10/2/2020 Yes Unknown, Entered By History   Multiple Vitamins-Minerals (MULTIVITAMIN ADULT PO) Take 1 tablet by mouth daily  10/2/2020 Yes Reported,  Patient   ondansetron (ZOFRAN) 4 MG tablet Take 4 mg by mouth every 6 hours as needed for nausea 10/2/2020 Yes Unknown, Entered By History   thiamine (B-1) 100 MG tablet Take 100 mg by mouth daily 10/2/2020 Yes Unknown, Entered By History       Date completed: 10/02/20    Medication history completed by: Elisa Oropeza             bilateral UE Active ROM was WFL  (within functional limits)
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
## 2023-08-25 NOTE — OCCUPATIONAL THERAPY INITIAL EVALUATION ADULT - ADL RETRAINING, OT EVAL
Pt will perform toileting task with supervision by dc. Pt will perform LB dressing with supervision by dc.

## 2023-08-25 NOTE — OCCUPATIONAL THERAPY INITIAL EVALUATION ADULT - LEVEL OF INDEPENDENCE: CHAIR TO BED, REHAB EVAL
squat pivot with breakdown steps, transferred from wc into bed at Right side of the body, without dropping off armrest/contact guard
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
## 2023-08-25 NOTE — CHART NOTE - NSCHARTNOTEFT_GEN_A_CORE
A detailed discussion was had with the patient and her daughter, Sirisah Gonsales, via conference call [phone] regarding the risk and benefits of internal loop recorder implant. All questions were answered. The patient agrees to proceed with internal loop recorder implant.

## 2023-08-25 NOTE — DISCHARGE NOTE NURSING/CASE MANAGEMENT/SOCIAL WORK - PATIENT PORTAL LINK FT
You can access the FollowMyHealth Patient Portal offered by [facility] by registering at the following website: [email or web address]. By joining [facility]’s FollowMyHealth portal, you will also be able to view your health information using other applications (apps) compatible with our system.

## 2023-08-25 NOTE — OCCUPATIONAL THERAPY INITIAL EVALUATION ADULT - ADDITIONAL COMMENTS
+pt reported that the neighbor assisted her shopping/cooking +pt reported that the neighbor provided stand-by assistance while bathing activity/transfer +no HHA prior +independent in wc mobility and wc management(remove left legrest)

## 2023-08-25 NOTE — DISCHARGE NOTE NURSING/CASE MANAGEMENT/SOCIAL WORK - NSDCVIVACCINE_GEN_ALL_CORE_FT
COVID-19, mRNA, LNP-S, PF, 30 mcg/0.3 mL dose, bradley-sucrose (Pfizer); 28-Sep-2022 16:04; Louissaint, Nancy (RN); [employer]; [id number] (Exp. Date: 07-Dec-2022); IntraMuscular; Deltoid Left.; 0.3 milliLiter(s);   influenza, high-dose, quadrivalent; 28-Sep-2022 15:50; Louissaint, Nancy (RN); [employer]; [id number] (Exp. Date: 30-Jun-2023); IntraMuscular; Deltoid Left.; 0.7 milliLiter(s); VIS (VIS Published: 06-Aug-2021, VIS Presented: 28-Sep-2022);

## 2023-08-25 NOTE — OCCUPATIONAL THERAPY INITIAL EVALUATION ADULT - LEVEL OF INDEPENDENCE: DRESS LOWER BODY, OT EVAL
in LS without back supported(pt reported that she was independent in donning/doffing prosthetic Left leg)/contact guard

## 2023-08-25 NOTE — CHART NOTE - NSCHARTNOTEFT_GEN_A_CORE
Electrophysiology Brief Post-Op Note    I have personally seen and examined the patient.  I agree with the history and physical which I have reviewed and noted any changes below.  08-25-23 @ 12:11    PRE-OP DIAGNOSIS: Syncope    POST-OP DIAGNOSIS: Syncope    PROCEDURE: Loop Implant    Physician: Thaddeus Gillespie MD   Assistant: LESLIE Rosado     ESTIMATED BLOOD LOSS:  1  mL    ANESTHESIA TYPE:  [  ]General Anesthesia  [  ] Sedation  [X  ] Local/Regional    CONDITION  [  ] Critical  [  ] Serious  [  ]Fair  [ X ]Good    SPECIMENS REMOVED (IF APPLICABLE):  none    IMPLANTS (IF APPLICABLE)  Loop Recorder ([employer])  [id number]  R-wave 0.35mV    FINDINGS  PLAN OF CARE  - F/U 3-4 weeks  - May shower in 48 hours  - Do not get site wet for 7 days  - Remove band aid in 1 week

## 2023-08-26 ENCOUNTER — TRANSCRIPTION ENCOUNTER (OUTPATIENT)
Age: 73
End: 2023-08-26

## 2023-08-26 LAB
ALBUMIN SERPL ELPH-MCNC: 4.1 G/DL — SIGNIFICANT CHANGE UP (ref 3.5–5.2)
ALP SERPL-CCNC: 135 U/L — HIGH (ref 30–115)
ALT FLD-CCNC: 15 U/L — SIGNIFICANT CHANGE UP (ref 0–41)
ANION GAP SERPL CALC-SCNC: 11 MMOL/L — SIGNIFICANT CHANGE UP (ref 7–14)
AST SERPL-CCNC: 18 U/L — SIGNIFICANT CHANGE UP (ref 0–41)
BASOPHILS # BLD AUTO: 0.05 K/UL — SIGNIFICANT CHANGE UP (ref 0–0.2)
BASOPHILS NFR BLD AUTO: 0.8 % — SIGNIFICANT CHANGE UP (ref 0–1)
BILIRUB SERPL-MCNC: 0.5 MG/DL — SIGNIFICANT CHANGE UP (ref 0.2–1.2)
BUN SERPL-MCNC: 24 MG/DL — HIGH (ref 10–20)
CALCIUM SERPL-MCNC: 9.3 MG/DL — SIGNIFICANT CHANGE UP (ref 8.4–10.5)
CHLORIDE SERPL-SCNC: 107 MMOL/L — SIGNIFICANT CHANGE UP (ref 98–110)
CO2 SERPL-SCNC: 22 MMOL/L — SIGNIFICANT CHANGE UP (ref 17–32)
CREAT SERPL-MCNC: 0.6 MG/DL — LOW (ref 0.7–1.5)
EGFR: 94 ML/MIN/1.73M2 — SIGNIFICANT CHANGE UP
EOSINOPHIL # BLD AUTO: 0.17 K/UL — SIGNIFICANT CHANGE UP (ref 0–0.7)
EOSINOPHIL NFR BLD AUTO: 2.9 % — SIGNIFICANT CHANGE UP (ref 0–8)
GLUCOSE SERPL-MCNC: 94 MG/DL — SIGNIFICANT CHANGE UP (ref 70–99)
HCT VFR BLD CALC: 39.6 % — SIGNIFICANT CHANGE UP (ref 37–47)
HGB BLD-MCNC: 13.1 G/DL — SIGNIFICANT CHANGE UP (ref 12–16)
IMM GRANULOCYTES NFR BLD AUTO: 0.3 % — SIGNIFICANT CHANGE UP (ref 0.1–0.3)
LYMPHOCYTES # BLD AUTO: 2.07 K/UL — SIGNIFICANT CHANGE UP (ref 1.2–3.4)
LYMPHOCYTES # BLD AUTO: 35.1 % — SIGNIFICANT CHANGE UP (ref 20.5–51.1)
MAGNESIUM SERPL-MCNC: 1.9 MG/DL — SIGNIFICANT CHANGE UP (ref 1.8–2.4)
MCHC RBC-ENTMCNC: 29.2 PG — SIGNIFICANT CHANGE UP (ref 27–31)
MCHC RBC-ENTMCNC: 33.1 G/DL — SIGNIFICANT CHANGE UP (ref 32–37)
MCV RBC AUTO: 88.4 FL — SIGNIFICANT CHANGE UP (ref 81–99)
MONOCYTES # BLD AUTO: 0.46 K/UL — SIGNIFICANT CHANGE UP (ref 0.1–0.6)
MONOCYTES NFR BLD AUTO: 7.8 % — SIGNIFICANT CHANGE UP (ref 1.7–9.3)
NEUTROPHILS # BLD AUTO: 3.12 K/UL — SIGNIFICANT CHANGE UP (ref 1.4–6.5)
NEUTROPHILS NFR BLD AUTO: 53.1 % — SIGNIFICANT CHANGE UP (ref 42.2–75.2)
NRBC # BLD: 0 /100 WBCS — SIGNIFICANT CHANGE UP (ref 0–0)
PLATELET # BLD AUTO: 202 K/UL — SIGNIFICANT CHANGE UP (ref 130–400)
PMV BLD: 10.5 FL — HIGH (ref 7.4–10.4)
POTASSIUM SERPL-MCNC: 4 MMOL/L — SIGNIFICANT CHANGE UP (ref 3.5–5)
POTASSIUM SERPL-SCNC: 4 MMOL/L — SIGNIFICANT CHANGE UP (ref 3.5–5)
PROT SERPL-MCNC: 6.7 G/DL — SIGNIFICANT CHANGE UP (ref 6–8)
RBC # BLD: 4.48 M/UL — SIGNIFICANT CHANGE UP (ref 4.2–5.4)
RBC # FLD: 13.8 % — SIGNIFICANT CHANGE UP (ref 11.5–14.5)
SODIUM SERPL-SCNC: 140 MMOL/L — SIGNIFICANT CHANGE UP (ref 135–146)
WBC # BLD: 5.89 K/UL — SIGNIFICANT CHANGE UP (ref 4.8–10.8)
WBC # FLD AUTO: 5.89 K/UL — SIGNIFICANT CHANGE UP (ref 4.8–10.8)

## 2023-08-26 RX ADMIN — GABAPENTIN 600 MILLIGRAM(S): 400 CAPSULE ORAL at 05:37

## 2023-08-26 RX ADMIN — ENOXAPARIN SODIUM 40 MILLIGRAM(S): 100 INJECTION SUBCUTANEOUS at 13:28

## 2023-08-26 RX ADMIN — AMLODIPINE BESYLATE 5 MILLIGRAM(S): 2.5 TABLET ORAL at 05:36

## 2023-08-26 RX ADMIN — Medication 650 MILLIGRAM(S): at 22:28

## 2023-08-26 RX ADMIN — GABAPENTIN 600 MILLIGRAM(S): 400 CAPSULE ORAL at 13:29

## 2023-08-26 RX ADMIN — LOSARTAN POTASSIUM 50 MILLIGRAM(S): 100 TABLET, FILM COATED ORAL at 05:37

## 2023-08-26 RX ADMIN — Medication 650 MILLIGRAM(S): at 21:30

## 2023-08-26 RX ADMIN — Medication 100 MICROGRAM(S): at 05:37

## 2023-08-26 RX ADMIN — GABAPENTIN 600 MILLIGRAM(S): 400 CAPSULE ORAL at 21:15

## 2023-08-26 RX ADMIN — DULOXETINE HYDROCHLORIDE 30 MILLIGRAM(S): 30 CAPSULE, DELAYED RELEASE ORAL at 12:37

## 2023-08-26 RX ADMIN — Medication 81 MILLIGRAM(S): at 12:38

## 2023-08-26 RX ADMIN — Medication 50 MILLIGRAM(S): at 05:37

## 2023-08-26 RX ADMIN — SIMVASTATIN 40 MILLIGRAM(S): 20 TABLET, FILM COATED ORAL at 21:15

## 2023-08-26 RX ADMIN — SERTRALINE 100 MILLIGRAM(S): 25 TABLET, FILM COATED ORAL at 12:37

## 2023-08-26 NOTE — DISCHARGE NOTE PROVIDER - CARE PROVIDER_API CALL
Apple Witt  Internal Medicine  [address] [place] [address]  Phone: [phone]  Fax: [phone]  Follow Up Time:     Thaddeus Gillespie  Cardiovascular Disease  [address] [place] [address], [address] [address]  Phone: [phone]  Fax: [phone]  Follow Up Time:

## 2023-08-26 NOTE — DISCHARGE NOTE PROVIDER - NSDCFUSCHEDAPPT_GEN_ALL_CORE_FT
[facility]  ONCMRI [address]  Scheduled Appointment: 08/29/2023    [facility]  ONCMRI [address]  Scheduled Appointment: 08/30/2023    Chad Horne  [facility] PreAdmits  Scheduled Appointment: 08/31/2023    Chad Horne  [facility]  OPHTHALM  [address]  Scheduled Appointment: 08/31/2023    [place]  [facility]  ONCNEUROLO [address]  Scheduled Appointment: 09/05/2023    [facility]  ELECTROPH [address]  Scheduled Appointment: 09/08/2023

## 2023-08-26 NOTE — DISCHARGE NOTE PROVIDER - CARE PROVIDERS DIRECT ADDRESSES
[email or web address]
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
## 2023-08-26 NOTE — DISCHARGE NOTE PROVIDER - NSDCCPTREATMENT_GEN_ALL_CORE_FT
PRINCIPAL PROCEDURE  Procedure: Insertion, loop recorder  Findings and Treatment: You came in for loss of conciousness and a fall for which you got a loop recorder to monitor your heart.

## 2023-08-26 NOTE — DISCHARGE NOTE PROVIDER - NSDCCPCAREPLAN_GEN_ALL_CORE_FT
PRINCIPAL DISCHARGE DIAGNOSIS  Diagnosis: Syncope  Assessment and Plan of Treatment: You came in for loss of conciousness and a fall. You got a loop recorder to monitor your heart. You are stable for discharge to inpatient rehab. Please continue your home meds.      SECONDARY DISCHARGE DIAGNOSES  Diagnosis: Fall  Assessment and Plan of Treatment:     Diagnosis: CHI (closed head injury)  Assessment and Plan of Treatment:

## 2023-08-27 LAB
ALBUMIN SERPL ELPH-MCNC: 4.1 G/DL — SIGNIFICANT CHANGE UP (ref 3.5–5.2)
ALP SERPL-CCNC: 136 U/L — HIGH (ref 30–115)
ALT FLD-CCNC: 14 U/L — SIGNIFICANT CHANGE UP (ref 0–41)
ANION GAP SERPL CALC-SCNC: 12 MMOL/L — SIGNIFICANT CHANGE UP (ref 7–14)
AST SERPL-CCNC: 17 U/L — SIGNIFICANT CHANGE UP (ref 0–41)
BASOPHILS # BLD AUTO: 0.04 K/UL — SIGNIFICANT CHANGE UP (ref 0–0.2)
BASOPHILS NFR BLD AUTO: 0.6 % — SIGNIFICANT CHANGE UP (ref 0–1)
BILIRUB SERPL-MCNC: 0.4 MG/DL — SIGNIFICANT CHANGE UP (ref 0.2–1.2)
BUN SERPL-MCNC: 26 MG/DL — HIGH (ref 10–20)
CALCIUM SERPL-MCNC: 9.5 MG/DL — SIGNIFICANT CHANGE UP (ref 8.4–10.5)
CHLORIDE SERPL-SCNC: 108 MMOL/L — SIGNIFICANT CHANGE UP (ref 98–110)
CO2 SERPL-SCNC: 21 MMOL/L — SIGNIFICANT CHANGE UP (ref 17–32)
CREAT SERPL-MCNC: 0.5 MG/DL — LOW (ref 0.7–1.5)
EGFR: 98 ML/MIN/1.73M2 — SIGNIFICANT CHANGE UP
EOSINOPHIL # BLD AUTO: 0.18 K/UL — SIGNIFICANT CHANGE UP (ref 0–0.7)
EOSINOPHIL NFR BLD AUTO: 2.9 % — SIGNIFICANT CHANGE UP (ref 0–8)
GLUCOSE SERPL-MCNC: 98 MG/DL — SIGNIFICANT CHANGE UP (ref 70–99)
HCT VFR BLD CALC: 40.1 % — SIGNIFICANT CHANGE UP (ref 37–47)
HGB BLD-MCNC: 13.4 G/DL — SIGNIFICANT CHANGE UP (ref 12–16)
IMM GRANULOCYTES NFR BLD AUTO: 0.3 % — SIGNIFICANT CHANGE UP (ref 0.1–0.3)
LYMPHOCYTES # BLD AUTO: 2.04 K/UL — SIGNIFICANT CHANGE UP (ref 1.2–3.4)
LYMPHOCYTES # BLD AUTO: 33.1 % — SIGNIFICANT CHANGE UP (ref 20.5–51.1)
MAGNESIUM SERPL-MCNC: 2.1 MG/DL — SIGNIFICANT CHANGE UP (ref 1.8–2.4)
MCHC RBC-ENTMCNC: 30 PG — SIGNIFICANT CHANGE UP (ref 27–31)
MCHC RBC-ENTMCNC: 33.4 G/DL — SIGNIFICANT CHANGE UP (ref 32–37)
MCV RBC AUTO: 89.7 FL — SIGNIFICANT CHANGE UP (ref 81–99)
MONOCYTES # BLD AUTO: 0.48 K/UL — SIGNIFICANT CHANGE UP (ref 0.1–0.6)
MONOCYTES NFR BLD AUTO: 7.8 % — SIGNIFICANT CHANGE UP (ref 1.7–9.3)
NEUTROPHILS # BLD AUTO: 3.4 K/UL — SIGNIFICANT CHANGE UP (ref 1.4–6.5)
NEUTROPHILS NFR BLD AUTO: 55.3 % — SIGNIFICANT CHANGE UP (ref 42.2–75.2)
NRBC # BLD: 0 /100 WBCS — SIGNIFICANT CHANGE UP (ref 0–0)
PLATELET # BLD AUTO: 186 K/UL — SIGNIFICANT CHANGE UP (ref 130–400)
PMV BLD: 10.6 FL — HIGH (ref 7.4–10.4)
POTASSIUM SERPL-MCNC: 4.1 MMOL/L — SIGNIFICANT CHANGE UP (ref 3.5–5)
POTASSIUM SERPL-SCNC: 4.1 MMOL/L — SIGNIFICANT CHANGE UP (ref 3.5–5)
PROT SERPL-MCNC: 6.7 G/DL — SIGNIFICANT CHANGE UP (ref 6–8)
RBC # BLD: 4.47 M/UL — SIGNIFICANT CHANGE UP (ref 4.2–5.4)
RBC # FLD: 13.9 % — SIGNIFICANT CHANGE UP (ref 11.5–14.5)
SODIUM SERPL-SCNC: 141 MMOL/L — SIGNIFICANT CHANGE UP (ref 135–146)
WBC # BLD: 6.16 K/UL — SIGNIFICANT CHANGE UP (ref 4.8–10.8)
WBC # FLD AUTO: 6.16 K/UL — SIGNIFICANT CHANGE UP (ref 4.8–10.8)

## 2023-08-27 RX ORDER — DIPHENHYDRAMINE HCL 50 MG
25 CAPSULE ORAL ONCE
Refills: 0 | Status: COMPLETED | OUTPATIENT
Start: 2023-08-27 | End: 2023-08-27

## 2023-08-27 RX ADMIN — GABAPENTIN 600 MILLIGRAM(S): 400 CAPSULE ORAL at 21:09

## 2023-08-27 RX ADMIN — LOSARTAN POTASSIUM 50 MILLIGRAM(S): 100 TABLET, FILM COATED ORAL at 06:12

## 2023-08-27 RX ADMIN — GABAPENTIN 600 MILLIGRAM(S): 400 CAPSULE ORAL at 06:12

## 2023-08-27 RX ADMIN — SERTRALINE 100 MILLIGRAM(S): 25 TABLET, FILM COATED ORAL at 12:14

## 2023-08-27 RX ADMIN — Medication 50 MILLIGRAM(S): at 06:12

## 2023-08-27 RX ADMIN — Medication 25 MILLIGRAM(S): at 21:10

## 2023-08-27 RX ADMIN — Medication 650 MILLIGRAM(S): at 20:13

## 2023-08-27 RX ADMIN — Medication 650 MILLIGRAM(S): at 19:48

## 2023-08-27 RX ADMIN — Medication 100 MICROGRAM(S): at 06:12

## 2023-08-27 RX ADMIN — ENOXAPARIN SODIUM 40 MILLIGRAM(S): 100 INJECTION SUBCUTANEOUS at 13:50

## 2023-08-27 RX ADMIN — DULOXETINE HYDROCHLORIDE 30 MILLIGRAM(S): 30 CAPSULE, DELAYED RELEASE ORAL at 12:14

## 2023-08-27 RX ADMIN — GABAPENTIN 600 MILLIGRAM(S): 400 CAPSULE ORAL at 13:50

## 2023-08-27 RX ADMIN — AMLODIPINE BESYLATE 5 MILLIGRAM(S): 2.5 TABLET ORAL at 06:12

## 2023-08-27 RX ADMIN — SIMVASTATIN 40 MILLIGRAM(S): 20 TABLET, FILM COATED ORAL at 21:10

## 2023-08-27 RX ADMIN — Medication 81 MILLIGRAM(S): at 12:14

## 2023-08-27 NOTE — PATIENT PROFILE ADULT - ARE SIGNIFICANT INDICATORS COMPLETE.
Patient: Tin Eric    : 1960    56 year old male      PULMONARY PROGRESS NOTE        Subjective:    Dyspnea is improved      Past Medical History  Past Medical History:   Diagnosis Date   • Bronchitis    • COPD (chronic obstructive pulmonary disease) (CMS/HCC)    • History of chemotherapy \"7 years ago\"   • LYMPHADENOPATHY    • Malignant neoplasm of larynx, unspecified site 2005    stage MEGHAN T4ga, N1, M0   • Pneumonia    • Radiation \"7years ago\"   • Sleep apnea     does not wear cpap        Medications/Infusions:  Scheduled:   • predniSONE  20 mg Oral BID WC   • rOPINIRole  1 mg Oral Nightly   • allopurinol  100 mg Oral Daily   • aspirin  325 mg Oral Once per day on    • escitalopram  10 mg Oral Daily   • guaiFENesin  1,200 mg Oral 2 times per day   • polyethylene glycol  17 g Oral BID   • fluticasone-salmeterol  1 puff Inhalation BID Resp   • fluticasone  2 spray Each Nare Daily   • sodium chloride (PF)  2 mL Injection 2 times per day   • enoxaparin (LOVENOX) injection  40 mg Subcutaneous Daily   • famotidine  20 mg Oral 2 times per day     Vital Last Value 24 Hour Range   Temperature 97.1 °F (36.2 °C) Temp  Min: 97.1 °F (36.2 °C)  Max: 98.3 °F (36.8 °C)   Pulse 72 Pulse  Min: 67  Max: 93   Respiratory 20 Resp  Min: 16  Max: 20   Blood Pressure 132/82 BP  Min: 120/72  Max: 133/76   Art BP   No Data Recorded   Pulse Oximetry 96 % SpO2  Min: 91 %  Max: 97 %     Vital Today Admitted   Weight 93.9 kg Weight: 93 kg   Height N/A Height: 5' 11\" (180.3 cm)   BMI N/A BMI (Calculated): 28.65     Weight over the past 48 Hours:  Patient Vitals for the past 48 hrs:   Weight   17 0600 93.5 kg   17 0800 93.9 kg        Intake/Output:    Last Stool Occurrence: 1 (17 0745)    I/O last 3 completed shifts:  In: 480 [P.O.:480]  Out: 1800 [Urine:1800]      Intake/Output Summary (Last 24 hours) at 17 0831  Last data filed at 17 06   Gross per 24 hour   Intake              480  ml   Output             1800 ml   Net            -1320 ml     Physical Exam:  Gen: Awake,alert, not in acute distress. Sitting up in chair.  HEENT: atraumatic, normocephalic, pinkconjunctivae, anicteric sclerae. Bilateral arcus senilis.  No oral lesions. Dentition is poor.  Neck: No JVD, no adenopathy. Using neck muscles with breathing.  Chest: Very poor air movement. No high-pitched wheezing.  CVS: regular rate and rhythm, no rubs. No obvious murmurs.  Abd: soft, nontender,not distended. No visceromegaly  Ext: no cyanosis, clubbing, edema  Skin: no rash  Neuro: Oriented x 3; non-focal      Recent Labs  Lab 05/12/17  0720 05/11/17  1815 05/11/17  0526 05/10/17  0349 05/09/17  1809 05/09/17  0402 05/08/17  0351 05/07/17  1620   GLUCOSE 141*  --  145* 146*  --  151* 147* 99   SODIUM 137  --  132* 135  --  133* 138 137   POTASSIUM 4.5 5.1 5.7* 5.4* 5.4* 6.2* 5.5* 5.2*   CHLORIDE 91*  --  93* 94*  --  97* 100 99   CO2 43*  --  40* 41*  --  35* 33* 39*   BUN 30*  --  40* 37*  --  38* 30* 32*   CREATININE 1.26*  --  1.34* 1.26*  --  1.25* 1.26* 1.59*   GFRA 73  --  68 73  --  74 73 55   GFRNA 63  --  58 63  --  64 63 47   ANIONGAP 8*  --  5* 5*  --  7* 11 4*   BILIRUBIN 0.6  --  0.5 0.5  --  0.4 0.5 0.3   AST 13  --  27 15  --  15 18 21   GPT 10  --  17 15  --  24 17 16   ALKPT 106  --  105 117  --  119* 126* 135*   ALBUMIN 3.3*  --  3.2* 3.1*  --  3.1* 3.2* 3.3*   CALCIUM 9.4  --  9.2 9.6  --  9.5 9.4 9.1       Recent Labs  Lab 05/12/17  0720 05/11/17  0526 05/10/17  0349   CALCIUM 9.4 9.2 9.6      Other labs and CBC/Iron Panel  No results found    Recent Labs  Lab 05/12/17  0720 05/11/17  0526 05/10/17  0349 05/09/17  0402 05/08/17  0351 05/07/17  1620 01/19/17  0449 01/18/17  0448 01/17/17  0814 01/16/17  0441   WBC 6.0 9.3 10.8 11.0 5.1 4.5 8.5 10.1 11.0 12.8*   RBC 6.07* 5.81 5.73 5.60 5.76 5.84 5.36 5.22 5.30 5.25   HGB 18.5* 18.0* 17.4* 17.2* 17.8* 18.0* 16.7 16.0 16.7 16.5   HCT 59.7* 57.4* 57.3* 55.9* 58.0* 59.5*  53.7* 52.6* 53.7* 53.3*   MCV 98.4 98.8 100.0 99.8 100.7* 101.9* 100.2* 100.8* 101.3* 101.5*    218 231 234 225 230 213 224 244 235   ANEUT  --   --   --   --   --  2.9 7.8* 9.2* 10.0* 12.0*   ALYMS  --   --   --   --   --  1.1 0.6* 0.5* 0.8* 0.5*   JEFFREY  --   --   --   --   --  0.3 0.2* 0.3 0.2* 0.3   AEOS  --   --   --   --   --  0.3 0.0* 0.0* 0.0* 0.0*   ABASO  --   --   --   --   --  0.0 0.0 0.0 0.0 0.0       Recent Labs  Lab 05/09/17  0615 05/08/17  1251 05/07/17  2030   APH 7.30* 7.25* 7.28*   APCO2 77* 74* 79*   APO2 63* 74* 88   AHCO3 37* 31* 36*       CT chest reviewed: no PE. Mosaic attenuation. Mild enlargement of pulmonary arteries. Some atelectasis left lower. Probable bronchomalacia central airways.     TTE Sep 2016  Mildly increased LV wall thickness. Normal LV wall motion and LV ejection fraction, LVEF 60%  Increased right ventricular size. Decreased right ventricular systolic function.  At least mild pulmonary hypertension, RVSP 38 mmHg, which may be underestimated due to poor quality TR signal  Agitated saline was injected through a peripheral vein and did show evidence of a small right-to-left shunt.  No pericardial effusion.       Impression:    Acute on chronic hypercarbic respiratory failure. Clinically improved.  Severe COPD, with acute exacerbation.  Tracheobronchitis. No evident pneumonia.  Pulmonary arterial hypertension, per right heart cath Sep 2016 (PVR 5.89 CROUCH)  Chronic rhinitis  CKD III - s/p IV contrast for CT chest  Secondary polycythemia  KHALIDA. Patient does not have CPAP machine at home.  Restless legs syndrome.  Severe sleep problems at night.  H/o laryngeal cancer   Generalized anxiety disorder with panic attacks  Paradoxical vocal fold motion disorder  Tobacco use disorder. Patient was smoking until prior to this hospitalization.  MRSA colonization.  Hyperkalemia, no hyperacute t waves on EKG,     Recommendations:    Continue steroids  HHNs  Continue oral antibiotics.    No ropinirole dose at night.  Patient will need a repeat sleep study and then arrangements for CPAP machine for home use.  , neph following, monitor I/O      Thank you    TONNY UPTON MD  Pulmonary and Critical Care  Answering Service  [phone]    .

## 2023-08-27 NOTE — PATIENT PROFILE ADULT - FALL HARM RISK - HARM RISK INTERVENTIONS

## 2023-08-28 ENCOUNTER — APPOINTMENT (OUTPATIENT)
Dept: MRI IMAGING | Facility: CLINIC | Age: 73
End: 2023-08-28

## 2023-08-28 VITALS
RESPIRATION RATE: 18 BRPM | TEMPERATURE: 98 F | HEART RATE: 80 BPM | SYSTOLIC BLOOD PRESSURE: 139 MMHG | DIASTOLIC BLOOD PRESSURE: 73 MMHG

## 2023-08-28 LAB
ALBUMIN SERPL ELPH-MCNC: 4.1 G/DL — SIGNIFICANT CHANGE UP (ref 3.5–5.2)
ALP SERPL-CCNC: 135 U/L — HIGH (ref 30–115)
ALT FLD-CCNC: 15 U/L — SIGNIFICANT CHANGE UP (ref 0–41)
ANION GAP SERPL CALC-SCNC: 10 MMOL/L — SIGNIFICANT CHANGE UP (ref 7–14)
AST SERPL-CCNC: 16 U/L — SIGNIFICANT CHANGE UP (ref 0–41)
BASOPHILS # BLD AUTO: 0.05 K/UL — SIGNIFICANT CHANGE UP (ref 0–0.2)
BASOPHILS NFR BLD AUTO: 0.8 % — SIGNIFICANT CHANGE UP (ref 0–1)
BILIRUB SERPL-MCNC: 0.4 MG/DL — SIGNIFICANT CHANGE UP (ref 0.2–1.2)
BUN SERPL-MCNC: 23 MG/DL — HIGH (ref 10–20)
CALCIUM SERPL-MCNC: 9.5 MG/DL — SIGNIFICANT CHANGE UP (ref 8.4–10.5)
CHLORIDE SERPL-SCNC: 105 MMOL/L — SIGNIFICANT CHANGE UP (ref 98–110)
CO2 SERPL-SCNC: 24 MMOL/L — SIGNIFICANT CHANGE UP (ref 17–32)
CREAT SERPL-MCNC: 0.6 MG/DL — LOW (ref 0.7–1.5)
EGFR: 94 ML/MIN/1.73M2 — SIGNIFICANT CHANGE UP
EOSINOPHIL # BLD AUTO: 0.2 K/UL — SIGNIFICANT CHANGE UP (ref 0–0.7)
EOSINOPHIL NFR BLD AUTO: 3 % — SIGNIFICANT CHANGE UP (ref 0–8)
GLUCOSE SERPL-MCNC: 93 MG/DL — SIGNIFICANT CHANGE UP (ref 70–99)
HCT VFR BLD CALC: 39.4 % — SIGNIFICANT CHANGE UP (ref 37–47)
HGB BLD-MCNC: 13.2 G/DL — SIGNIFICANT CHANGE UP (ref 12–16)
IMM GRANULOCYTES NFR BLD AUTO: 0.5 % — HIGH (ref 0.1–0.3)
LYMPHOCYTES # BLD AUTO: 2.07 K/UL — SIGNIFICANT CHANGE UP (ref 1.2–3.4)
LYMPHOCYTES # BLD AUTO: 31.5 % — SIGNIFICANT CHANGE UP (ref 20.5–51.1)
MAGNESIUM SERPL-MCNC: 2 MG/DL — SIGNIFICANT CHANGE UP (ref 1.8–2.4)
MCHC RBC-ENTMCNC: 30.3 PG — SIGNIFICANT CHANGE UP (ref 27–31)
MCHC RBC-ENTMCNC: 33.5 G/DL — SIGNIFICANT CHANGE UP (ref 32–37)
MCV RBC AUTO: 90.6 FL — SIGNIFICANT CHANGE UP (ref 81–99)
MONOCYTES # BLD AUTO: 0.57 K/UL — SIGNIFICANT CHANGE UP (ref 0.1–0.6)
MONOCYTES NFR BLD AUTO: 8.7 % — SIGNIFICANT CHANGE UP (ref 1.7–9.3)
NEUTROPHILS # BLD AUTO: 3.66 K/UL — SIGNIFICANT CHANGE UP (ref 1.4–6.5)
NEUTROPHILS NFR BLD AUTO: 55.5 % — SIGNIFICANT CHANGE UP (ref 42.2–75.2)
NRBC # BLD: 0 /100 WBCS — SIGNIFICANT CHANGE UP (ref 0–0)
PLATELET # BLD AUTO: 193 K/UL — SIGNIFICANT CHANGE UP (ref 130–400)
PMV BLD: 10.6 FL — HIGH (ref 7.4–10.4)
POTASSIUM SERPL-MCNC: 4.3 MMOL/L — SIGNIFICANT CHANGE UP (ref 3.5–5)
POTASSIUM SERPL-SCNC: 4.3 MMOL/L — SIGNIFICANT CHANGE UP (ref 3.5–5)
PROT SERPL-MCNC: 6.8 G/DL — SIGNIFICANT CHANGE UP (ref 6–8)
RBC # BLD: 4.35 M/UL — SIGNIFICANT CHANGE UP (ref 4.2–5.4)
RBC # FLD: 14 % — SIGNIFICANT CHANGE UP (ref 11.5–14.5)
SODIUM SERPL-SCNC: 139 MMOL/L — SIGNIFICANT CHANGE UP (ref 135–146)
WBC # BLD: 6.58 K/UL — SIGNIFICANT CHANGE UP (ref 4.8–10.8)
WBC # FLD AUTO: 6.58 K/UL — SIGNIFICANT CHANGE UP (ref 4.8–10.8)

## 2023-08-28 RX ADMIN — AMLODIPINE BESYLATE 5 MILLIGRAM(S): 2.5 TABLET ORAL at 06:00

## 2023-08-28 RX ADMIN — GABAPENTIN 600 MILLIGRAM(S): 400 CAPSULE ORAL at 06:00

## 2023-08-28 RX ADMIN — Medication 81 MILLIGRAM(S): at 12:16

## 2023-08-28 RX ADMIN — SERTRALINE 100 MILLIGRAM(S): 25 TABLET, FILM COATED ORAL at 12:15

## 2023-08-28 RX ADMIN — LOSARTAN POTASSIUM 50 MILLIGRAM(S): 100 TABLET, FILM COATED ORAL at 06:01

## 2023-08-28 RX ADMIN — Medication 100 MICROGRAM(S): at 06:00

## 2023-08-28 RX ADMIN — GABAPENTIN 600 MILLIGRAM(S): 400 CAPSULE ORAL at 14:20

## 2023-08-28 RX ADMIN — ENOXAPARIN SODIUM 40 MILLIGRAM(S): 100 INJECTION SUBCUTANEOUS at 12:18

## 2023-08-28 RX ADMIN — DULOXETINE HYDROCHLORIDE 30 MILLIGRAM(S): 30 CAPSULE, DELAYED RELEASE ORAL at 12:16

## 2023-08-28 NOTE — PROGRESS NOTE ADULT - PROVIDER SPECIALTY LIST ADULT
Physiatry
Internal Medicine
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
## 2023-08-29 ENCOUNTER — APPOINTMENT (OUTPATIENT)
Dept: HOME HEALTH SERVICES | Facility: HOME HEALTH | Age: 73
End: 2023-08-29

## 2023-08-29 ENCOUNTER — TRANSCRIPTION ENCOUNTER (OUTPATIENT)
Age: 73
End: 2023-08-29

## 2023-08-31 ENCOUNTER — APPOINTMENT (OUTPATIENT)
Dept: HOME HEALTH SERVICES | Facility: HOME HEALTH | Age: 73
End: 2023-08-31
Payer: MEDICARE

## 2023-08-31 VITALS
DIASTOLIC BLOOD PRESSURE: 84 MMHG | OXYGEN SATURATION: 98 % | TEMPERATURE: 97.7 F | SYSTOLIC BLOOD PRESSURE: 138 MMHG | RESPIRATION RATE: 20 BRPM | HEART RATE: 97 BPM

## 2023-08-31 DIAGNOSIS — M25.532 PAIN IN LEFT WRIST: ICD-10-CM

## 2023-08-31 PROCEDURE — 99349 HOME/RES VST EST MOD MDM 40: CPT

## 2023-08-31 NOTE — COUNSELING
[Overweight - ( BMI 25.1 - 29.9 )] : overweight -  ( BMI 25.1 - 29.9 ) [DASH diet recommended] : DASH diet recommended [Continue diet as tolerated] : continue diet as tolerated based on goals of care [Non - Smoker] : non-smoker [Medical alert] : medical alert [Use assistive device to avoid falls] : use assistive device to avoid falls [Remove clutter and unsafe carpeting to avoid falls] : remove clutter and unsafe carpeting to avoid falls [] : foot exam [Improve mobility] : improve mobility [Maintain functional ability] : maintain functional ability [Discussed disease trajectory with patient/caregiver] : discussed disease trajectory with patient/caregiver [Patient/Caregiver not ready to engage in discussion] : patient/caregiver not ready to engage in discussion

## 2023-08-31 NOTE — CHRONIC CARE ASSESSMENT
[Inadequate social support] : inadequate social support [PPS Score: ____] : Palliative Performance Scale (PPS) Score: [unfilled] [de-identified] : as tolerated [de-identified] : low sodium
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
## 2023-08-31 NOTE — PHYSICAL EXAM
[No Acute Distress] : no acute distress [Normal Voice/Communication] : normal voice communication [Normal Sclera/Conjunctiva] : normal sclera/conjunctiva [No JVD] : no jugular venous distention [Normal Outer Ear/Nose] : the ears and nose were normal in appearance [No Respiratory Distress] : no respiratory distress [Normal Rate] : heart rate was normal  [Normal S1, S2] : normal S1 and S2 [No Edema] : there was no peripheral edema [Normal Bowel Sounds] : normal bowel sounds [Non Tender] : non-tender [Soft] : abdomen soft [Normal Anterior Cervical Nodes] : no anterior cervical lymphadenopathy [No CVA Tenderness] : no ~M costovertebral angle tenderness [No Joint Swelling] : no joint swelling seen [No Rash] : no rash [No Motor Deficits] : the motor exam was normal [Oriented x3] : oriented to person, place, and time [Foot Ulcers] : no foot ulcers [de-identified] : Patient sitting upright in chair [de-identified] : Left Above Knee Amputation

## 2023-08-31 NOTE — HEALTH RISK ASSESSMENT
[HRA Reviewed] : Health risk assessment reviewed [Independent] : using telephone [Some assistance needed] : managing medications [Full assistance needed] : managing finances [TimeGetUpGo] : 10 [de-identified] : with walker
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
## 2023-09-01 ENCOUNTER — NON-APPOINTMENT (OUTPATIENT)
Age: 73
End: 2023-09-01

## 2023-09-05 ENCOUNTER — APPOINTMENT (OUTPATIENT)
Dept: NEUROLOGY | Facility: CLINIC | Age: 73
End: 2023-09-05

## 2023-09-07 DIAGNOSIS — F41.9 ANXIETY DISORDER, UNSPECIFIED: ICD-10-CM

## 2023-09-07 DIAGNOSIS — I10 ESSENTIAL (PRIMARY) HYPERTENSION: ICD-10-CM

## 2023-09-07 DIAGNOSIS — H43.813 VITREOUS DEGENERATION, BILATERAL: ICD-10-CM

## 2023-09-07 DIAGNOSIS — F32.A DEPRESSION, UNSPECIFIED: ICD-10-CM
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
## 2023-09-07 DIAGNOSIS — R26.89 OTHER ABNORMALITIES OF GAIT AND MOBILITY: ICD-10-CM

## 2023-09-07 DIAGNOSIS — R29.6 REPEATED FALLS: ICD-10-CM

## 2023-09-07 DIAGNOSIS — Z79.82 LONG TERM (CURRENT) USE OF ASPIRIN: ICD-10-CM
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
## 2023-09-07 DIAGNOSIS — E78.00 PURE HYPERCHOLESTEROLEMIA, UNSPECIFIED: ICD-10-CM

## 2023-09-07 DIAGNOSIS — G89.11 ACUTE PAIN DUE TO TRAUMA: ICD-10-CM

## 2023-09-07 DIAGNOSIS — R07.89 OTHER CHEST PAIN: ICD-10-CM

## 2023-09-07 DIAGNOSIS — G62.9 POLYNEUROPATHY, UNSPECIFIED: ICD-10-CM

## 2023-09-07 DIAGNOSIS — Z95.5 PRESENCE OF CORONARY ANGIOPLASTY IMPLANT AND GRAFT: ICD-10-CM

## 2023-09-07 DIAGNOSIS — R55 SYNCOPE AND COLLAPSE: ICD-10-CM

## 2023-09-07 DIAGNOSIS — Z89.612 ACQUIRED ABSENCE OF LEFT LEG ABOVE KNEE: ICD-10-CM

## 2023-09-07 DIAGNOSIS — M25.532 PAIN IN LEFT WRIST: ICD-10-CM

## 2023-09-07 DIAGNOSIS — I25.10 ATHEROSCLEROTIC HEART DISEASE OF NATIVE CORONARY ARTERY WITHOUT ANGINA PECTORIS: ICD-10-CM

## 2023-09-07 DIAGNOSIS — E03.9 HYPOTHYROIDISM, UNSPECIFIED: ICD-10-CM

## 2023-09-07 DIAGNOSIS — Z79.890 HORMONE REPLACEMENT THERAPY: ICD-10-CM

## 2023-09-07 DIAGNOSIS — S00.03XA CONTUSION OF SCALP, INITIAL ENCOUNTER: ICD-10-CM

## 2023-09-07 DIAGNOSIS — M19.90 UNSPECIFIED OSTEOARTHRITIS, UNSPECIFIED SITE: ICD-10-CM

## 2023-09-08 ENCOUNTER — APPOINTMENT (OUTPATIENT)
Dept: ELECTROPHYSIOLOGY | Facility: CLINIC | Age: 73
End: 2023-09-08
Payer: MEDICARE

## 2023-09-08 VITALS
DIASTOLIC BLOOD PRESSURE: 77 MMHG | BODY MASS INDEX: 25.76 KG/M2 | HEART RATE: 74 BPM | SYSTOLIC BLOOD PRESSURE: 136 MMHG | HEIGHT: 62 IN | WEIGHT: 140 LBS

## 2023-09-08 DIAGNOSIS — Z48.89 ENCOUNTER FOR OTHER SPECIFIED SURGICAL AFTERCARE: ICD-10-CM

## 2023-09-08 PROCEDURE — 93000 ELECTROCARDIOGRAM COMPLETE: CPT | Mod: 59

## 2023-09-08 PROCEDURE — 93291 INTERROG DEV EVAL SCRMS IP: CPT

## 2023-09-08 PROCEDURE — 99213 OFFICE O/P EST LOW 20 MIN: CPT | Mod: 25
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
## 2023-09-08 NOTE — CARDIOLOGY SUMMARY
[de-identified] : 08/03/23: SR, LAFB, NSST changes. [de-identified] : 04/16/21:\par  No ischemia. [de-identified] : Carotid duplex: Mild bilateral plaque.

## 2023-09-11 PROBLEM — Z48.89 ENCOUNTER FOR POSTOPERATIVE WOUND CHECK: Status: ACTIVE | Noted: 2023-09-11

## 2023-09-15 LAB
ALBUMIN SERPL ELPH-MCNC: 4.3 G/DL
ALP BLD-CCNC: 145 U/L
ALT SERPL-CCNC: 17 U/L
ANION GAP SERPL CALC-SCNC: 15 MMOL/L
AST SERPL-CCNC: 22 U/L
BILIRUB SERPL-MCNC: 0.5 MG/DL
BUN SERPL-MCNC: 18 MG/DL
CALCIUM SERPL-MCNC: 9.3 MG/DL
CHLORIDE SERPL-SCNC: 103 MMOL/L
CHOLEST SERPL-MCNC: 151 MG/DL
CO2 SERPL-SCNC: 21 MMOL/L
CREAT SERPL-MCNC: 0.6 MG/DL
EGFR: 94 ML/MIN/1.73M2
ESTIMATED AVERAGE GLUCOSE: 111 MG/DL
GLUCOSE SERPL-MCNC: 90 MG/DL
HBA1C MFR BLD HPLC: 5.5 %
HDLC SERPL-MCNC: 85 MG/DL
LDLC SERPL CALC-MCNC: 57 MG/DL
NONHDLC SERPL-MCNC: 66 MG/DL
POTASSIUM SERPL-SCNC: 4 MMOL/L
PROT SERPL-MCNC: 7.4 G/DL
SODIUM SERPL-SCNC: 139 MMOL/L
TRIGL SERPL-MCNC: 43 MG/DL
TSH SERPL-ACNC: 1.49 UIU/ML
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
## 2023-09-25 ENCOUNTER — APPOINTMENT (OUTPATIENT)
Dept: MRI IMAGING | Facility: CLINIC | Age: 73
End: 2023-09-25

## 2023-10-04 ENCOUNTER — APPOINTMENT (OUTPATIENT)
Dept: CARDIOLOGY | Facility: CLINIC | Age: 73
End: 2023-10-04

## 2023-10-04 ENCOUNTER — NON-APPOINTMENT (OUTPATIENT)
Age: 73
End: 2023-10-04

## 2023-10-30 ENCOUNTER — APPOINTMENT (OUTPATIENT)
Dept: ELECTROPHYSIOLOGY | Facility: CLINIC | Age: 73
End: 2023-10-30

## 2023-11-09 NOTE — PATIENT PROFILE ADULT - NSPROPOAPRESSUREINJURY_GEN_A_NUR
Try to take 1mg melatonin 2-3 hours before bedtime.    Send me a message in 2 weeks regarding whether you would like to increase your dose of lexapro.   no

## 2023-11-20 ENCOUNTER — APPOINTMENT (OUTPATIENT)
Dept: CARDIOLOGY | Facility: CLINIC | Age: 73
End: 2023-11-20
Payer: MEDICARE

## 2023-11-20 ENCOUNTER — NON-APPOINTMENT (OUTPATIENT)
Age: 73
End: 2023-11-20

## 2023-11-20 PROCEDURE — 93298 REM INTERROG DEV EVAL SCRMS: CPT

## 2023-11-20 PROCEDURE — G2066: CPT | Mod: NC

## 2023-12-01 ENCOUNTER — APPOINTMENT (OUTPATIENT)
Dept: CARDIOLOGY | Facility: CLINIC | Age: 73
End: 2023-12-01

## 2023-12-19 ENCOUNTER — APPOINTMENT (OUTPATIENT)
Dept: CARDIOLOGY | Facility: CLINIC | Age: 73
End: 2023-12-19
Payer: MEDICARE

## 2023-12-19 VITALS
BODY MASS INDEX: 27.97 KG/M2 | WEIGHT: 152 LBS | HEIGHT: 62 IN | SYSTOLIC BLOOD PRESSURE: 172 MMHG | HEART RATE: 64 BPM | DIASTOLIC BLOOD PRESSURE: 98 MMHG

## 2023-12-19 PROCEDURE — 93000 ELECTROCARDIOGRAM COMPLETE: CPT

## 2023-12-19 PROCEDURE — 99214 OFFICE O/P EST MOD 30 MIN: CPT | Mod: 25

## 2023-12-19 NOTE — ASSESSMENT
[FreeTextEntry1] : H/o non-obstructive CAD. Episodes of chest pain suspicious for angina. HTN is uncontrolled. Unable to clarify patient's medication list. Hyperlipidemia better controlled.  Plan: Continue Metoprolol and Amlodipine. Increase Losartan HCT to 100/25 mg daily. Continue Atorvastatin 40 mg daily. Low-salt diet d/w patient. She will bring her medication bottles to next visit. Lexiscan MPI to r/o ischemia. F/u in 4 months.  Adelfo Dunn MD.

## 2023-12-19 NOTE — REVIEW OF SYSTEMS
[Chest Discomfort] : chest discomfort [Negative] : Neurological [Dyspnea on exertion] : dyspnea during exertion [Lower Ext Edema] : no extremity edema [Palpitations] : no palpitations [Syncope] : no syncope [Rash] : no rash [Anxiety] : no anxiety

## 2023-12-19 NOTE — PHYSICAL EXAM
[Well Developed] : well developed [Well Nourished] : well nourished [No Acute Distress] : no acute distress [Normal Conjunctiva] : normal conjunctiva [Normal Venous Pressure] : normal venous pressure [No Carotid Bruit] : no carotid bruit [Normal S1, S2] : normal S1, S2 [No Rub] : no rub [No Gallop] : no gallop [S4] : S4 [Clear Lung Fields] : clear lung fields [Good Air Entry] : good air entry [No Respiratory Distress] : no respiratory distress  [Soft] : abdomen soft [Non Tender] : non-tender [Normal Bowel Sounds] : normal bowel sounds [Normal Gait] : normal gait [No Edema] : no edema [No Cyanosis] : no cyanosis [No Clubbing] : no clubbing [No Varicosities] : no varicosities [No Rash] : no rash [Moves all extremities] : moves all extremities [No Focal Deficits] : no focal deficits [Normal Speech] : normal speech [Alert and Oriented] : alert and oriented [Normal memory] : normal memory [de-identified] : S/p left BKA

## 2023-12-19 NOTE — CARDIOLOGY SUMMARY
[de-identified] : 12/19/23: SR, LAFB, NSST changes. [de-identified] : 04/16/21:\par  No ischemia. [de-identified] : Carotid duplex: Mild bilateral plaque.
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
## 2023-12-22 ENCOUNTER — APPOINTMENT (OUTPATIENT)
Dept: CARDIOLOGY | Facility: CLINIC | Age: 73
End: 2023-12-22
Payer: MEDICARE

## 2023-12-22 ENCOUNTER — NON-APPOINTMENT (OUTPATIENT)
Age: 73
End: 2023-12-22

## 2023-12-23 PROCEDURE — 93298 REM INTERROG DEV EVAL SCRMS: CPT

## 2023-12-23 PROCEDURE — G2066: CPT

## 2024-01-05 ENCOUNTER — INPATIENT (INPATIENT)
Facility: HOSPITAL | Age: 74
LOS: 5 days | Discharge: HOME HEALTH | DRG: 392 | End: 2024-01-11
Attending: INTERNAL MEDICINE | Admitting: INTERNAL MEDICINE
Payer: MEDICARE

## 2024-01-05 VITALS
OXYGEN SATURATION: 98 % | RESPIRATION RATE: 20 BRPM | HEIGHT: 62 IN | WEIGHT: 145.06 LBS | TEMPERATURE: 98 F | HEART RATE: 63 BPM | SYSTOLIC BLOOD PRESSURE: 162 MMHG | DIASTOLIC BLOOD PRESSURE: 85 MMHG

## 2024-01-05 DIAGNOSIS — K52.9 NONINFECTIVE GASTROENTERITIS AND COLITIS, UNSPECIFIED: ICD-10-CM

## 2024-01-05 DIAGNOSIS — Z98.890 OTHER SPECIFIED POSTPROCEDURAL STATES: Chronic | ICD-10-CM

## 2024-01-05 DIAGNOSIS — S78.112A COMPLETE TRAUMATIC AMPUTATION AT LEVEL BETWEEN LEFT HIP AND KNEE, INITIAL ENCOUNTER: Chronic | ICD-10-CM

## 2024-01-05 DIAGNOSIS — Z96.651 PRESENCE OF RIGHT ARTIFICIAL KNEE JOINT: Chronic | ICD-10-CM

## 2024-01-05 LAB
ALBUMIN SERPL ELPH-MCNC: 4.2 G/DL — SIGNIFICANT CHANGE UP (ref 3.5–5.2)
ALBUMIN SERPL ELPH-MCNC: 4.2 G/DL — SIGNIFICANT CHANGE UP (ref 3.5–5.2)
ALP SERPL-CCNC: 149 U/L — HIGH (ref 30–115)
ALP SERPL-CCNC: 149 U/L — HIGH (ref 30–115)
ALT FLD-CCNC: 20 U/L — SIGNIFICANT CHANGE UP (ref 0–41)
ALT FLD-CCNC: 20 U/L — SIGNIFICANT CHANGE UP (ref 0–41)
ANION GAP SERPL CALC-SCNC: 15 MMOL/L — HIGH (ref 7–14)
ANION GAP SERPL CALC-SCNC: 15 MMOL/L — HIGH (ref 7–14)
APPEARANCE UR: ABNORMAL
APPEARANCE UR: ABNORMAL
AST SERPL-CCNC: 22 U/L — SIGNIFICANT CHANGE UP (ref 0–41)
AST SERPL-CCNC: 22 U/L — SIGNIFICANT CHANGE UP (ref 0–41)
BACTERIA # UR AUTO: ABNORMAL /HPF
BACTERIA # UR AUTO: ABNORMAL /HPF
BASOPHILS # BLD AUTO: 0.04 K/UL — SIGNIFICANT CHANGE UP (ref 0–0.2)
BASOPHILS # BLD AUTO: 0.04 K/UL — SIGNIFICANT CHANGE UP (ref 0–0.2)
BASOPHILS NFR BLD AUTO: 0.6 % — SIGNIFICANT CHANGE UP (ref 0–1)
BASOPHILS NFR BLD AUTO: 0.6 % — SIGNIFICANT CHANGE UP (ref 0–1)
BILIRUB SERPL-MCNC: 0.6 MG/DL — SIGNIFICANT CHANGE UP (ref 0.2–1.2)
BILIRUB SERPL-MCNC: 0.6 MG/DL — SIGNIFICANT CHANGE UP (ref 0.2–1.2)
BILIRUB UR-MCNC: NEGATIVE — SIGNIFICANT CHANGE UP
BILIRUB UR-MCNC: NEGATIVE — SIGNIFICANT CHANGE UP
BUN SERPL-MCNC: 21 MG/DL — HIGH (ref 10–20)
BUN SERPL-MCNC: 21 MG/DL — HIGH (ref 10–20)
CALCIUM SERPL-MCNC: 9.5 MG/DL — SIGNIFICANT CHANGE UP (ref 8.4–10.5)
CALCIUM SERPL-MCNC: 9.5 MG/DL — SIGNIFICANT CHANGE UP (ref 8.4–10.5)
CAST: 1 /LPF — SIGNIFICANT CHANGE UP (ref 0–4)
CAST: 1 /LPF — SIGNIFICANT CHANGE UP (ref 0–4)
CHLORIDE SERPL-SCNC: 102 MMOL/L — SIGNIFICANT CHANGE UP (ref 98–110)
CHLORIDE SERPL-SCNC: 102 MMOL/L — SIGNIFICANT CHANGE UP (ref 98–110)
CO2 SERPL-SCNC: 24 MMOL/L — SIGNIFICANT CHANGE UP (ref 17–32)
CO2 SERPL-SCNC: 24 MMOL/L — SIGNIFICANT CHANGE UP (ref 17–32)
COLOR SPEC: YELLOW — SIGNIFICANT CHANGE UP
COLOR SPEC: YELLOW — SIGNIFICANT CHANGE UP
CREAT SERPL-MCNC: 0.7 MG/DL — SIGNIFICANT CHANGE UP (ref 0.7–1.5)
CREAT SERPL-MCNC: 0.7 MG/DL — SIGNIFICANT CHANGE UP (ref 0.7–1.5)
DIFF PNL FLD: NEGATIVE — SIGNIFICANT CHANGE UP
DIFF PNL FLD: NEGATIVE — SIGNIFICANT CHANGE UP
EGFR: 90 ML/MIN/1.73M2 — SIGNIFICANT CHANGE UP
EGFR: 90 ML/MIN/1.73M2 — SIGNIFICANT CHANGE UP
EOSINOPHIL # BLD AUTO: 0.15 K/UL — SIGNIFICANT CHANGE UP (ref 0–0.7)
EOSINOPHIL # BLD AUTO: 0.15 K/UL — SIGNIFICANT CHANGE UP (ref 0–0.7)
EOSINOPHIL NFR BLD AUTO: 2.1 % — SIGNIFICANT CHANGE UP (ref 0–8)
EOSINOPHIL NFR BLD AUTO: 2.1 % — SIGNIFICANT CHANGE UP (ref 0–8)
GLUCOSE SERPL-MCNC: 100 MG/DL — HIGH (ref 70–99)
GLUCOSE SERPL-MCNC: 100 MG/DL — HIGH (ref 70–99)
GLUCOSE UR QL: NEGATIVE MG/DL — SIGNIFICANT CHANGE UP
GLUCOSE UR QL: NEGATIVE MG/DL — SIGNIFICANT CHANGE UP
HCT VFR BLD CALC: 39 % — SIGNIFICANT CHANGE UP (ref 37–47)
HCT VFR BLD CALC: 39 % — SIGNIFICANT CHANGE UP (ref 37–47)
HGB BLD-MCNC: 13.1 G/DL — SIGNIFICANT CHANGE UP (ref 12–16)
HGB BLD-MCNC: 13.1 G/DL — SIGNIFICANT CHANGE UP (ref 12–16)
IMM GRANULOCYTES NFR BLD AUTO: 0.3 % — SIGNIFICANT CHANGE UP (ref 0.1–0.3)
IMM GRANULOCYTES NFR BLD AUTO: 0.3 % — SIGNIFICANT CHANGE UP (ref 0.1–0.3)
KETONES UR-MCNC: NEGATIVE MG/DL — SIGNIFICANT CHANGE UP
KETONES UR-MCNC: NEGATIVE MG/DL — SIGNIFICANT CHANGE UP
LEUKOCYTE ESTERASE UR-ACNC: ABNORMAL
LEUKOCYTE ESTERASE UR-ACNC: ABNORMAL
LYMPHOCYTES # BLD AUTO: 1.78 K/UL — SIGNIFICANT CHANGE UP (ref 1.2–3.4)
LYMPHOCYTES # BLD AUTO: 1.78 K/UL — SIGNIFICANT CHANGE UP (ref 1.2–3.4)
LYMPHOCYTES # BLD AUTO: 24.8 % — SIGNIFICANT CHANGE UP (ref 20.5–51.1)
LYMPHOCYTES # BLD AUTO: 24.8 % — SIGNIFICANT CHANGE UP (ref 20.5–51.1)
MCHC RBC-ENTMCNC: 29.7 PG — SIGNIFICANT CHANGE UP (ref 27–31)
MCHC RBC-ENTMCNC: 29.7 PG — SIGNIFICANT CHANGE UP (ref 27–31)
MCHC RBC-ENTMCNC: 33.6 G/DL — SIGNIFICANT CHANGE UP (ref 32–37)
MCHC RBC-ENTMCNC: 33.6 G/DL — SIGNIFICANT CHANGE UP (ref 32–37)
MCV RBC AUTO: 88.4 FL — SIGNIFICANT CHANGE UP (ref 81–99)
MCV RBC AUTO: 88.4 FL — SIGNIFICANT CHANGE UP (ref 81–99)
MONOCYTES # BLD AUTO: 0.5 K/UL — SIGNIFICANT CHANGE UP (ref 0.1–0.6)
MONOCYTES # BLD AUTO: 0.5 K/UL — SIGNIFICANT CHANGE UP (ref 0.1–0.6)
MONOCYTES NFR BLD AUTO: 7 % — SIGNIFICANT CHANGE UP (ref 1.7–9.3)
MONOCYTES NFR BLD AUTO: 7 % — SIGNIFICANT CHANGE UP (ref 1.7–9.3)
NEUTROPHILS # BLD AUTO: 4.7 K/UL — SIGNIFICANT CHANGE UP (ref 1.4–6.5)
NEUTROPHILS # BLD AUTO: 4.7 K/UL — SIGNIFICANT CHANGE UP (ref 1.4–6.5)
NEUTROPHILS NFR BLD AUTO: 65.2 % — SIGNIFICANT CHANGE UP (ref 42.2–75.2)
NEUTROPHILS NFR BLD AUTO: 65.2 % — SIGNIFICANT CHANGE UP (ref 42.2–75.2)
NITRITE UR-MCNC: NEGATIVE — SIGNIFICANT CHANGE UP
NITRITE UR-MCNC: NEGATIVE — SIGNIFICANT CHANGE UP
NRBC # BLD: 0 /100 WBCS — SIGNIFICANT CHANGE UP (ref 0–0)
NRBC # BLD: 0 /100 WBCS — SIGNIFICANT CHANGE UP (ref 0–0)
PH UR: 6.5 — SIGNIFICANT CHANGE UP (ref 5–8)
PH UR: 6.5 — SIGNIFICANT CHANGE UP (ref 5–8)
PLATELET # BLD AUTO: 186 K/UL — SIGNIFICANT CHANGE UP (ref 130–400)
PLATELET # BLD AUTO: 186 K/UL — SIGNIFICANT CHANGE UP (ref 130–400)
PMV BLD: 11.2 FL — HIGH (ref 7.4–10.4)
PMV BLD: 11.2 FL — HIGH (ref 7.4–10.4)
POTASSIUM SERPL-MCNC: 4 MMOL/L — SIGNIFICANT CHANGE UP (ref 3.5–5)
POTASSIUM SERPL-MCNC: 4 MMOL/L — SIGNIFICANT CHANGE UP (ref 3.5–5)
POTASSIUM SERPL-SCNC: 4 MMOL/L — SIGNIFICANT CHANGE UP (ref 3.5–5)
POTASSIUM SERPL-SCNC: 4 MMOL/L — SIGNIFICANT CHANGE UP (ref 3.5–5)
PROT SERPL-MCNC: 7.3 G/DL — SIGNIFICANT CHANGE UP (ref 6–8)
PROT SERPL-MCNC: 7.3 G/DL — SIGNIFICANT CHANGE UP (ref 6–8)
PROT UR-MCNC: 100 MG/DL
PROT UR-MCNC: 100 MG/DL
RBC # BLD: 4.41 M/UL — SIGNIFICANT CHANGE UP (ref 4.2–5.4)
RBC # BLD: 4.41 M/UL — SIGNIFICANT CHANGE UP (ref 4.2–5.4)
RBC # FLD: 14.4 % — SIGNIFICANT CHANGE UP (ref 11.5–14.5)
RBC # FLD: 14.4 % — SIGNIFICANT CHANGE UP (ref 11.5–14.5)
RBC CASTS # UR COMP ASSIST: 1 /HPF — SIGNIFICANT CHANGE UP (ref 0–4)
RBC CASTS # UR COMP ASSIST: 1 /HPF — SIGNIFICANT CHANGE UP (ref 0–4)
SODIUM SERPL-SCNC: 141 MMOL/L — SIGNIFICANT CHANGE UP (ref 135–146)
SODIUM SERPL-SCNC: 141 MMOL/L — SIGNIFICANT CHANGE UP (ref 135–146)
SP GR SPEC: >1.03 — HIGH (ref 1–1.03)
SP GR SPEC: >1.03 — HIGH (ref 1–1.03)
SQUAMOUS # UR AUTO: >36 /HPF — HIGH (ref 0–5)
SQUAMOUS # UR AUTO: >36 /HPF — HIGH (ref 0–5)
TROPONIN T, HIGH SENSITIVITY RESULT: 11 NG/L — SIGNIFICANT CHANGE UP (ref 6–13)
TROPONIN T, HIGH SENSITIVITY RESULT: 11 NG/L — SIGNIFICANT CHANGE UP (ref 6–13)
TROPONIN T, HIGH SENSITIVITY RESULT: 13 NG/L — SIGNIFICANT CHANGE UP (ref 6–13)
TROPONIN T, HIGH SENSITIVITY RESULT: 13 NG/L — SIGNIFICANT CHANGE UP (ref 6–13)
UROBILINOGEN FLD QL: 0.2 MG/DL — SIGNIFICANT CHANGE UP (ref 0.2–1)
UROBILINOGEN FLD QL: 0.2 MG/DL — SIGNIFICANT CHANGE UP (ref 0.2–1)
WBC # BLD: 7.19 K/UL — SIGNIFICANT CHANGE UP (ref 4.8–10.8)
WBC # BLD: 7.19 K/UL — SIGNIFICANT CHANGE UP (ref 4.8–10.8)
WBC # FLD AUTO: 7.19 K/UL — SIGNIFICANT CHANGE UP (ref 4.8–10.8)
WBC # FLD AUTO: 7.19 K/UL — SIGNIFICANT CHANGE UP (ref 4.8–10.8)
WBC UR QL: 21 /HPF — HIGH (ref 0–5)
WBC UR QL: 21 /HPF — HIGH (ref 0–5)

## 2024-01-05 PROCEDURE — 85027 COMPLETE CBC AUTOMATED: CPT

## 2024-01-05 PROCEDURE — 99285 EMERGENCY DEPT VISIT HI MDM: CPT

## 2024-01-05 PROCEDURE — 97162 PT EVAL MOD COMPLEX 30 MIN: CPT | Mod: GP

## 2024-01-05 PROCEDURE — 97530 THERAPEUTIC ACTIVITIES: CPT | Mod: GP

## 2024-01-05 PROCEDURE — 73600 X-RAY EXAM OF ANKLE: CPT | Mod: RT

## 2024-01-05 PROCEDURE — 81001 URINALYSIS AUTO W/SCOPE: CPT

## 2024-01-05 PROCEDURE — 70450 CT HEAD/BRAIN W/O DYE: CPT | Mod: 26,MA

## 2024-01-05 PROCEDURE — 70486 CT MAXILLOFACIAL W/O DYE: CPT | Mod: 26,MA

## 2024-01-05 PROCEDURE — 71045 X-RAY EXAM CHEST 1 VIEW: CPT | Mod: 26

## 2024-01-05 PROCEDURE — 85025 COMPLETE CBC W/AUTO DIFF WBC: CPT

## 2024-01-05 PROCEDURE — 97166 OT EVAL MOD COMPLEX 45 MIN: CPT | Mod: GO

## 2024-01-05 PROCEDURE — 97116 GAIT TRAINING THERAPY: CPT | Mod: GP

## 2024-01-05 PROCEDURE — 72125 CT NECK SPINE W/O DYE: CPT | Mod: 26,MA

## 2024-01-05 PROCEDURE — 36415 COLL VENOUS BLD VENIPUNCTURE: CPT

## 2024-01-05 PROCEDURE — 97110 THERAPEUTIC EXERCISES: CPT | Mod: GO

## 2024-01-05 PROCEDURE — 87635 SARS-COV-2 COVID-19 AMP PRB: CPT

## 2024-01-05 PROCEDURE — 83735 ASSAY OF MAGNESIUM: CPT

## 2024-01-05 PROCEDURE — 87086 URINE CULTURE/COLONY COUNT: CPT

## 2024-01-05 PROCEDURE — 80053 COMPREHEN METABOLIC PANEL: CPT

## 2024-01-05 PROCEDURE — 84100 ASSAY OF PHOSPHORUS: CPT

## 2024-01-05 PROCEDURE — 74177 CT ABD & PELVIS W/CONTRAST: CPT | Mod: 26,MA

## 2024-01-05 PROCEDURE — 93010 ELECTROCARDIOGRAM REPORT: CPT

## 2024-01-05 RX ORDER — DULOXETINE HYDROCHLORIDE 30 MG/1
30 CAPSULE, DELAYED RELEASE ORAL DAILY
Refills: 0 | Status: DISCONTINUED | OUTPATIENT
Start: 2024-01-05 | End: 2024-01-11

## 2024-01-05 RX ORDER — ONDANSETRON 8 MG/1
4 TABLET, FILM COATED ORAL EVERY 8 HOURS
Refills: 0 | Status: DISCONTINUED | OUTPATIENT
Start: 2024-01-05 | End: 2024-01-11

## 2024-01-05 RX ORDER — METRONIDAZOLE 500 MG
500 TABLET ORAL ONCE
Refills: 0 | Status: COMPLETED | OUTPATIENT
Start: 2024-01-05 | End: 2024-01-05

## 2024-01-05 RX ORDER — SODIUM CHLORIDE 9 MG/ML
1000 INJECTION, SOLUTION INTRAVENOUS ONCE
Refills: 0 | Status: COMPLETED | OUTPATIENT
Start: 2024-01-05 | End: 2024-01-05

## 2024-01-05 RX ORDER — CIPROFLOXACIN LACTATE 400MG/40ML
400 VIAL (ML) INTRAVENOUS ONCE
Refills: 0 | Status: COMPLETED | OUTPATIENT
Start: 2024-01-05 | End: 2024-01-05

## 2024-01-05 RX ORDER — AMLODIPINE BESYLATE 2.5 MG/1
1 TABLET ORAL
Refills: 0 | DISCHARGE

## 2024-01-05 RX ORDER — SERTRALINE 25 MG/1
100 TABLET, FILM COATED ORAL DAILY
Refills: 0 | Status: DISCONTINUED | OUTPATIENT
Start: 2024-01-05 | End: 2024-01-11

## 2024-01-05 RX ORDER — AMLODIPINE BESYLATE 2.5 MG/1
5 TABLET ORAL DAILY
Refills: 0 | Status: DISCONTINUED | OUTPATIENT
Start: 2024-01-05 | End: 2024-01-11

## 2024-01-05 RX ORDER — LOSARTAN POTASSIUM 100 MG/1
100 TABLET, FILM COATED ORAL DAILY
Refills: 0 | Status: DISCONTINUED | OUTPATIENT
Start: 2024-01-05 | End: 2024-01-11

## 2024-01-05 RX ORDER — LANOLIN ALCOHOL/MO/W.PET/CERES
3 CREAM (GRAM) TOPICAL AT BEDTIME
Refills: 0 | Status: DISCONTINUED | OUTPATIENT
Start: 2024-01-05 | End: 2024-01-11

## 2024-01-05 RX ORDER — HEPARIN SODIUM 5000 [USP'U]/ML
5000 INJECTION INTRAVENOUS; SUBCUTANEOUS EVERY 12 HOURS
Refills: 0 | Status: DISCONTINUED | OUTPATIENT
Start: 2024-01-05 | End: 2024-01-11

## 2024-01-05 RX ORDER — METOPROLOL TARTRATE 50 MG
50 TABLET ORAL DAILY
Refills: 0 | Status: DISCONTINUED | OUTPATIENT
Start: 2024-01-05 | End: 2024-01-11

## 2024-01-05 RX ORDER — ACETAMINOPHEN 500 MG
650 TABLET ORAL EVERY 6 HOURS
Refills: 0 | Status: DISCONTINUED | OUTPATIENT
Start: 2024-01-05 | End: 2024-01-11

## 2024-01-05 RX ORDER — LOSARTAN/HYDROCHLOROTHIAZIDE 100MG-25MG
1 TABLET ORAL
Refills: 0 | DISCHARGE

## 2024-01-05 RX ORDER — GABAPENTIN 400 MG/1
600 CAPSULE ORAL THREE TIMES A DAY
Refills: 0 | Status: DISCONTINUED | OUTPATIENT
Start: 2024-01-05 | End: 2024-01-11

## 2024-01-05 RX ORDER — ATORVASTATIN CALCIUM 80 MG/1
1 TABLET, FILM COATED ORAL
Refills: 0 | DISCHARGE

## 2024-01-05 RX ORDER — ASPIRIN/CALCIUM CARB/MAGNESIUM 324 MG
81 TABLET ORAL DAILY
Refills: 0 | Status: DISCONTINUED | OUTPATIENT
Start: 2024-01-05 | End: 2024-01-11

## 2024-01-05 RX ORDER — LEVOTHYROXINE SODIUM 125 MCG
100 TABLET ORAL DAILY
Refills: 0 | Status: DISCONTINUED | OUTPATIENT
Start: 2024-01-05 | End: 2024-01-11

## 2024-01-05 RX ORDER — ATORVASTATIN CALCIUM 80 MG/1
40 TABLET, FILM COATED ORAL AT BEDTIME
Refills: 0 | Status: DISCONTINUED | OUTPATIENT
Start: 2024-01-05 | End: 2024-01-11

## 2024-01-05 RX ADMIN — Medication 100 MILLIGRAM(S): at 15:19

## 2024-01-05 RX ADMIN — Medication 200 MILLIGRAM(S): at 15:19

## 2024-01-05 RX ADMIN — SODIUM CHLORIDE 1000 MILLILITER(S): 9 INJECTION, SOLUTION INTRAVENOUS at 13:53

## 2024-01-05 NOTE — ED PROVIDER NOTE - PROGRESS NOTE DETAILS
BT: Spoke with EP NP who stated that [employer] rep was at her side, was able to pull up data from patient loop recorder. Data and analysis shows no arrhythmias in the past week/month.

## 2024-01-05 NOTE — ED ADULT NURSE NOTE - NSFALLHARMRISKINTERV_ED_ALL_ED
Assistance OOB with selected safe patient handling equipment if applicable/Communicate risk of Fall with Harm to all staff, patient, and family/Provide patient with walking aids/Provide visual cue: red socks, yellow wristband, yellow gown, etc/Reinforce activity limits and safety measures with patient and family/Bed in lowest position, wheels locked, appropriate side rails in place/Call bell, personal items and telephone in reach/Instruct patient to call for assistance before getting out of bed/chair/stretcher/Non-slip footwear applied when patient is off stretcher/[place] to call system/Physically safe environment - no spills, clutter or unnecessary equipment/Purposeful Proactive Rounding/Room/bathroom lighting operational, light cord in reach Assistance OOB with selected safe patient handling equipment if applicable/Communicate risk of Fall with Harm to all staff, patient, and family/Provide patient with walking aids/Provide visual cue: red socks, yellow wristband, yellow gown, etc/Reinforce activity limits and safety measures with patient and family/Bed in lowest position, wheels locked, appropriate side rails in place/Call bell, personal items and telephone in reach/Instruct patient to call for assistance before getting out of bed/chair/stretcher/Non-slip footwear applied when patient is off stretcher/[place] to call system/Physically safe environment - no spills, clutter or unnecessary equipment/Purposeful Proactive Rounding/Room/bathroom lighting operational, light cord in reach

## 2024-01-05 NOTE — ED PROVIDER NOTE - OBJECTIVE STATEMENT
Patient is a 73 y/o F, pmhx of HTN, HLD, CAD s/p stent, implanted heart monitor, Left above knee amputation, comes in for headache and facial pain s/p syncopal episode 1 wk ago on Sunday. Patient felt dizzy prior to synopsizing, fell face forward, + LOC for 10 mins, was not evaluated by PMD or hospital afterwards. Had episodes of nosebleed afterwards, but has resolved. Headache still present. Son reports abd looks slightly distended than usual. Otherwise she denies chest pain, sob, N/V.

## 2024-01-05 NOTE — ED PROVIDER NOTE - CLINICAL SUMMARY MEDICAL DECISION MAKING FREE TEXT BOX
Patient presents with syncopal episode with a fall 1 week prior. labs, imging done. no traumatic injuries. no events on loop recorder as per EP. + abdominal tenderness. ct scan shows colitis, abx given. Fluids given. Patient admitted for further management. Labs and EKG were ordered and reviewed.  Imaging was ordered and reviewed by me.  Appropriate medications for patient's presenting complaints were ordered and effects were reassessed.  Patient's records (prior hospital, ED visit, and/or nursing home notes if available) were reviewed.  Additional history was obtained from EMS, family, and/or PCP (where available).  Escalation to admission/observation was considered.  Patient requires inpatient hospitalization - monitored setting.

## 2024-01-05 NOTE — H&P ADULT - NSHPPHYSICALEXAM_GEN_ALL_CORE
T(C): 36.8 (01-05-24 @ 20:00), Max: 36.8 (01-05-24 @ 09:43)  HR: 72 (01-05-24 @ 20:00) (63 - 72)  BP: 162/85 (01-05-24 @ 09:43) (162/85 - 162/85)  RR: 18 (01-05-24 @ 20:00) (18 - 20)  SpO2: 98% (01-05-24 @ 20:00) (98% - 98%)    CONSTITUTIONAL: NAD  HEENT: NC, infraorbital bruising bilaterally (closer to the cheeks)  NECK: Supple, symmetric and without tracheal deviation   RESP: No respiratory distress, no use of accessory muscles. B/l air entry. No adventitious breath sounds  CARDIOVASCULAR: RRR, +S1S2  EXTREMITIES: No R pedal edema, L. AKA stump  GI: BS (+), Soft, tender to palpation most prominent in LUQ and LLQ, some tenderness in RLQ, ND, no rebound, no guarding  MSK: Moves all 4 extremities  NEURO: Sensation intact in upper and lower extremities to light touch   PSYCH: A&Ox3. Appropriate insight & judgement

## 2024-01-05 NOTE — CHART NOTE - NSCHARTNOTEFT_GEN_A_CORE
Electrophysiology    Pts device MDT loop recorder implant was interrogated on 01-05-24  Device working properly    Events:  No episodes are recorded for the last 4 months. No episodes are detected that would correlate with syncopal episode.     Preliminary results d/w with primary team  Reviewed by attending    Contact EP ACP with any questions [phone] Electrophysiology    Pts device MDT loop recorder implant was interrogated on 01-05-24  Device working properly    Events:  No episodes are recorded for the last 4 months. No episodes are detected that would correlate with syncopal episode.     Preliminary results d/w with primary team  Reviewed by attending    Contact EP ACP with any questions [phone] Electrophysiology    Pts device MDT loop recorder implant was interrogated on 01-05-24  Device working properly    Events:  No episodes are recorded for the last 4 months. No episodes are detected that would correlate with syncopal episode.     Preliminary results d/w with primary team  Reviewed by attending Dr. Barrientos  Contact EP ACP with any questions [phone] Electrophysiology    Pts device MDT loop recorder implant was interrogated on 01-05-24  Device working properly    Events:  No episodes are recorded for the last 4 months. No episodes are detected that would correlate with syncopal episode.     Preliminary results d/w with primary team  Reviewed by attending Dr. Barrientos  Contact EP ACP with any questions [phone]

## 2024-01-05 NOTE — ED PROVIDER NOTE - PHYSICAL EXAMINATION
GENERAL: Well-developed; well-nourished; in no acute distress. AAOx3  SKIN: warm, well perfused  HEAD: Bruises on the cheeks b/l, tender to touch  EYES: PERRLA, no conjunctival erythema, ocular motions intact and appropriate, no nystagmus  ENT: No nasal discharge or bleeding. some bruising on nasal bridge, no obvious external nasal deviation. Swelling of nasal passage L> R  CARD: S1, S2 normal; no murmurs, gallops, or rubs. Regular rate and rhythm.   RESP: LCTAB; No wheezes, rales, rhonchi, or stridor.  ABD: soft, nontender, and nondistended  Upper EXT: Normal ROM. Rad pulses 2+ symm, cap refill <2 secs, sensation intact and symm,  strength 5/5. Bruise on right arm  Lower EXT: left leg prosthesis. strength 5/5

## 2024-01-05 NOTE — H&P ADULT - HISTORY OF PRESENT ILLNESS
74 year-old female with PMH of HTN, HLD, CAD s/p stents and PSH of ILR (04/2023), L. AKA, Knee replacement, Inguinal hernia repair presents c/o of headache, facial pain, abdominal pain after fall 1 week ago on Sunday. Patient reports she went to get something from the kitchen and as she was walking through her living room she fell and blacked out for about 5 minutes. Prior to the fall, she denies experiencing dizziness, lightheadedness, tongue bitting, urinary incontinence or anything out of the ordinary. After regaining consciousness, she denies experiencing lightheadedness, dizziness or confused. She endorses a frontal headache, facial pain around her nose, and abdominal pain. Headache is localized to frontal region bilaterally, rated 9/10 severity, described as constant that has not become worse since onset, transient relief provided by Tylenol and nothing makes it worse, denies headache of this nature in the past. Abdominal pain localized to LUQ and bilateral flanks L>>R rated 10/10 in severity, worse with movement. Her son who lives downstairs in the same home helped her and gave her ice packs to place on her head. Of note, she was not wearing her L. leg prosthesis and has a history of multiple falls due to poor balance on the prosthesis. Endorses nausea, decreased appetite, burning with urination. Burning with urination began ____. Urinary incontinence? ____. Denies chest pain, palpitations, fever, chills, changes in vision, dizziness, lightheadedness, constipation, diarrhea, bleeding.     In ED, Vital Signs:  BP Systolic: 162 mm Hg  BP Diastolic: 85 mm Hg  Heart Rate: 63 /min  Respiration Rate (breaths/min): 20 /min  Temp (F): 98.2 Degrees F  SpO2 (%): 98 %    s/p, 1L LR bolus, 1 dose Flagyl & Ciprofloxacin in ED.   Admitted to medicine for further work-up of syncope.

## 2024-01-05 NOTE — ED ADULT TRIAGE NOTE - AS TEMP SITE
"  SUBJECTIVE:   Cristiana Myles is a 38 year old female who presents to clinic today for the following health issues:      Medication Followup of concerta    Taking Medication as prescribed: yes    Side Effects:  None    Medication Helping Symptoms:  Yes    No issue - working  well     GERD/Heartburn      Duration: years    Description (location/character/radiation): heart burn    Intensity:  mild    Accompanying signs and symptoms:  food getting stuck: YES  nausea/vomiting/blood: YES- when gets bad  abdominal pain: no   black/tarry or bloody stools: no :    History (similar episodes/previous evaluation): Med not covered by insurance    Precipitating or alleviating factors:  worse with no particular food or drink.  current NSAID/Aspirin use: no     Therapies tried and outcome: Omeprazole (Prilosec)      Pt is working on quiting tobacco and decrease on caffeine   Needs rf on nicotine patch             Problem list and histories reviewed & adjusted, as indicated.  Additional history: as documented        Reviewed and updated as needed this visit by clinical staffTobacco  Allergies  Meds  Med Hx  Surg Hx  Fam Hx  Soc Hx      Reviewed and updated as needed this visit by Provider         ROS:  C: NEGATIVE for fever, chills, change in weight  E/M: NEGATIVE for ear, mouth and throat problems  R: NEGATIVE for significant cough or SOB  CV: NEGATIVE for chest pain, palpitations or peripheral edema    OBJECTIVE:                                                    /62  Pulse 66  Temp 96.6  F (35.9  C)  Ht 5' 8.5\" (1.74 m)  Wt 194 lb (88 kg)  BMI 29.07 kg/m2  Body mass index is 29.07 kg/(m^2).   GENERAL: healthy, alert, well nourished, well hydrated, no distress  ABDOMEN: soft, no tenderness, no  hepatosplenomegaly, no masses, normal bowel sounds  PSYCH: Alert and oriented times 3; speech- coherent , normal rate and volume; able to articulate logical thoughts, able to abstract reason, no tangential thoughts, no " hallucinations or delusions, affect- normal         ASSESSMENT/PLAN:                                                    (K21.9) Gastroesophageal reflux disease without esophagitis  (primary encounter diagnosis)  Comment: Insurance with not pay for PPI for more than 120 days. Discussed. My hands are tied. Can not afford OTC.  Plan: ranitidine (ZANTAC) 150 MG tablet        Will try H2 blocker. Handout given and continue to work on behaviors.    (F90.0) Attention deficit hyperactivity disorder (ADHD), predominantly inattentive type  Comment: 3 month RF given   Plan: methylphenidate ER (CONCERTA) 54 MG CR tablet,         methylphenidate ER (CONCERTA) 54 MG CR tablet,         methylphenidate ER (CONCERTA) 54 MG CR tablet        F/u in 3 months     (Z71.6) Encounter for tobacco use cessation counseling  Comment On the 21 with Quit plan. Needs 14 adn7 mcg patch   Plan: nicotine (NICODERM CQ) 14 MG/24HR 24 hr patch,         nicotine (NICODERM CQ) 7 MG/24HR 24 hr patch        RF done.       See Patient InstructionsSee Patient Instructions    Sudhakar Cuellar MD  [facility]   oral

## 2024-01-05 NOTE — PATIENT PROFILE ADULT - FALL HARM RISK - HARM RISK INTERVENTIONS
Assistance with ambulation/Assistance OOB with selected safe patient handling equipment/Communicate Risk of Fall with Harm to all staff/Discuss with provider need for PT consult/Monitor gait and stability/Provide patient with walking aids - walker, cane, crutches/Reinforce activity limits and safety measures with patient and family/Sit up slowly, dangle for a short time, stand at bedside before walking/Tailored Fall Risk Interventions/Visual Cue: Yellow wristband and red socks/Bed in lowest position, wheels locked, appropriate side rails in place/Call bell, personal items and telephone in reach/Instruct patient to call for assistance before getting out of bed or chair/Non-slip footwear when patient is out of bed/[place] to call system/Physically safe environment - no spills, clutter or unnecessary equipment/Purposeful Proactive Rounding/Room/bathroom lighting operational, light cord in reach Assistance with ambulation/Assistance OOB with selected safe patient handling equipment/Communicate Risk of Fall with Harm to all staff/Discuss with provider need for PT consult/Monitor gait and stability/Provide patient with walking aids - walker, cane, crutches/Reinforce activity limits and safety measures with patient and family/Sit up slowly, dangle for a short time, stand at bedside before walking/Tailored Fall Risk Interventions/Visual Cue: Yellow wristband and red socks/Bed in lowest position, wheels locked, appropriate side rails in place/Call bell, personal items and telephone in reach/Instruct patient to call for assistance before getting out of bed or chair/Non-slip footwear when patient is out of bed/[place] to call system/Physically safe environment - no spills, clutter or unnecessary equipment/Purposeful Proactive Rounding/Room/bathroom lighting operational, light cord in reach

## 2024-01-05 NOTE — H&P ADULT - NSHPLABSRESULTS_GEN_ALL_CORE
01-05    141  |  102  |  21<H>  ----------------------------<  100<H>  4.0   |  24  |  0.7    Ca    9.5      05 Jan 2024 11:27    TPro  7.3  /  Alb  4.2  /  TBili  0.6  /  DBili  x   /  AST  22  /  ALT  20  /  AlkPhos  149<H>  01-05                          13.1   7.19  )-----------( 186      ( 05 Jan 2024 11:27 )             39.0     < from: CT Abdomen and Pelvis w/ IV Cont (01.05.24 @ 13:39) >    IMPRESSION:    Wall thickening of the cecum, which may represent acute or chronic   colitis. Clinical correlation is recommended.    Chronic wall thickening the sigmoid colon.    If not already recently performed, on an outpatient basis colonoscopy is  recommended for further evaluation.    New decubitus ulcer formation involving the soft tissues posterior to the   left hip    Severely distended bladder    < end of copied text >    < from: CT Cervical Spine No Cont (01.05.24 @ 11:25) >    IMPRESSION:    CT HEAD:  No acute intracranial pathology.    CT FACIAL BONES:  No acute maxillofacial fracture.    Small frontal scalp swelling.    CT CERVICAL SPINE:  No acute cervical fracture or facet subluxation.    --- End of Report ---    < end of copied text >    < from: CT Maxillofacial No Cont (01.05.24 @ 11:21) >    IMPRESSION:    CT HEAD:  No acute intracranial pathology.    CT FACIAL BONES:  No acute maxillofacial fracture.    Small frontal scalp swelling.    CT CERVICAL SPINE:  No acute cervical fracture or facet subluxation.    --- End of Report ---    < end of copied text >    < from: CT Head No Cont (01.05.24 @ 11:18) >    IMPRESSION:    CT HEAD:  No acute intracranial pathology.    CT FACIAL BONES:  No acute maxillofacial fracture.    Small frontal scalp swelling.    CT CERVICAL SPINE:  No acute cervical fracture or facet subluxation.    --- End of Report ---    < end of copied text >

## 2024-01-05 NOTE — H&P ADULT - ASSESSMENT
74 year-old female with PMH of HTN, HLD, CAD s/p stents and PSH of ILR (2023), L. AKA, Knee replacement, Inguinal hernia repair presents c/o of headache, facial pain, abdominal pain after fall 1 week ago on . Admitted to medicine for further work-up of fall/syncopal episode.     Impressions:   Appears as though she lost her balance, fell, hit her head, LOC with facial and abdominal trauma, return of consciousness, ice pack.     #Fall with (+) LOC 2/2 Loss of Balance without LLE Prosthesis  - history of multiple falls   - s/p L. AKA  - ambulates using LLE prosthesis (still adjusting  with aid of OP & home PT)  - fall on  while not using LLE prothesis followed by LOC and return to baseline with return of consciousness (no prodrome or post-ictal state)  - endorses persistent headache, facial pain, abdominal pain  - CTH noted small frontal scalp swelling, negative for acute intracranial pathology  - CT C-spine: (-)  - CT A/P (-)  - CT Maxillofacial (-)  - Seen by EP in ED  - ILR interrogated  - No episodes recorded for last 4 months that would correlate with syncopal episode. Device working properly  - Started on Tylenol 650 mg PO q6h PRN  - f/u Orthostatics  - PT/OT consulted    #Urinary Retention  - CT A/P noted severely distended bladder, prominent periuterine vessels  - Post-void bladder scan  - If retaining, straight cath    #HTN  - c/w Losartan 100mg PO qd  - c/w HCTZ 25 mg PO qd    #CAD s/p stents  #Grade I Diastolic Dysfunction  - TTE (): biplane EF 67%  - Mild concentric LVH    #HLD  - c/w Atorvastatin 40mg qd                                           --------------------------------------------------------------    # DVT prophylaxis: Heparin  # GI prophylaxis: PPI  # Diet: DASH/TLC  # Activity: IAT  # Code status: Full Code  # Disposition:    [facility]) Orthostatics  2) PT/OT consult 74 year-old female with PMH of HTN, HLD, CAD s/p stents and PSH of ILR (2023), L. AKA, Knee replacement, Inguinal hernia repair presents c/o of headache, facial pain, abdominal pain after walking through her living room, losing her balance and falling 1 week ago on . Admitted to medicine for further work-up of fall/syncopal episode.     Impressions:   Appears as though she lost her balance, fell, hit her head, LOC with facial and abdominal trauma, return of consciousness, ice pack.     #Fall with (+) LOC 2/2 Loss of Balance without LLE Prosthesis  - history of multiple falls   - s/p L. AKA  - ambulates using LLE prosthesis (still adjusting  with aid of OP & home PT)  - fall on  while not using LLE prothesis followed by LOC and return to baseline with return of consciousness (no prodrome or post-ictal state)  - denied preceding dizziness   - CTH noted small frontal scalp swelling, negative for acute intracranial pathology  - CT C-spine: (-)  - CT A/P (-)  - CT Maxillofacial (-)  - Seen by EP in ED  - ILR interrogated  - No episodes recorded for last 4 months that would correlate with syncopal episode. Device working properly  - Started on Tylenol 650 mg PO q6h PRN  - f/u Orthostatics  - PT/OT consulted    #Colitis  - CT A/P: Wall thickening of the cecum, which may represent acute or chronic   colitis.  - no WBC  - afebrile  - denies diarrhea  - will monitor off abx for now  - monitor for symptom improvement post-void/straight cath  - consider colonoscopy OP    #Urinary Retention  - Suprapubic abdominal pain likely 2/2 urinary retention  - CT A/P noted severely distended bladder, prominent periuterine vessels  - Post-void bladder scan  - If retaining, straight cath    #HTN  - c/w Losartan 100mg PO qd  - c/w HCTZ 25 mg PO qd    #CAD s/p stents  #Grade I Diastolic Dysfunction  - TTE (): biplane EF 67%  - Mild concentric LVH    #HLD  - c/w Atorvastatin 40mg qd                                           --------------------------------------------------------------    # DVT prophylaxis: Heparin  # GI prophylaxis: PPI  # Diet: DASH/TLC  # Activity: IAT  # Code status: Full Code  # Disposition:    [facility]) Orthostatics  2) PT/OT consult

## 2024-01-05 NOTE — ED PROVIDER NOTE - ATTENDING CONTRIBUTION TO CARE
74-year-old female past med history hypertension, hyperlipidemia, CAD, left AKA presents with syncopal fall 1 week ago.  Patient states that she was walking felt dizzy and then syncopized and fell forward.  Landed on her face.  Since then reports some intermittent dizziness and generalized weakness.  No chest pain shortness of breath or palpitations.  No fevers or chills.  Patient also reports abdominal pain worse on her right side.  No nausea vomiting diarrhea.    CONSTITUTIONAL: Well-developed; well-nourished; in no acute distress.   SKIN: warm, dry.  Positive ecchymosis to bilateral face.  HEAD: Normocephalic; atraumatic.  EYES: PERRL, EOMI, no conjunctival erythema  ENT: No nasal discharge; airway clear.  Positive tenderness and ecchymosis to bridge of the nose.  No septal hematoma.  NECK: Supple; non tender.  CARD: S1, S2 normal;  Regular rate and rhythm.   RESP: No wheezes, rales or rhonchi.  ABD: soft positive diffuse tenderness worse on the right abdomen.  Non distended, no rebound or guarding  EXT: Normal ROM.  5/5 strength in all 4 extremities   LYMPH: No acute cervical adenopathy.  NEURO: Alert, oriented, grossly unremarkable. neurovascularly intact  PSYCH: Cooperative, appropriate.

## 2024-01-06 ENCOUNTER — NON-APPOINTMENT (OUTPATIENT)
Age: 74
End: 2024-01-06

## 2024-01-06 LAB
ALBUMIN SERPL ELPH-MCNC: 4.3 G/DL — SIGNIFICANT CHANGE UP (ref 3.5–5.2)
ALBUMIN SERPL ELPH-MCNC: 4.3 G/DL — SIGNIFICANT CHANGE UP (ref 3.5–5.2)
ALP SERPL-CCNC: 152 U/L — HIGH (ref 30–115)
ALP SERPL-CCNC: 152 U/L — HIGH (ref 30–115)
ALT FLD-CCNC: 21 U/L — SIGNIFICANT CHANGE UP (ref 0–41)
ALT FLD-CCNC: 21 U/L — SIGNIFICANT CHANGE UP (ref 0–41)
ANION GAP SERPL CALC-SCNC: 15 MMOL/L — HIGH (ref 7–14)
ANION GAP SERPL CALC-SCNC: 15 MMOL/L — HIGH (ref 7–14)
AST SERPL-CCNC: 25 U/L — SIGNIFICANT CHANGE UP (ref 0–41)
AST SERPL-CCNC: 25 U/L — SIGNIFICANT CHANGE UP (ref 0–41)
BASOPHILS # BLD AUTO: 0.06 K/UL — SIGNIFICANT CHANGE UP (ref 0–0.2)
BASOPHILS # BLD AUTO: 0.06 K/UL — SIGNIFICANT CHANGE UP (ref 0–0.2)
BASOPHILS NFR BLD AUTO: 0.9 % — SIGNIFICANT CHANGE UP (ref 0–1)
BASOPHILS NFR BLD AUTO: 0.9 % — SIGNIFICANT CHANGE UP (ref 0–1)
BILIRUB SERPL-MCNC: 0.8 MG/DL — SIGNIFICANT CHANGE UP (ref 0.2–1.2)
BILIRUB SERPL-MCNC: 0.8 MG/DL — SIGNIFICANT CHANGE UP (ref 0.2–1.2)
BUN SERPL-MCNC: 14 MG/DL — SIGNIFICANT CHANGE UP (ref 10–20)
BUN SERPL-MCNC: 14 MG/DL — SIGNIFICANT CHANGE UP (ref 10–20)
CALCIUM SERPL-MCNC: 9.5 MG/DL — SIGNIFICANT CHANGE UP (ref 8.4–10.4)
CALCIUM SERPL-MCNC: 9.5 MG/DL — SIGNIFICANT CHANGE UP (ref 8.4–10.4)
CHLORIDE SERPL-SCNC: 102 MMOL/L — SIGNIFICANT CHANGE UP (ref 98–110)
CHLORIDE SERPL-SCNC: 102 MMOL/L — SIGNIFICANT CHANGE UP (ref 98–110)
CO2 SERPL-SCNC: 25 MMOL/L — SIGNIFICANT CHANGE UP (ref 17–32)
CO2 SERPL-SCNC: 25 MMOL/L — SIGNIFICANT CHANGE UP (ref 17–32)
CREAT SERPL-MCNC: 0.7 MG/DL — SIGNIFICANT CHANGE UP (ref 0.7–1.5)
CREAT SERPL-MCNC: 0.7 MG/DL — SIGNIFICANT CHANGE UP (ref 0.7–1.5)
CULTURE RESULTS: SIGNIFICANT CHANGE UP
CULTURE RESULTS: SIGNIFICANT CHANGE UP
EGFR: 90 ML/MIN/1.73M2 — SIGNIFICANT CHANGE UP
EGFR: 90 ML/MIN/1.73M2 — SIGNIFICANT CHANGE UP
EOSINOPHIL # BLD AUTO: 0.14 K/UL — SIGNIFICANT CHANGE UP (ref 0–0.7)
EOSINOPHIL # BLD AUTO: 0.14 K/UL — SIGNIFICANT CHANGE UP (ref 0–0.7)
EOSINOPHIL NFR BLD AUTO: 2.2 % — SIGNIFICANT CHANGE UP (ref 0–8)
EOSINOPHIL NFR BLD AUTO: 2.2 % — SIGNIFICANT CHANGE UP (ref 0–8)
GLUCOSE SERPL-MCNC: 100 MG/DL — HIGH (ref 70–99)
GLUCOSE SERPL-MCNC: 100 MG/DL — HIGH (ref 70–99)
HCT VFR BLD CALC: 39.9 % — SIGNIFICANT CHANGE UP (ref 37–47)
HCT VFR BLD CALC: 39.9 % — SIGNIFICANT CHANGE UP (ref 37–47)
HGB BLD-MCNC: 13.4 G/DL — SIGNIFICANT CHANGE UP (ref 12–16)
HGB BLD-MCNC: 13.4 G/DL — SIGNIFICANT CHANGE UP (ref 12–16)
IMM GRANULOCYTES NFR BLD AUTO: 0.3 % — SIGNIFICANT CHANGE UP (ref 0.1–0.3)
IMM GRANULOCYTES NFR BLD AUTO: 0.3 % — SIGNIFICANT CHANGE UP (ref 0.1–0.3)
LYMPHOCYTES # BLD AUTO: 2 K/UL — SIGNIFICANT CHANGE UP (ref 1.2–3.4)
LYMPHOCYTES # BLD AUTO: 2 K/UL — SIGNIFICANT CHANGE UP (ref 1.2–3.4)
LYMPHOCYTES # BLD AUTO: 31.2 % — SIGNIFICANT CHANGE UP (ref 20.5–51.1)
LYMPHOCYTES # BLD AUTO: 31.2 % — SIGNIFICANT CHANGE UP (ref 20.5–51.1)
MCHC RBC-ENTMCNC: 29.2 PG — SIGNIFICANT CHANGE UP (ref 27–31)
MCHC RBC-ENTMCNC: 29.2 PG — SIGNIFICANT CHANGE UP (ref 27–31)
MCHC RBC-ENTMCNC: 33.6 G/DL — SIGNIFICANT CHANGE UP (ref 32–37)
MCHC RBC-ENTMCNC: 33.6 G/DL — SIGNIFICANT CHANGE UP (ref 32–37)
MCV RBC AUTO: 86.9 FL — SIGNIFICANT CHANGE UP (ref 81–99)
MCV RBC AUTO: 86.9 FL — SIGNIFICANT CHANGE UP (ref 81–99)
MONOCYTES # BLD AUTO: 0.49 K/UL — SIGNIFICANT CHANGE UP (ref 0.1–0.6)
MONOCYTES # BLD AUTO: 0.49 K/UL — SIGNIFICANT CHANGE UP (ref 0.1–0.6)
MONOCYTES NFR BLD AUTO: 7.6 % — SIGNIFICANT CHANGE UP (ref 1.7–9.3)
MONOCYTES NFR BLD AUTO: 7.6 % — SIGNIFICANT CHANGE UP (ref 1.7–9.3)
NEUTROPHILS # BLD AUTO: 3.7 K/UL — SIGNIFICANT CHANGE UP (ref 1.4–6.5)
NEUTROPHILS # BLD AUTO: 3.7 K/UL — SIGNIFICANT CHANGE UP (ref 1.4–6.5)
NEUTROPHILS NFR BLD AUTO: 57.8 % — SIGNIFICANT CHANGE UP (ref 42.2–75.2)
NEUTROPHILS NFR BLD AUTO: 57.8 % — SIGNIFICANT CHANGE UP (ref 42.2–75.2)
NRBC # BLD: 0 /100 WBCS — SIGNIFICANT CHANGE UP (ref 0–0)
NRBC # BLD: 0 /100 WBCS — SIGNIFICANT CHANGE UP (ref 0–0)
PLATELET # BLD AUTO: 200 K/UL — SIGNIFICANT CHANGE UP (ref 130–400)
PLATELET # BLD AUTO: 200 K/UL — SIGNIFICANT CHANGE UP (ref 130–400)
PMV BLD: 10.7 FL — HIGH (ref 7.4–10.4)
PMV BLD: 10.7 FL — HIGH (ref 7.4–10.4)
POTASSIUM SERPL-MCNC: 3.8 MMOL/L — SIGNIFICANT CHANGE UP (ref 3.5–5)
POTASSIUM SERPL-MCNC: 3.8 MMOL/L — SIGNIFICANT CHANGE UP (ref 3.5–5)
POTASSIUM SERPL-SCNC: 3.8 MMOL/L — SIGNIFICANT CHANGE UP (ref 3.5–5)
POTASSIUM SERPL-SCNC: 3.8 MMOL/L — SIGNIFICANT CHANGE UP (ref 3.5–5)
PROT SERPL-MCNC: 7.4 G/DL — SIGNIFICANT CHANGE UP (ref 6–8)
PROT SERPL-MCNC: 7.4 G/DL — SIGNIFICANT CHANGE UP (ref 6–8)
RBC # BLD: 4.59 M/UL — SIGNIFICANT CHANGE UP (ref 4.2–5.4)
RBC # BLD: 4.59 M/UL — SIGNIFICANT CHANGE UP (ref 4.2–5.4)
RBC # FLD: 14.4 % — SIGNIFICANT CHANGE UP (ref 11.5–14.5)
RBC # FLD: 14.4 % — SIGNIFICANT CHANGE UP (ref 11.5–14.5)
SODIUM SERPL-SCNC: 142 MMOL/L — SIGNIFICANT CHANGE UP (ref 135–146)
SODIUM SERPL-SCNC: 142 MMOL/L — SIGNIFICANT CHANGE UP (ref 135–146)
SPECIMEN SOURCE: SIGNIFICANT CHANGE UP
SPECIMEN SOURCE: SIGNIFICANT CHANGE UP
WBC # BLD: 6.41 K/UL — SIGNIFICANT CHANGE UP (ref 4.8–10.8)
WBC # BLD: 6.41 K/UL — SIGNIFICANT CHANGE UP (ref 4.8–10.8)
WBC # FLD AUTO: 6.41 K/UL — SIGNIFICANT CHANGE UP (ref 4.8–10.8)
WBC # FLD AUTO: 6.41 K/UL — SIGNIFICANT CHANGE UP (ref 4.8–10.8)

## 2024-01-06 RX ORDER — ALPRAZOLAM 0.25 MG
0.5 TABLET ORAL ONCE
Refills: 0 | Status: DISCONTINUED | OUTPATIENT
Start: 2024-01-06 | End: 2024-01-06

## 2024-01-06 RX ORDER — CEFTRIAXONE 500 MG/1
1000 INJECTION, POWDER, FOR SOLUTION INTRAMUSCULAR; INTRAVENOUS EVERY 24 HOURS
Refills: 0 | Status: COMPLETED | OUTPATIENT
Start: 2024-01-06 | End: 2024-01-08

## 2024-01-06 RX ADMIN — GABAPENTIN 600 MILLIGRAM(S): 400 CAPSULE ORAL at 05:54

## 2024-01-06 RX ADMIN — LOSARTAN POTASSIUM 100 MILLIGRAM(S): 100 TABLET, FILM COATED ORAL at 05:54

## 2024-01-06 RX ADMIN — Medication 100 MICROGRAM(S): at 05:55

## 2024-01-06 RX ADMIN — DULOXETINE HYDROCHLORIDE 30 MILLIGRAM(S): 30 CAPSULE, DELAYED RELEASE ORAL at 12:05

## 2024-01-06 RX ADMIN — GABAPENTIN 600 MILLIGRAM(S): 400 CAPSULE ORAL at 21:11

## 2024-01-06 RX ADMIN — AMLODIPINE BESYLATE 5 MILLIGRAM(S): 2.5 TABLET ORAL at 05:55

## 2024-01-06 RX ADMIN — HEPARIN SODIUM 5000 UNIT(S): 5000 INJECTION INTRAVENOUS; SUBCUTANEOUS at 05:54

## 2024-01-06 RX ADMIN — Medication 81 MILLIGRAM(S): at 12:05

## 2024-01-06 RX ADMIN — SERTRALINE 100 MILLIGRAM(S): 25 TABLET, FILM COATED ORAL at 12:05

## 2024-01-06 RX ADMIN — Medication 3 MILLIGRAM(S): at 21:11

## 2024-01-06 RX ADMIN — Medication 50 MILLIGRAM(S): at 05:55

## 2024-01-06 RX ADMIN — HEPARIN SODIUM 5000 UNIT(S): 5000 INJECTION INTRAVENOUS; SUBCUTANEOUS at 17:24

## 2024-01-06 RX ADMIN — CEFTRIAXONE 100 MILLIGRAM(S): 500 INJECTION, POWDER, FOR SOLUTION INTRAMUSCULAR; INTRAVENOUS at 16:30

## 2024-01-06 RX ADMIN — GABAPENTIN 600 MILLIGRAM(S): 400 CAPSULE ORAL at 13:05

## 2024-01-06 RX ADMIN — Medication 0.5 MILLIGRAM(S): at 21:11

## 2024-01-06 RX ADMIN — ATORVASTATIN CALCIUM 40 MILLIGRAM(S): 80 TABLET, FILM COATED ORAL at 21:11

## 2024-01-07 RX ADMIN — HEPARIN SODIUM 5000 UNIT(S): 5000 INJECTION INTRAVENOUS; SUBCUTANEOUS at 06:33

## 2024-01-07 RX ADMIN — SERTRALINE 100 MILLIGRAM(S): 25 TABLET, FILM COATED ORAL at 11:33

## 2024-01-07 RX ADMIN — HEPARIN SODIUM 5000 UNIT(S): 5000 INJECTION INTRAVENOUS; SUBCUTANEOUS at 17:28

## 2024-01-07 RX ADMIN — Medication 100 MICROGRAM(S): at 06:32

## 2024-01-07 RX ADMIN — Medication 650 MILLIGRAM(S): at 16:28

## 2024-01-07 RX ADMIN — ATORVASTATIN CALCIUM 40 MILLIGRAM(S): 80 TABLET, FILM COATED ORAL at 21:58

## 2024-01-07 RX ADMIN — DULOXETINE HYDROCHLORIDE 30 MILLIGRAM(S): 30 CAPSULE, DELAYED RELEASE ORAL at 11:33

## 2024-01-07 RX ADMIN — GABAPENTIN 600 MILLIGRAM(S): 400 CAPSULE ORAL at 21:59

## 2024-01-07 RX ADMIN — GABAPENTIN 600 MILLIGRAM(S): 400 CAPSULE ORAL at 06:33

## 2024-01-07 RX ADMIN — GABAPENTIN 600 MILLIGRAM(S): 400 CAPSULE ORAL at 13:57

## 2024-01-07 RX ADMIN — Medication 81 MILLIGRAM(S): at 11:33

## 2024-01-07 RX ADMIN — CEFTRIAXONE 100 MILLIGRAM(S): 500 INJECTION, POWDER, FOR SOLUTION INTRAMUSCULAR; INTRAVENOUS at 13:57

## 2024-01-07 NOTE — PROGRESS NOTE ADULT - SUBJECTIVE AND OBJECTIVE BOX
ТАТЬЯНА MULTANI  74y  Female      Patient is a 74y old  Female who presents with a chief complaint of Fall (05 Jan 2024 22:03)    74 year-old female with PMH of HTN, HLD, CAD s/p stents and PSH of ILR (04/2023), L. AKA, Knee replacement, Inguinal hernia repair presents c/o of headache, facial pain, abdominal pain after fall 1 week ago on Sunday. Patient reports she went to get something from the kitchen and as she was walking through her living room she fell and blacked out for about 5 minutes. Prior to the fall, she denies experiencing dizziness, lightheadedness, tongue bitting, urinary incontinence or anything out of the ordinary. After regaining consciousness, she denies experiencing lightheadedness, dizziness or confused. She endorses a frontal headache, facial pain around her nose, and abdominal pain. Headache is localized to frontal region bilaterally, rated 9/10 severity, described as constant that has not become worse since onset, transient relief provided by Tylenol and nothing makes it worse, denies headache of this nature in the past. Abdominal pain localized to LUQ and bilateral flanks L>>R rated 10/10 in severity, worse with movement. Her son who lives downstairs in the same home helped her and gave her ice packs to place on her head. Of note, she was not wearing her L. leg prosthesis and has a history of multiple falls due to poor balance on the prosthesis. Endorses nausea, decreased appetite, burning with urination. Burning with urination began ____. Urinary incontinence? ____. Denies chest pain, palpitations, fever, chills, changes in vision, dizziness, lightheadedness, constipation, diarrhea, bleeding.     REVIEW OF SYSTEMS:  CONSTITUTIONAL: No fever, weight loss, or fatigue  EYES: No eye pain, visual disturbances, or discharge  ENMT:  No difficulty hearing, tinnitus, vertigo; No sinus or throat pain  NECK: No pain or stiffness  BREASTS: No pain, masses, or nipple discharge  RESPIRATORY: No cough, wheezing, chills or hemoptysis; No shortness of breath  CARDIOVASCULAR: No chest pain, palpitations, dizziness, or leg swelling  GASTROINTESTINAL: No abdominal or epigastric pain. No nausea, vomiting, or hematemesis; No diarrhea or constipation. No melena or hematochezia.  GENITOURINARY: No dysuria, frequency, hematuria, or incontinence  PSYCHIATRIC: No depression, anxiety, mood swings, or difficulty sleeping  HEME/LYMPH: No easy bruising, or bleeding gums  ALLERY AND IMMUNOLOGIC: No hives or eczema  FAMILY HISTORY:    T(C): 36 (01-07-24 @ 05:00), Max: 36.3 (01-06-24 @ 19:15)  HR: 60 (01-07-24 @ 05:00) (60 - 73)  BP: 120/60 (01-07-24 @ 05:00) (120/60 - 176/95)  RR: 18 (01-07-24 @ 05:00) (18 - 18)  SpO2: 97% (01-06-24 @ 19:15) (97% - 97%)  Wt(kg): --Vital Signs Last 24 Hrs  T(C): 36 (07 Jan 2024 05:00), Max: 36.3 (06 Jan 2024 19:15)  T(F): 96.8 (07 Jan 2024 05:00), Max: 97.3 (06 Jan 2024 19:15)  HR: 60 (07 Jan 2024 05:00) (60 - 73)  BP: 120/60 (07 Jan 2024 05:00) (120/60 - 176/95)  BP(mean): --  RR: 18 (07 Jan 2024 05:00) (18 - 18)  SpO2: 97% (06 Jan 2024 19:15) (97% - 97%)    Parameters below as of 06 Jan 2024 19:15  Patient On (Oxygen Delivery Method): room air      No Known Allergies      PHYSICAL EXAM:  GENERAL: NAD,  HEAD:  Atraumatic, Normocephalic  EYES: EOMI, PERRLA, conjunctiva and sclera clear  ENMT: No tonsillar erythema, exudates, or enlargement;   NECK: Supple, No JVD, Normal thyroid  NERVOUS SYSTEM:  Alert & Oriented X3,  CHEST/LUNG: Clear to percussion bilaterally; No rales, rhonchi, wheezing, or rubs  HEART: Regular rate and rhythm; No murmurs, rubs, or gallops  ABDOMEN: Soft, Nontender, Nondistended; Bowel sounds present  EXTREMITIES:   No clubbing, cyanosis, or edema  LYMPH: No lymphadenopathy noted  SKIN: No rashes or lesions      LABS:  01-06    142  |  102  |  14  ----------------------------<  100<H>  3.8   |  25  |  0.7    Ca    9.5      06 Jan 2024 08:19    TPro  7.4  /  Alb  4.3  /  TBili  0.8  /  DBili  x   /  AST  25  /  ALT  21  /  AlkPhos  152<H>  01-06                          13.4   6.41  )-----------( 200      ( 06 Jan 2024 08:19 )             39.9       < from: CT Abdomen and Pelvis w/ IV Cont (01.05.24 @ 13:39) >  Wall thickening of the cecum, which may represent acute or chronic   colitis. Clinical correlation is recommended.    Chronic wall thickening the sigmoid colon.    If not already recently performed, on an outpatient basis colonoscopy is  recommended for further evaluation.    New decubitus ulcer formation involving the soft tissues posterior to the   left hip    Severely distended bladder    --- End of Report ---            < end of copied text >  < from: CT Cervical Spine No Cont (01.05.24 @ 11:25) >  CT HEAD:  No acute intracranial pathology.    CT FACIAL BONES:  No acute maxillofacial fracture.    Small frontal scalp swelling.    CT CERVICAL SPINE:  No acute cervical fracture or facet subluxation.    < end of copied text >    RADIOLOGY & ADDITIONAL TESTS:    MEDICATION:  acetaminophen     Tablet .. 650 milliGRAM(s) Oral every 6 hours PRN  aluminum hydroxide/magnesium hydroxide/simethicone Suspension 30 milliLiter(s) Oral every 4 hours PRN  amLODIPine   Tablet 5 milliGRAM(s) Oral daily  aspirin  chewable 81 milliGRAM(s) Oral daily  atorvastatin 40 milliGRAM(s) Oral at bedtime  cefTRIAXone   IVPB 1000 milliGRAM(s) IV Intermittent every 24 hours  DULoxetine 30 milliGRAM(s) Oral daily  gabapentin 600 milliGRAM(s) Oral three times a day  heparin   Injectable 5000 Unit(s) SubCutaneous every 12 hours  hydrochlorothiazide 25 milliGRAM(s) Oral daily  levothyroxine 100 MICROGram(s) Oral daily  losartan 100 milliGRAM(s) Oral daily  melatonin 3 milliGRAM(s) Oral at bedtime PRN  metoprolol succinate ER 50 milliGRAM(s) Oral daily  ondansetron Injectable 4 milliGRAM(s) IV Push every 8 hours PRN  sertraline 100 milliGRAM(s) Oral daily      HEALTH ISSUES - PROBLEM Dx:    #Fall with (+) LOC 2/2 Loss of Balance without LLE Prosthesis  - history of multiple falls   - s/p L. AKA  - ambulates using LLE prosthesis (still adjusting  with aid of OP & home PT)  - fall on Sunday while not using LLE prothesis followed by LOC and return to baseline with return of consciousness (no prodrome or post-ictal state)  - denied preceding dizziness   - CTH noted small frontal scalp swelling, negative for acute intracranial pathology  - CT C-spine: (-)  - CT A/P (-)  - CT Maxillofacial (-)  - Seen by EP in ED  - ILR interrogated  - No episodes recorded for last 4 months that would correlate with syncopal episode. Device working properly  - Started on Tylenol 650 mg PO q6h PRN- f/u Orthostatics  - PT/OT consulted    #Colitis  - CT A/P: Wall thickening of the cecum, which may represent acute or chronic   colitis.  - no WBC  - afebrile  - denies diarrhea  - will monitor off abx for now  - monitor for symptom improvement post-void/straight cath  - consider colonoscopy OP    #Urinary Retention  - Suprapubic abdominal pain likely 2/2 urinary retention  - CT A/P noted severely distended bladder, prominent periuterine vessels  - Post-void bladder scan  - If retaining, straight cath    #HTN  - c/w Losartan 100mg PO qd  - c/w HCTZ 25 mg PO qd    #CAD s/p stents  #Grade I Diastolic Dysfunction  - TTE (08/23): biplane EF 67%#Fall with (+) LOC 2/2 Loss of Balance without LLE Prosthesis  - history of multiple falls   - s/p L. AKA  - ambulates using LLE prosthesis (still adjusting  with aid of OP & home PT)  - fall on Sunday while not using LLE prothesis followed by LOC and return to baseline with return of consciousness (no prodrome or post-ictal state)  - denied preceding dizziness   - CTH noted small frontal scalp swelling, negative for acute intracranial pathology  - CT C-spine: (-)  - CT A/P (-)  - CT Maxillofacial (-)  - Seen by EP in ED  - ILR interrogated  - No episodes recorded for last 4 months that would correlate with syncopal episode. Device working properly

## 2024-01-08 LAB
SARS-COV-2 RNA SPEC QL NAA+PROBE: SIGNIFICANT CHANGE UP
SARS-COV-2 RNA SPEC QL NAA+PROBE: SIGNIFICANT CHANGE UP

## 2024-01-08 RX ADMIN — Medication 100 MICROGRAM(S): at 06:39

## 2024-01-08 RX ADMIN — ATORVASTATIN CALCIUM 40 MILLIGRAM(S): 80 TABLET, FILM COATED ORAL at 21:32

## 2024-01-08 RX ADMIN — LOSARTAN POTASSIUM 100 MILLIGRAM(S): 100 TABLET, FILM COATED ORAL at 06:39

## 2024-01-08 RX ADMIN — GABAPENTIN 600 MILLIGRAM(S): 400 CAPSULE ORAL at 21:31

## 2024-01-08 RX ADMIN — Medication 81 MILLIGRAM(S): at 11:50

## 2024-01-08 RX ADMIN — GABAPENTIN 600 MILLIGRAM(S): 400 CAPSULE ORAL at 14:58

## 2024-01-08 RX ADMIN — HEPARIN SODIUM 5000 UNIT(S): 5000 INJECTION INTRAVENOUS; SUBCUTANEOUS at 17:04

## 2024-01-08 RX ADMIN — AMLODIPINE BESYLATE 5 MILLIGRAM(S): 2.5 TABLET ORAL at 06:39

## 2024-01-08 RX ADMIN — GABAPENTIN 600 MILLIGRAM(S): 400 CAPSULE ORAL at 06:39

## 2024-01-08 RX ADMIN — Medication 50 MILLIGRAM(S): at 06:39

## 2024-01-08 RX ADMIN — CEFTRIAXONE 100 MILLIGRAM(S): 500 INJECTION, POWDER, FOR SOLUTION INTRAMUSCULAR; INTRAVENOUS at 14:58

## 2024-01-08 RX ADMIN — HEPARIN SODIUM 5000 UNIT(S): 5000 INJECTION INTRAVENOUS; SUBCUTANEOUS at 06:40

## 2024-01-08 RX ADMIN — SERTRALINE 100 MILLIGRAM(S): 25 TABLET, FILM COATED ORAL at 11:48

## 2024-01-08 RX ADMIN — DULOXETINE HYDROCHLORIDE 30 MILLIGRAM(S): 30 CAPSULE, DELAYED RELEASE ORAL at 11:48

## 2024-01-08 NOTE — PROGRESS NOTE ADULT - SUBJECTIVE AND OBJECTIVE BOX
NERISSA, ТАТЬЯНА  74y  Female  HPI:  74 year-old female with PMH of HTN, HLD, CAD s/p stents and PSH of ILR (04/2023), L. AKA, Knee replacement, Inguinal hernia repair presents c/o of headache, facial pain, abdominal pain after fall 1 week ago on Sunday. Patient reports she went to get something from the kitchen and as she was walking through her living room she fell and blacked out for about 5 minutes. Prior to the fall, she denies experiencing dizziness, lightheadedness, tongue bitting, urinary incontinence or anything out of the ordinary. After regaining consciousness, she denies experiencing lightheadedness, dizziness or confused. She endorses a frontal headache, facial pain around her nose, and abdominal pain. Headache is localized to frontal region bilaterally, rated 9/10 severity, described as constant that has not become worse since onset, transient relief provided by Tylenol and nothing makes it worse, denies headache of this nature in the past. Abdominal pain localized to LUQ and bilateral flanks L>>R rated 10/10 in severity, worse with movement. Her son who lives downstairs in the same home helped her and gave her ice packs to place on her head. Of note, she was not wearing her L. leg prosthesis and has a history of multiple falls due to poor balance on the prosthesis. Endorses nausea, decreased appetite, burning with urination. Burning with urination began ____. Urinary incontinence? ____. Denies chest pain, palpitations, fever, chills, changes in vision, dizziness, lightheadedness, constipation, diarrhea, bleeding.     In ED, Vital Signs:  BP Systolic: 162 mm Hg  BP Diastolic: 85 mm Hg  Heart Rate: 63 /min  Respiration Rate (breaths/min): 20 /min  Temp (F): 98.2 Degrees F  SpO2 (%): 98 %    s/p, 1L LR bolus, 1 dose Flagyl & Ciprofloxacin in ED.   Admitted to medicine for further work-up of syncope.      (05 Jan 2024 22:03)    MEDICATIONS  (STANDING):  amLODIPine   Tablet 5 milliGRAM(s) Oral daily  aspirin  chewable 81 milliGRAM(s) Oral daily  atorvastatin 40 milliGRAM(s) Oral at bedtime  cefTRIAXone   IVPB 1000 milliGRAM(s) IV Intermittent every 24 hours  DULoxetine 30 milliGRAM(s) Oral daily  gabapentin 600 milliGRAM(s) Oral three times a day  heparin   Injectable 5000 Unit(s) SubCutaneous every 12 hours  hydrochlorothiazide 25 milliGRAM(s) Oral daily  levothyroxine 100 MICROGram(s) Oral daily  losartan 100 milliGRAM(s) Oral daily  metoprolol succinate ER 50 milliGRAM(s) Oral daily  sertraline 100 milliGRAM(s) Oral daily    MEDICATIONS  (PRN):  acetaminophen     Tablet .. 650 milliGRAM(s) Oral every 6 hours PRN Temp greater or equal to 38C (100.4F), Mild Pain (1 - 3)  aluminum hydroxide/magnesium hydroxide/simethicone Suspension 30 milliLiter(s) Oral every 4 hours PRN Dyspepsia  melatonin 3 milliGRAM(s) Oral at bedtime PRN Insomnia  ondansetron Injectable 4 milliGRAM(s) IV Push every 8 hours PRN Nausea and/or Vomiting    INTERVAL EVENTS: Patient seen today facial bruising improved, swelling around her nose persist    T(C): 36.1 (01-08-24 @ 05:00), Max: 36.6 (01-07-24 @ 14:20)  HR: 75 (01-08-24 @ 05:00) (73 - 79)  BP: 140/77 (01-08-24 @ 05:00) (140/77 - 156/83)  RR: 18 (01-08-24 @ 05:00) (18 - 18)  SpO2: 97% (01-07-24 @ 14:20) (97% - 97%)  Wt(kg): --Vital Signs Last 24 Hrs  T(C): 36.1 (08 Jan 2024 05:00), Max: 36.6 (07 Jan 2024 14:20)  T(F): 97 (08 Jan 2024 05:00), Max: 97.8 (07 Jan 2024 14:20)  HR: 75 (08 Jan 2024 05:00) (73 - 79)  BP: 140/77 (08 Jan 2024 05:00) (140/77 - 156/83)  BP(mean): --  RR: 18 (08 Jan 2024 05:00) (18 - 18)  SpO2: 97% (07 Jan 2024 14:20) (97% - 97%)    Parameters below as of 07 Jan 2024 14:20  Patient On (Oxygen Delivery Method): room air        PHYSICAL EXAM:  GENERAL:   NECK: Supple, No JVD  CHEST/LUNG: Clear  HEART: S1, S2  ABDOMEN: Soft, Nontender, Nondistended; Bowel sounds present  EXTREMITIES: No clubbing, cyanosis, or edema  SKIN: Facial bruising    LABS:                         Culture - Urine (collected 05 Jan 2024 17:26)  Source: Clean Catch Clean Catch (Midstream)  Final Report (06 Jan 2024 22:06):    <10,000 CFU/mL Normal Urogenital [place]      RADIOLOGY & ADDITIONAL TESTS:

## 2024-01-08 NOTE — PROGRESS NOTE ADULT - ASSESSMENT
74 year-old female with PMH of HTN, HLD, CAD s/p stents and PSH of ILR (04/2023), L. AKA, Knee replacement, Inguinal hernia repair presents c/o of headache, facial pain, abdominal pain after walking through her living room, losing her balance and falling 1 week ago on Sunday. Admitted to medicine for further work-up of fall/syncopal episode.     #Fall with (+) LOC 2/2 Loss of Balance without LLE Prosthesis  - history of multiple falls   - s/p L. AKA  - ambulates using LLE prosthesis (still adjusting  with aid of OP & home PT)  - fall on Sunday while not using LLE prothesis followed by LOC and return to baseline with return of consciousness (no prodrome or post-ictal state)  - denied preceding dizziness   - CTH noted small frontal scalp swelling, negative for acute intracranial pathology  - CT C-spine: (-)  - CT A/P (-)  - CT Maxillofacial (-)  - Seen by EP in ED  - ILR interrogated: device working properly  - No episodes recorded for last 4 months that would correlate with syncopal episode. Device working properly  - Started on Tylenol 650 mg PO q6h PRN- f/u Orthostatics  - PT/OT consulted: rehab facility vs. home pending progress  - ENT appreciated: no acute interventions    #Colitis  - CT A/P: Wall thickening of the cecum, which may represent acute or chronic   colitis.  - no WBC  - afebrile  - denies diarrhea  - will monitor off abx for now  - monitor for symptom improvement post-void/straight cath  - consider colonoscopy OP    #Urinary Retention  - Suprapubic abdominal pain likely 2/2 urinary retention  - CT A/P noted severely distended bladder, prominent periuterine vessels  - Post-void bladder scan  - If retaining, straight cath    #HTN  - c/w Losartan 100mg PO qd  - c/w HCTZ 25 mg PO qd    #CAD s/p stents  #Grade I Diastolic Dysfunction  - TTE (08/23): biplane EF 67%#Fall with (+) LOC 2/2 Loss of Balance without LLE Prosthesis  - history of multiple falls   - s/p L. AKA  - ambulates using LLE prosthesis (still adjusting  with aid of OP & home PT)  - fall on Sunday while not using LLE prothesis followed by LOC and return to baseline with return of consciousness (no prodrome or post-ictal state)  - denied preceding dizziness   - CTH noted small frontal scalp swelling, negative for acute intracranial pathology  - CT C-spine: (-)  - CT A/P (-)  - CT Maxillofacial (-)  - Seen by EP in ED  - ILR interrogated  - No episodes recorded for last 4 months that would correlate with syncopal episode. Device working properly    #COVID exposure  -f/u covid and isolation precautions in meantime    Handoff: f/u COVID, anticipate DC to home vs STR

## 2024-01-08 NOTE — CHART NOTE - NSCHARTNOTEFT_GEN_A_CORE
ENT was consulted for possible nasal bone fracture.    < from: CT Maxillofacial No Cont (01.05.24 @ 11:21) >  IMPRESSION:  CT HEAD:  No acute intracranial pathology.  CT FACIAL BONES:  No acute maxillofacial fracture.  Small frontal scalp swelling.  CT CERVICAL SPINE:  No acute cervical fracture or facet subluxation.    BRIAN DONATO MD; Attending Radiologist  This document has been electronically signed. Jan 5 2024 11:44AM    No CT evidence for nasal bone fracture as of 1/5/2024  Discussed with medical team  Continue care per primary team .

## 2024-01-08 NOTE — PROGRESS NOTE ADULT - ASSESSMENT
74 year-old female with PMH of HTN, HLD, CAD s/p stents and PSH of ILR (04/2023), L. AKA, Knee replacement, Inguinal hernia repair presents c/o of headache, facial pain, abdominal pain after walking through her living room, losing her balance and falling 1 week ago on Sunday. Admitted to medicine for further work-up of fall/syncopal episode.     Syncope vs Fall with (+) LOC 2/2 Loss of Balance without LLE Prosthesis  - no diffensive injures?  - history of multiple falls   - s/p L. AKA  - ambulates using LLE prosthesis (still adjusting  with aid of OP & home PT)  - fall on Sunday while not using LLE prothesis followed by LOC and return to baseline with return of consciousness (no prodrome or post-ictal state)  - denied preceding dizziness   - CTH noted small frontal scalp swelling, negative for acute intracranial pathology  - CT C-spine: (-)  - CT A/P (-)  - CT Maxillofacial (-)  - Seen by EP in ED  - ILR interrogated: device working properly  - No episodes recorded for last 4 months that would correlate with syncopal episode. Device working properly  - Started on Tylenol 650 mg PO q6h PRN- f/u Orthostatics  - PT/OT consulted: rehab facility vs. home pending progress  - ENT eval re facial pain    Colitis  - CT A/P: Wall thickening of the cecum, which may represent acute or chronic   colitis.  - no WBC  - afebrile  - denies diarrhea  - will monitor off abx for now  - monitor for symptom improvement post-void/straight cath  - consider colonoscopy OP    Urinary Retention  - Suprapubic abdominal pain likely 2/2 urinary retention  - CT A/P noted severely distended bladder, prominent periuterine vessels  - Post-void bladder scan  - If retaining, straight cath    HTN  - on Losartan 100mg PO qd and HCTZ 25 mg PO qd    CAD s/p stents  Grade I Diastolic Dysfunction  - TTE (08/23): biplane EF 67%#Fall with (+) LOC 2/2 Loss of Balance without LLE Prosthesis  - history of multiple falls   - s/p L. AKA  - ambulates using LLE prosthesis (still adjusting  with aid of OP & home PT)  - fall on Sunday while not using LLE prothesis followed by LOC and return to baseline with return of consciousness (no prodrome or post-ictal state)  - denied preceding dizziness   - CTH noted small frontal scalp swelling, negative for acute intracranial pathology  - CT C-spine: (-)  - CT A/P (-)  - CT Maxillofacial (-)  - Seen by EP in ED  - ILR interrogated  - No episodes recorded for last 4 months that would correlate with syncopal episode. Device working properly    COVID exposure  -f/u covid and isolation precautions in meantime    Anticipate DC to home vs STR
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
## 2024-01-08 NOTE — OCCUPATIONAL THERAPY INITIAL EVALUATION ADULT - PERTINENT HX OF CURRENT PROBLEM, REHAB EVAL
74 year-old female with PMH of HTN, HLD, CAD s/p stents and PSH of ILR (04/2023), L. AKA, Knee replacement, Inguinal hernia repair presents c/o of headache, facial pain, abdominal pain after fall 1 week ago on Sunday. Patient reports she went to get something from the kitchen and as she was walking through her living room she fell and blacked out for about 5 minutes. Prior to the fall, she denies experiencing dizziness, lightheadedness, tongue bitting, urinary incontinence or anything out of the ordinary. After regaining consciousness, she denies experiencing lightheadedness, dizziness or confused. She endorses a frontal headache, facial pain around her nose, and abdominal pain. Headache is localized to frontal region bilaterally, rated 9/10 severity, described as constant that has not become worse since onset, transient relief provided by Tylenol and nothing makes it worse, denies headache of this nature in the past. Abdominal pain localized to LUQ and bilateral flanks L>>R rated 10/10 in severity, worse with movement. Her son who lives downstairs in the same home helped her and gave her ice packs to place on her head. Of note, she was not wearing her L. leg prosthesis and has a history of multiple falls due to poor balance on the prosthesis. Endorses nausea, decreased appetite, burning with urination. Burning with urination began ____. Urinary incontinence? ____. Denies chest pain, palpitations, fever, chills, changes in vision, dizziness, lightheadedness, constipation, diarrhea, bleeding.

## 2024-01-09 RX ADMIN — GABAPENTIN 600 MILLIGRAM(S): 400 CAPSULE ORAL at 13:19

## 2024-01-09 RX ADMIN — Medication 650 MILLIGRAM(S): at 22:14

## 2024-01-09 RX ADMIN — ATORVASTATIN CALCIUM 40 MILLIGRAM(S): 80 TABLET, FILM COATED ORAL at 21:25

## 2024-01-09 RX ADMIN — Medication 100 MICROGRAM(S): at 06:04

## 2024-01-09 RX ADMIN — LOSARTAN POTASSIUM 100 MILLIGRAM(S): 100 TABLET, FILM COATED ORAL at 06:04

## 2024-01-09 RX ADMIN — Medication 81 MILLIGRAM(S): at 11:23

## 2024-01-09 RX ADMIN — Medication 50 MILLIGRAM(S): at 06:04

## 2024-01-09 RX ADMIN — SERTRALINE 100 MILLIGRAM(S): 25 TABLET, FILM COATED ORAL at 11:23

## 2024-01-09 RX ADMIN — Medication 650 MILLIGRAM(S): at 21:26

## 2024-01-09 RX ADMIN — HEPARIN SODIUM 5000 UNIT(S): 5000 INJECTION INTRAVENOUS; SUBCUTANEOUS at 17:51

## 2024-01-09 RX ADMIN — GABAPENTIN 600 MILLIGRAM(S): 400 CAPSULE ORAL at 06:06

## 2024-01-09 RX ADMIN — AMLODIPINE BESYLATE 5 MILLIGRAM(S): 2.5 TABLET ORAL at 06:04

## 2024-01-09 RX ADMIN — DULOXETINE HYDROCHLORIDE 30 MILLIGRAM(S): 30 CAPSULE, DELAYED RELEASE ORAL at 11:23

## 2024-01-09 RX ADMIN — HEPARIN SODIUM 5000 UNIT(S): 5000 INJECTION INTRAVENOUS; SUBCUTANEOUS at 06:04

## 2024-01-09 RX ADMIN — GABAPENTIN 600 MILLIGRAM(S): 400 CAPSULE ORAL at 21:25

## 2024-01-09 NOTE — ADVANCED PRACTICE NURSE CONSULT - RECOMMEDATIONS
Cleanse left groin with soap and water.   Pat dry apply triad twice a day and prn for soiling.   Maintain pressure injury prevention.   Keep skin clean.   Maintain incontinence care.   Monitor wound for changes and notify provider

## 2024-01-09 NOTE — PROGRESS NOTE ADULT - SUBJECTIVE AND OBJECTIVE BOX
NERISSA, ТАТЬЯНА  74y  Female  HPI:  74 year-old female with PMH of HTN, HLD, CAD s/p stents and PSH of ILR (04/2023), L. AKA, Knee replacement, Inguinal hernia repair presents c/o of headache, facial pain, abdominal pain after fall 1 week ago on Sunday. Patient reports she went to get something from the kitchen and as she was walking through her living room she fell and blacked out for about 5 minutes. Prior to the fall, she denies experiencing dizziness, lightheadedness, tongue bitting, urinary incontinence or anything out of the ordinary. After regaining consciousness, she denies experiencing lightheadedness, dizziness or confused. She endorses a frontal headache, facial pain around her nose, and abdominal pain. Headache is localized to frontal region bilaterally, rated 9/10 severity, described as constant that has not become worse since onset, transient relief provided by Tylenol and nothing makes it worse, denies headache of this nature in the past. Abdominal pain localized to LUQ and bilateral flanks L>>R rated 10/10 in severity, worse with movement. Her son who lives downstairs in the same home helped her and gave her ice packs to place on her head. Of note, she was not wearing her L. leg prosthesis and has a history of multiple falls due to poor balance on the prosthesis. Endorses nausea, decreased appetite, burning with urination. Burning with urination began ____. Urinary incontinence? ____. Denies chest pain, palpitations, fever, chills, changes in vision, dizziness, lightheadedness, constipation, diarrhea, bleeding.     In ED, Vital Signs:  BP Systolic: 162 mm Hg  BP Diastolic: 85 mm Hg  Heart Rate: 63 /min  Respiration Rate (breaths/min): 20 /min  Temp (F): 98.2 Degrees F  SpO2 (%): 98 %    s/p, 1L LR bolus, 1 dose Flagyl & Ciprofloxacin in ED.   Admitted to medicine for further work-up of syncope.      (05 Jan 2024 22:03)    MEDICATIONS  (STANDING):  amLODIPine   Tablet 5 milliGRAM(s) Oral daily  aspirin  chewable 81 milliGRAM(s) Oral daily  atorvastatin 40 milliGRAM(s) Oral at bedtime  DULoxetine 30 milliGRAM(s) Oral daily  gabapentin 600 milliGRAM(s) Oral three times a day  heparin   Injectable 5000 Unit(s) SubCutaneous every 12 hours  hydrochlorothiazide 25 milliGRAM(s) Oral daily  levothyroxine 100 MICROGram(s) Oral daily  losartan 100 milliGRAM(s) Oral daily  metoprolol succinate ER 50 milliGRAM(s) Oral daily  sertraline 100 milliGRAM(s) Oral daily    MEDICATIONS  (PRN):  acetaminophen     Tablet .. 650 milliGRAM(s) Oral every 6 hours PRN Temp greater or equal to 38C (100.4F), Mild Pain (1 - 3)  aluminum hydroxide/magnesium hydroxide/simethicone Suspension 30 milliLiter(s) Oral every 4 hours PRN Dyspepsia  melatonin 3 milliGRAM(s) Oral at bedtime PRN Insomnia  ondansetron Injectable 4 milliGRAM(s) IV Push every 8 hours PRN Nausea and/or Vomiting    INTERVAL EVENTS: Patient seen today     T(C): 36.2 (01-09-24 @ 05:00), Max: 36.6 (01-08-24 @ 13:39)  HR: 60 (01-09-24 @ 06:00) (56 - 67)  BP: 116/67 (01-09-24 @ 06:00) (110/55 - 151/81)  RR: 18 (01-09-24 @ 05:00) (18 - 18)  SpO2: 97% (01-08-24 @ 20:33) (97% - 98%)  Wt(kg): --Vital Signs Last 24 Hrs  T(C): 36.2 (09 Jan 2024 05:00), Max: 36.6 (08 Jan 2024 13:39)  T(F): 97.2 (09 Jan 2024 05:00), Max: 97.8 (08 Jan 2024 13:39)  HR: 60 (09 Jan 2024 06:00) (56 - 67)  BP: 116/67 (09 Jan 2024 06:00) (110/55 - 151/81)  BP(mean): 87 (09 Jan 2024 06:00) (87 - 87)  RR: 18 (09 Jan 2024 05:00) (18 - 18)  SpO2: 97% (08 Jan 2024 20:33) (97% - 98%)    Parameters below as of 08 Jan 2024 13:39  Patient On (Oxygen Delivery Method): room air        PHYSICAL EXAM:  GENERAL:   NECK: Supple, No JVD  CHEST/LUNG: Clear  HEART: S1, S2, Regular   ABDOMEN: Soft, Nontender,  Bowel sounds present  EXTREMITIES: No edema  SKIN: No rashes or lesions    LABS:              None new        RADIOLOGY & ADDITIONAL TESTS:     NERISSA, ТАТЬЯНА  74y  Female  HPI:  74 year-old female with PMH of HTN, HLD, CAD s/p stents and PSH of ILR (04/2023), L. AKA, Knee replacement, Inguinal hernia repair presents c/o of headache, facial pain, abdominal pain after fall 1 week ago on Sunday. Patient reports she went to get something from the kitchen and as she was walking through her living room she fell and blacked out for about 5 minutes. Prior to the fall, she denies experiencing dizziness, lightheadedness, tongue bitting, urinary incontinence or anything out of the ordinary. After regaining consciousness, she denies experiencing lightheadedness, dizziness or confused. She endorses a frontal headache, facial pain around her nose, and abdominal pain. Headache is localized to frontal region bilaterally, rated 9/10 severity, described as constant that has not become worse since onset, transient relief provided by Tylenol and nothing makes it worse, denies headache of this nature in the past. Abdominal pain localized to LUQ and bilateral flanks L>>R rated 10/10 in severity, worse with movement. Her son who lives downstairs in the same home helped her and gave her ice packs to place on her head. Of note, she was not wearing her L. leg prosthesis and has a history of multiple falls due to poor balance on the prosthesis. Endorses nausea, decreased appetite, burning with urination. Burning with urination began ____. Urinary incontinence? ____. Denies chest pain, palpitations, fever, chills, changes in vision, dizziness, lightheadedness, constipation, diarrhea, bleeding.     In ED, Vital Signs:  BP Systolic: 162 mm Hg  BP Diastolic: 85 mm Hg  Heart Rate: 63 /min  Respiration Rate (breaths/min): 20 /min  Temp (F): 98.2 Degrees F  SpO2 (%): 98 %    s/p, 1L LR bolus, 1 dose Flagyl & Ciprofloxacin in ED.   Admitted to medicine for further work-up of syncope.      (05 Jan 2024 22:03)    MEDICATIONS  (STANDING):  amLODIPine   Tablet 5 milliGRAM(s) Oral daily  aspirin  chewable 81 milliGRAM(s) Oral daily  atorvastatin 40 milliGRAM(s) Oral at bedtime  DULoxetine 30 milliGRAM(s) Oral daily  gabapentin 600 milliGRAM(s) Oral three times a day  heparin   Injectable 5000 Unit(s) SubCutaneous every 12 hours  hydrochlorothiazide 25 milliGRAM(s) Oral daily  levothyroxine 100 MICROGram(s) Oral daily  losartan 100 milliGRAM(s) Oral daily  metoprolol succinate ER 50 milliGRAM(s) Oral daily  sertraline 100 milliGRAM(s) Oral daily    MEDICATIONS  (PRN):  acetaminophen     Tablet .. 650 milliGRAM(s) Oral every 6 hours PRN Temp greater or equal to 38C (100.4F), Mild Pain (1 - 3)  aluminum hydroxide/magnesium hydroxide/simethicone Suspension 30 milliLiter(s) Oral every 4 hours PRN Dyspepsia  melatonin 3 milliGRAM(s) Oral at bedtime PRN Insomnia  ondansetron Injectable 4 milliGRAM(s) IV Push every 8 hours PRN Nausea and/or Vomiting    INTERVAL EVENTS: Patient seen today     T(C): 36.2 (01-09-24 @ 05:00), Max: 36.6 (01-08-24 @ 13:39)  HR: 60 (01-09-24 @ 06:00) (56 - 67)  BP: 116/67 (01-09-24 @ 06:00) (110/55 - 151/81)  RR: 18 (01-09-24 @ 05:00) (18 - 18)  SpO2: 97% (01-08-24 @ 20:33) (97% - 98%)  Wt(kg): --Vital Signs Last 24 Hrs  T(C): 36.2 (09 Jan 2024 05:00), Max: 36.6 (08 Jan 2024 13:39)  T(F): 97.2 (09 Jan 2024 05:00), Max: 97.8 (08 Jan 2024 13:39)  HR: 60 (09 Jan 2024 06:00) (56 - 67)  BP: 116/67 (09 Jan 2024 06:00) (110/55 - 151/81)  BP(mean): 87 (09 Jan 2024 06:00) (87 - 87)  RR: 18 (09 Jan 2024 05:00) (18 - 18)  SpO2: 97% (08 Jan 2024 20:33) (97% - 98%)    Parameters below as of 08 Jan 2024 13:39  Patient On (Oxygen Delivery Method): room air        PHYSICAL EXAM:  GENERAL:   NECK: Supple, No JVD  CHEST/LUNG: Clear  HEART: S1, S2, Regular   ABDOMEN: Soft, Nontender,  Bowel sounds present  EXTREMITIES: No edema  SKIN: No rashes or lesions    LABS:              None new    ovCOVID-19 PCR: NotDetec: Methodology:     RADIOLOGY & ADDITIONAL TESTS:
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
## 2024-01-09 NOTE — PROGRESS NOTE ADULT - ASSESSMENT
74 year-old female with PMH of HTN, HLD, CAD s/p stents and PSH of ILR (04/2023), L. AKA, Knee replacement, Inguinal hernia repair presents c/o of headache, facial pain, abdominal pain after walking through her living room, losing her balance and falling 1 week ago on Sunday. Admitted to medicine for further work-up of fall/syncopal episode.     Syncope vs Fall with (+) LOC 2/2 Loss of Balance without LLE Prosthesis  - no diffensive injures?  - history of multiple falls   - s/p L. AKA  - ambulates using LLE prosthesis (still adjusting  with aid of OP & home PT)  - fall on Sunday while not using LLE prothesis followed by LOC and return to baseline with return of consciousness (no prodrome or post-ictal state)  - denied preceding dizziness   - CTH noted small frontal scalp swelling, negative for acute intracranial pathology  - CT C-spine: (-)  - CT A/P (-)  - CT Maxillofacial (-)  - Seen by EP in ED  - ILR interrogated: device working properly  - No episodes recorded for last 4 months that would correlate with syncopal episode. Device working properly  - Started on Tylenol 650 mg PO q6h PRN- f/u [facility]  - PT/OT consulted: rehab facility vs. home with services  - ENT eval re facial pain noted    Colitis  - CT A/P: Wall thickening of the cecum, which may represent acute or chronic   colitis.  - no WBC  - afebrile  - denies diarrhea  - will monitor off abx for now  - monitor for symptom improvement post-void/straight cath  - consider colonoscopy OP    Urinary Retention  - Suprapubic abdominal pain likely 2/2 urinary retention  - CT A/P noted severely distended bladder, prominent periuterine vessels  - Post-void bladder scan    HTN  - on Losartan 100mg PO qd and HCTZ 25 mg PO qd    CAD s/p stents  Grade I Diastolic Dysfunction  - TTE (08/23): biplane EF 67%#Fall with (+) LOC 2/2 Loss of Balance without LLE Prosthesis  - history of multiple falls   - s/p L. AKA  - ambulates using LLE prosthesis (still adjusting  with aid of OP & home PT)  - fall on Sunday while not using LLE prothesis followed by LOC and return to baseline with return of consciousness (no prodrome or post-ictal state)  - denied preceding dizziness   - CTH noted small frontal scalp swelling, negative for acute intracranial pathology  - CT C-spine: (-)  - CT A/P (-)  - CT Maxillofacial (-)  - Seen by EP in ED  - ILR interrogated  - No episodes recorded for last 4 months that would correlate with syncopal episode. Device working properly    COVID exposure  -f/u covid and isolation precautions  - repeat Covid swab in am    Anticipate DC to home vs STR in 24-48 hours
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
## 2024-01-09 NOTE — PROGRESS NOTE ADULT - ASSESSMENT
74 year-old female with PMH of HTN, HLD, CAD s/p stents and PSH of ILR (04/2023), L. AKA, Knee replacement, Inguinal hernia repair presents c/o of headache, facial pain, abdominal pain after walking through her living room, losing her balance and falling 1 week ago on Sunday. Admitted to medicine for further work-up of fall/syncopal episode.     #Fall with (+) LOC 2/2 Loss of Balance without LLE Prosthesis  - history of multiple falls   - s/p L. AKA  - ambulates using LLE prosthesis (still adjusting  with aid of OP & home PT)  - fall on Sunday while not using LLE prothesis followed by LOC and return to baseline with return of consciousness (no prodrome or post-ictal state)  - denied preceding dizziness   - CTH noted small frontal scalp swelling, negative for acute intracranial pathology  - CT C-spine: (-)  - CT A/P (-)  - CT Maxillofacial (-)  - Seen by EP in ED  - ILR interrogated: device working properly  - No episodes recorded for last 4 months that would correlate with syncopal episode. Device working properly  - Started on Tylenol 650 mg PO q6h PRN- f/u Orthostatics  - PT/OT consulted: rehab facility vs. home pending progress  - ENT appreciated: no acute interventions    #L AKA wound at ostomy site  Cleanse left groin with soap and water.   Pat dry apply triad twice a day and prn for soiling.   Maintain pressure injury prevention.   Keep skin clean.   Maintain incontinence care.   Monitor wound for changes and notify provider       #Colitis  - CT A/P: Wall thickening of the cecum, which may represent acute or chronic   colitis.  - no WBC  - afebrile  - denies diarrhea  - will monitor off abx for now  - monitor for symptom improvement post-void/straight cath  - consider colonoscopy OP    #Urinary Retention  - Suprapubic abdominal pain likely 2/2 urinary retention  - CT A/P noted severely distended bladder, prominent periuterine vessels  - Post-void bladder scan  - If retaining, straight cath    #HTN  - c/w Losartan 100mg PO qd  - c/w HCTZ 25 mg PO qd    #CAD s/p stents  #Grade I Diastolic Dysfunction  - TTE (08/23): biplane EF 67%#Fall with (+) LOC 2/2 Loss of Balance without LLE Prosthesis  - history of multiple falls   - s/p L. AKA  - ambulates using LLE prosthesis (still adjusting  with aid of OP & home PT)  - fall on Sunday while not using LLE prothesis followed by LOC and return to baseline with return of consciousness (no prodrome or post-ictal state)  - denied preceding dizziness   - CTH noted small frontal scalp swelling, negative for acute intracranial pathology  - CT C-spine: (-)  - CT A/P (-)  - CT Maxillofacial (-)  - Seen by EP in ED  - ILR interrogated  - No episodes recorded for last 4 months that would correlate with syncopal episode. Device working properly    #COVID exposure  -f/u covid (1/10) and isolation precautions in meantime    Handoff: f/u COVID tomorrow, anticipate DC to home vs STR

## 2024-01-10 ENCOUNTER — TRANSCRIPTION ENCOUNTER (OUTPATIENT)
Age: 74
End: 2024-01-10

## 2024-01-10 LAB
ALBUMIN SERPL ELPH-MCNC: 4.4 G/DL — SIGNIFICANT CHANGE UP (ref 3.5–5.2)
ALBUMIN SERPL ELPH-MCNC: 4.4 G/DL — SIGNIFICANT CHANGE UP (ref 3.5–5.2)
ALP SERPL-CCNC: 159 U/L — HIGH (ref 30–115)
ALP SERPL-CCNC: 159 U/L — HIGH (ref 30–115)
ALT FLD-CCNC: 26 U/L — SIGNIFICANT CHANGE UP (ref 0–41)
ALT FLD-CCNC: 26 U/L — SIGNIFICANT CHANGE UP (ref 0–41)
ANION GAP SERPL CALC-SCNC: 15 MMOL/L — HIGH (ref 7–14)
ANION GAP SERPL CALC-SCNC: 15 MMOL/L — HIGH (ref 7–14)
AST SERPL-CCNC: 25 U/L — SIGNIFICANT CHANGE UP (ref 0–41)
AST SERPL-CCNC: 25 U/L — SIGNIFICANT CHANGE UP (ref 0–41)
BILIRUB SERPL-MCNC: 0.5 MG/DL — SIGNIFICANT CHANGE UP (ref 0.2–1.2)
BILIRUB SERPL-MCNC: 0.5 MG/DL — SIGNIFICANT CHANGE UP (ref 0.2–1.2)
BUN SERPL-MCNC: 26 MG/DL — HIGH (ref 10–20)
BUN SERPL-MCNC: 26 MG/DL — HIGH (ref 10–20)
CALCIUM SERPL-MCNC: 9.5 MG/DL — SIGNIFICANT CHANGE UP (ref 8.4–10.5)
CALCIUM SERPL-MCNC: 9.5 MG/DL — SIGNIFICANT CHANGE UP (ref 8.4–10.5)
CHLORIDE SERPL-SCNC: 101 MMOL/L — SIGNIFICANT CHANGE UP (ref 98–110)
CHLORIDE SERPL-SCNC: 101 MMOL/L — SIGNIFICANT CHANGE UP (ref 98–110)
CO2 SERPL-SCNC: 23 MMOL/L — SIGNIFICANT CHANGE UP (ref 17–32)
CO2 SERPL-SCNC: 23 MMOL/L — SIGNIFICANT CHANGE UP (ref 17–32)
CREAT SERPL-MCNC: 0.7 MG/DL — SIGNIFICANT CHANGE UP (ref 0.7–1.5)
CREAT SERPL-MCNC: 0.7 MG/DL — SIGNIFICANT CHANGE UP (ref 0.7–1.5)
EGFR: 90 ML/MIN/1.73M2 — SIGNIFICANT CHANGE UP
EGFR: 90 ML/MIN/1.73M2 — SIGNIFICANT CHANGE UP
GLUCOSE SERPL-MCNC: 97 MG/DL — SIGNIFICANT CHANGE UP (ref 70–99)
GLUCOSE SERPL-MCNC: 97 MG/DL — SIGNIFICANT CHANGE UP (ref 70–99)
HCT VFR BLD CALC: 41.5 % — SIGNIFICANT CHANGE UP (ref 37–47)
HCT VFR BLD CALC: 41.5 % — SIGNIFICANT CHANGE UP (ref 37–47)
HGB BLD-MCNC: 13.7 G/DL — SIGNIFICANT CHANGE UP (ref 12–16)
HGB BLD-MCNC: 13.7 G/DL — SIGNIFICANT CHANGE UP (ref 12–16)
MAGNESIUM SERPL-MCNC: 2 MG/DL — SIGNIFICANT CHANGE UP (ref 1.8–2.4)
MAGNESIUM SERPL-MCNC: 2 MG/DL — SIGNIFICANT CHANGE UP (ref 1.8–2.4)
MCHC RBC-ENTMCNC: 29.1 PG — SIGNIFICANT CHANGE UP (ref 27–31)
MCHC RBC-ENTMCNC: 29.1 PG — SIGNIFICANT CHANGE UP (ref 27–31)
MCHC RBC-ENTMCNC: 33 G/DL — SIGNIFICANT CHANGE UP (ref 32–37)
MCHC RBC-ENTMCNC: 33 G/DL — SIGNIFICANT CHANGE UP (ref 32–37)
MCV RBC AUTO: 88.3 FL — SIGNIFICANT CHANGE UP (ref 81–99)
MCV RBC AUTO: 88.3 FL — SIGNIFICANT CHANGE UP (ref 81–99)
NRBC # BLD: 0 /100 WBCS — SIGNIFICANT CHANGE UP (ref 0–0)
NRBC # BLD: 0 /100 WBCS — SIGNIFICANT CHANGE UP (ref 0–0)
PHOSPHATE SERPL-MCNC: 4.3 MG/DL — SIGNIFICANT CHANGE UP (ref 2.1–4.9)
PHOSPHATE SERPL-MCNC: 4.3 MG/DL — SIGNIFICANT CHANGE UP (ref 2.1–4.9)
PLATELET # BLD AUTO: 216 K/UL — SIGNIFICANT CHANGE UP (ref 130–400)
PLATELET # BLD AUTO: 216 K/UL — SIGNIFICANT CHANGE UP (ref 130–400)
PMV BLD: 10.7 FL — HIGH (ref 7.4–10.4)
PMV BLD: 10.7 FL — HIGH (ref 7.4–10.4)
POTASSIUM SERPL-MCNC: 4.2 MMOL/L — SIGNIFICANT CHANGE UP (ref 3.5–5)
POTASSIUM SERPL-MCNC: 4.2 MMOL/L — SIGNIFICANT CHANGE UP (ref 3.5–5)
POTASSIUM SERPL-SCNC: 4.2 MMOL/L — SIGNIFICANT CHANGE UP (ref 3.5–5)
POTASSIUM SERPL-SCNC: 4.2 MMOL/L — SIGNIFICANT CHANGE UP (ref 3.5–5)
PROT SERPL-MCNC: 7.5 G/DL — SIGNIFICANT CHANGE UP (ref 6–8)
PROT SERPL-MCNC: 7.5 G/DL — SIGNIFICANT CHANGE UP (ref 6–8)
RBC # BLD: 4.7 M/UL — SIGNIFICANT CHANGE UP (ref 4.2–5.4)
RBC # BLD: 4.7 M/UL — SIGNIFICANT CHANGE UP (ref 4.2–5.4)
RBC # FLD: 14.6 % — HIGH (ref 11.5–14.5)
RBC # FLD: 14.6 % — HIGH (ref 11.5–14.5)
SARS-COV-2 RNA SPEC QL NAA+PROBE: SIGNIFICANT CHANGE UP
SARS-COV-2 RNA SPEC QL NAA+PROBE: SIGNIFICANT CHANGE UP
SODIUM SERPL-SCNC: 139 MMOL/L — SIGNIFICANT CHANGE UP (ref 135–146)
SODIUM SERPL-SCNC: 139 MMOL/L — SIGNIFICANT CHANGE UP (ref 135–146)
WBC # BLD: 7.88 K/UL — SIGNIFICANT CHANGE UP (ref 4.8–10.8)
WBC # BLD: 7.88 K/UL — SIGNIFICANT CHANGE UP (ref 4.8–10.8)
WBC # FLD AUTO: 7.88 K/UL — SIGNIFICANT CHANGE UP (ref 4.8–10.8)
WBC # FLD AUTO: 7.88 K/UL — SIGNIFICANT CHANGE UP (ref 4.8–10.8)

## 2024-01-10 PROCEDURE — 73600 X-RAY EXAM OF ANKLE: CPT | Mod: 26,RT

## 2024-01-10 RX ADMIN — Medication 650 MILLIGRAM(S): at 13:24

## 2024-01-10 RX ADMIN — HEPARIN SODIUM 5000 UNIT(S): 5000 INJECTION INTRAVENOUS; SUBCUTANEOUS at 05:38

## 2024-01-10 RX ADMIN — ATORVASTATIN CALCIUM 40 MILLIGRAM(S): 80 TABLET, FILM COATED ORAL at 21:54

## 2024-01-10 RX ADMIN — HEPARIN SODIUM 5000 UNIT(S): 5000 INJECTION INTRAVENOUS; SUBCUTANEOUS at 18:01

## 2024-01-10 RX ADMIN — GABAPENTIN 600 MILLIGRAM(S): 400 CAPSULE ORAL at 05:38

## 2024-01-10 RX ADMIN — Medication 650 MILLIGRAM(S): at 14:24

## 2024-01-10 RX ADMIN — SERTRALINE 100 MILLIGRAM(S): 25 TABLET, FILM COATED ORAL at 11:45

## 2024-01-10 RX ADMIN — Medication 100 MICROGRAM(S): at 05:37

## 2024-01-10 RX ADMIN — Medication 50 MILLIGRAM(S): at 05:38

## 2024-01-10 RX ADMIN — GABAPENTIN 600 MILLIGRAM(S): 400 CAPSULE ORAL at 21:54

## 2024-01-10 RX ADMIN — LOSARTAN POTASSIUM 100 MILLIGRAM(S): 100 TABLET, FILM COATED ORAL at 05:37

## 2024-01-10 RX ADMIN — GABAPENTIN 600 MILLIGRAM(S): 400 CAPSULE ORAL at 13:26

## 2024-01-10 RX ADMIN — DULOXETINE HYDROCHLORIDE 30 MILLIGRAM(S): 30 CAPSULE, DELAYED RELEASE ORAL at 11:45

## 2024-01-10 RX ADMIN — AMLODIPINE BESYLATE 5 MILLIGRAM(S): 2.5 TABLET ORAL at 05:38

## 2024-01-10 RX ADMIN — Medication 81 MILLIGRAM(S): at 11:45

## 2024-01-10 NOTE — DISCHARGE NOTE PROVIDER - CARE PROVIDERS DIRECT ADDRESSES
,[email or web address]

## 2024-01-10 NOTE — DISCHARGE NOTE PROVIDER - NSDCMRMEDTOKEN_GEN_ALL_CORE_FT
acetaminophen 325 mg oral tablet: 2 tab(s) orally every 6 hours As needed Temp greater or equal to 38C (100.4F), Mild Pain (1 - 3)  amLODIPine 5 mg oral tablet: 1 tab(s) orally once a day  aspirin 81 mg oral tablet, chewable: 1 tab(s) orally once a day  atorvastatin 40 mg oral tablet: 1 tab(s) orally once a day (at bedtime)  DULoxetine 30 mg oral delayed release capsule: 1 tab(s) orally once a day  Florastor 250 mg oral capsule: 1 cap(s) orally 2 times a day  gabapentin 600 mg oral tablet: 1 tab(s) orally 3 times a day  levothyroxine 100 mcg (0.1 mg) oral tablet: 1 tab(s) orally once a day  losartan-hydroCHLOROthiazide 100 mg-25 mg oral tablet: 1 tab(s) orally once a day  metoprolol succinate 50 mg oral tablet, extended release: 1 tab(s) orally once a day  nystatin 100,000 units/g topical powder: Apply topically to affected area 2 times a day 1 Apply topically to affected area 2 times a day  sertraline 100 mg oral tablet: 1 tab(s) orally once a day

## 2024-01-10 NOTE — PROGRESS NOTE ADULT - SUBJECTIVE AND OBJECTIVE BOX
Chart reviewed, patient examined. Pertinent results reviewed.  Case discussed with HO; specialist f/u reviewed  HD#6  NERISSA ТАТЬЯНА    HPI:  74 year-old female with PMH of HTN, HLD, CAD s/p stents and PSH of ILR (04/2023), L. AKA, Knee replacement, Inguinal hernia repair presents c/o of headache, facial pain, abdominal pain after fall 1 week ago on Sunday. Patient reports she went to get something from the kitchen and as she was walking through her living room she fell and blacked out for about 5 minutes. Prior to the fall, she denies experiencing dizziness, lightheadedness, tongue bitting, urinary incontinence or anything out of the ordinary. After regaining consciousness, she denies experiencing lightheadedness, dizziness or confused. She endorses a frontal headache, facial pain around her nose, and abdominal pain. Headache is localized to frontal region bilaterally, rated 9/10 severity, described as constant that has not become worse since onset, transient relief provided by Tylenol and nothing makes it worse, denies headache of this nature in the past. Abdominal pain localized to LUQ and bilateral flanks L>>R rated 10/10 in severity, worse with movement. Her son who lives downstairs in the same home helped her and gave her ice packs to place on her head. Of note, she was not wearing her L. leg prosthesis and has a history of multiple falls due to poor balance on the prosthesis. Endorses nausea, decreased appetite, burning with urination. Burning with urination began ____. Urinary incontinence? ____. Denies chest pain, palpitations, fever, chills, changes in vision, dizziness, lightheadedness, constipation, diarrhea, bleeding.       INTERVAL EVENTS: Patient seen today ; DC is planned for today, and she is ready.    MEDICATIONS  (STANDING):  amLODIPine   Tablet 5 milliGRAM(s) Oral daily  aspirin  chewable 81 milliGRAM(s) Oral daily  atorvastatin 40 milliGRAM(s) Oral at bedtime  DULoxetine 30 milliGRAM(s) Oral daily  gabapentin 600 milliGRAM(s) Oral three times a day  heparin   Injectable 5000 Unit(s) SubCutaneous every 12 hours  hydrochlorothiazide 25 milliGRAM(s) Oral daily  levothyroxine 100 MICROGram(s) Oral daily  losartan 100 milliGRAM(s) Oral daily  metoprolol succinate ER 50 milliGRAM(s) Oral daily  sertraline 100 milliGRAM(s) Oral daily    MEDICATIONS  (PRN):  acetaminophen     Tablet .. 650 milliGRAM(s) Oral every 6 hours PRN Temp greater or equal to 38C (100.4F), Mild Pain (1 - 3)  aluminum hydroxide/magnesium hydroxide/simethicone Suspension 30 milliLiter(s) Oral every 4 hours PRN Dyspepsia  melatonin 3 milliGRAM(s) Oral at bedtime PRN Insomnia  ondansetron Injectable 4 milliGRAM(s) IV Push every 8 hours PRN Nausea and/or Vomiting      Vital Signs Last 24 Hrs  T(C): 35.6 (10 [place] 2024 05:02), Max: 36.3 (09 Jan 2024 20:00)  T(F): 96.1 (10 [place] 2024 05:02), Max: 97.4 (09 Jan 2024 20:00)  HR: 58 (10 [place] 2024 05:02) (58 - 69)  BP: 113/62 (10 [place]hel 2024 05:02) (113/62 - 127/77)  BP(mean): --  RR: 19 (10 [place] 2024 05:02) (18 - 19)  SpO2: 98% (10 [place]hel 2024 05:02) (95% - 98%)    Parameters below as of 10 Bethel 2024 05:02  Patient On (Oxygen Delivery Method): room air      PHYSICAL EXAM:  GENERAL: older F; NARD  NECK: Supple, No JVD  CHEST/LUNG: Clear  HEART: RRR, no MRG  ABDOMEN: Soft, Nontender,  Bowel sounds present  EXTREMITIES: No edema- R; L AKA  SKIN: No rashes or lesions    LABS:              None new    ovCOVID-19 PCR: NotDetec: Methodology:     RADIOLOGY & ADDITIONAL TESTS:   Chart reviewed, patient examined. Pertinent results reviewed.  Case discussed with HO; specialist f/u reviewed  HD#6  NERISSA ТАТЬЯНА    HPI:  74 year-old female with PMH of HTN, HLD, CAD s/p stents and PSH of ILR (04/2023), L. AKA, Knee replacement, Inguinal hernia repair presents c/o of headache, facial pain, abdominal pain after fall 1 week ago on Sunday. Patient reports she went to get something from the kitchen and as she was walking through her living room she fell and blacked out for about 5 minutes. Prior to the fall, she denies experiencing dizziness, lightheadedness, tongue bitting, urinary incontinence or anything out of the ordinary. After regaining consciousness, she denies experiencing lightheadedness, dizziness or confused. She endorses a frontal headache, facial pain around her nose, and abdominal pain. Headache is localized to frontal region bilaterally, rated 9/10 severity, described as constant that has not become worse since onset, transient relief provided by Tylenol and nothing makes it worse, denies headache of this nature in the past. Abdominal pain localized to LUQ and bilateral flanks L>>R rated 10/10 in severity, worse with movement. Her son who lives downstairs in the same home helped her and gave her ice packs to place on her head. Of note, she was not wearing her L. leg prosthesis and has a history of multiple falls due to poor balance on the prosthesis. Endorses nausea, decreased appetite, burning with urination. Burning with urination began ____. Urinary incontinence? ____. Denies chest pain, palpitations, fever, chills, changes in vision, dizziness, lightheadedness, constipation, diarrhea, bleeding.       INTERVAL EVENTS: Patient seen today ; DC is planned for today, and she is ready.    MEDICATIONS  (STANDING):  amLODIPine   Tablet 5 milliGRAM(s) Oral daily  aspirin  chewable 81 milliGRAM(s) Oral daily  atorvastatin 40 milliGRAM(s) Oral at bedtime  DULoxetine 30 milliGRAM(s) Oral daily  gabapentin 600 milliGRAM(s) Oral three times a day  heparin   Injectable 5000 Unit(s) SubCutaneous every 12 hours  hydrochlorothiazide 25 milliGRAM(s) Oral daily  levothyroxine 100 MICROGram(s) Oral daily  losartan 100 milliGRAM(s) Oral daily  metoprolol succinate ER 50 milliGRAM(s) Oral daily  sertraline 100 milliGRAM(s) Oral daily    MEDICATIONS  (PRN):  acetaminophen     Tablet .. 650 milliGRAM(s) Oral every 6 hours PRN Temp greater or equal to 38C (100.4F), Mild Pain (1 - 3)  aluminum hydroxide/magnesium hydroxide/simethicone Suspension 30 milliLiter(s) Oral every 4 hours PRN Dyspepsia  melatonin 3 milliGRAM(s) Oral at bedtime PRN Insomnia  ondansetron Injectable 4 milliGRAM(s) IV Push every 8 hours PRN Nausea and/or Vomiting      Vital Signs Last 24 Hrs  T(C): 35.6 (10 [place] 2024 05:02), Max: 36.3 (09 Jan 2024 20:00)  T(F): 96.1 (10 [place] 2024 05:02), Max: 97.4 (09 Jan 2024 20:00)  HR: 58 (10 [place] 2024 05:02) (58 - 69)  BP: 113/62 (10 [place] 2024 05:02) (113/62 - 127/77)  BP(mean): --  RR: 19 (10 [place] 2024 05:02) (18 - 19)  SpO2: 98% (10 [place] 2024 05:02) (95% - 98%)    Parameters below as of 10 [place] 2024 05:02  Patient On (Oxygen Delivery Method): room air      PHYSICAL EXAM:  GENERAL: older F; NARD  NECK: Supple, No JVD  CHEST/LUNG: Clear  HEART: RRR, no MRG  ABDOMEN: Soft, Nontender,  Bowel sounds present  EXTREMITIES: No edema- R; L AKA  SKIN:  POS+ erythema in l groin to post Buttocks area on post L thigh.  Neuro: no gross focal M/S deficit    LABS:                                13.7   7.88  )-----------( 216      ( 10 [place] 2024 12:15 )             41.5   01-10    139  |  101  |  26<H>  ----------------------------<  97  4.2   |  23  |  0.7    Ca    9.5      10 [place] 2024 12:15  Phos  4.3     01-10  Mg     2.0     01-10    TPro  7.5  /  Alb  4.4  /  TBili  0.5  /  DBili  x   /  AST  25  /  ALT  26  /  AlkPhos  159<H>  01-10        ovCOVID-19 PCR: NotDetec: 1/8, & 1/10    RADIOLOGY & ADDITIONAL TESTS:

## 2024-01-10 NOTE — DISCHARGE NOTE PROVIDER - NSDCFUSCHEDAPPT_GEN_ALL_CORE_FT
Adelfo Dunn  [facility]  CARDIOLOGY [address]  Scheduled Appointment: 01/25/2024    [facility]  CARDIOLOGY [address]  Scheduled Appointment: 01/26/2024    Chad Horne  [facility] PreAdmits  Scheduled Appointment: 02/07/2024    Chad Horne  [facility] SI [facility] [address]  Scheduled Appointment: 02/07/2024    [facility]  ELECTROPH [address]  Scheduled Appointment: 03/15/2024     Adelfo Dunn  [facility]  CARDIOLOGY [address]  Scheduled Appointment: 01/25/2024    [facility]  CARDIOLOGY [address]  Scheduled Appointment: 01/26/2024    Chad Horne  [facility] PreAdmits  Scheduled Appointment: 02/07/2024    Chad Horne  [facility] SI [facility] [address]  Scheduled Appointment: 02/07/2024    [facility]  ELECTROPH [address]  Scheduled Appointment: 03/15/2024

## 2024-01-10 NOTE — DISCHARGE NOTE PROVIDER - HOSPITAL COURSE
74 year-old female with PMH of HTN, HLD, CAD s/p stents and PSH of ILR (04/2023), L. AKA, Knee replacement, Inguinal hernia repair presents c/o of headache, facial pain, abdominal pain after fall 1 week ago on Sunday. Patient reports she went to get something from the kitchen and as she was walking through her living room she fell and blacked out for about 5 minutes. Prior to the fall, she denies experiencing dizziness, lightheadedness, tongue bitting, urinary incontinence or anything out of the ordinary. After regaining consciousness, she denies experiencing lightheadedness, dizziness or confused. She endorses a frontal headache, facial pain around her nose, and abdominal pain. Headache is localized to frontal region bilaterally, rated 9/10 severity, described as constant that has not become worse since onset, transient relief provided by Tylenol and nothing makes it worse, denies headache of this nature in the past. Abdominal pain localized to LUQ and bilateral flanks L>>R rated 10/10 in severity, worse with movement. Her son who lives downstairs in the same home helped her and gave her ice packs to place on her head. Of note, she was not wearing her L. leg prosthesis and has a history of multiple falls due to poor balance on the prosthesis. Endorses nausea, decreased appetite, burning with urination. Denies chest pain, palpitations, fever, chills, changes in vision, dizziness, lightheadedness, constipation, diarrhea, bleeding.     In ED, Vital Signs:  BP Systolic: 162 mm Hg  BP Diastolic: 85 mm Hg  Heart Rate: 63 /min  Respiration Rate (breaths/min): 20 /min  Temp (F): 98.2 Degrees F  SpO2 (%): 98 %    s/p, 1L LR bolus, 1 dose Flagyl & Ciprofloxacin in ED.   Admitted to medicine for further work-up of syncope.     Impression:  74 year-old female with PMH of HTN, HLD, CAD s/p stents and PSH of ILR (04/2023), L. AKA, Knee replacement, Inguinal hernia repair presents c/o of headache, facial pain, abdominal pain after walking through her living room, losing her balance and falling 1 week ago on Sunday. Admitted to medicine for further work-up of fall/syncopal episode.     #Fall with (+) LOC 2/2 Loss of Balance without LLE Prosthesis  - history of multiple falls   - s/p L. AKA  - ambulates using LLE prosthesis (still adjusting  with aid of OP & home PT)  - fall on Sunday while not using LLE prothesis followed by LOC and return to baseline with return of consciousness (no prodrome or post-ictal state)  - denied preceding dizziness   - CTH noted small frontal scalp swelling, negative for acute intracranial pathology  - CT C-spine: (-)  - CT A/P (-)  - CT Maxillofacial (-)  - Seen by EP in ED  - ILR interrogated: device working properly  - No episodes recorded for last 4 months that would correlate with syncopal episode. Device working properly  - Started on Tylenol 650 mg PO q6h PRN- f/u Orthostatics  - PT/OT consulted: rehab facility vs. home pending progress  - ENT appreciated: no acute interventions    #L AKA wound at ostomy site  Cleanse left groin with soap and water.   Pat dry apply triad twice a day and prn for soiling.   Maintain pressure injury prevention.   Keep skin clean.   Maintain incontinence care.   Monitor wound for changes and notify provider     #Colitis  - CT A/P: Wall thickening of the cecum, which may represent acute or chronic   colitis.  - consider colonoscopy OP    #Urinary Retention  - Suprapubic abdominal pain likely 2/2 urinary retention  - CT A/P noted severely distended bladder, prominent periuterine vessels    #HTN  - c/w Losartan 100mg PO qd  - c/w HCTZ 25 mg PO qd    #CAD s/p stents  #Grade I Diastolic Dysfunction  - TTE (08/23): biplane EF 67%#Fall with (+) LOC 2/2 Loss of Balance without LLE Prosthesis  - history of multiple falls   - s/p L. AKA  - ambulates using LLE prosthesis (still adjusting  with aid of OP & home PT)  - fall on Sunday while not using LLE prothesis followed by LOC and return to baseline with return of consciousness (no prodrome or post-ictal state)  - denied preceding dizziness   - CTH noted small frontal scalp swelling, negative for acute intracranial pathology  - CT C-spine: (-)  - CT A/P (-)  - CT Maxillofacial (-)  - Seen by EP in ED  - ILR interrogated: No episodes recorded for last 4 months that would correlate with syncopal episode. Device working properly  - ENT eval: no acute interventions    #COVID exposure  -f/u covid (1/10) and isolation precautions in meantime    Handoff: f/u COVID tomorrow, anticipate DC to home vs STR     74 year-old female with PMH of HTN, HLD, CAD s/p stents and PSH of ILR (04/2023), L. AKA, Knee replacement, Inguinal hernia repair presents c/o of headache, facial pain, abdominal pain after fall 1 week ago on Sunday. Patient reports she went to get something from the kitchen and as she was walking through her living room she fell and blacked out for about 5 minutes. Prior to the fall, she denies experiencing dizziness, lightheadedness, tongue bitting, urinary incontinence or anything out of the ordinary. After regaining consciousness, she denies experiencing lightheadedness, dizziness or confused. She endorses a frontal headache, facial pain around her nose, and abdominal pain. Headache is localized to frontal region bilaterally, rated 9/10 severity, described as constant that has not become worse since onset, transient relief provided by Tylenol and nothing makes it worse, denies headache of this nature in the past. Abdominal pain localized to LUQ and bilateral flanks L>>R rated 10/10 in severity, worse with movement. Her son who lives downstairs in the same home helped her and gave her ice packs to place on her head. Of note, she was not wearing her L. leg prosthesis and has a history of multiple falls due to poor balance on the prosthesis. Endorses nausea, decreased appetite, burning with urination. Denies chest pain, palpitations, fever, chills, changes in vision, dizziness, lightheadedness, constipation, diarrhea, bleeding.     In ED, Vital Signs:  BP Systolic: 162 mm Hg  BP Diastolic: 85 mm Hg  Heart Rate: 63 /min  Respiration Rate (breaths/min): 20 /min  Temp (F): 98.2 Degrees F  SpO2 (%): 98 %    s/p, 1L LR bolus, 1 dose Flagyl & Ciprofloxacin in ED.   Admitted to medicine for further work-up of syncope.     Impression:  74 year-old female with PMH of HTN, HLD, CAD s/p stents and PSH of ILR (04/2023), L. AKA, Knee replacement, Inguinal hernia repair presents c/o of headache, facial pain, abdominal pain after walking through her living room, losing her balance and falling 1 week ago on Sunday. Admitted to medicine for further work-up of fall/syncopal episode.     #Fall with (+) LOC 2/2 Loss of Balance without LLE Prosthesis  - history of multiple falls   - s/p L. AKA  - ambulates using LLE prosthesis (still adjusting  with aid of OP & home PT)  - fall on Sunday while not using LLE prothesis followed by LOC and return to baseline with return of consciousness (no prodrome or post-ictal state)  - denied preceding dizziness   - CTH noted small frontal scalp swelling, negative for acute intracranial pathology  - CT C-spine: (-)  - CT A/P (-)  - CT Maxillofacial (-)  - Seen by EP in ED  - ILR interrogated: device working properly  - No episodes recorded for last 4 months that would correlate with syncopal episode. Device working properly  - Started on Tylenol 650 mg PO q6h PRN- f/u Orthostatics  - PT/OT consulted: rehab facility vs. home pending progress  - ENT appreciated: no acute interventions    #L AKA wound at ostomy site  Cleanse left groin with soap and water.   Pat dry apply triad twice a day and prn for soiling.   Maintain pressure injury prevention.   Keep skin clean.   Maintain incontinence care.   Monitor wound for changes and notify provider     #Colitis  - CT A/P: Wall thickening of the cecum, which may represent acute or chronic   colitis.  - consider colonoscopy OP    #Urinary Retention  - Suprapubic abdominal pain likely 2/2 urinary retention  - CT A/P noted severely distended bladder, prominent periuterine vessels    #HTN  - c/w Losartan 100mg PO qd  - c/w amlodipine 5mg po QD    #CAD s/p stents  #Grade I Diastolic Dysfunction  - TTE (08/23): biplane EF 67%#Fall with (+) LOC 2/2 Loss of Balance without LLE Prosthesis  - history of multiple falls   - s/p L. AKA  - ambulates using LLE prosthesis (still adjusting  with aid of OP & home PT)  - fall on Sunday while not using LLE prothesis followed by LOC and return to baseline with return of consciousness (no prodrome or post-ictal state)  - denied preceding dizziness   - CTH noted small frontal scalp swelling, negative for acute intracranial pathology  - CT C-spine: (-)  - CT A/P (-)  - CT Maxillofacial (-)  - Seen by EP in ED  - ILR interrogated: No episodes recorded for last 4 months that would correlate with syncopal episode. Device working properly  - ENT eval: no acute interventions    #COVID exposure  -f/u covid (1/10) and isolation precautions in meantime    Handoff: f/u COVID tomorrow, anticipate DC to home vs STR     74 year-old female with PMH of HTN, HLD, CAD s/p stents and PSH of ILR (04/2023), L. AKA, Knee replacement, Inguinal hernia repair presents c/o of headache, facial pain, abdominal pain after fall 1 week ago on Sunday. Patient reports she went to get something from the kitchen and as she was walking through her living room she fell and blacked out for about 5 minutes. Prior to the fall, she denies experiencing dizziness, lightheadedness, tongue bitting, urinary incontinence or anything out of the ordinary. After regaining consciousness, she denies experiencing lightheadedness, dizziness or confused. She endorses a frontal headache, facial pain around her nose, and abdominal pain. Headache is localized to frontal region bilaterally, rated 9/10 severity, described as constant that has not become worse since onset, transient relief provided by Tylenol and nothing makes it worse, denies headache of this nature in the past. Abdominal pain localized to LUQ and bilateral flanks L>>R rated 10/10 in severity, worse with movement. Her son who lives downstairs in the same home helped her and gave her ice packs to place on her head. Of note, she was not wearing her L. leg prosthesis and has a history of multiple falls due to poor balance on the prosthesis. Endorses nausea, decreased appetite, burning with urination. Denies chest pain, palpitations, fever, chills, changes in vision, dizziness, lightheadedness, constipation, diarrhea, bleeding.     In ED, Vital Signs:  BP Systolic: 162 mm Hg  BP Diastolic: 85 mm Hg  Heart Rate: 63 /min  Respiration Rate (breaths/min): 20 /min  Temp (F): 98.2 Degrees F  SpO2 (%): 98 %    s/p, 1L LR bolus, 1 dose Flagyl & Ciprofloxacin in ED.   Admitted to medicine for further work-up of syncope.     Impression:  74 year-old female with PMH of HTN, HLD, CAD s/p stents and PSH of ILR (04/2023), L. AKA, Knee replacement, Inguinal hernia repair presents c/o of headache, facial pain, abdominal pain after walking through her living room, losing her balance and falling 1 week ago on Sunday. Admitted to medicine for further work-up of fall/syncopal episode.     #Fall with (+) LOC 2/2 Loss of Balance without LLE Prosthesis  - history of multiple falls   - s/p L. AKA  - ambulates using LLE prosthesis (still adjusting  with aid of OP & home PT)  - fall on Sunday while not using LLE prothesis followed by LOC and return to baseline with return of consciousness (no prodrome or post-ictal state)  - denied preceding dizziness   - CTH noted small frontal scalp swelling, negative for acute intracranial pathology  - CT C-spine: (-)  - CT A/P (-)  - CT Maxillofacial (-)  - Seen by EP in ED  - ILR interrogated: device working properly  - No episodes recorded for last 4 months that would correlate with syncopal episode. Device working properly  - Started on Tylenol 650 mg PO q6h PRN- f/u Orthostatics  - PT/OT consulted: rehab facility vs. home pending progress  - ENT appreciated: no acute interventions    #L AKA wound at ostomy site  Cleanse left groin with soap and water.   Pat dry apply triad twice a day and prn for soiling.   Maintain pressure injury prevention.   Keep skin clean.   Maintain incontinence care.   Monitor wound for changes and notify provider     #Colitis  - CT A/P: Wall thickening of the cecum, which may represent acute or chronic   colitis.  - consider colonoscopy OP    #Urinary Retention  - Suprapubic abdominal pain likely 2/2 urinary retention  - CT A/P noted severely distended bladder, prominent periuterine vessels    #HTN  - c/w Losartan 100mg PO qd  - c/w amlodipine 5mg po QD    #CAD s/p stents  #Grade I Diastolic Dysfunction  - TTE (08/23): biplane EF 67%#Fall with (+) LOC 2/2 Loss of Balance without LLE Prosthesis  - history of multiple falls   - s/p L. AKA  - ambulates using LLE prosthesis (still adjusting  with aid of OP & home PT)  - fall on Sunday while not using LLE prothesis followed by LOC and return to baseline with return of consciousness (no prodrome or post-ictal state)  - denied preceding dizziness   - CTH noted small frontal scalp swelling, negative for acute intracranial pathology  - CT C-spine: (-)  - CT A/P (-)  - CT Maxillofacial (-)  - Seen by EP in ED  - ILR interrogated: No episodes recorded for last 4 months that would correlate with syncopal episode. Device working properly  - ENT eval: no acute interventions    #COVID exposure  -f/u covid (1/10) and isolation precautions in meantime    Handoff: f/u COVID -ve, anticipate DC to home vs STR

## 2024-01-10 NOTE — PROGRESS NOTE ADULT - ASSESSMENT
74 year-old female with PMH of HTN, HLD, CAD s/p stents and PSH of ILR (04/2023), L. AKA, Knee replacement, Inguinal hernia repair presents c/o of headache, facial pain, abdominal pain after walking through her living room, losing her balance and falling 1 week ago on Sunday. Admitted to medicine for further work-up of fall/syncopal episode.     Syncope vs Fall with (+) LOC 2/2 Loss of Balance without LLE Prosthesis  - no significant injuries  - history of multiple falls - w or w/o her prosthesis  - s/p L. AKA  - ambulates using LLE prosthesis (still adjusting  with aid of OP & home PT)  - fall on Sunday while not using LLE prothesis followed by LOC and return to baseline with return of consciousness (no prodrome or post-ictal state)  - denied preceding dizziness   - CTH noted small frontal scalp swelling, negative for acute intracranial pathology  - CT C-spine: (-)  - CT A/P (-)  - CT Maxillofacial (-)  - Seen by EP in ED  - ILR interrogated: device working properly  - No episodes recorded for last 4 months that would correlate with syncopal episode. Device working properly  - Started on Tylenol 650 mg PO q6h PRN- f/u Orthostatics  - PT/OT consulted: rehab facility vs. home with services  - ENT eval re facial pain noted    Colitis  - CT A/P: Wall thickening of the cecum, which may represent acute or chronic   colitis.  - no WBC  - afebrile  - denies diarrhea  - will monitor off abx for now  - monitor for symptom improvement post-void/straight cath  - consider colonoscopy OP    Urinary Retention  - Suprapubic abdominal pain likely 2/2 urinary retention  - CT A/P noted severely distended bladder, prominent periuterine vessels  - Post-void bladder scan    HTN  - on Losartan 100mg PO qd and HCTZ 25 mg PO qd    CAD s/p stents  Grade I Diastolic Dysfunction  - TTE (08/23): biplane EF 67%#Fall with (+) LOC 2/2 Loss of Balance without LLE Prosthesis  - history of multiple falls   - s/p L. AKA  - ambulates using LLE prosthesis (still adjusting  with aid of OP & home PT)  - fall on Sunday while not using LLE prothesis followed by LOC and return to baseline with return of consciousness (no prodrome or post-ictal state)  - denied preceding dizziness   - CTH noted small frontal scalp swelling, negative for acute intracranial pathology  - CT C-spine: (-)  - CT A/P (-)  - CT Maxillofacial (-)  - Seen by EP in ED  - ILR interrogated  - No episodes recorded for last 4 months that would correlate with syncopal episode. Device working properly    COVID exposure  -f/u covid and isolation precautions  - repeat Covid swab in am    Anticipate DC to home vs STR in 24-48 hours   74 year-old female with PMH of HTN, HLD, CAD s/p stents and PSH of ILR (04/2023), L. AKA, Knee replacement, Inguinal hernia repair presents c/o of headache, facial pain, abdominal pain after walking through her living room, losing her balance and falling 1 week ago on Sunday. Admitted to medicine for further work-up of fall/syncopal episode.     Syncope vs Fall with (+) LOC 2/2 Loss of Balance without LLE Prosthesis  - no significant injuries  - history of multiple falls - w or w/o her prosthesis  - s/p L. AKA  - ambulates using LLE prosthesis (still adjusting  with aid of OP & home PT)  - fall on Sunday while not using LLE prothesis followed by LOC and return to baseline with return of consciousness (no prodrome or post-ictal state)  - denied preceding dizziness   - CTH noted small frontal scalp swelling, negative for acute intracranial pathology  - CT C-spine: (-)  - CT A/P (-)  - CT Maxillofacial (-)  - Seen by EP in ED  - ILR interrogated: device working properly  - No episodes recorded for last 4 months that would correlate with syncopal episode. Device working properly  - Started on Tylenol 650 mg PO q6h PRN- f/u Orthostatics  - PT/OT consulted: rehab facility vs. home with services  - ENT eval re facial pain noted    Colitis  - CT A/P: Wall thickening of the cecum, which may represent acute or chronic   colitis.  - no WBC  - afebrile  - denies diarrhea  - will monitor off abx for now  - monitor for symptom improvement post-void/straight cath  - consider colonoscopy OP- GI F/U    Urinary Retention  - Suprapubic abdominal pain likely 2/2 urinary retention  - CT A/P noted severely distended bladder, prominent periuterine vessels  - Post-void bladder scan    HTN  - on Losartan 100mg PO qd and HCTZ 25 mg PO qd    CAD s/p stents  Grade I Diastolic Dysfunction  - TTE (08/23): biplane EF 67%#Fall with (+) LOC 2/2 Loss of Balance without LLE Prosthesis  - history of multiple falls   - s/p L. AKA  - ambulates using LLE prosthesis (still adjusting  with aid of OP & home PT)  - fall on Sunday while not using LLE prothesis followed by LOC and return to baseline with return of consciousness (no prodrome or post-ictal state)  - denied preceding dizziness   - CTH noted small frontal scalp swelling, negative for acute intracranial pathology  - CT C-spine: (-)  - CT A/P (-)  - CT Maxillofacial (-)  - Seen by EP in ED  - ILR interrogated  - No episodes recorded for last 4 months that would correlate with syncopal episode. Device working properly    COVID exposure  -f/u covid and isolation precautions  - repeat Covid NEG again today.    Was cleared for DC toiday, but patient and her son, at bedside, state she cannot leave today, but can tomorrow.   She asks for her 24hrs. For DC home tomorrow.

## 2024-01-10 NOTE — DISCHARGE NOTE PROVIDER - CARE PROVIDER_API CALL
Apple Witt  Internal Medicine  [id number], [place] [address]  Phone: [phone]  Fax: [phone]  Established Patient  Follow Up Time: 2 weeks   Apple Witt  Internal Medicine  [id number], [place] [address]  Phone: [phone]  Fax: [phone]  Established Patient  Follow Up Time: 2 weeks

## 2024-01-10 NOTE — DISCHARGE NOTE PROVIDER - NSDCCPCAREPLAN_GEN_ALL_CORE_FT
PRINCIPAL DISCHARGE DIAGNOSIS  Diagnosis: Colitis  Assessment and Plan of Treatment:   Colitis is inflammation of the colon. Colitis may last a short time (acute) or it may last a long time (chronic).   This condition may be caused by viruses, bacteria, reactions to medicine, and certain autoimmune diseases, such as Crohn disease or ulcerative colitis.  SYMPTOMS:  Symptoms of this condition include, diarrhea, passing bloody or tarry stool, pain, fever, vomiting, tiredness (fatigue), weight loss, bloating, sudden increase in abdominal pain, or having fewer bowel movements than usual  DIAGNOSIS:  This condition is diagnosed with a stool test or a blood test. You may also have other tests, including X-rays, a CT scan, or a colonoscopy.  TREATMENT:  Treatment may include resting the bowels (This involves not eating or drinking for a period of time), fluids that are given through an IV, medicine for pain and diarrhea, antibiotics, or surgery in severe cases.  HOME CARE INSTRUCTIONS:  Follow instructions from your health care provider about eating or drinking restrictions. Drink enough fluid to keep your urine clear or pale yellow. Work with a dietitian to determine which foods cause your condition to flare up. Avoid foods that cause flare-ups. Eat a well-balanced diet. Take over-the-counter and prescription medicines only as told by your health care provider. If you were prescribed an antibiotic, take it as told by your health care provider. Do not stop taking the antibiotic even if you start to feel better.   SEEK IMMEDIATE MEDICAL CARE IF:   You have a fever that does not go away with treatment.   You develop chills.   You have extreme weakness, fainting, or dehydration.   You have repeated vomiting.   You develop severe pain in your abdomen .  You pass bloody or tarry stool.  ADDITIONAL NOTES AND INSTRUCTIONS:   Please follow up with your primary care provider in 1-3 days.   Seek immediate medical care for any new/worsening signs or symptoms.        SECONDARY DISCHARGE DIAGNOSES  Diagnosis: Syncope  Assessment and Plan of Treatment:     Diagnosis: Weakness  Assessment and Plan of Treatment:

## 2024-01-10 NOTE — DISCHARGE NOTE PROVIDER - PROVIDER TOKENS
PROVIDER:[TOKEN:[[id number]],FOLLOWUP:[2 weeks],ESTABLISHEDPATIENT:[T]] PROVIDER:[TOKEN:[[id number]],FOLLOWUP:[2 weeks],ESTABLISHEDPATIENT:[T]]

## 2024-01-11 ENCOUNTER — TRANSCRIPTION ENCOUNTER (OUTPATIENT)
Age: 74
End: 2024-01-11

## 2024-01-11 VITALS
TEMPERATURE: 98 F | HEART RATE: 64 BPM | SYSTOLIC BLOOD PRESSURE: 112 MMHG | OXYGEN SATURATION: 97 % | RESPIRATION RATE: 17 BRPM | DIASTOLIC BLOOD PRESSURE: 62 MMHG

## 2024-01-11 RX ADMIN — Medication 81 MILLIGRAM(S): at 12:02

## 2024-01-11 RX ADMIN — Medication 100 MICROGRAM(S): at 05:32

## 2024-01-11 RX ADMIN — HEPARIN SODIUM 5000 UNIT(S): 5000 INJECTION INTRAVENOUS; SUBCUTANEOUS at 05:31

## 2024-01-11 RX ADMIN — SERTRALINE 100 MILLIGRAM(S): 25 TABLET, FILM COATED ORAL at 12:02

## 2024-01-11 RX ADMIN — GABAPENTIN 600 MILLIGRAM(S): 400 CAPSULE ORAL at 05:32

## 2024-01-11 RX ADMIN — AMLODIPINE BESYLATE 5 MILLIGRAM(S): 2.5 TABLET ORAL at 05:32

## 2024-01-11 RX ADMIN — DULOXETINE HYDROCHLORIDE 30 MILLIGRAM(S): 30 CAPSULE, DELAYED RELEASE ORAL at 12:02

## 2024-01-11 RX ADMIN — GABAPENTIN 600 MILLIGRAM(S): 400 CAPSULE ORAL at 13:16

## 2024-01-11 RX ADMIN — LOSARTAN POTASSIUM 100 MILLIGRAM(S): 100 TABLET, FILM COATED ORAL at 05:32

## 2024-01-11 RX ADMIN — Medication 50 MILLIGRAM(S): at 05:32

## 2024-01-11 NOTE — PROGRESS NOTE ADULT - ASSESSMENT
74 year-old female with PMH of HTN, HLD, CAD s/p stents and PSH of ILR (04/2023), L. AKA, Knee replacement, Inguinal hernia repair presents c/o of headache, facial pain, abdominal pain after walking through her living room, losing her balance and falling 1 week ago on Sunday. Admitted to medicine for further work-up of fall/syncopal episode.     Syncope vs Fall with (+) LOC 2/2 Loss of Balance without LLE Prosthesis  - no diffensive injures?  - history of multiple falls   - s/p L. AKA  - ambulates using LLE prosthesis (still adjusting  with aid of OP & home PT)  - fall on Sunday while not using LLE prothesis followed by LOC and return to baseline with return of consciousness (no prodrome or post-ictal state)  - denied preceding dizziness   - CTH noted small frontal scalp swelling, negative for acute intracranial pathology  - CT C-spine: (-)  - CT A/P (-)  - CT Maxillofacial (-)  - Seen by EP in ED  - ILR interrogated: device working properly  - No episodes recorded for last 4 months that would correlate with syncopal episode. Device working properly  - Started on Tylenol 650 mg PO q6h PRN- f/u Orthostatics  - PT/OT consulted: rehab facility vs. home with services  - ENT eval re facial pain noted    Colitis  - CT A/P: Wall thickening of the cecum, which may represent acute or chronic   colitis.  - no WBC  - afebrile  - denies diarrhea  - will monitor off abx for now  - monitor for symptom improvement post-void/straight cath  - consider colonoscopy OP    Urinary Retention  - Suprapubic abdominal pain likely 2/2 urinary retention  - CT A/P noted severely distended bladder, prominent periuterine vessels  - Post-void bladder scan    HTN  - on Losartan and Amlodipine  - off HCTZ 25 mg PO qd    CAD s/p stents  Grade I Diastolic Dysfunction  - TTE (08/23): biplane EF 67%#Fall with (+) LOC 2/2 Loss of Balance without LLE Prosthesis  - history of multiple falls   - s/p L. AKA  - ambulates using LLE prosthesis (still adjusting  with aid of OP & home PT)  - fall on Sunday while not using LLE prothesis followed by LOC and return to baseline with return of consciousness (no prodrome or post-ictal state)  - denied preceding dizziness   - CTH noted small frontal scalp swelling, negative for acute intracranial pathology  - CT C-spine: (-)  - CT A/P (-)  - CT Maxillofacial (-)  - Seen by EP in ED  - ILR interrogated  - No episodes recorded for last 4 months that would correlate with syncopal episode. Device working properly    COVID exposure  -f/u covid and isolation precautions  - repeat Covid swab in am    DC  home, f/u in office in 1-2 weeks

## 2024-01-11 NOTE — PROGRESS NOTE ADULT - ASSESSMENT
74 year-old female with PMH of HTN, HLD, CAD s/p stents and PSH of ILR (04/2023), L. AKA, Knee replacement, Inguinal hernia repair presents c/o of headache, facial pain, abdominal pain after walking through her living room, losing her balance and falling 1 week ago on Sunday. Admitted to medicine for further work-up of fall/syncopal episode.     #Fall with (+) LOC 2/2 Loss of Balance without LLE Prosthesis  - history of multiple falls   - s/p L. AKA  - ambulates using LLE prosthesis (still adjusting  with aid of OP & home PT)  - fall on Sunday while not using LLE prothesis followed by LOC and return to baseline with return of consciousness (no prodrome or post-ictal state)  - denied preceding dizziness   - CTH noted small frontal scalp swelling, negative for acute intracranial pathology  - CT C-spine: (-)  - CT A/P (-)  - CT Maxillofacial (-)  - Seen by EP in ED  - ILR interrogated: device working properly  - No episodes recorded for last 4 months that would correlate with syncopal episode. Device working properly  - Started on Tylenol 650 mg PO q6h PRN- f/u Orthostatics  - PT/OT consulted: rehab facility vs. home pending progress  - ENT appreciated: no acute interventions    #L AKA wound at ostomy site  Cleanse left groin with soap and water.   Pat dry apply triad twice a day and prn for soiling.   Maintain pressure injury prevention.   Keep skin clean.   Maintain incontinence care.   Monitor wound for changes and notify provider       #Colitis  - CT A/P: Wall thickening of the cecum, which may represent acute or chronic   colitis.  - no WBC  - afebrile  - denies diarrhea  - will monitor off abx for now  - monitor for symptom improvement post-void/straight cath  - consider colonoscopy OP    #Urinary Retention  - Suprapubic abdominal pain likely 2/2 urinary retention  - CT A/P noted severely distended bladder, prominent periuterine vessels  - Post-void bladder scan  - If retaining, straight cath    #HTN  - c/w Losartan 100mg PO qd  - c/w HCTZ 25 mg PO qd    #CAD s/p stents  #Grade I Diastolic Dysfunction  - TTE (08/23): biplane EF 67%#Fall with (+) LOC 2/2 Loss of Balance without LLE Prosthesis  - history of multiple falls   - s/p L. AKA  - ambulates using LLE prosthesis (still adjusting  with aid of OP & home PT)  - fall on Sunday while not using LLE prothesis followed by LOC and return to baseline with return of consciousness (no prodrome or post-ictal state)  - denied preceding dizziness   - CTH noted small frontal scalp swelling, negative for acute intracranial pathology  - CT C-spine: (-)  - CT A/P (-)  - CT Maxillofacial (-)  - Seen by EP in ED  - ILR interrogated  - No episodes recorded for last 4 months that would correlate with syncopal episode. Device working properly    #COVID exposure  -f/u covid negative (1/10) now off isolation precautions    Handoff: DC home with home care

## 2024-01-11 NOTE — DISCHARGE NOTE NURSING/CASE MANAGEMENT/SOCIAL WORK - NSDCPEFALRISK_GEN_ALL_CORE
For information on Fall & Injury Prevention, visit: [email or web address]/[email or web address] OR  [email or web address].[email or web address]/[email or web address] OR  https://www.cdc.gov/steadi/patient.html For information on Fall & Injury Prevention, visit: [email or web address]://[email or web address]/[email or web address]-[email or web address]-health-[email or web address]-mobility OR  [email or web address]://[email or web address]/[email or web address]-prevention-tips-to-avoid-injury OR  https://www.cdc.gov/steadi/patient.html
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
## 2024-01-11 NOTE — DISCHARGE NOTE NURSING/CASE MANAGEMENT/SOCIAL WORK - NSDCVIVACCINE_GEN_ALL_CORE_FT
COVID-19, mRNA, LNP-S, PF, 30 mcg/0.3 mL dose, bradley-sucrose (Pfizer); 28-Sep-2022 16:04; Louissaint, Nancy (RN); Pf[employer] Inc; [id number] (Exp. Date: 07-Dec-2022); IntraMuscular; Deltoid Left.; 0.3 milliLiter(s);   influenza, high-dose, quadrivalent; 28-Sep-2022 15:50; Louissaint, Nancy (RN); [employer]; [id number] (Exp. Date: 30-Jun-2023); IntraMuscular; Deltoid Left.; 0.7 milliLiter(s); VIS (VIS Published: 06-Aug-2021, VIS Presented: 28-Sep-2022);    COVID-19, mRNA, LNP-S, PF, 30 mcg/0.3 mL dose, bradley-sucrose (Pfizer); 28-Sep-2022 16:04; Louissaint, Nancy (RN); Pf[employer] Inc; [id number] (Exp. Date: 07-Dec-2022); IntraMuscular; Deltoid Left.; 0.3 milliLiter(s);   influenza, high-dose, quadrivalent; 28-Sep-2022 15:50; Louissaint, Nancy (RN); [employer]; [id number] (Exp. Date: 30-Jun-2023); IntraMuscular; Deltoid Left.; 0.7 milliLiter(s); VIS (VIS Published: 06-Aug-2021, VIS Presented: 28-Sep-2022);

## 2024-01-11 NOTE — DISCHARGE NOTE NURSING/CASE MANAGEMENT/SOCIAL WORK - PATIENT PORTAL LINK FT
You can access the FollowMyHealth Patient Portal offered by [facility] by registering at the following website: [email or web address]. By joining [facility]’s FollowMyHealth portal, you will also be able to view your health information using other applications (apps) compatible with our system. You can access the FollowMyHealth Patient Portal offered by [facility] by registering at the following website: [email or web address]. By joining [facility]’s FollowMyHealth portal, you will also be able to view your health information using other applications (apps) compatible with our system.

## 2024-01-11 NOTE — PROGRESS NOTE ADULT - SUBJECTIVE AND OBJECTIVE BOX
NERISSA, ТАТЬЯНА  74y  Female  HPI:  74 year-old female with PMH of HTN, HLD, CAD s/p stents and PSH of ILR (04/2023), L. AKA, Knee replacement, Inguinal hernia repair presents c/o of headache, facial pain, abdominal pain after fall 1 week ago on Sunday. Patient reports she went to get something from the kitchen and as she was walking through her living room she fell and blacked out for about 5 minutes. Prior to the fall, she denies experiencing dizziness, lightheadedness, tongue bitting, urinary incontinence or anything out of the ordinary. After regaining consciousness, she denies experiencing lightheadedness, dizziness or confused. She endorses a frontal headache, facial pain around her nose, and abdominal pain. Headache is localized to frontal region bilaterally, rated 9/10 severity, described as constant that has not become worse since onset, transient relief provided by Tylenol and nothing makes it worse, denies headache of this nature in the past. Abdominal pain localized to LUQ and bilateral flanks L>>R rated 10/10 in severity, worse with movement. Her son who lives downstairs in the same home helped her and gave her ice packs to place on her head. Of note, she was not wearing her L. leg prosthesis and has a history of multiple falls due to poor balance on the prosthesis. Endorses nausea, decreased appetite, burning with urination. Burning with urination began ____. Urinary incontinence? ____. Denies chest pain, palpitations, fever, chills, changes in vision, dizziness, lightheadedness, constipation, diarrhea, bleeding.     In ED, Vital Signs:  BP Systolic: 162 mm Hg  BP Diastolic: 85 mm Hg  Heart Rate: 63 /min  Respiration Rate (breaths/min): 20 /min  Temp (F): 98.2 Degrees F  SpO2 (%): 98 %    s/p, 1L LR bolus, 1 dose Flagyl & Ciprofloxacin in ED.   Admitted to medicine for further work-up of syncope.      (05 Jan 2024 22:03)    MEDICATIONS  (STANDING):  amLODIPine   Tablet 5 milliGRAM(s) Oral daily  aspirin  chewable 81 milliGRAM(s) Oral daily  atorvastatin 40 milliGRAM(s) Oral at bedtime  DULoxetine 30 milliGRAM(s) Oral daily  gabapentin 600 milliGRAM(s) Oral three times a day  heparin   Injectable 5000 Unit(s) SubCutaneous every 12 hours  hydrochlorothiazide 25 milliGRAM(s) Oral daily  levothyroxine 100 MICROGram(s) Oral daily  losartan 100 milliGRAM(s) Oral daily  metoprolol succinate ER 50 milliGRAM(s) Oral daily  sertraline 100 milliGRAM(s) Oral daily    MEDICATIONS  (PRN):  acetaminophen     Tablet .. 650 milliGRAM(s) Oral every 6 hours PRN Temp greater or equal to 38C (100.4F), Mild Pain (1 - 3)  aluminum hydroxide/magnesium hydroxide/simethicone Suspension 30 milliLiter(s) Oral every 4 hours PRN Dyspepsia  melatonin 3 milliGRAM(s) Oral at bedtime PRN Insomnia  ondansetron Injectable 4 milliGRAM(s) IV Push every 8 hours PRN Nausea and/or Vomiting    INTERVAL EVENTS: Patient seen today prior to discharge, mendez, accepted home care services.    T(C): 36.5 (01-11-24 @ 11:38), Max: 36.5 (01-11-24 @ 11:38)  HR: 64 (01-11-24 @ 11:38) (59 - 64)  BP: 112/62 (01-11-24 @ 11:38) (112/62 - 117/59)  RR: 17 (01-11-24 @ 11:38) (17 - 18)  SpO2: 97% (01-11-24 @ 11:38) (97% - 97%)  Wt(kg): --Vital Signs Last 24 Hrs  T(C): 36.5 (11 Jan 2024 11:38), Max: 36.5 (11 Jan 2024 11:38)  T(F): 97.7 (11 Jan 2024 11:38), Max: 97.7 (11 Jan 2024 11:38)  HR: 64 (11 Jan 2024 11:38) (59 - 64)  BP: 112/62 (11 Jan 2024 11:38) (112/62 - 117/59)  BP(mean): --  RR: 17 (11 Jan 2024 11:38) (17 - 18)  SpO2: 97% (11 Jan 2024 11:38) (97% - 97%)    Parameters below as of 11 Jan 2024 11:38  Patient On (Oxygen Delivery Method): room air        PHYSICAL EXAM:  GENERAL: NAD  NECK: Supple, No JVD  CHEST/LUNG: Clear  HEART: S1, S2, Regular   ABDOMEN: Soft, Nontender, Bowel sounds present  EXTREMITIES: No edema  SKIN: No rashes or lesions    LABS:  Labs:                        13.7   7.88  )-----------( 216      ( 10 [place] 2024 12:15 )             41.5             01-10    139  |  101  |  26<H>  ----------------------------<  97  4.2   |  23  |  0.7    Ca    9.5      10 [place] 2024 12:15  Phos  4.3     01-10  Mg     2.0     01-10    TPro  7.5  /  Alb  4.4  /  TBili  0.5  /  DBili  x   /  AST  25  /  ALT  26  /  AlkPhos  159<H>  01-10    LIVER FUNCTIONS - ( 10 [place] 2024 12:15 )  Alb: 4.4 g/dL / Pro: 7.5 g/dL / ALK PHOS: 159 U/L / ALT: 26 U/L / AST: 25 U/L / GGT: x                         Urinalysis Basic - ( 10 [place] 2024 12:15 )    Color: x / Appearance: x / SG: x / pH: x  Gluc: 97 mg/dL / Ketone: x  / Bili: x / Urobili: x   Blood: x / Protein: x / Nitrite: x   Leuk Esterase: x / RBC: x / WBC x   Sq Epi: x / Non Sq Epi: x / Bacteria: x        RADIOLOGY & ADDITIONAL TESTS:

## 2024-01-12 ENCOUNTER — APPOINTMENT (OUTPATIENT)
Dept: HOME HEALTH SERVICES | Facility: HOME HEALTH | Age: 74
End: 2024-01-12
Payer: MEDICARE

## 2024-01-12 VITALS
RESPIRATION RATE: 20 BRPM | HEART RATE: 72 BPM | DIASTOLIC BLOOD PRESSURE: 72 MMHG | TEMPERATURE: 97.5 F | OXYGEN SATURATION: 98 % | SYSTOLIC BLOOD PRESSURE: 120 MMHG

## 2024-01-12 DIAGNOSIS — G62.9 POLYNEUROPATHY, UNSPECIFIED: ICD-10-CM

## 2024-01-12 DIAGNOSIS — F32.A DEPRESSION, UNSPECIFIED: ICD-10-CM

## 2024-01-12 DIAGNOSIS — Z74.09 OTHER REDUCED MOBILITY: ICD-10-CM

## 2024-01-12 DIAGNOSIS — R29.6 REPEATED FALLS: ICD-10-CM

## 2024-01-12 DIAGNOSIS — S31.109A UNSPECIFIED OPEN WOUND OF ABDOMINAL WALL, UNSPECIFIED QUADRANT W/OUT PENETRATION INTO PERITONEAL CAVITY, INITIAL ENCOUNTER: ICD-10-CM

## 2024-01-12 PROCEDURE — 99496 TRANSJ CARE MGMT HIGH F2F 7D: CPT

## 2024-01-13 PROBLEM — F32.A DEPRESSION: Status: ACTIVE | Noted: 2023-08-22

## 2024-01-13 PROBLEM — R29.6 FREQUENT FALLS: Status: ACTIVE | Noted: 2023-08-22

## 2024-01-13 PROBLEM — Z74.09 IMPAIRED MOBILITY: Status: ACTIVE | Noted: 2023-08-22

## 2024-01-13 PROBLEM — G62.9 NEUROPATHY: Status: ACTIVE | Noted: 2023-08-10

## 2024-01-13 PROBLEM — S31.109A WOUND OF LEFT GROIN: Status: ACTIVE | Noted: 2023-03-30

## 2024-01-13 NOTE — PHYSICAL EXAM
[No Acute Distress] : no acute distress [Normal Voice/Communication] : normal voice communication [Normal Sclera/Conjunctiva] : normal sclera/conjunctiva [Normal Outer Ear/Nose] : the ears and nose were normal in appearance [No JVD] : no jugular venous distention [No Accessory Muscle Use] : no accessory muscle use [No Respiratory Distress] : no respiratory distress [Normal Rate] : heart rate was normal  [Regular Rhythm] : with a regular rhythm [No Edema] : there was no peripheral edema [Normal Bowel Sounds] : normal bowel sounds [Soft] : abdomen soft [Not Distended] : not distended [Normal Anterior Cervical Nodes] : no anterior cervical lymphadenopathy [No Spinal Tenderness] : no spinal tenderness [No Joint Swelling] : no joint swelling seen [No Skin Lesions] : no skin lesions [No Motor Deficits] : the motor exam was normal [Oriented x3] : oriented to person, place, and time [de-identified] : Patient sitting upright in cahir

## 2024-01-13 NOTE — COUNSELING
[Overweight - ( BMI 25.1 - 29.9 )] : overweight -  ( BMI 25.1 - 29.9 ) [DASH diet recommended] : DASH diet recommended [Non - Smoker] : non-smoker [Continue diet as tolerated] : continue diet as tolerated based on goals of care [Medical alert] : medical alert [Use assistive device to avoid falls] : use assistive device to avoid falls [Remove clutter and unsafe carpeting to avoid falls] : remove clutter and unsafe carpeting to avoid falls [] : foot exam [Improve mobility] : improve mobility [Maintain functional ability] : maintain functional ability [Patient/Caregiver not ready to engage in discussion] : patient/caregiver not ready to engage in discussion [Discussed disease trajectory with patient/caregiver] : discussed disease trajectory with patient/caregiver

## 2024-01-13 NOTE — HISTORY OF PRESENT ILLNESS
[Patient is stable] : patient is stable [Education provided] : education provided [Patient] : patient [Family Member] : family member [LastPVisitDate] : 06/2023 [FreeTextEntry4] :  [LastSpecialistVisitDate] : 08/2023 [FreeTextEntry1] : Left AKA and impaired mobility  [FreeTextEntry2] : 01/13/2024 COVID SCREEN: Patient or caregiver denies fever, cough, trouble breathing, rash or contact with someone who tested positive for COVID-19.   N95 mask, gloves, eye wear and gown (if indicated) used during visit: YES  Total face to face time with patient is 45 min.   Aminah Ann, 73 year old female with Left AKA, HTN, Hypothyroidism, left groin wound, HLD, depression and neuropathy presents for follow up post hospital discharge,   Today patient reports lower back/sacral region pain worsened with sitting. Educated on use or pressure offloading cushion. No redness, brusing or deformity noted. Will start Lidocaine patch advised to apply to lower back region and rotate on for 12 hours and off for 12 hours.   Will reffer patient to follow up with GI for management of colitis and possible colonoscopy.

## 2024-01-13 NOTE — REASON FOR VISIT
[Post Hospitalization] : a post hospitalization visit [Family Member] : family member [Pre-Visit Preparation] : pre-visit preparation was done [Post-hospitalization from ___ Hospital] : Post-hospitalization from [unfilled] Hospital [Admitted on: ___] : The patient was admitted on [unfilled] [Discharged on ___] : discharged on [unfilled] [Discharge Summary] : discharge summary [Discharge Med List] : discharge medication list [Patient Contacted By: ____] : and contacted by [unfilled] [FreeTextEntry1] : PMH HTN HLD, Hypothyroid, left groin wound, neuropathy and depression [FreeTextEntry2] : chart review

## 2024-01-13 NOTE — REVIEW OF SYSTEMS
[Vision Problems] : vision problems [Muscle Weakness] : muscle weakness [Unsteady Walking] : ataxia [Negative] : Heme/Lymph

## 2024-01-13 NOTE — HEALTH RISK ASSESSMENT
[Two or more falls in past year] : Patient reported two or more falls in the past year [Some assistance needed] : walking [Independent] : managing medications [Full assistance needed] : using transportation [] : managing finances [Any fall with injury in past year] : Patient reported fall with injury in the past year [Yes] : The patient has visual impairment [[place]] : 10 [FreeTextEntry2] : ambulates with 4 wheeled walker  [HRA Reviewed] : Health Risk Assessment reviewed [[place]] : 10 [de-identified] : with nonwheeled walker

## 2024-01-13 NOTE — CURRENT MEDS
[Adherent to medications] : Patient is adherent to medications as prescribed [Medication and Allergies Reconciled] : medication and allergies reconciled [High Risk Medications Reviewed and Reconciled (Beers Criteria)] : high risk medications reviewed, reconciled [Adherentt to medications as prescribed] : the patient is adherent to medications as prescribed

## 2024-01-13 NOTE — CHRONIC CARE ASSESSMENT
[Inadequate social support] : inadequate social support [PPS Score: ____] : Palliative Performance Scale (PPS) Score: [unfilled] [de-identified] : as tolerated [de-identified] : low sodium

## 2024-01-18 DIAGNOSIS — Z11.52 ENCOUNTER FOR SCREENING FOR COVID-19: ICD-10-CM

## 2024-01-18 DIAGNOSIS — Z89.612 ACQUIRED ABSENCE OF LEFT LEG ABOVE KNEE: ICD-10-CM

## 2024-01-18 DIAGNOSIS — Z95.5 PRESENCE OF CORONARY ANGIOPLASTY IMPLANT AND GRAFT: ICD-10-CM

## 2024-01-18 DIAGNOSIS — K52.9 NONINFECTIVE GASTROENTERITIS AND COLITIS, UNSPECIFIED: ICD-10-CM

## 2024-01-18 DIAGNOSIS — Z97.14 PRESENCE OF ARTIFICIAL LEFT LEG (COMPLETE) (PARTIAL): ICD-10-CM

## 2024-01-18 DIAGNOSIS — R53.1 WEAKNESS: ICD-10-CM

## 2024-01-18 DIAGNOSIS — Z79.82 LONG TERM (CURRENT) USE OF ASPIRIN: ICD-10-CM

## 2024-01-18 DIAGNOSIS — Z79.890 HORMONE REPLACEMENT THERAPY: ICD-10-CM

## 2024-01-18 DIAGNOSIS — L89.221 PRESSURE ULCER OF LEFT HIP, STAGE 1: ICD-10-CM

## 2024-01-18 DIAGNOSIS — J34.89 OTHER SPECIFIED DISORDERS OF NOSE AND NASAL SINUSES: ICD-10-CM

## 2024-01-18 DIAGNOSIS — Z87.828 PERSONAL HISTORY OF OTHER (HEALED) PHYSICAL INJURY AND TRAUMA: ICD-10-CM

## 2024-01-18 DIAGNOSIS — M19.90 UNSPECIFIED OSTEOARTHRITIS, UNSPECIFIED SITE: ICD-10-CM

## 2024-01-18 DIAGNOSIS — E03.9 HYPOTHYROIDISM, UNSPECIFIED: ICD-10-CM

## 2024-01-18 DIAGNOSIS — Z96.661 PRESENCE OF RIGHT ARTIFICIAL ANKLE JOINT: ICD-10-CM
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
## 2024-01-18 DIAGNOSIS — R51.9 HEADACHE, UNSPECIFIED: ICD-10-CM
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
## 2024-01-18 DIAGNOSIS — G47.00 INSOMNIA, UNSPECIFIED: ICD-10-CM

## 2024-01-18 DIAGNOSIS — R33.9 RETENTION OF URINE, UNSPECIFIED: ICD-10-CM

## 2024-01-18 DIAGNOSIS — N32.89 OTHER SPECIFIED DISORDERS OF BLADDER: ICD-10-CM

## 2024-01-18 DIAGNOSIS — R63.8 OTHER SYMPTOMS AND SIGNS CONCERNING FOOD AND FLUID INTAKE: ICD-10-CM

## 2024-01-18 DIAGNOSIS — R26.89 OTHER ABNORMALITIES OF GAIT AND MOBILITY: ICD-10-CM

## 2024-01-18 DIAGNOSIS — Z87.19 PERSONAL HISTORY OF OTHER DISEASES OF THE DIGESTIVE SYSTEM: ICD-10-CM

## 2024-01-18 DIAGNOSIS — F41.9 ANXIETY DISORDER, UNSPECIFIED: ICD-10-CM

## 2024-01-18 DIAGNOSIS — E78.5 HYPERLIPIDEMIA, UNSPECIFIED: ICD-10-CM

## 2024-01-18 DIAGNOSIS — Z96.651 PRESENCE OF RIGHT ARTIFICIAL KNEE JOINT: ICD-10-CM

## 2024-01-18 DIAGNOSIS — Z79.02 LONG TERM (CURRENT) USE OF ANTITHROMBOTICS/ANTIPLATELETS: ICD-10-CM

## 2024-01-18 DIAGNOSIS — R55 SYNCOPE AND COLLAPSE: ICD-10-CM

## 2024-01-18 DIAGNOSIS — I10 ESSENTIAL (PRIMARY) HYPERTENSION: ICD-10-CM

## 2024-01-18 DIAGNOSIS — Z20.822 CONTACT WITH AND (SUSPECTED) EXPOSURE TO COVID-19: ICD-10-CM
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
## 2024-01-18 DIAGNOSIS — F32.A DEPRESSION, UNSPECIFIED: ICD-10-CM

## 2024-01-18 DIAGNOSIS — Z79.899 OTHER LONG TERM (CURRENT) DRUG THERAPY: ICD-10-CM

## 2024-01-18 DIAGNOSIS — G89.29 OTHER CHRONIC PAIN: ICD-10-CM

## 2024-01-18 DIAGNOSIS — I25.10 ATHEROSCLEROTIC HEART DISEASE OF NATIVE CORONARY ARTERY WITHOUT ANGINA PECTORIS: ICD-10-CM

## 2024-01-25 ENCOUNTER — RESULT REVIEW (OUTPATIENT)
Age: 74
End: 2024-01-25

## 2024-01-25 ENCOUNTER — OUTPATIENT (OUTPATIENT)
Dept: OUTPATIENT SERVICES | Facility: HOSPITAL | Age: 74
LOS: 1 days | End: 2024-01-25
Payer: MEDICARE

## 2024-01-25 ENCOUNTER — APPOINTMENT (OUTPATIENT)
Dept: CARDIOLOGY | Facility: CLINIC | Age: 74
End: 2024-01-25
Payer: MEDICARE

## 2024-01-25 VITALS
BODY MASS INDEX: 28.16 KG/M2 | SYSTOLIC BLOOD PRESSURE: 150 MMHG | WEIGHT: 153 LBS | HEART RATE: 71 BPM | DIASTOLIC BLOOD PRESSURE: 80 MMHG | HEIGHT: 62 IN

## 2024-01-25 DIAGNOSIS — Z98.890 OTHER SPECIFIED POSTPROCEDURAL STATES: Chronic | ICD-10-CM

## 2024-01-25 DIAGNOSIS — Z96.651 PRESENCE OF RIGHT ARTIFICIAL KNEE JOINT: Chronic | ICD-10-CM

## 2024-01-25 DIAGNOSIS — M79.642 PAIN IN LEFT HAND: ICD-10-CM

## 2024-01-25 DIAGNOSIS — S78.112A COMPLETE TRAUMATIC AMPUTATION AT LEVEL BETWEEN LEFT HIP AND KNEE, INITIAL ENCOUNTER: Chronic | ICD-10-CM

## 2024-01-25 DIAGNOSIS — M25.531 PAIN IN RIGHT WRIST: ICD-10-CM

## 2024-01-25 PROCEDURE — 73130 X-RAY EXAM OF HAND: CPT | Mod: 26,LT,RT

## 2024-01-25 PROCEDURE — 73110 X-RAY EXAM OF WRIST: CPT | Mod: 50

## 2024-01-25 PROCEDURE — 99214 OFFICE O/P EST MOD 30 MIN: CPT | Mod: 25

## 2024-01-25 PROCEDURE — 73130 X-RAY EXAM OF HAND: CPT | Mod: 50

## 2024-01-25 PROCEDURE — 73110 X-RAY EXAM OF WRIST: CPT | Mod: 26,LT,RT

## 2024-01-25 PROCEDURE — 93000 ELECTROCARDIOGRAM COMPLETE: CPT

## 2024-01-25 NOTE — REVIEW OF SYSTEMS
[Lower Ext Edema] : no extremity edema [Palpitations] : no palpitations [Syncope] : syncope [Rash] : no rash [Anxiety] : no anxiety

## 2024-01-25 NOTE — ASSESSMENT
[FreeTextEntry1] : H/o non-obstructive CAD. Episodes of chest pain suspicious for angina. HTN is uncontrolled.  Hyperlipidemia better controlled. S/p fall and LOC, ? syncope.  Plan: Continue Amlodipine and Losartan /25 mg daily. Increase Metoprolol to 50 mg daily. Continue Atorvastatin 40 mg daily. Low-salt diet d/w patient. Lexiscan MPI to r/o ischemia. F/u in 1 month.  Adelfo Dunn MD.
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
## 2024-01-25 NOTE — CARDIOLOGY SUMMARY
[de-identified] : 1/25/24: SR 71bpm, LAFB, NSST changes. [de-identified] : 04/16/21:\par  No ischemia. [de-identified] : Carotid duplex: Mild bilateral plaque.

## 2024-01-26 ENCOUNTER — NON-APPOINTMENT (OUTPATIENT)
Age: 74
End: 2024-01-26

## 2024-01-26 ENCOUNTER — APPOINTMENT (OUTPATIENT)
Dept: CARDIOLOGY | Facility: CLINIC | Age: 74
End: 2024-01-26
Payer: MEDICARE

## 2024-01-26 DIAGNOSIS — M79.642 PAIN IN LEFT HAND: ICD-10-CM

## 2024-01-26 DIAGNOSIS — M25.531 PAIN IN RIGHT WRIST: ICD-10-CM

## 2024-01-27 PROCEDURE — 93298 REM INTERROG DEV EVAL SCRMS: CPT

## 2024-01-30 ENCOUNTER — OUTPATIENT (OUTPATIENT)
Dept: OUTPATIENT SERVICES | Facility: HOSPITAL | Age: 74
LOS: 1 days | End: 2024-01-30
Payer: MEDICARE

## 2024-01-30 ENCOUNTER — APPOINTMENT (OUTPATIENT)
Dept: HOME HEALTH SERVICES | Facility: HOME HEALTH | Age: 74
End: 2024-01-30

## 2024-01-30 VITALS
SYSTOLIC BLOOD PRESSURE: 164 MMHG | BODY MASS INDEX: 25.76 KG/M2 | TEMPERATURE: 98 F | OXYGEN SATURATION: 98 % | DIASTOLIC BLOOD PRESSURE: 92 MMHG | RESPIRATION RATE: 19 BRPM | WEIGHT: 140 LBS | HEART RATE: 68 BPM | HEIGHT: 62 IN

## 2024-01-30 DIAGNOSIS — Z98.890 OTHER SPECIFIED POSTPROCEDURAL STATES: Chronic | ICD-10-CM

## 2024-01-30 DIAGNOSIS — S78.112A COMPLETE TRAUMATIC AMPUTATION AT LEVEL BETWEEN LEFT HIP AND KNEE, INITIAL ENCOUNTER: Chronic | ICD-10-CM

## 2024-01-30 DIAGNOSIS — Z12.31 ENCOUNTER FOR SCREENING MAMMOGRAM FOR MALIGNANT NEOPLASM OF BREAST: ICD-10-CM

## 2024-01-30 DIAGNOSIS — Z13.820 ENCOUNTER FOR SCREENING FOR OSTEOPOROSIS: ICD-10-CM

## 2024-01-30 DIAGNOSIS — Z96.651 PRESENCE OF RIGHT ARTIFICIAL KNEE JOINT: Chronic | ICD-10-CM

## 2024-01-30 PROCEDURE — 77080 DXA BONE DENSITY AXIAL: CPT

## 2024-01-30 PROCEDURE — 77080 DXA BONE DENSITY AXIAL: CPT | Mod: 26

## 2024-01-31 DIAGNOSIS — Z12.31 ENCOUNTER FOR SCREENING MAMMOGRAM FOR MALIGNANT NEOPLASM OF BREAST: ICD-10-CM

## 2024-01-31 DIAGNOSIS — Z13.820 ENCOUNTER FOR SCREENING FOR OSTEOPOROSIS: ICD-10-CM

## 2024-02-05 ENCOUNTER — APPOINTMENT (OUTPATIENT)
Dept: CARDIOLOGY | Facility: CLINIC | Age: 74
End: 2024-02-05

## 2024-02-07 ENCOUNTER — APPOINTMENT (OUTPATIENT)
Dept: ELECTROPHYSIOLOGY | Facility: CLINIC | Age: 74
End: 2024-02-07
Payer: MEDICARE

## 2024-02-07 VITALS
DIASTOLIC BLOOD PRESSURE: 72 MMHG | WEIGHT: 147 LBS | BODY MASS INDEX: 27.05 KG/M2 | SYSTOLIC BLOOD PRESSURE: 119 MMHG | HEART RATE: 62 BPM | HEIGHT: 62 IN | TEMPERATURE: 98 F

## 2024-02-07 DIAGNOSIS — E78.5 HYPERLIPIDEMIA, UNSPECIFIED: ICD-10-CM

## 2024-02-07 DIAGNOSIS — Z45.09 ENCOUNTER FOR ADJUSTMENT AND MANAGEMENT OF OTHER CARDIAC DEVICE: ICD-10-CM

## 2024-02-07 DIAGNOSIS — R55 SYNCOPE AND COLLAPSE: ICD-10-CM

## 2024-02-07 PROCEDURE — 93291 INTERROG DEV EVAL SCRMS IP: CPT

## 2024-02-07 PROCEDURE — 99214 OFFICE O/P EST MOD 30 MIN: CPT | Mod: 25
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
## 2024-02-08 NOTE — REVIEW OF SYSTEMS
[Negative] : Heme/Lymph [Dyspnea on exertion] : dyspnea during exertion [Chest Discomfort] : chest discomfort [Palpitations] : no palpitations [Orthopnea] : no orthopnea [Syncope] : no syncope
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
## 2024-02-08 NOTE — CARDIOLOGY SUMMARY
[de-identified] : 0908/2023: NSR 74 bpm, LAD,  08/03/23: SR, LAFB, NSST changes. [de-identified] : SPECT 04/16/2021: no ischemia. [de-identified] : TTE 09/27/2022: LVEF 62% and no significant valvular disease. [de-identified] : Carotid duplex 09/27/2022: 20-39% stenosis of DAVID and LICA

## 2024-02-08 NOTE — PHYSICAL EXAM
[General Appearance - In No Acute Distress] : no acute distress [Heart Rate And Rhythm] : heart rate and rhythm were normal [Heart Sounds] : normal S1 and S2 [] : no respiratory distress [Exaggerated Use Of Accessory Muscles For Inspiration] : no accessory muscle use [Clean] : clean [Dry] : dry [Healing Well] : healing well [Bleeding] : no active bleeding [Purulent Drainage] : no purulent drainage [Erythema] : not erythematous [Warm] : not warm [Tender] : not tender [FreeTextEntry1] : left parasternal.

## 2024-02-08 NOTE — PROCEDURE
[No] : not [NSR] : normal sinus rhythm [Sensing Amplitude ___mv] : sensing amplitude was [unfilled] mv [None] : none [See Device Printout] : See device printout [de-identified] : 92 bpm [de-identified] : [employer] ILR  [de-identified] : LINQ II [de-identified] : [id number] [de-identified] : 08/25/2023 [de-identified] : Good [de-identified] : Normal device function. 2 false A fib episodes consistent with noise. The patient has not been transmitting due to issues with the home monitor. The [employer] rep ordered a new monitor today.

## 2024-02-12 ENCOUNTER — APPOINTMENT (OUTPATIENT)
Dept: HOME HEALTH SERVICES | Facility: HOME HEALTH | Age: 74
End: 2024-02-12

## 2024-02-12 VITALS
TEMPERATURE: 97 F | SYSTOLIC BLOOD PRESSURE: 144 MMHG | OXYGEN SATURATION: 98 % | RESPIRATION RATE: 19 BRPM | HEART RATE: 77 BPM | DIASTOLIC BLOOD PRESSURE: 86 MMHG

## 2024-02-12 DIAGNOSIS — R30.0 DYSURIA: ICD-10-CM

## 2024-02-12 RX ORDER — BETAMETHASONE DIPROPIONATE 0.5 MG/G
0.05 CREAM TOPICAL TWICE DAILY
Qty: 1 | Refills: 0 | Status: ACTIVE | COMMUNITY
Start: 2023-06-07

## 2024-02-12 RX ORDER — LEVOTHYROXINE SODIUM 0.1 MG/1
100 TABLET ORAL DAILY
Qty: 90 | Refills: 0 | Status: ACTIVE | COMMUNITY
Start: 2023-08-22

## 2024-02-12 RX ORDER — LIDOCAINE 40 MG/G
4 PATCH TOPICAL
Qty: 15 | Refills: 0 | Status: ACTIVE | COMMUNITY
Start: 2024-01-13

## 2024-02-12 RX ORDER — BACITRACIN 500 [IU]/G
500 OINTMENT TOPICAL TWICE DAILY
Qty: 1 | Refills: 0 | Status: ACTIVE | COMMUNITY
Start: 2023-08-22

## 2024-02-12 RX ORDER — ATORVASTATIN CALCIUM 40 MG/1
40 TABLET, FILM COATED ORAL
Qty: 1 | Refills: 1 | Status: ACTIVE | COMMUNITY
Start: 2023-08-03

## 2024-02-12 RX ORDER — KRILL/OM-3/DHA/EPA/PHOSPHO/AST 1000-230MG
CAPSULE ORAL DAILY
Refills: 0 | Status: ACTIVE | COMMUNITY

## 2024-02-12 RX ORDER — DULOXETINE HYDROCHLORIDE 30 MG/1
30 CAPSULE, DELAYED RELEASE PELLETS ORAL
Qty: 30 | Refills: 3 | Status: ACTIVE | COMMUNITY
Start: 2022-09-27

## 2024-02-12 RX ORDER — AMLODIPINE BESYLATE 5 MG/1
5 TABLET ORAL
Qty: 90 | Refills: 1 | Status: ACTIVE | COMMUNITY
Start: 2023-06-08

## 2024-02-12 RX ORDER — SERTRALINE HYDROCHLORIDE 100 MG/1
100 TABLET, FILM COATED ORAL DAILY
Qty: 90 | Refills: 0 | Status: ACTIVE | COMMUNITY

## 2024-02-12 RX ORDER — GABAPENTIN 600 MG/1
600 TABLET, COATED ORAL 3 TIMES DAILY
Qty: 270 | Refills: 0 | Status: ACTIVE | COMMUNITY
Start: 2022-09-07

## 2024-02-12 RX ORDER — METOPROLOL SUCCINATE 50 MG/1
50 TABLET, EXTENDED RELEASE ORAL
Qty: 90 | Refills: 1 | Status: ACTIVE | COMMUNITY

## 2024-02-12 RX ORDER — LOSARTAN POTASSIUM AND HYDROCHLOROTHIAZIDE 25; 100 MG/1; MG/1
100-25 TABLET ORAL
Qty: 90 | Refills: 1 | Status: ACTIVE | COMMUNITY
Start: 2023-06-08

## 2024-02-13 ENCOUNTER — NON-APPOINTMENT (OUTPATIENT)
Age: 74
End: 2024-02-13

## 2024-02-13 LAB — URINALYSIS W/REFLEX URINE CULTURE: NORMAL

## 2024-03-05 ENCOUNTER — APPOINTMENT (OUTPATIENT)
Dept: GASTROENTEROLOGY | Facility: CLINIC | Age: 74
End: 2024-03-05

## 2024-03-05 ENCOUNTER — OUTPATIENT (OUTPATIENT)
Dept: OUTPATIENT SERVICES | Facility: HOSPITAL | Age: 74
LOS: 1 days | End: 2024-03-05
Payer: MEDICARE

## 2024-03-05 DIAGNOSIS — Z96.651 PRESENCE OF RIGHT ARTIFICIAL KNEE JOINT: Chronic | ICD-10-CM

## 2024-03-05 DIAGNOSIS — Z98.890 OTHER SPECIFIED POSTPROCEDURAL STATES: Chronic | ICD-10-CM

## 2024-03-05 DIAGNOSIS — R07.9 CHEST PAIN, UNSPECIFIED: ICD-10-CM

## 2024-03-05 DIAGNOSIS — S78.112A COMPLETE TRAUMATIC AMPUTATION AT LEVEL BETWEEN LEFT HIP AND KNEE, INITIAL ENCOUNTER: Chronic | ICD-10-CM

## 2024-03-05 PROCEDURE — A9500: CPT

## 2024-03-05 PROCEDURE — 93018 CV STRESS TEST I&R ONLY: CPT

## 2024-03-05 PROCEDURE — 78452 HT MUSCLE IMAGE SPECT MULT: CPT

## 2024-03-05 PROCEDURE — 78452 HT MUSCLE IMAGE SPECT MULT: CPT | Mod: 26

## 2024-03-06 DIAGNOSIS — R07.9 CHEST PAIN, UNSPECIFIED: ICD-10-CM

## 2024-03-11 ENCOUNTER — APPOINTMENT (OUTPATIENT)
Dept: CARDIOLOGY | Facility: CLINIC | Age: 74
End: 2024-03-11
Payer: MEDICARE

## 2024-03-11 VITALS
DIASTOLIC BLOOD PRESSURE: 86 MMHG | BODY MASS INDEX: 28.16 KG/M2 | HEIGHT: 62 IN | WEIGHT: 153 LBS | HEART RATE: 59 BPM | SYSTOLIC BLOOD PRESSURE: 142 MMHG

## 2024-03-11 DIAGNOSIS — I10 ESSENTIAL (PRIMARY) HYPERTENSION: ICD-10-CM

## 2024-03-11 DIAGNOSIS — E03.9 HYPOTHYROIDISM, UNSPECIFIED: ICD-10-CM

## 2024-03-11 DIAGNOSIS — I25.9 CHRONIC ISCHEMIC HEART DISEASE, UNSPECIFIED: ICD-10-CM

## 2024-03-11 DIAGNOSIS — R07.9 CHEST PAIN, UNSPECIFIED: ICD-10-CM

## 2024-03-11 PROCEDURE — 99214 OFFICE O/P EST MOD 30 MIN: CPT | Mod: 25

## 2024-03-11 PROCEDURE — 93000 ELECTROCARDIOGRAM COMPLETE: CPT

## 2024-03-11 NOTE — REVIEW OF SYSTEMS
[Dyspnea on exertion] : dyspnea during exertion [Chest Discomfort] : chest discomfort [Negative] : Gastrointestinal [Lower Ext Edema] : no extremity edema [Palpitations] : no palpitations [Syncope] : no syncope [Rash] : no rash [Anxiety] : no anxiety

## 2024-03-11 NOTE — CARDIOLOGY SUMMARY
[de-identified] : 03/11/2024: SB, LAD, NSST changes. [de-identified] : 04/16/21:\par  No ischemia. [de-identified] : Carotid duplex: Mild bilateral plaque.

## 2024-03-11 NOTE — PHYSICAL EXAM
[Well Developed] : well developed [Well Nourished] : well nourished [No Acute Distress] : no acute distress [Normal Conjunctiva] : normal conjunctiva [Normal Venous Pressure] : normal venous pressure [No Carotid Bruit] : no carotid bruit [Normal S1, S2] : normal S1, S2 [No Rub] : no rub [No Gallop] : no gallop [S4] : S4 [Clear Lung Fields] : clear lung fields [Good Air Entry] : good air entry [No Respiratory Distress] : no respiratory distress  [Soft] : abdomen soft [Non Tender] : non-tender [Normal Bowel Sounds] : normal bowel sounds [Normal Gait] : normal gait [No Edema] : no edema [No Cyanosis] : no cyanosis [No Clubbing] : no clubbing [No Varicosities] : no varicosities [No Rash] : no rash [Moves all extremities] : moves all extremities [No Focal Deficits] : no focal deficits [Normal Speech] : normal speech [Alert and Oriented] : alert and oriented [Normal memory] : normal memory [de-identified] : S/p left BKA

## 2024-03-11 NOTE — ASSESSMENT
[FreeTextEntry1] : H/o non-obstructive CAD. No evidence of significant ischemia again. HTN is uncontrolled. Unable to clarify patient's medication list. Hyperlipidemia better controlled.  Plan: Continue Metoprolol and Amlodipine. Continue Losartan /25 mg daily. Continue Atorvastatin 40 mg daily. Low-salt diet d/w patient. She will bring her medication bottles to next visit. Fasting blood work ordered. F/u in 3 months.  Adelfo Dunn MD.

## 2024-03-11 NOTE — HISTORY OF PRESENT ILLNESS
[FreeTextEntry1] : 73-yo female with h/o HTN, hyperlipidemia. Non-obstructive CAD (60% mid LAD stenosis on [facility] in 2007).  H/o hepatitis C, left BKA (trauma).  S/p right TKR  Recent evaluation at [facility] after passing out. Patient states she was standing outside, turned around and passed out. She was found to have orthostatic hypotension and vitreous detachment. ECHO showed LVEF 62%, CTH was negative.  She was hospitalized in April for L leg wound due to prosthesis pressure, s/p debridement and closure. Pt is awaiting for a new above the knee prostheses.  She underwent ILR implantation on 08/25/2023   Patient presents for a follow up. She is c/o substernal chest pain described as squeezing during emotional stress and ALAMO when she walks and climbs stairs.   03/11/2024: Patient presents for F/U visit. Her Nuclear stress test was negative for ischemia. Complains of chest pain that sometimes wakes her up at night. She  had a visit with EP in 02/2024 for ILR, device function is normal.   LDL 57.     .

## 2024-04-02 ENCOUNTER — APPOINTMENT (OUTPATIENT)
Dept: ELECTROPHYSIOLOGY | Facility: CLINIC | Age: 74
End: 2024-04-02

## 2024-04-19 ENCOUNTER — APPOINTMENT (OUTPATIENT)
Dept: CARDIOLOGY | Facility: CLINIC | Age: 74
End: 2024-04-19
Payer: MEDICARE

## 2024-04-19 ENCOUNTER — NON-APPOINTMENT (OUTPATIENT)
Age: 74
End: 2024-04-19

## 2024-04-19 PROCEDURE — 93298 REM INTERROG DEV EVAL SCRMS: CPT

## 2024-04-25 ENCOUNTER — OUTPATIENT (OUTPATIENT)
Dept: OUTPATIENT SERVICES | Facility: HOSPITAL | Age: 74
LOS: 1 days | End: 2024-04-25
Payer: MEDICARE

## 2024-04-25 ENCOUNTER — APPOINTMENT (OUTPATIENT)
Dept: BURN CARE | Facility: CLINIC | Age: 74
End: 2024-04-25
Payer: MEDICARE

## 2024-04-25 VITALS — TEMPERATURE: 97.9 F | DIASTOLIC BLOOD PRESSURE: 100 MMHG | HEART RATE: 72 BPM | SYSTOLIC BLOOD PRESSURE: 166 MMHG

## 2024-04-25 DIAGNOSIS — Z96.651 PRESENCE OF RIGHT ARTIFICIAL KNEE JOINT: Chronic | ICD-10-CM

## 2024-04-25 DIAGNOSIS — Z00.8 ENCOUNTER FOR OTHER GENERAL EXAMINATION: ICD-10-CM

## 2024-04-25 DIAGNOSIS — Z98.890 OTHER SPECIFIED POSTPROCEDURAL STATES: Chronic | ICD-10-CM

## 2024-04-25 DIAGNOSIS — S78.112A COMPLETE TRAUMATIC AMPUTATION AT LEVEL BETWEEN LEFT HIP AND KNEE, INITIAL ENCOUNTER: Chronic | ICD-10-CM

## 2024-04-25 PROCEDURE — 99213 OFFICE O/P EST LOW 20 MIN: CPT

## 2024-04-25 RX ORDER — MUPIROCIN 20 MG/G
2 OINTMENT TOPICAL 3 TIMES DAILY
Qty: 3 | Refills: 2 | Status: ACTIVE | COMMUNITY
Start: 2024-04-25 | End: 1900-01-01

## 2024-04-27 LAB — BACTERIA SPEC CULT: ABNORMAL

## 2024-04-30 RX ORDER — CLINDAMYCIN HYDROCHLORIDE 300 MG/1
300 CAPSULE ORAL
Qty: 21 | Refills: 0 | Status: ACTIVE | COMMUNITY
Start: 2024-04-30 | End: 1900-01-01
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
## 2024-04-30 NOTE — PHYSICAL EXAM
[New] : new [Size%: ______] : Size: [unfilled]% [Infected?] : Infected: No [3] : 3 out of 10 [Abnormal] : abnormal [Medium] : medium [] : no [de-identified] : The left groin wound due to prosthesis post left AKA measures 1x1cm and 1x 0.5cm and are granulating .     The patient was instructed to clean  the wound with soap and water. Continue local wound care .  Follow up 1-2 months. Will debride and close. [TWNoteComboBox1] : bandaid

## 2024-04-30 NOTE — ASSESSMENT
[FreeTextEntry1] : The left groin wound due to prosthesis post left AKA measures 1x1cm and 1x 0.5cm and are granulating .     The patient was instructed to clean  the wound with soap and water. Continue local wound care .  Follow up 1-2 months. Will debride and close. [Wound Care] : wound care

## 2024-05-02 DIAGNOSIS — T87.89 OTHER COMPLICATIONS OF AMPUTATION STUMP: ICD-10-CM

## 2024-05-02 DIAGNOSIS — Z89.612 ACQUIRED ABSENCE OF LEFT LEG ABOVE KNEE: ICD-10-CM

## 2024-05-14 LAB
ALBUMIN SERPL ELPH-MCNC: 4.3 G/DL
ALP BLD-CCNC: 108 U/L
ALT SERPL-CCNC: 14 U/L
ANION GAP SERPL CALC-SCNC: 13 MMOL/L
AST SERPL-CCNC: 15 U/L
BASOPHILS # BLD AUTO: 0.06 K/UL
BASOPHILS NFR BLD AUTO: 0.8 %
BILIRUB SERPL-MCNC: 0.4 MG/DL
BUN SERPL-MCNC: 20 MG/DL
CALCIUM SERPL-MCNC: 9.4 MG/DL
CHLORIDE SERPL-SCNC: 105 MMOL/L
CHOLEST SERPL-MCNC: 129 MG/DL
CO2 SERPL-SCNC: 23 MMOL/L
CREAT SERPL-MCNC: 0.7 MG/DL
EGFR: 90 ML/MIN/1.73M2
EOSINOPHIL # BLD AUTO: 0.15 K/UL
EOSINOPHIL NFR BLD AUTO: 2 %
ESTIMATED AVERAGE GLUCOSE: 108 MG/DL
GLUCOSE SERPL-MCNC: 100 MG/DL
HBA1C MFR BLD HPLC: 5.4 %
HCT VFR BLD CALC: 38.3 %
HDLC SERPL-MCNC: 74 MG/DL
HGB BLD-MCNC: 12.7 G/DL
IMM GRANULOCYTES NFR BLD AUTO: 0.3 %
LDLC SERPL CALC-MCNC: 47 MG/DL
LYMPHOCYTES # BLD AUTO: 1.91 K/UL
LYMPHOCYTES NFR BLD AUTO: 25 %
MAN DIFF?: NORMAL
MCHC RBC-ENTMCNC: 31 PG
MCHC RBC-ENTMCNC: 33.2 G/DL
MCV RBC AUTO: 93.4 FL
MONOCYTES # BLD AUTO: 0.65 K/UL
MONOCYTES NFR BLD AUTO: 8.5 %
NEUTROPHILS # BLD AUTO: 4.85 K/UL
NEUTROPHILS NFR BLD AUTO: 63.4 %
NONHDLC SERPL-MCNC: 55 MG/DL
PLATELET # BLD AUTO: 201 K/UL
PMV BLD AUTO: 0 /100 WBCS
POTASSIUM SERPL-SCNC: 3.7 MMOL/L
PROT SERPL-MCNC: 7 G/DL
RBC # BLD: 4.1 M/UL
RBC # FLD: 13.3 %
SODIUM SERPL-SCNC: 141 MMOL/L
TRIGL SERPL-MCNC: 38 MG/DL
WBC # FLD AUTO: 7.64 K/UL

## 2024-05-23 ENCOUNTER — OUTPATIENT (OUTPATIENT)
Dept: OUTPATIENT SERVICES | Facility: HOSPITAL | Age: 74
LOS: 1 days | End: 2024-05-23
Payer: MEDICARE

## 2024-05-23 ENCOUNTER — APPOINTMENT (OUTPATIENT)
Dept: BURN CARE | Facility: CLINIC | Age: 74
End: 2024-05-23
Payer: MEDICARE

## 2024-05-23 VITALS — SYSTOLIC BLOOD PRESSURE: 145 MMHG | DIASTOLIC BLOOD PRESSURE: 90 MMHG | HEART RATE: 68 BPM

## 2024-05-23 DIAGNOSIS — Z00.8 ENCOUNTER FOR OTHER GENERAL EXAMINATION: ICD-10-CM

## 2024-05-23 DIAGNOSIS — Z98.890 OTHER SPECIFIED POSTPROCEDURAL STATES: Chronic | ICD-10-CM

## 2024-05-23 DIAGNOSIS — Z96.651 PRESENCE OF RIGHT ARTIFICIAL KNEE JOINT: Chronic | ICD-10-CM

## 2024-05-23 PROCEDURE — 99213 OFFICE O/P EST LOW 20 MIN: CPT

## 2024-05-23 NOTE — ASSESSMENT
[FreeTextEntry1] :  Subjective:   - Summary: A 74-year-old female patient, Kathryn Burr, with a history of left above-knee amputation and a recurrent pressure sore in the left groin area due to prosthesis use.   - Chief Complaint (CC): Follow-up for left groin pressure sore.   - History of Present Illness (HPI):     - Chief Complaint: Recurrent left groin pressure sore     - Onset of Symptoms: Not specified     - Characterization of the Discomfort: Not specified     - Duration: Not specified     - Location: Left groin area     - Severity: Not specified     - Aggravating Factors: Prosthesis use     - Relieving Factors: Not specified     - Associated Signs and Symptoms: Not specified     - Temporal Factors: Not specified     - Context: History of left above-knee amputation     - Associated Symptoms: Not specified     - Severity and Impact: Not specified     - Comparative Analysis: Recurrent wound after previous debridement and primary closure     - Patient's Medical Regimen: Not specified     - Contextual Factors: Not specified     - Recent Medical Interventions: Previous debridement and primary closure of the wound   - Past Medical History: Left above-knee amputation   - Past Surgical History: Not specified   - Family History: Not specified   - Social History: Not specified   - Review of Systems: Not specified   - Medications: Not specified   - Allergies: Not specified   Objective:   - Diagnostic Results: Not specified   - Vital Signs: Not specified   - Physical Examination (PE): Left groin wound measures approximately 2 cm x 1 cm.   Assessment:   - Summary: A 74-year-old female patient with a history of left above-knee amputation and a recurrent pressure sore in the left groin area due to prosthesis use.   - Problems:     - Recurrent left groin pressure sore   - Differential Diagnosis:     - Not specified   Plan:   - Summary: The plan involves managing the recurrent left groin pressure sore, potentially considering alternative prosthesis options or modifications to prevent further recurrence, and monitoring for wound healing.   - Plan:     - Wound care and management for the left groin pressure sore     - Evaluation of prosthesis fit and potential modifications or alternatives to prevent recurrence     - Follow-up to monitor wound healing and assess the need for further interventions   [Wound Care] : wound care

## 2024-05-23 NOTE — REASON FOR VISIT
[Revisit] : revisit [Were you seen in the Emergency Room?] : seen in the emergency room [Were you admitted to the burn center at [facility]?] : not admitted to the burn center at [facility]
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
## 2024-05-23 NOTE — PHYSICAL EXAM
[Healing] : healing [Size%: ______] : Size: [unfilled]% [Infected?] : Infected: No [3] : 3 out of 10 [Abnormal] : abnormal [Medium] : medium [] : no [de-identified] :  Subjective:   - Summary: A 74-year-old female patient, Kathryn Burr, with a history of left above-knee amputation and a recurrent pressure sore in the left groin area due to prosthesis use.   - Chief Complaint (CC): Follow-up for left groin pressure sore.   - History of Present Illness (HPI):     - Chief Complaint: Recurrent left groin pressure sore     - Onset of Symptoms: Not specified     - Characterization of the Discomfort: Not specified     - Duration: Not specified     - Location: Left groin area     - Severity: Not specified     - Aggravating Factors: Prosthesis use     - Relieving Factors: Not specified     - Associated Signs and Symptoms: Not specified     - Temporal Factors: Not specified     - Context: History of left above-knee amputation     - Associated Symptoms: Not specified     - Severity and Impact: Not specified     - Comparative Analysis: Recurrent wound after previous debridement and primary closure     - Patient's Medical Regimen: Not specified     - Contextual Factors: Not specified     - Recent Medical Interventions: Previous debridement and primary closure of the wound   - Past Medical History: Left above-knee amputation   - Past Surgical History: Not specified   - Family History: Not specified   - Social History: Not specified   - Review of Systems: Not specified   - Medications: Not specified   - Allergies: Not specified   Objective:   - Diagnostic Results: Not specified   - Vital Signs: Not specified   - Physical Examination (PE): Left groin wound measures approximately 2 cm x 1 cm.   Assessment:   - Summary: A 74-year-old female patient with a history of left above-knee amputation and a recurrent pressure sore in the left groin area due to prosthesis use.   - Problems:     - Recurrent left groin pressure sore   - Differential Diagnosis:     - Not specified   Plan:   - Summary: The plan involves managing the recurrent left groin pressure sore, potentially considering alternative prosthesis options or modifications to prevent further recurrence, and monitoring for wound healing.   - Plan:     - Wound care and management for the left groin pressure sore     - Evaluation of prosthesis fit and potential modifications or alternatives to prevent recurrence     - Follow-up to monitor wound healing and assess the need for further interventions   [TWNoteComboBox1] : bandaid

## 2024-05-24 ENCOUNTER — APPOINTMENT (OUTPATIENT)
Dept: CARDIOLOGY | Facility: CLINIC | Age: 74
End: 2024-05-24

## 2024-05-24 DIAGNOSIS — L89.899 PRESSURE ULCER OF OTHER SITE, UNSPECIFIED STAGE: ICD-10-CM

## 2024-05-24 DIAGNOSIS — Z89.612 ACQUIRED ABSENCE OF LEFT LEG ABOVE KNEE: ICD-10-CM

## 2024-05-26 LAB — BACTERIA SPEC CULT: ABNORMAL

## 2024-05-28 RX ORDER — CIPROFLOXACIN HYDROCHLORIDE 500 MG/1
500 TABLET, FILM COATED ORAL TWICE DAILY
Qty: 14 | Refills: 0 | Status: ACTIVE | COMMUNITY
Start: 2024-05-28 | End: 1900-01-01

## 2024-05-28 RX ORDER — AMOXICILLIN AND CLAVULANATE POTASSIUM 875; 125 MG/1; MG/1
875-125 TABLET, COATED ORAL
Qty: 14 | Refills: 0 | Status: ACTIVE | COMMUNITY
Start: 2024-05-28 | End: 1900-01-01

## 2024-06-11 ENCOUNTER — APPOINTMENT (OUTPATIENT)
Dept: CARDIOLOGY | Facility: CLINIC | Age: 74
End: 2024-06-11

## 2024-06-20 ENCOUNTER — APPOINTMENT (OUTPATIENT)
Dept: BURN CARE | Facility: CLINIC | Age: 74
End: 2024-06-20

## 2024-06-25 ENCOUNTER — APPOINTMENT (OUTPATIENT)
Dept: OPHTHALMOLOGY | Facility: CLINIC | Age: 74
End: 2024-06-25

## 2024-07-05 ENCOUNTER — OUTPATIENT (OUTPATIENT)
Dept: OUTPATIENT SERVICES | Facility: HOSPITAL | Age: 74
LOS: 1 days | End: 2024-07-05
Payer: MEDICARE

## 2024-07-05 VITALS
OXYGEN SATURATION: 98 % | WEIGHT: 123.02 LBS | SYSTOLIC BLOOD PRESSURE: 156 MMHG | DIASTOLIC BLOOD PRESSURE: 94 MMHG | HEIGHT: 62 IN | RESPIRATION RATE: 16 BRPM | TEMPERATURE: 98 F | HEART RATE: 70 BPM

## 2024-07-05 DIAGNOSIS — Z96.651 PRESENCE OF RIGHT ARTIFICIAL KNEE JOINT: Chronic | ICD-10-CM

## 2024-07-05 DIAGNOSIS — Z98.890 OTHER SPECIFIED POSTPROCEDURAL STATES: Chronic | ICD-10-CM

## 2024-07-05 DIAGNOSIS — S78.112A COMPLETE TRAUMATIC AMPUTATION AT LEVEL BETWEEN LEFT HIP AND KNEE, INITIAL ENCOUNTER: Chronic | ICD-10-CM

## 2024-07-05 DIAGNOSIS — Z95.5 PRESENCE OF CORONARY ANGIOPLASTY IMPLANT AND GRAFT: Chronic | ICD-10-CM

## 2024-07-05 DIAGNOSIS — Z01.818 ENCOUNTER FOR OTHER PREPROCEDURAL EXAMINATION: ICD-10-CM

## 2024-07-05 DIAGNOSIS — S31.103A UNSPECIFIED OPEN WOUND OF ABDOMINAL WALL, RIGHT LOWER QUADRANT WITHOUT PENETRATION INTO PERITONEAL CAVITY, INITIAL ENCOUNTER: ICD-10-CM

## 2024-07-05 PROCEDURE — 99214 OFFICE O/P EST MOD 30 MIN: CPT | Mod: 25

## 2024-07-06 DIAGNOSIS — S31.103A UNSPECIFIED OPEN WOUND OF ABDOMINAL WALL, RIGHT LOWER QUADRANT WITHOUT PENETRATION INTO PERITONEAL CAVITY, INITIAL ENCOUNTER: ICD-10-CM

## 2024-07-06 DIAGNOSIS — Z01.818 ENCOUNTER FOR OTHER PREPROCEDURAL EXAMINATION: ICD-10-CM

## 2024-07-09 ENCOUNTER — APPOINTMENT (OUTPATIENT)
Dept: BURN CARE | Facility: CLINIC | Age: 74
End: 2024-07-09

## 2024-07-17 PROBLEM — B19.20 UNSPECIFIED VIRAL HEPATITIS C WITHOUT HEPATIC COMA: Chronic | Status: ACTIVE | Noted: 2024-07-05

## 2024-07-17 PROBLEM — Z87.898 PERSONAL HISTORY OF OTHER SPECIFIED CONDITIONS: Chronic | Status: ACTIVE | Noted: 2024-07-05

## 2024-07-17 PROBLEM — I25.10 ATHEROSCLEROTIC HEART DISEASE OF NATIVE CORONARY ARTERY WITHOUT ANGINA PECTORIS: Chronic | Status: ACTIVE | Noted: 2024-07-05

## 2024-07-18 ENCOUNTER — APPOINTMENT (OUTPATIENT)
Dept: CARDIOLOGY | Facility: CLINIC | Age: 74
End: 2024-07-18
Payer: MEDICARE

## 2024-07-18 VITALS
HEIGHT: 62 IN | HEART RATE: 72 BPM | BODY MASS INDEX: 27.05 KG/M2 | WEIGHT: 147 LBS | DIASTOLIC BLOOD PRESSURE: 94 MMHG | SYSTOLIC BLOOD PRESSURE: 138 MMHG

## 2024-07-18 DIAGNOSIS — E78.5 HYPERLIPIDEMIA, UNSPECIFIED: ICD-10-CM

## 2024-07-18 DIAGNOSIS — Z01.810 ENCOUNTER FOR PREPROCEDURAL CARDIOVASCULAR EXAMINATION: ICD-10-CM

## 2024-07-18 DIAGNOSIS — I10 ESSENTIAL (PRIMARY) HYPERTENSION: ICD-10-CM

## 2024-07-18 DIAGNOSIS — I25.9 CHRONIC ISCHEMIC HEART DISEASE, UNSPECIFIED: ICD-10-CM

## 2024-07-18 DIAGNOSIS — R55 SYNCOPE AND COLLAPSE: ICD-10-CM

## 2024-07-18 PROCEDURE — 93000 ELECTROCARDIOGRAM COMPLETE: CPT

## 2024-07-18 PROCEDURE — 99214 OFFICE O/P EST MOD 30 MIN: CPT | Mod: 25
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
## 2024-07-18 NOTE — ASU PATIENT PROFILE, ADULT - FALL HARM RISK - HARM RISK INTERVENTIONS

## 2024-07-18 NOTE — ASU PATIENT PROFILE, ADULT - NSICDXPASTMEDICALHX_GEN_ALL_CORE_FT
PAST MEDICAL HISTORY:  Anxiety     CAD (coronary artery disease)     Chronic pain     Depression     Essential hypertension     H/O syncope     Hepatitis-C     HLD (hyperlipidemia)     Hypothyroid     Insomnia     Osteoarthritis

## 2024-07-19 ENCOUNTER — OUTPATIENT (OUTPATIENT)
Dept: OUTPATIENT SERVICES | Facility: HOSPITAL | Age: 74
LOS: 1 days | Discharge: HOME | End: 2024-07-19
Payer: MEDICARE

## 2024-07-19 ENCOUNTER — RESULT REVIEW (OUTPATIENT)
Age: 74
End: 2024-07-19

## 2024-07-19 ENCOUNTER — TRANSCRIPTION ENCOUNTER (OUTPATIENT)
Age: 74
End: 2024-07-19

## 2024-07-19 VITALS — DIASTOLIC BLOOD PRESSURE: 68 MMHG | OXYGEN SATURATION: 97 % | SYSTOLIC BLOOD PRESSURE: 131 MMHG | HEART RATE: 80 BPM

## 2024-07-19 VITALS
OXYGEN SATURATION: 95 % | HEART RATE: 67 BPM | DIASTOLIC BLOOD PRESSURE: 77 MMHG | RESPIRATION RATE: 17 BRPM | HEIGHT: 62 IN | SYSTOLIC BLOOD PRESSURE: 143 MMHG | TEMPERATURE: 98 F | WEIGHT: 145.06 LBS

## 2024-07-19 DIAGNOSIS — Z98.890 OTHER SPECIFIED POSTPROCEDURAL STATES: Chronic | ICD-10-CM

## 2024-07-19 DIAGNOSIS — Z96.651 PRESENCE OF RIGHT ARTIFICIAL KNEE JOINT: Chronic | ICD-10-CM

## 2024-07-19 DIAGNOSIS — S31.103A UNSPECIFIED OPEN WOUND OF ABDOMINAL WALL, RIGHT LOWER QUADRANT WITHOUT PENETRATION INTO PERITONEAL CAVITY, INITIAL ENCOUNTER: ICD-10-CM

## 2024-07-19 DIAGNOSIS — Z95.5 PRESENCE OF CORONARY ANGIOPLASTY IMPLANT AND GRAFT: Chronic | ICD-10-CM

## 2024-07-19 DIAGNOSIS — S78.112A COMPLETE TRAUMATIC AMPUTATION AT LEVEL BETWEEN LEFT HIP AND KNEE, INITIAL ENCOUNTER: Chronic | ICD-10-CM

## 2024-07-19 PROCEDURE — 11043 DBRDMT MUSC&/FSCA 1ST 20/<: CPT

## 2024-07-19 PROCEDURE — 88304 TISSUE EXAM BY PATHOLOGIST: CPT

## 2024-07-19 PROCEDURE — 87075 CULTR BACTERIA EXCEPT BLOOD: CPT

## 2024-07-19 PROCEDURE — 87077 CULTURE AEROBIC IDENTIFY: CPT

## 2024-07-19 PROCEDURE — 87186 SC STD MICRODIL/AGAR DIL: CPT

## 2024-07-19 PROCEDURE — 88304 TISSUE EXAM BY PATHOLOGIST: CPT | Mod: 26

## 2024-07-19 PROCEDURE — 87070 CULTURE OTHR SPECIMN AEROBIC: CPT

## 2024-07-19 RX ORDER — HYDROMORPHONE HCL 0.2 MG/ML
0.5 INJECTION, SOLUTION INTRAVENOUS
Refills: 0 | Status: DISCONTINUED | OUTPATIENT
Start: 2024-07-19 | End: 2024-07-19

## 2024-07-19 RX ORDER — HYDROMORPHONE HCL 0.2 MG/ML
1 INJECTION, SOLUTION INTRAVENOUS
Refills: 0 | Status: DISCONTINUED | OUTPATIENT
Start: 2024-07-19 | End: 2024-07-19

## 2024-07-19 RX ORDER — CLINDAMYCIN PHOSPHATE 150 MG/ML
1 INJECTION, SOLUTION INTRAVENOUS
Refills: 0 | DISCHARGE

## 2024-07-19 RX ORDER — ONDANSETRON HYDROCHLORIDE 2 MG/ML
4 INJECTION INTRAMUSCULAR; INTRAVENOUS ONCE
Refills: 0 | Status: DISCONTINUED | OUTPATIENT
Start: 2024-07-19 | End: 2024-07-19

## 2024-07-19 RX ORDER — AMOXICILLIN/POTASSIUM CLAV 250-125 MG
1 TABLET ORAL
Refills: 0 | DISCHARGE

## 2024-07-19 RX ORDER — DEXTROSE MONOHYDRATE AND SODIUM CHLORIDE 5; .3 G/100ML; G/100ML
1000 INJECTION, SOLUTION INTRAVENOUS
Refills: 0 | Status: DISCONTINUED | OUTPATIENT
Start: 2024-07-19 | End: 2024-07-19

## 2024-07-19 RX ORDER — MEPERIDINE HYDROCHLORIDE 50 MG/1
12.5 TABLET ORAL
Refills: 0 | Status: DISCONTINUED | OUTPATIENT
Start: 2024-07-19 | End: 2024-07-19

## 2024-07-19 NOTE — BRIEF OPERATIVE NOTE - NSICDXBRIEFPROCEDURE_GEN_ALL_CORE_FT
PROCEDURES:  Selective debridement 19-Jul-2024 09:16:11 excisional debridement with scalpel , closure left groin wound x2 Jesús Dc

## 2024-07-19 NOTE — BRIEF OPERATIVE NOTE - NSICDXBRIEFPOSTOP_GEN_ALL_CORE_FT
Otitis Media (Middle-Ear Infection) in Adults  Otitis media is another name for a middle-ear infection. It means an infection behind your eardrum. This kind of ear infection can happen after any condition that keeps fluid from draining from the middle ear. These conditions include allergies, a cold, a sore throat, or a respiratory infection.  Middle-ear infections are common in children, but they can also happen in adults. An ear infection in an adult may mean a more serious problem than in a child. So you may need additional tests. If you have an ear infection, you should see your health care provider for treatment.  What are the types of middle-ear infections?  Infections can affect the middle ear in several ways. They are:  · Acute otitis media. This middle-ear infection occurs suddenly. It causes swelling and redness. Fluid and mucus become trapped inside the ear. You can have a fever and ear pain.  · Otitis media with effusion. Fluid (effusion) and mucus build up in the middle ear after the infection goes away. You may feel like your middle ear is full. This can continue for months and may affect your hearing.  · Chronic otitis media with effusion. Fluid (effusion) remains in the middle ear for a long time. Or it builds up again and again, even though there is no infection. This type of middle-ear infection may be hard to treat. It may also affect your hearing.  Who is more likely to get a middle-ear infection?  You are more likely to get an ear infection if you:  · Smoke or are around someone who smokes  · Have seasonal or year-round allergy symptoms  · Have a cold or other upper respiratory infection  What causes a middle-ear infection?  The middle ear connects to the throat by a canal called the eustachian tube. This tube helps even out the pressure between the outer ear and the inner ear. A cold or allergy can irritate the tube or cause the area around it to swell. This can keep fluid from draining from  the middle ear. The fluid builds up behind the eardrum. Bacteria and viruses can grow in this fluid. The bacteria and viruses cause the middle-ear infection.  What are the symptoms of a middle-ear infection?  Common symptoms of a middle-ear infection in adults are:  · Pain in 1 or both ears  · Drainage from the ear  · Muffled hearing  · Sore throat   You may also have a fever. Rarely, your balance can be affected.  These symptoms may be the same as for other conditions. It’s important to talk with your health care provider if you think you have a middle-ear infection. If you have a high fever, severe pain behind your ear, or paralysis in your face, see your provider as soon as you can.  How is a middle-ear infection diagnosed?  Your health care provider will take a medical history and do a physical exam. He or she will look at the outer ear and eardrum with an otoscope. The otoscope is a lighted tool that lets your provider see inside the ear. A pneumatic otoscope blows a puff of air into the ear to check how well your eardrum moves. If you eardrum doesn’t move well, it may mean you have fluid behind it.  Your provider may also do a test called tympanometry. This test tells how well the middle ear is working. It can find any changes in pressure in the middle ear. Your provider may test your hearing with a tuning fork.  How is a middle-ear infection treated?  A middle-ear infection may be treated with:  · Antibiotics, taken by mouth or as ear drops  · Medication for pain  · Decongestants, antihistamines, or nasal steroids  Your health care provider may also have you try autoinsufflation. This helps adjust the air pressure in your ear. For this, you pinch your nose and gently exhale. This forces air back through the eustachian tube.  The exact treatment for your ear infection will depend on the type of infection you have. In general, if your symptoms don’t get better in 48 to 72 hours, contact your health care  provider.  Middle-ear infections can cause long-term problems if not treated. They can lead to:  · Infection in other parts of the head  · Permanent hearing loss  · Paralysis of a nerve in your face  If you have a middle-ear infection that doesn’t get better, you may need to see an ear, nose, and throat specialist (otolaryngologist). You may need a CT scan or MRI to check for head and neck cancer.  Ear tubes  Sometimes fluid stays in the middle ear even after you take antibiotics and the infection goes away. In this case, your health care provider may suggest that a small tube be placed in your ear. The tube is put at the opening of the eardrum. The tube keeps fluid from building up and relieves pressure in the middle ear. It can also help you hear better. This surgery is called myringotomy. It is not often done in adults.  The tubes usually fall out on their own after 6 months to a year.  © 6513-2678 [employer]. [address] [place] [address], [place] [address]. All rights reserved. This information is not intended as a substitute for professional medical care. Always follow your healthcare professional's instructions.         POST-OP DIAGNOSIS:  Wound 19-Jul-2024 09:17:20 left groin Jesús Dc

## 2024-07-19 NOTE — ASU DISCHARGE PLAN (ADULT/PEDIATRIC) - DISCHARGE PLAN IS COMPLETE AND GIVEN TO PATIENT
Physical Therapy E-Visit     Patient verbally consented to e-visit.    SUBJECTIVE:   New or change in symptoms/reports since last in-person visit: worsening left hip pain at this time. Increased pain with lateral movements and standing activities.   Current pain/limitations: 5-6/10 with lateral movements and walking     OBJECTIVE (per patient report):       Treatment Recommendations:   Discussion of new/updated home exercise program, modifying activity as needed, listening to her body with activity and not overdoing it, icing as needed.   HEP ([employer] access code): [email or web address]      ASSESSMENT (Impression of current status):   Likely increased pain due to stopping of exercises and pushing herself too much with standing and walking tasks. Recommended patient to continue to give herself time and modify activity following the cortisone injection.     PLAN:   Continue with home exercise program. Provided patient with writer's clinic phone number in case there is worsening pain or concerns with the home exercise program.   Emailed new/updated home exercise program to preferred email of: [email or web address]    Time spent in E-visit with patient: 12 minutes   (conducted e-visit 11-20 minutes)     
: Yes

## 2024-07-19 NOTE — ASU DISCHARGE PLAN (ADULT/PEDIATRIC) - ASU DC SPECIAL INSTRUCTIONSFT
Dressing to be changed once daily with gauze and tape.   Shower and clean area daily with soap and water.   Please follow up in our office in 1 week.

## 2024-07-19 NOTE — BRIEF OPERATIVE NOTE - NSICDXBRIEFPREOP_GEN_ALL_CORE_FT
PRE-OP DIAGNOSIS:  Wound 19-Jul-2024 09:16:58 left groin Jesús Dc  Wound 19-Jul-2024 09:17:03  Jesús Dc

## 2024-07-19 NOTE — PRE-ANESTHESIA EVALUATION ADULT - NSANTHASARD_GEN_ALL_CORE
ANTICOAGULATION MANAGEMENT     Jose Luis ROCHA Adcox 73 year old male is on warfarin with therapeutic INR result. (Goal INR 2.0-3.0)    Recent labs: (last 7 days)     08/16/21  0000   INR 2.6*       ASSESSMENT     Source(s): Patient/Caregiver Call     Warfarin doses taken: Warfarin taken as instructed  Diet: No new diet changes identified  New illness, injury, or hospitalization: No  Medication/supplement changes: None noted  Signs or symptoms of bleeding or clotting: No  Previous INR: Therapeutic last visit; previously outside of goal range  Additional findings: None     PLAN     Recommended plan for no diet, medication or health factor changes affecting INR     Dosing Instructions: Continue your current warfarin dose with next INR in 9 days       Summary  As of 8/16/2021    Full warfarin instructions:  5 mg every day   Next INR check:               Telephone call with  Spouse Heaven  who verbalizes understanding and agrees to plan and who agrees to plan and repeated back plan correctly    Lab visit scheduled    Education provided: None required    Plan made per [facility] anticoagulation protocol and per LVAD protocol    Bridgette Melara RN  Anticoagulation Clinic  8/16/2021    _______________________________________________________________________     Anticoagulation Episode Summary     Current INR goal:  2.0-3.0   TTR:  64.0 % (10.4 mo)   Target end date:  Indefinite   Send INR reminders to:  [facility]    Indications    Left ventricular assist device present (H) [Z95.811]  Long term (current) use of anticoagulants [Z79.01]  Chronic systolic heart failure (H) [I50.22]           Comments:  LVAD placed on 8/1/19 (HM 3) ASA 81mg Daily Spouse Heaven ph [phone] Uses [facility] lab Ph [phone] F [phone] 10/17/19 [employer] Home Monitoring Machine          Anticoagulation Care Providers     Provider Role Specialty Phone number    Karen Celestin MD Referring Advanced Heart Failure and Transplant  Cardiology [phone]           3

## 2024-07-19 NOTE — ASU DISCHARGE PLAN (ADULT/PEDIATRIC) - CARE PROVIDER_API CALL
Jesús Dc  Plastic Surgery  [address] [place] [id number]  Phone: [phone]  Fax: [phone]  Follow Up Time: 1 week

## 2024-07-20 LAB
GRAM STN FLD: ABNORMAL
GRAM STN FLD: SIGNIFICANT CHANGE UP
SPECIMEN SOURCE: SIGNIFICANT CHANGE UP

## 2024-07-21 LAB
-  CLINDAMYCIN: SIGNIFICANT CHANGE UP
-  ERYTHROMYCIN: SIGNIFICANT CHANGE UP
-  GENTAMICIN: SIGNIFICANT CHANGE UP
-  OXACILLIN: SIGNIFICANT CHANGE UP
-  PENICILLIN: SIGNIFICANT CHANGE UP
-  RIFAMPIN: SIGNIFICANT CHANGE UP
-  TETRACYCLINE: SIGNIFICANT CHANGE UP
-  TRIMETHOPRIM/SULFAMETHOXAZOLE: SIGNIFICANT CHANGE UP
-  VANCOMYCIN: SIGNIFICANT CHANGE UP
METHOD TYPE: SIGNIFICANT CHANGE UP

## 2024-07-22 LAB — SURGICAL PATHOLOGY STUDY: SIGNIFICANT CHANGE UP

## 2024-07-24 LAB
CULTURE RESULTS: ABNORMAL
CULTURE RESULTS: ABNORMAL
ORGANISM # SPEC MICROSCOPIC CNT: ABNORMAL
ORGANISM # SPEC MICROSCOPIC CNT: SIGNIFICANT CHANGE UP
SPECIMEN SOURCE: SIGNIFICANT CHANGE UP
SPECIMEN SOURCE: SIGNIFICANT CHANGE UP

## 2024-07-27 DIAGNOSIS — F32.A DEPRESSION, UNSPECIFIED: ICD-10-CM

## 2024-07-27 DIAGNOSIS — M19.90 UNSPECIFIED OSTEOARTHRITIS, UNSPECIFIED SITE: ICD-10-CM

## 2024-07-27 DIAGNOSIS — Z95.5 PRESENCE OF CORONARY ANGIOPLASTY IMPLANT AND GRAFT: ICD-10-CM

## 2024-07-27 DIAGNOSIS — G47.00 INSOMNIA, UNSPECIFIED: ICD-10-CM

## 2024-07-27 DIAGNOSIS — I25.10 ATHEROSCLEROTIC HEART DISEASE OF NATIVE CORONARY ARTERY WITHOUT ANGINA PECTORIS: ICD-10-CM

## 2024-07-27 DIAGNOSIS — Z86.19 PERSONAL HISTORY OF OTHER INFECTIOUS AND PARASITIC DISEASES: ICD-10-CM

## 2024-07-27 DIAGNOSIS — Z89.612 ACQUIRED ABSENCE OF LEFT LEG ABOVE KNEE: ICD-10-CM

## 2024-07-27 DIAGNOSIS — I10 ESSENTIAL (PRIMARY) HYPERTENSION: ICD-10-CM

## 2024-07-27 DIAGNOSIS — Z79.82 LONG TERM (CURRENT) USE OF ASPIRIN: ICD-10-CM

## 2024-07-27 DIAGNOSIS — E03.9 HYPOTHYROIDISM, UNSPECIFIED: ICD-10-CM
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
## 2024-07-27 DIAGNOSIS — G89.29 OTHER CHRONIC PAIN: ICD-10-CM

## 2024-07-27 DIAGNOSIS — S78.112S: ICD-10-CM

## 2024-07-27 DIAGNOSIS — F41.9 ANXIETY DISORDER, UNSPECIFIED: ICD-10-CM

## 2024-08-01 ENCOUNTER — OUTPATIENT (OUTPATIENT)
Dept: OUTPATIENT SERVICES | Facility: HOSPITAL | Age: 74
LOS: 1 days | End: 2024-08-01
Payer: MEDICARE

## 2024-08-01 ENCOUNTER — APPOINTMENT (OUTPATIENT)
Dept: BURN CARE | Facility: CLINIC | Age: 74
End: 2024-08-01
Payer: MEDICARE

## 2024-08-01 VITALS
HEIGHT: 62 IN | WEIGHT: 145 LBS | BODY MASS INDEX: 26.68 KG/M2 | SYSTOLIC BLOOD PRESSURE: 135 MMHG | DIASTOLIC BLOOD PRESSURE: 83 MMHG

## 2024-08-01 DIAGNOSIS — Z98.890 OTHER SPECIFIED POSTPROCEDURAL STATES: Chronic | ICD-10-CM

## 2024-08-01 DIAGNOSIS — Z00.8 ENCOUNTER FOR OTHER GENERAL EXAMINATION: ICD-10-CM

## 2024-08-01 DIAGNOSIS — Z95.5 PRESENCE OF CORONARY ANGIOPLASTY IMPLANT AND GRAFT: Chronic | ICD-10-CM

## 2024-08-01 DIAGNOSIS — Z96.651 PRESENCE OF RIGHT ARTIFICIAL KNEE JOINT: Chronic | ICD-10-CM

## 2024-08-01 DIAGNOSIS — T14.8XXA OTHER INJURY OF UNSPECIFIED BODY REGION, INITIAL ENCOUNTER: ICD-10-CM

## 2024-08-01 PROCEDURE — 99214 OFFICE O/P EST MOD 30 MIN: CPT

## 2024-08-01 RX ORDER — OXYCODONE AND ACETAMINOPHEN 5; 325 MG/1; MG/1
5-325 TABLET ORAL
Qty: 20 | Refills: 0 | Status: ACTIVE | COMMUNITY
Start: 2024-08-01 | End: 1900-01-01

## 2024-08-01 NOTE — ASSESSMENT
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   - Chief Complaint (CC): Pain in the left groin area related to a pressure sore.   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 The patient had previously undergone debridement and wound closure two weeks ago. She is currently complaining of pain in the affected area. The wound was previously being treated with gauze and soap and water. On physical examination, the wound measures approximately 5 by 3 centimeters, with evidence of healing, including staples and sutures. There is no drainage noted, but mild tenderness is present. The plan is to continue with open wound care using water and dry gauze dressings. Percocet has been prescribed for pain management, and a follow-up appointment has been scheduled in two weeks for staple and suture removal.   - Past Medical History: Based on the provided information, the patient has a history of hypothyroidism, right lumbar radiculopathy, and syncope.   - Past Surgical History: The patient has undergone an above-knee amputation of the left leg.   - Family History: No family history information was provided.   - Social History: No social history information was provided.   - Review of Systems: No additional review of systems information was provided.   - Medications: Percocet has been prescribed for pain management.   - Allergies: No allergy information was provided.   Objective:   - Diagnostic Results: No diagnostic results were mentioned.   - Vital Signs: No vital signs were provided.   - Physical Examination (PE): On physical examination, the wound measures about 5 by 3 centimeters, healing with staples and sutures, no drainage, mild tenderness.   Assessment and Plan:   - 0:     - Left Groin Wound: The patient has a pressure sore on the left groin area due to a prosthesis after undergoing an above-knee amputation of the left leg. The wound is currently healing with staples and sutures, and there is mild tenderness present.     - Therapeutic Interventions: Continuous open wound care with water and dry gauze dressings. Percocet prescribed for pain management.      - Follow-Up: Follow-up appointment scheduled in two weeks for staple and suture removal.    - 1:     - Hypothyroidism: The patient has a history of hypothyroidism, which should be monitored and managed accordingly.     - Therapeutic Interventions: Ensure appropriate thyroid hormone replacement therapy and regular monitoring of thyroid function tests.      - Follow-Up: Schedule regular follow-up appointments for monitoring and adjusting treatment as needed.    - 2:     - Right Lumbar Radiculopathy: The patient has a history of right lumbar radiculopathy, which may require ongoing management.     - Therapeutic Interventions: Consider conservative treatments such as physical therapy, medications for pain management, and potential interventional procedures if necessary.      - Referrals: Referral to a spine specialist or pain management specialist may be appropriate if symptoms persist or worsen.      - Follow-Up: Schedule follow-up appointments as needed to monitor and adjust treatment.    - 3:     - Syncope: The patient has a history of syncope, which may require further evaluation and management.     - Diagnostic Tests: Consider ordering appropriate diagnostic tests (e.g., electrocardiogram, echocardiogram, tilt-table testing) to investigate the underlying cause of syncope.      - Referrals: Referral to a cardiologist or neurologist may be appropriate for further evaluation and management.      - Patient Education: Educate the patient on potential triggers and precautions to prevent falls or injuries related to syncope episodes.      - Follow-Up: Schedule follow-up appointments as needed to monitor and adjust treatment.    [Wound Care] : wound care

## 2024-08-01 NOTE — PHYSICAL EXAM
[de-identified] :  Subjective:   - Summary: Ilda, a 74-year-old female patient, presents for a follow-up visit regarding a pressure sore on her left groin area due to a prosthesis after undergoing an above-knee amputation of her left leg.   - Chief Complaint (CC): Pain in the left groin area related to a pressure sore.   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 The patient had previously undergone debridement and wound closure two weeks ago. She is currently complaining of pain in the affected area. The wound was previously being treated with gauze and soap and water. On physical examination, the wound measures approximately 5 by 3 centimeters, with evidence of healing, including staples and sutures. There is no drainage noted, but mild tenderness is present. The plan is to continue with open wound care using water and dry gauze dressings. Percocet has been prescribed for pain management, and a follow-up appointment has been scheduled in two weeks for staple and suture removal.   - Past Medical History: Based on the provided information, the patient has a history of hypothyroidism, right lumbar radiculopathy, and syncope.   - Past Surgical History: The patient has undergone an above-knee amputation of the left leg.   - Family History: No family history information was provided.   - Social History: No social history information was provided.   - Review of Systems: No additional review of systems information was provided.   - Medications: Percocet has been prescribed for pain management.   - Allergies: No allergy information was provided.   Objective:   - Diagnostic Results: No diagnostic results were mentioned.   - Vital Signs: No vital signs were provided.   - Physical Examination (PE): On physical examination, the wound measures about 5 by 3 centimeters, healing with staples and sutures, no drainage, mild tenderness.   Assessment and Plan:   - 0:     - Left Groin Wound: The patient has a pressure sore on the left groin area due to a prosthesis after undergoing an above-knee amputation of the left leg. The wound is currently healing with staples and sutures, and there is mild tenderness present.     - Therapeutic Interventions: Continuous open wound care with water and dry gauze dressings. Percocet prescribed for pain management.      - Follow-Up: Follow-up appointment scheduled in two weeks for staple and suture removal.    - 1:     - Hypothyroidism: The patient has a history of hypothyroidism, which should be monitored and managed accordingly.     - Therapeutic Interventions: Ensure appropriate thyroid hormone replacement therapy and regular monitoring of thyroid function tests.      - Follow-Up: Schedule regular follow-up appointments for monitoring and adjusting treatment as needed.    - 2:     - Right Lumbar Radiculopathy: The patient has a history of right lumbar radiculopathy, which may require ongoing management.     - Therapeutic Interventions: Consider conservative treatments such as physical therapy, medications for pain management, and potential interventional procedures if necessary.      - Referrals: Referral to a spine specialist or pain management specialist may be appropriate if symptoms persist or worsen.      - Follow-Up: Schedule follow-up appointments as needed to monitor and adjust treatment.    - 3:     - Syncope: The patient has a history of syncope, which may require further evaluation and management.     - Diagnostic Tests: Consider ordering appropriate diagnostic tests (e.g., electrocardiogram, echocardiogram, tilt-table testing) to investigate the underlying cause of syncope.      - Referrals: Referral to a cardiologist or neurologist may be appropriate for further evaluation and management.      - Patient Education: Educate the patient on potential triggers and precautions to prevent falls or injuries related to syncope episodes.      - Follow-Up: Schedule follow-up appointments as needed to monitor and adjust treatment.

## 2024-08-02 DIAGNOSIS — L89.899 PRESSURE ULCER OF OTHER SITE, UNSPECIFIED STAGE: ICD-10-CM

## 2024-08-02 DIAGNOSIS — M54.16 RADICULOPATHY, LUMBAR REGION: ICD-10-CM

## 2024-08-02 DIAGNOSIS — Z98.890 OTHER SPECIFIED POSTPROCEDURAL STATES: ICD-10-CM

## 2024-08-02 DIAGNOSIS — Z89.612 ACQUIRED ABSENCE OF LEFT LEG ABOVE KNEE: ICD-10-CM

## 2024-08-02 DIAGNOSIS — R55 SYNCOPE AND COLLAPSE: ICD-10-CM

## 2024-08-02 DIAGNOSIS — E03.9 HYPOTHYROIDISM, UNSPECIFIED: ICD-10-CM

## 2024-08-10 ENCOUNTER — OUTPATIENT (OUTPATIENT)
Dept: OUTPATIENT SERVICES | Facility: HOSPITAL | Age: 74
LOS: 1 days | End: 2024-08-10
Payer: MEDICARE

## 2024-08-10 DIAGNOSIS — Z00.8 ENCOUNTER FOR OTHER GENERAL EXAMINATION: ICD-10-CM

## 2024-08-10 DIAGNOSIS — Z98.890 OTHER SPECIFIED POSTPROCEDURAL STATES: Chronic | ICD-10-CM

## 2024-08-10 DIAGNOSIS — M54.12 RADICULOPATHY, CERVICAL REGION: ICD-10-CM

## 2024-08-10 DIAGNOSIS — Z96.651 PRESENCE OF RIGHT ARTIFICIAL KNEE JOINT: Chronic | ICD-10-CM

## 2024-08-10 DIAGNOSIS — S78.112A COMPLETE TRAUMATIC AMPUTATION AT LEVEL BETWEEN LEFT HIP AND KNEE, INITIAL ENCOUNTER: Chronic | ICD-10-CM

## 2024-08-10 DIAGNOSIS — Z95.5 PRESENCE OF CORONARY ANGIOPLASTY IMPLANT AND GRAFT: Chronic | ICD-10-CM

## 2024-08-10 PROCEDURE — 72141 MRI NECK SPINE W/O DYE: CPT

## 2024-08-10 PROCEDURE — 72141 MRI NECK SPINE W/O DYE: CPT | Mod: 26

## 2024-08-11 DIAGNOSIS — M54.12 RADICULOPATHY, CERVICAL REGION: ICD-10-CM

## 2024-08-22 ENCOUNTER — APPOINTMENT (OUTPATIENT)
Dept: BURN CARE | Facility: CLINIC | Age: 74
End: 2024-08-22
Payer: MEDICARE

## 2024-08-22 VITALS — SYSTOLIC BLOOD PRESSURE: 147 MMHG | DIASTOLIC BLOOD PRESSURE: 85 MMHG | HEART RATE: 66 BPM

## 2024-08-22 PROCEDURE — 99213 OFFICE O/P EST LOW 20 MIN: CPT

## 2024-08-22 NOTE — PHYSICAL EXAM
[de-identified] :  Subjective:   - Summary: The patient, Ilda, is here for a follow-up visit regarding a wound in the left groin area.   - Chief Complaint (CC): Left groin wound evaluation and management.   - History of Present Illness: Ilda, a patient with a history of peripheral artery disease, underwent an above-knee amputation of the left leg. Subsequently, the patient developed a pressure sore from a prosthesis in the left groin area, which required debridement and primary closure. The patient has an extensive medical history of neuropathy, peripheral artery disease, syncope, vaginal itching, and depression. During the current visit, the focus is on evaluating the healing progress of the left upper groin wound.   - Past Medical History:     - Peripheral artery disease     - Above-knee amputation of the left leg     - Neuropathy     - Syncope     - Vaginal itching     - Depression   - Past Surgical History: Above-knee amputation of the left leg, debridement and primary closure of the left groin wound.   - Family History:   - Social History:   - Review of Systems:   - Medications:   - Allergies:   Objective:   - Diagnostic Results:   - Vital Signs:   - Physical Examination (PE): Physical examination of the left upper groin wound revealed a wound measuring approximately 10 by 2 centimeters. The wound had healing sutures and staples removed, with areas of delayed wound healing.   Assessment and Plan:   - Left Groin Wound: The patient has a left groin wound measuring 10 by 2 centimeters, with areas of delayed healing after the removal of sutures and staples. This wound is likely related to the patient's history of peripheral artery disease and the pressure sore from the prosthesis.     - Therapeutic Interventions: Soap and water bath, wound irrigation, and dry gauze dressing.      - Diagnostic Tests: None specified.      - Referrals: None specified.      - Patient Education: Provide instructions on proper wound care and hygiene.      - Follow-Up: Schedule a follow-up appointment in two weeks to monitor wound healing progress.

## 2024-08-28 ENCOUNTER — OUTPATIENT (OUTPATIENT)
Dept: OUTPATIENT SERVICES | Facility: HOSPITAL | Age: 74
LOS: 1 days | End: 2024-08-28
Payer: MEDICARE

## 2024-08-28 ENCOUNTER — APPOINTMENT (OUTPATIENT)
Dept: OPHTHALMOLOGY | Facility: CLINIC | Age: 74
End: 2024-08-28

## 2024-08-28 DIAGNOSIS — Z96.651 PRESENCE OF RIGHT ARTIFICIAL KNEE JOINT: Chronic | ICD-10-CM

## 2024-08-28 DIAGNOSIS — Z95.5 PRESENCE OF CORONARY ANGIOPLASTY IMPLANT AND GRAFT: Chronic | ICD-10-CM

## 2024-08-28 DIAGNOSIS — H53.8 OTHER VISUAL DISTURBANCES: ICD-10-CM

## 2024-08-28 PROCEDURE — 92134 CPTRZ OPH DX IMG PST SGM RTA: CPT

## 2024-08-28 PROCEDURE — 99213 OFFICE O/P EST LOW 20 MIN: CPT

## 2024-09-05 ENCOUNTER — OUTPATIENT (OUTPATIENT)
Dept: OUTPATIENT SERVICES | Facility: HOSPITAL | Age: 74
LOS: 1 days | End: 2024-09-05
Payer: MEDICARE

## 2024-09-05 ENCOUNTER — APPOINTMENT (OUTPATIENT)
Dept: BURN CARE | Facility: CLINIC | Age: 74
End: 2024-09-05
Payer: MEDICARE

## 2024-09-05 VITALS — HEART RATE: 61 BPM | DIASTOLIC BLOOD PRESSURE: 82 MMHG | SYSTOLIC BLOOD PRESSURE: 141 MMHG | OXYGEN SATURATION: 98 %

## 2024-09-05 DIAGNOSIS — S78.112A COMPLETE TRAUMATIC AMPUTATION AT LEVEL BETWEEN LEFT HIP AND KNEE, INITIAL ENCOUNTER: Chronic | ICD-10-CM

## 2024-09-05 DIAGNOSIS — Z95.5 PRESENCE OF CORONARY ANGIOPLASTY IMPLANT AND GRAFT: Chronic | ICD-10-CM

## 2024-09-05 DIAGNOSIS — Z98.890 OTHER SPECIFIED POSTPROCEDURAL STATES: Chronic | ICD-10-CM

## 2024-09-05 DIAGNOSIS — Z00.8 ENCOUNTER FOR OTHER GENERAL EXAMINATION: ICD-10-CM

## 2024-09-05 PROCEDURE — 99213 OFFICE O/P EST LOW 20 MIN: CPT

## 2024-09-05 NOTE — PHYSICAL EXAM
[de-identified] :  Subjective:   - Summary: Patient Ilda, a 74-year-old female, presents with a history of a left groin wound due to prosthesis pressure. The wound underwent debridement and primary closure. Staples were removed with certain areas displaying delayed wound healing. The patient also has a history of syncope and peripheral artery disease.   - Chief Complaint (CC): Left groin wound resulting from prosthesis pressure   - History of Present Illness (HPI): Ilda, a 74-year-old female has a left groin wound caused by prosthesis pressure. The wound had to undergo several procedures including debridement, primary closure, and removal of staples. Certain areas of the wound are showing signs of delayed healing. Alongside these issues, the patient has a history of syncope and peripheral artery disease.   - Past Medical History: Syncope, Peripheral artery disease   - Past Surgical History: Left groin wound debridement and primary closure   - Family History:    - Social History:    - Review of Systems:    - Medications:    - Allergies:    Objective:   - Diagnostic Results:    - Vital Signs:    - Physical Examination (PE): Upon examination, the left groin wound measures about 0.5 by 0.5 centimeters. It is healing, with an unhealed area of around 0.3 by 0.3 centimeters.   Assessment:   - Summary: Ilda has a history of a left groin wound due to prosthesis pressure. Even with procedures like debridement, primary closure, and removal of staples, certain areas are showing signs of delayed wound healing. The patient also has a history of syncope and peripheral artery disease.   - Problems:     - Delayed wound healing   - Differential Diagnosis:   Plan:   - Summary: Plan to discontinue ointment, cleanse with soap and water, apply band-aid, and schedule follow-up in three weeks.   - Plan:     - Discontinue ointment.     - Cleanse with soap and water.     - Apply band-aid.     - Follow-up in three weeks.

## 2024-09-06 DIAGNOSIS — H26.499 OTHER SECONDARY CATARACT, UNSPECIFIED EYE: ICD-10-CM

## 2024-09-06 DIAGNOSIS — Z98.890 OTHER SPECIFIED POSTPROCEDURAL STATES: ICD-10-CM

## 2024-09-06 DIAGNOSIS — S31.109D UNSPECIFIED OPEN WOUND OF ABDOMINAL WALL, UNSPECIFIED QUADRANT WITHOUT PENETRATION INTO PERITONEAL CAVITY, SUBSEQUENT ENCOUNTER: ICD-10-CM

## 2024-09-06 DIAGNOSIS — H35.369 DRUSEN (DEGENERATIVE) OF MACULA, UNSPECIFIED EYE: ICD-10-CM

## 2024-09-11 ENCOUNTER — OUTPATIENT (OUTPATIENT)
Dept: OUTPATIENT SERVICES | Facility: HOSPITAL | Age: 74
LOS: 1 days | End: 2024-09-11
Payer: MEDICARE

## 2024-09-11 ENCOUNTER — APPOINTMENT (OUTPATIENT)
Dept: PODIATRY | Facility: CLINIC | Age: 74
End: 2024-09-11
Payer: MEDICARE

## 2024-09-11 DIAGNOSIS — M79.674 PAIN IN RIGHT TOE(S): ICD-10-CM

## 2024-09-11 DIAGNOSIS — B35.1 TINEA UNGUIUM: ICD-10-CM

## 2024-09-11 DIAGNOSIS — L84 CORNS AND CALLOSITIES: ICD-10-CM

## 2024-09-11 DIAGNOSIS — Z98.890 OTHER SPECIFIED POSTPROCEDURAL STATES: Chronic | ICD-10-CM

## 2024-09-11 DIAGNOSIS — M79.675 PAIN IN RIGHT TOE(S): ICD-10-CM

## 2024-09-11 DIAGNOSIS — Z00.00 ENCOUNTER FOR GENERAL ADULT MEDICAL EXAMINATION WITHOUT ABNORMAL FINDINGS: ICD-10-CM

## 2024-09-11 DIAGNOSIS — Z96.651 PRESENCE OF RIGHT ARTIFICIAL KNEE JOINT: Chronic | ICD-10-CM

## 2024-09-11 PROCEDURE — 99202 OFFICE O/P NEW SF 15 MIN: CPT

## 2024-09-11 RX ORDER — AMMONIUM LACTATE 12 %
12 CREAM (GRAM) TOPICAL TWICE DAILY
Qty: 1 | Refills: 3 | Status: ACTIVE | COMMUNITY
Start: 2024-09-11 | End: 1900-01-01

## 2024-09-11 NOTE — REASON FOR VISIT
[Initial Visit] : an initial visit for [Onychomycosis] : onychomycosis [Family Member] : family member

## 2024-09-13 PROBLEM — L84 CALLUS OF FOOT: Status: ACTIVE | Noted: 2024-09-13

## 2024-09-13 PROBLEM — M79.674 PAIN IN TOES OF BOTH FEET: Status: ACTIVE | Noted: 2024-09-13

## 2024-09-13 PROBLEM — B35.1 ONYCHOMYCOSIS: Status: ACTIVE | Noted: 2024-09-13

## 2024-09-13 NOTE — PHYSICAL EXAM
[General Appearance - Alert] : alert [General Appearance - In No Acute Distress] : in no acute distress [2+] : right foot dorsalis pedis 2+ [Skin Color & Pigmentation] : normal skin color and pigmentation [Sensation] : the sensory exam was normal to light touch and pinprick [Delayed in the Right Toes] : capillary refills normal in right toes [de-identified] : Prominent fifth met base right foot [Foot Ulcer] : no foot ulcer [FreeTextEntry1] : Thickened, dystrophic toenails 1,2,3 right foot Hyperkeratotic tissue sub met head 5 and lateral fifth met base right foot

## 2024-09-13 NOTE — HISTORY OF PRESENT ILLNESS
[Sneakers] : maverick [Wheelchair] : a wheelchair [FreeTextEntry1] : Presents for calluses and onychomycosis of right foot toes 1,2,3 States that thickened toenails and calluses cause discomfort Not diabetic; no other pedal complaints

## 2024-09-13 NOTE — ASSESSMENT
[FreeTextEntry1] : -Onychomycosis -Calluses  Plan:  -Pt seen and examined. All findings discussed with pt and family member -Toenails 1,2,3 right foot debrided with sterile nail nippers to patient tolerance -Calluses debrided with dermal curette w/o incident -Ammonium lactate sent to pharmacy -RTC 6 months or PRN

## 2024-09-13 NOTE — PHYSICAL EXAM
[General Appearance - Alert] : alert [General Appearance - In No Acute Distress] : in no acute distress [2+] : right foot dorsalis pedis 2+ [Skin Color & Pigmentation] : normal skin color and pigmentation [Sensation] : the sensory exam was normal to light touch and pinprick [Delayed in the Right Toes] : capillary refills normal in right toes [de-identified] : Prominent fifth met base right foot [Foot Ulcer] : no foot ulcer [FreeTextEntry1] : Thickened, dystrophic toenails 1,2,3 right foot Hyperkeratotic tissue sub met head 5 and lateral fifth met base right foot

## 2024-09-13 NOTE — END OF VISIT
[] : Resident [FreeTextEntry3] : presents with painful nails and calluses debridement performed as above -Rx ammonium lactate for dry skin -return as needed

## 2024-09-17 DIAGNOSIS — M79.674 PAIN IN RIGHT TOE(S): ICD-10-CM

## 2024-09-17 DIAGNOSIS — L84 CORNS AND CALLOSITIES: ICD-10-CM
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
## 2024-09-17 DIAGNOSIS — B35.1 TINEA UNGUIUM: ICD-10-CM
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
## 2024-09-18 ENCOUNTER — APPOINTMENT (OUTPATIENT)
Dept: ELECTROPHYSIOLOGY | Facility: CLINIC | Age: 74
End: 2024-09-18

## 2024-09-26 ENCOUNTER — APPOINTMENT (OUTPATIENT)
Dept: BURN CARE | Facility: CLINIC | Age: 74
End: 2024-09-26
Payer: MEDICARE

## 2024-09-26 ENCOUNTER — OUTPATIENT (OUTPATIENT)
Dept: OUTPATIENT SERVICES | Facility: HOSPITAL | Age: 74
LOS: 1 days | End: 2024-09-26
Payer: MEDICARE

## 2024-09-26 VITALS
WEIGHT: 143 LBS | HEART RATE: 78 BPM | SYSTOLIC BLOOD PRESSURE: 154 MMHG | OXYGEN SATURATION: 98 % | BODY MASS INDEX: 26.16 KG/M2 | DIASTOLIC BLOOD PRESSURE: 91 MMHG

## 2024-09-26 DIAGNOSIS — Z95.5 PRESENCE OF CORONARY ANGIOPLASTY IMPLANT AND GRAFT: Chronic | ICD-10-CM

## 2024-09-26 DIAGNOSIS — S78.112A COMPLETE TRAUMATIC AMPUTATION AT LEVEL BETWEEN LEFT HIP AND KNEE, INITIAL ENCOUNTER: Chronic | ICD-10-CM

## 2024-09-26 DIAGNOSIS — Z00.8 ENCOUNTER FOR OTHER GENERAL EXAMINATION: ICD-10-CM

## 2024-09-26 DIAGNOSIS — Z96.651 PRESENCE OF RIGHT ARTIFICIAL KNEE JOINT: Chronic | ICD-10-CM

## 2024-09-26 DIAGNOSIS — Z98.890 OTHER SPECIFIED POSTPROCEDURAL STATES: Chronic | ICD-10-CM

## 2024-09-26 PROCEDURE — 99213 OFFICE O/P EST LOW 20 MIN: CPT

## 2024-09-30 DIAGNOSIS — L89.229 PRESSURE ULCER OF LEFT HIP, UNSPECIFIED STAGE: ICD-10-CM

## 2024-09-30 DIAGNOSIS — Z89.612 ACQUIRED ABSENCE OF LEFT LEG ABOVE KNEE: ICD-10-CM

## 2024-10-08 ENCOUNTER — APPOINTMENT (OUTPATIENT)
Dept: OPHTHALMOLOGY | Facility: CLINIC | Age: 74
End: 2024-10-08
Payer: MEDICARE

## 2024-10-08 ENCOUNTER — OUTPATIENT (OUTPATIENT)
Dept: OUTPATIENT SERVICES | Facility: HOSPITAL | Age: 74
LOS: 1 days | End: 2024-10-08
Payer: MEDICARE

## 2024-10-08 DIAGNOSIS — H43.813 VITREOUS DEGENERATION, BILATERAL: ICD-10-CM

## 2024-10-08 DIAGNOSIS — S78.112A COMPLETE TRAUMATIC AMPUTATION AT LEVEL BETWEEN LEFT HIP AND KNEE, INITIAL ENCOUNTER: Chronic | ICD-10-CM

## 2024-10-08 DIAGNOSIS — H53.8 OTHER VISUAL DISTURBANCES: ICD-10-CM

## 2024-10-08 DIAGNOSIS — Z96.651 PRESENCE OF RIGHT ARTIFICIAL KNEE JOINT: Chronic | ICD-10-CM

## 2024-10-08 DIAGNOSIS — Z95.5 PRESENCE OF CORONARY ANGIOPLASTY IMPLANT AND GRAFT: Chronic | ICD-10-CM

## 2024-10-08 DIAGNOSIS — H35.369 DRUSEN (DEGENERATIVE) OF MACULA, UNSPECIFIED EYE: ICD-10-CM

## 2024-10-08 DIAGNOSIS — Z98.890 OTHER SPECIFIED POSTPROCEDURAL STATES: Chronic | ICD-10-CM

## 2024-10-08 DIAGNOSIS — H26.499 OTHER SECONDARY CATARACT, UNSPECIFIED EYE: ICD-10-CM

## 2024-10-08 DIAGNOSIS — H35.373 PUCKERING OF MACULA, BILATERAL: ICD-10-CM

## 2024-10-08 DIAGNOSIS — H53.10 UNSPECIFIED SUBJECTIVE VISUAL DISTURBANCES: ICD-10-CM

## 2024-10-08 DIAGNOSIS — H30.043: ICD-10-CM

## 2024-10-08 PROCEDURE — 99213 OFFICE O/P EST LOW 20 MIN: CPT

## 2024-10-08 PROCEDURE — 99203 OFFICE O/P NEW LOW 30 MIN: CPT | Mod: 25

## 2024-10-08 PROCEDURE — 66821 AFTER CATARACT LASER SURGERY: CPT | Mod: LT

## 2024-10-09 ENCOUNTER — NON-APPOINTMENT (OUTPATIENT)
Age: 74
End: 2024-10-09

## 2024-10-10 ENCOUNTER — NON-APPOINTMENT (OUTPATIENT)
Age: 74
End: 2024-10-10

## 2024-10-17 ENCOUNTER — APPOINTMENT (OUTPATIENT)
Dept: BURN CARE | Facility: CLINIC | Age: 74
End: 2024-10-17

## 2024-10-29 ENCOUNTER — APPOINTMENT (OUTPATIENT)
Dept: OPHTHALMOLOGY | Facility: CLINIC | Age: 74
End: 2024-10-29

## 2024-10-31 ENCOUNTER — APPOINTMENT (OUTPATIENT)
Dept: CARDIOLOGY | Facility: CLINIC | Age: 74
End: 2024-10-31
Payer: MEDICARE

## 2024-10-31 VITALS
WEIGHT: 143 LBS | SYSTOLIC BLOOD PRESSURE: 122 MMHG | HEART RATE: 83 BPM | HEIGHT: 62 IN | DIASTOLIC BLOOD PRESSURE: 74 MMHG | BODY MASS INDEX: 26.31 KG/M2

## 2024-10-31 DIAGNOSIS — E78.5 HYPERLIPIDEMIA, UNSPECIFIED: ICD-10-CM

## 2024-10-31 DIAGNOSIS — I25.9 CHRONIC ISCHEMIC HEART DISEASE, UNSPECIFIED: ICD-10-CM

## 2024-10-31 DIAGNOSIS — I10 ESSENTIAL (PRIMARY) HYPERTENSION: ICD-10-CM

## 2024-10-31 DIAGNOSIS — R07.9 CHEST PAIN, UNSPECIFIED: ICD-10-CM

## 2024-10-31 PROCEDURE — 99214 OFFICE O/P EST MOD 30 MIN: CPT | Mod: 25

## 2024-10-31 PROCEDURE — 93000 ELECTROCARDIOGRAM COMPLETE: CPT

## 2024-10-31 RX ORDER — GABAPENTIN 600 MG/1
600 TABLET, COATED ORAL 3 TIMES DAILY
Refills: 0 | Status: ACTIVE | COMMUNITY

## 2024-10-31 RX ORDER — COLCHICINE 0.6 MG/1
0.6 TABLET ORAL DAILY
Refills: 0 | Status: ACTIVE | COMMUNITY

## 2024-11-11 ENCOUNTER — NON-APPOINTMENT (OUTPATIENT)
Age: 74
End: 2024-11-11

## 2024-11-26 ENCOUNTER — OUTPATIENT (OUTPATIENT)
Dept: OUTPATIENT SERVICES | Facility: HOSPITAL | Age: 74
LOS: 1 days | End: 2024-11-26
Payer: MEDICARE

## 2024-11-26 ENCOUNTER — APPOINTMENT (OUTPATIENT)
Dept: OPHTHALMOLOGY | Facility: CLINIC | Age: 74
End: 2024-11-26
Payer: MEDICARE

## 2024-11-26 DIAGNOSIS — Z95.5 PRESENCE OF CORONARY ANGIOPLASTY IMPLANT AND GRAFT: Chronic | ICD-10-CM

## 2024-11-26 DIAGNOSIS — H26.499 OTHER SECONDARY CATARACT, UNSPECIFIED EYE: ICD-10-CM

## 2024-11-26 DIAGNOSIS — Z96.651 PRESENCE OF RIGHT ARTIFICIAL KNEE JOINT: Chronic | ICD-10-CM

## 2024-11-26 DIAGNOSIS — S78.112A COMPLETE TRAUMATIC AMPUTATION AT LEVEL BETWEEN LEFT HIP AND KNEE, INITIAL ENCOUNTER: Chronic | ICD-10-CM

## 2024-11-26 DIAGNOSIS — H30.043: ICD-10-CM

## 2024-11-26 DIAGNOSIS — Z98.890 OTHER SPECIFIED POSTPROCEDURAL STATES: Chronic | ICD-10-CM

## 2024-11-26 DIAGNOSIS — H53.10 UNSPECIFIED SUBJECTIVE VISUAL DISTURBANCES: ICD-10-CM

## 2024-11-26 DIAGNOSIS — H35.373 PUCKERING OF MACULA, BILATERAL: ICD-10-CM

## 2024-11-26 DIAGNOSIS — H35.369 DRUSEN (DEGENERATIVE) OF MACULA, UNSPECIFIED EYE: ICD-10-CM

## 2024-11-26 DIAGNOSIS — H43.813 VITREOUS DEGENERATION, BILATERAL: ICD-10-CM

## 2024-11-26 DIAGNOSIS — H53.8 OTHER VISUAL DISTURBANCES: ICD-10-CM

## 2024-11-26 PROCEDURE — 66821 AFTER CATARACT LASER SURGERY: CPT | Mod: 78,RT

## 2024-11-26 PROCEDURE — 66821 AFTER CATARACT LASER SURGERY: CPT | Mod: 50

## 2024-11-26 PROCEDURE — 99214 OFFICE O/P EST MOD 30 MIN: CPT | Mod: 95

## 2024-11-26 PROCEDURE — 92134 CPTRZ OPH DX IMG PST SGM RTA: CPT

## 2024-11-26 PROCEDURE — 92134 CPTRZ OPH DX IMG PST SGM RTA: CPT | Mod: 26

## 2024-11-26 PROCEDURE — 99214 OFFICE O/P EST MOD 30 MIN: CPT | Mod: 24,25
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
## 2024-12-12 ENCOUNTER — OUTPATIENT (OUTPATIENT)
Dept: OUTPATIENT SERVICES | Facility: HOSPITAL | Age: 74
LOS: 1 days | End: 2024-12-12
Payer: MEDICARE

## 2024-12-12 ENCOUNTER — APPOINTMENT (OUTPATIENT)
Dept: BURN CARE | Facility: CLINIC | Age: 74
End: 2024-12-12
Payer: MEDICARE

## 2024-12-12 VITALS
WEIGHT: 143 LBS | DIASTOLIC BLOOD PRESSURE: 76 MMHG | OXYGEN SATURATION: 98 % | SYSTOLIC BLOOD PRESSURE: 139 MMHG | HEART RATE: 71 BPM | BODY MASS INDEX: 26.16 KG/M2

## 2024-12-12 DIAGNOSIS — Z95.5 PRESENCE OF CORONARY ANGIOPLASTY IMPLANT AND GRAFT: Chronic | ICD-10-CM

## 2024-12-12 DIAGNOSIS — Z98.890 OTHER SPECIFIED POSTPROCEDURAL STATES: Chronic | ICD-10-CM

## 2024-12-12 DIAGNOSIS — Z00.8 ENCOUNTER FOR OTHER GENERAL EXAMINATION: ICD-10-CM

## 2024-12-12 DIAGNOSIS — S78.112A COMPLETE TRAUMATIC AMPUTATION AT LEVEL BETWEEN LEFT HIP AND KNEE, INITIAL ENCOUNTER: Chronic | ICD-10-CM

## 2024-12-12 DIAGNOSIS — Z96.651 PRESENCE OF RIGHT ARTIFICIAL KNEE JOINT: Chronic | ICD-10-CM

## 2024-12-12 PROCEDURE — 99213 OFFICE O/P EST LOW 20 MIN: CPT

## 2024-12-18 DIAGNOSIS — Z98.890 OTHER SPECIFIED POSTPROCEDURAL STATES: ICD-10-CM

## 2024-12-18 DIAGNOSIS — Z87.2 PERSONAL HISTORY OF DISEASES OF THE SKIN AND SUBCUTANEOUS TISSUE: ICD-10-CM

## 2025-03-11 ENCOUNTER — OUTPATIENT (OUTPATIENT)
Dept: OUTPATIENT SERVICES | Facility: HOSPITAL | Age: 75
LOS: 1 days | End: 2025-03-11
Payer: MEDICARE

## 2025-03-11 ENCOUNTER — APPOINTMENT (OUTPATIENT)
Dept: OPHTHALMOLOGY | Facility: CLINIC | Age: 75
End: 2025-03-11
Payer: MEDICARE

## 2025-03-11 DIAGNOSIS — H30.043: ICD-10-CM

## 2025-03-11 DIAGNOSIS — H35.373 PUCKERING OF MACULA, BILATERAL: ICD-10-CM

## 2025-03-11 DIAGNOSIS — Z96.651 PRESENCE OF RIGHT ARTIFICIAL KNEE JOINT: Chronic | ICD-10-CM

## 2025-03-11 DIAGNOSIS — H53.8 OTHER VISUAL DISTURBANCES: ICD-10-CM

## 2025-03-11 DIAGNOSIS — S78.112A COMPLETE TRAUMATIC AMPUTATION AT LEVEL BETWEEN LEFT HIP AND KNEE, INITIAL ENCOUNTER: Chronic | ICD-10-CM

## 2025-03-11 DIAGNOSIS — H35.369 DRUSEN (DEGENERATIVE) OF MACULA, UNSPECIFIED EYE: ICD-10-CM

## 2025-03-11 DIAGNOSIS — Z98.890 OTHER SPECIFIED POSTPROCEDURAL STATES: Chronic | ICD-10-CM

## 2025-03-11 DIAGNOSIS — Z95.5 PRESENCE OF CORONARY ANGIOPLASTY IMPLANT AND GRAFT: Chronic | ICD-10-CM

## 2025-03-11 DIAGNOSIS — H43.813 VITREOUS DEGENERATION, BILATERAL: ICD-10-CM

## 2025-03-11 DIAGNOSIS — H26.499 OTHER SECONDARY CATARACT, UNSPECIFIED EYE: ICD-10-CM

## 2025-03-11 DIAGNOSIS — H53.10 UNSPECIFIED SUBJECTIVE VISUAL DISTURBANCES: ICD-10-CM

## 2025-03-11 PROCEDURE — 99214 OFFICE O/P EST MOD 30 MIN: CPT | Mod: 25

## 2025-03-11 PROCEDURE — 99381 INIT PM E/M NEW PAT INFANT: CPT

## 2025-03-11 PROCEDURE — 92134 CPTRZ OPH DX IMG PST SGM RTA: CPT

## 2025-03-11 PROCEDURE — 99214 OFFICE O/P EST MOD 30 MIN: CPT

## 2025-03-11 PROCEDURE — 92134 CPTRZ OPH DX IMG PST SGM RTA: CPT | Mod: 26

## 2025-03-12 ENCOUNTER — APPOINTMENT (OUTPATIENT)
Dept: PODIATRY | Facility: CLINIC | Age: 75
End: 2025-03-12

## 2025-03-15 ENCOUNTER — OUTPATIENT (OUTPATIENT)
Dept: OUTPATIENT SERVICES | Facility: HOSPITAL | Age: 75
LOS: 1 days | End: 2025-03-15
Payer: MEDICARE

## 2025-03-15 DIAGNOSIS — Z96.651 PRESENCE OF RIGHT ARTIFICIAL KNEE JOINT: Chronic | ICD-10-CM

## 2025-03-15 DIAGNOSIS — Z98.890 OTHER SPECIFIED POSTPROCEDURAL STATES: Chronic | ICD-10-CM

## 2025-03-15 DIAGNOSIS — M19.90 UNSPECIFIED OSTEOARTHRITIS, UNSPECIFIED SITE: ICD-10-CM

## 2025-03-15 DIAGNOSIS — Z95.5 PRESENCE OF CORONARY ANGIOPLASTY IMPLANT AND GRAFT: Chronic | ICD-10-CM

## 2025-03-15 DIAGNOSIS — S78.112A COMPLETE TRAUMATIC AMPUTATION AT LEVEL BETWEEN LEFT HIP AND KNEE, INITIAL ENCOUNTER: Chronic | ICD-10-CM

## 2025-03-15 PROCEDURE — 73030 X-RAY EXAM OF SHOULDER: CPT | Mod: 50

## 2025-03-15 PROCEDURE — 73130 X-RAY EXAM OF HAND: CPT | Mod: 50

## 2025-03-15 PROCEDURE — 73130 X-RAY EXAM OF HAND: CPT | Mod: 26,LT,RT

## 2025-03-15 PROCEDURE — 73030 X-RAY EXAM OF SHOULDER: CPT | Mod: 26,LT,RT

## 2025-03-16 DIAGNOSIS — M19.90 UNSPECIFIED OSTEOARTHRITIS, UNSPECIFIED SITE: ICD-10-CM

## 2025-04-28 ENCOUNTER — OUTPATIENT (OUTPATIENT)
Dept: OUTPATIENT SERVICES | Facility: HOSPITAL | Age: 75
LOS: 1 days | End: 2025-04-28
Payer: MEDICARE

## 2025-04-28 ENCOUNTER — APPOINTMENT (OUTPATIENT)
Dept: PODIATRY | Facility: CLINIC | Age: 75
End: 2025-04-28
Payer: MEDICARE

## 2025-04-28 ENCOUNTER — RESULT REVIEW (OUTPATIENT)
Age: 75
End: 2025-04-28

## 2025-04-28 DIAGNOSIS — M25.571 PAIN IN RIGHT ANKLE AND JOINTS OF RIGHT FOOT: ICD-10-CM

## 2025-04-28 DIAGNOSIS — S78.112A COMPLETE TRAUMATIC AMPUTATION AT LEVEL BETWEEN LEFT HIP AND KNEE, INITIAL ENCOUNTER: Chronic | ICD-10-CM

## 2025-04-28 DIAGNOSIS — Z95.5 PRESENCE OF CORONARY ANGIOPLASTY IMPLANT AND GRAFT: Chronic | ICD-10-CM

## 2025-04-28 DIAGNOSIS — L84 CORNS AND CALLOSITIES: ICD-10-CM

## 2025-04-28 DIAGNOSIS — Z96.7 PRESENCE OF OTHER BONE AND TENDON IMPLANTS: ICD-10-CM

## 2025-04-28 DIAGNOSIS — Z96.651 PRESENCE OF RIGHT ARTIFICIAL KNEE JOINT: Chronic | ICD-10-CM

## 2025-04-28 DIAGNOSIS — Z00.00 ENCOUNTER FOR GENERAL ADULT MEDICAL EXAMINATION WITHOUT ABNORMAL FINDINGS: ICD-10-CM

## 2025-04-28 DIAGNOSIS — Z98.890 OTHER SPECIFIED POSTPROCEDURAL STATES: Chronic | ICD-10-CM

## 2025-04-28 PROCEDURE — 73610 X-RAY EXAM OF ANKLE: CPT | Mod: RT

## 2025-04-28 PROCEDURE — 99213 OFFICE O/P EST LOW 20 MIN: CPT

## 2025-04-28 PROCEDURE — 73610 X-RAY EXAM OF ANKLE: CPT | Mod: 26,RT

## 2025-04-28 RX ORDER — UREA 40 G/100G
40 CREAM TOPICAL DAILY
Qty: 1 | Refills: 3 | Status: ACTIVE | COMMUNITY
Start: 2025-04-28 | End: 1900-01-01

## 2025-04-29 ENCOUNTER — APPOINTMENT (OUTPATIENT)
Dept: CARDIOLOGY | Facility: CLINIC | Age: 75
End: 2025-04-29

## 2025-04-29 DIAGNOSIS — M25.571 PAIN IN RIGHT ANKLE AND JOINTS OF RIGHT FOOT: ICD-10-CM
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
## 2025-05-06 DIAGNOSIS — Z96.7 PRESENCE OF OTHER BONE AND TENDON IMPLANTS: ICD-10-CM

## 2025-05-06 DIAGNOSIS — L84 CORNS AND CALLOSITIES: ICD-10-CM
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
## 2025-07-03 ENCOUNTER — EMERGENCY (EMERGENCY)
Facility: HOSPITAL | Age: 75
LOS: 0 days | Discharge: HOME | End: 2025-07-03
Attending: EMERGENCY MEDICINE

## 2025-07-03 ENCOUNTER — APPOINTMENT (OUTPATIENT)
Dept: CARDIOLOGY | Facility: CLINIC | Age: 75
End: 2025-07-03

## 2025-07-03 VITALS
RESPIRATION RATE: 20 BRPM | HEIGHT: 62 IN | WEIGHT: 141.98 LBS | DIASTOLIC BLOOD PRESSURE: 97 MMHG | SYSTOLIC BLOOD PRESSURE: 188 MMHG | TEMPERATURE: 98 F | HEART RATE: 86 BPM | OXYGEN SATURATION: 96 %

## 2025-07-03 DIAGNOSIS — Z95.5 PRESENCE OF CORONARY ANGIOPLASTY IMPLANT AND GRAFT: Chronic | ICD-10-CM

## 2025-07-03 DIAGNOSIS — Z96.651 PRESENCE OF RIGHT ARTIFICIAL KNEE JOINT: Chronic | ICD-10-CM

## 2025-07-03 DIAGNOSIS — S78.112A COMPLETE TRAUMATIC AMPUTATION AT LEVEL BETWEEN LEFT HIP AND KNEE, INITIAL ENCOUNTER: Chronic | ICD-10-CM

## 2025-07-03 DIAGNOSIS — Z98.890 OTHER SPECIFIED POSTPROCEDURAL STATES: Chronic | ICD-10-CM

## 2025-07-03 PROCEDURE — 99283 EMERGENCY DEPT VISIT LOW MDM: CPT

## 2025-07-03 RX ORDER — DEXAMETHASONE 0.5 MG/1
10 TABLET ORAL ONCE
Refills: 0 | Status: COMPLETED | OUTPATIENT
Start: 2025-07-03 | End: 2025-07-03

## 2025-07-03 RX ORDER — PREDNISONE 20 MG/1
1 TABLET ORAL
Qty: 5 | Refills: 0
Start: 2025-07-03 | End: 2025-07-07

## 2025-07-03 RX ORDER — DIPHENHYDRAMINE HCL 12.5MG/5ML
1 ELIXIR ORAL
Qty: 28 | Refills: 0
Start: 2025-07-03 | End: 2025-07-09

## 2025-07-03 RX ORDER — DIPHENHYDRAMINE HCL 12.5MG/5ML
50 ELIXIR ORAL ONCE
Refills: 0 | Status: COMPLETED | OUTPATIENT
Start: 2025-07-03 | End: 2025-07-03

## 2025-07-03 RX ADMIN — DEXAMETHASONE 10 MILLIGRAM(S): 0.5 TABLET ORAL at 10:22

## 2025-07-03 RX ADMIN — Medication 50 MILLIGRAM(S): at 10:22

## 2025-07-03 RX ADMIN — Medication 20 MILLIGRAM(S): at 10:22

## 2025-07-03 NOTE — ED PROVIDER NOTE - CLINICAL SUMMARY MEDICAL DECISION MAKING FREE TEXT BOX
VSS. No distress.  Patient with diffused urticarial rash to trunk.  No known triggers.  No dyspnea, dysphagia or dysphonia.  Rash confined to trunk.  No sloughing of skin.  No oral mucosal involvement.  Feels better after meds.  DC with Rx.  Placed in ED referral program for allergy/immunology referral.

## 2025-07-03 NOTE — ED PROVIDER NOTE - PHYSICAL EXAMINATION
VITAL SIGNS: I have reviewed nursing notes and confirm.  CONSTITUTIONAL: Patient is in no acute distress.  SKIN: +Hives throughout body.  HEAD: Normocephalic; atraumatic.  EYES: PERRL, EOM intact; conjunctiva and sclera clear.  ENT: No nasal discharge; airway clear.   NECK: Supple; non tender.  CARD: S1, S2 normal; no murmurs, gallops, or rubs. Regular rate and rhythm.  RESP: Clear to auscultation bilaterally. No wheezes, rales or rhonchi.  ABD: Normal bowel sounds; soft; non-distended; non-tender.   EXT: Normal ROM. No edema.  LYMPH: No acute cervical adenopathy.  NEURO: Alert, oriented. Grossly unremarkable. No focal deficits.  PSYCH: Cooperative, appropriate.

## 2025-07-03 NOTE — ED PROVIDER NOTE - CARE PROVIDER_API CALL
Nelli Wells  Allergy & Immunology  [address] [place] [id number]  Phone: [phone]  Fax: [phone]  Follow Up Time: 1-3 Days

## 2025-07-03 NOTE — ED PROVIDER NOTE - OBJECTIVE STATEMENT
75-year-old female with past medical history of hypertension, hyperlipidemia, CAD presenting for hives since last night.  States that hives are itchy.  Denies any exposures to any new agents.  No lip swelling, difficulty breathing, nausea, vomiting, abdominal pain, or chest pain.

## 2025-07-03 NOTE — ED PROVIDER NOTE - PATIENT PORTAL LINK FT
You can access the FollowMyHealth Patient Portal offered by [facility] by registering at the following website: [email or web address]. By joining [employer] [facility]’s FollowMyHealth portal, you will also be able to view your health information using other applications (apps) compatible with our system.

## 2025-07-07 ENCOUNTER — EMERGENCY (EMERGENCY)
Facility: HOSPITAL | Age: 75
LOS: 0 days | Discharge: HOME | End: 2025-07-07
Attending: STUDENT IN AN ORGANIZED HEALTH CARE EDUCATION/TRAINING PROGRAM
Payer: MEDICARE

## 2025-07-07 VITALS
DIASTOLIC BLOOD PRESSURE: 121 MMHG | HEIGHT: 62 IN | TEMPERATURE: 99 F | HEART RATE: 60 BPM | OXYGEN SATURATION: 96 % | RESPIRATION RATE: 18 BRPM | SYSTOLIC BLOOD PRESSURE: 170 MMHG | WEIGHT: 128.09 LBS

## 2025-07-07 DIAGNOSIS — Z95.5 PRESENCE OF CORONARY ANGIOPLASTY IMPLANT AND GRAFT: Chronic | ICD-10-CM

## 2025-07-07 DIAGNOSIS — Z98.890 OTHER SPECIFIED POSTPROCEDURAL STATES: Chronic | ICD-10-CM

## 2025-07-07 DIAGNOSIS — S78.112A COMPLETE TRAUMATIC AMPUTATION AT LEVEL BETWEEN LEFT HIP AND KNEE, INITIAL ENCOUNTER: Chronic | ICD-10-CM

## 2025-07-07 DIAGNOSIS — R21 RASH AND OTHER NONSPECIFIC SKIN ERUPTION: ICD-10-CM

## 2025-07-07 DIAGNOSIS — Z96.651 PRESENCE OF RIGHT ARTIFICIAL KNEE JOINT: Chronic | ICD-10-CM

## 2025-07-07 DIAGNOSIS — I25.10 ATHEROSCLEROTIC HEART DISEASE OF NATIVE CORONARY ARTERY WITHOUT ANGINA PECTORIS: ICD-10-CM

## 2025-07-07 DIAGNOSIS — Z95.5 PRESENCE OF CORONARY ANGIOPLASTY IMPLANT AND GRAFT: ICD-10-CM

## 2025-07-07 DIAGNOSIS — Z96.652 PRESENCE OF LEFT ARTIFICIAL KNEE JOINT: ICD-10-CM

## 2025-07-07 DIAGNOSIS — Z89.612 ACQUIRED ABSENCE OF LEFT LEG ABOVE KNEE: ICD-10-CM

## 2025-07-07 DIAGNOSIS — I10 ESSENTIAL (PRIMARY) HYPERTENSION: ICD-10-CM

## 2025-07-07 DIAGNOSIS — E78.5 HYPERLIPIDEMIA, UNSPECIFIED: ICD-10-CM

## 2025-07-07 PROCEDURE — 99284 EMERGENCY DEPT VISIT MOD MDM: CPT

## 2025-07-07 PROCEDURE — 99283 EMERGENCY DEPT VISIT LOW MDM: CPT

## 2025-07-07 RX ORDER — HYDROCORTISONE 10 MG/G
1 CREAM TOPICAL
Qty: 1 | Refills: 0
Start: 2025-07-07 | End: 2025-07-13

## 2025-07-07 RX ORDER — DEXAMETHASONE 0.5 MG/1
10 TABLET ORAL ONCE
Refills: 0 | Status: COMPLETED | OUTPATIENT
Start: 2025-07-07 | End: 2025-07-07

## 2025-07-07 RX ADMIN — DEXAMETHASONE 10 MILLIGRAM(S): 0.5 TABLET ORAL at 12:40

## 2025-07-07 NOTE — ED PROVIDER NOTE - OBJECTIVE STATEMENT
75 year old female, pmhx of  PMH of HTN, HLD, CAD s/p stents and PSH of ILR (04/2023), L. AKA, Knee replacement, Inguinal hernia repair comes in for rash to the abd and leg. Patient was seen in the ED a few days ago with same symptoms but it also involved face and chest. Face and chest rash has improved, but abd rash has not gone away despite 5 days of prednisone and pepcid. Denies fevers, chill, n/v, difficulty breathing/sob, abd pain, chest pain.

## 2025-07-07 NOTE — ED PROVIDER NOTE - CLINICAL SUMMARY MEDICAL DECISION MAKING FREE TEXT BOX
75-year-old female that was recently seen in the ED for diffuse rash spanning her face, trunk, abdomen, and extremities that was given Benadryl and dexamethasone with significant improvement of all those rashes aside from her abdomen which has remained itchy but improving slightly.  Remaining rash is located on right forearm, left/anterior abdomen, left lower extremity stump and left back.  Nontender, appears to be hives that are improving in nature.  Blanching, negative Nikolsky sign.  No oral lesion.  Patient to be given medicines and to follow-up outpatient with allergist and requesting PCP for general medical management.

## 2025-07-07 NOTE — ED PROVIDER NOTE - PATIENT PORTAL LINK FT
You can access the FollowMyHealth Patient Portal offered by [facility] by registering at the following website: [email or web address]. By joining [facility]’s FollowMyHealth portal, you will also be able to view your health information using other applications (apps) compatible with our system.

## 2025-07-07 NOTE — ED PROVIDER NOTE - PHYSICAL EXAMINATION
GENERAL: Well-developed; well-nourished; in no acute distress. AO  SKIN: warm, well perfused. purple bruise like rash around the mid abd exactly below the area of where patient waist band is, no flaking of skin, no ttp  HEAD: Normocephalic; atraumatic.  EYES: no conjunctival erythema, ocular motions intact and appropriate  ENT: airway clear.  CARD: Regular rate and rhythm.   RESP: LCTAB; No wheezes or crackles  ABD: soft and nondistended. nontender  Upper EXT: moving b/l UEs. 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

## 2025-07-07 NOTE — ED PROVIDER NOTE - NSFOLLOWUPINSTRUCTIONS_ED_ALL_ED_FT
Referral for Allergist  Our Emergency Department Referral Coordinators will be reaching out to you in the next 24-48 hours from 9:00am to 5:00pm (Monday to Friday) with a follow up appointment. Please expect a phone call from the hospital in that time frame. If you do not receive a call or if you have any questions or concerns, you can reach them at [phone]  ------------------------------------------------------------------------------------------------------------------------  Please follow up with your primary care physician and any medical specialist(s) if they were recommended. If you've been prescribed medications, please take your medications as prescribed. Return to the emergency department if your symptoms do not resolve and/or worsen.  ------------------------------------------------------------------------------------------------------------------------  Rash    A rash is a change in the color of the skin. A rash can also change the way your skin feels. There are many different conditions and factors that can cause a rash, most of which are not dangerous. Make sure to follow up with your primary care physician or a dermatologist as instructed by your health care provider.    SEEK IMMEDIATE MEDICAL CARE IF YOU HAVE ANY OF THE FOLLOWING SYMPTOMS: fever, blisters, a rash inside your mouth, vaginal or anal pain, or altered mental status. Referral for Allergist and Primary Care  Our Emergency Department Referral Coordinators will be reaching out to you in the next 24-48 hours from 9:00am to 5:00pm (Monday to Friday) with a follow up appointment. Please expect a phone call from the hospital in that time frame. If you do not receive a call or if you have any questions or concerns, you can reach them at [phone]  ------------------------------------------------------------------------------------------------------------------------  Please follow up with your primary care physician and any medical specialist(s) if they were recommended. If you've been prescribed medications, please take your medications as prescribed. Return to the emergency department if your symptoms do not resolve and/or worsen.  ------------------------------------------------------------------------------------------------------------------------  Rash    A rash is a change in the color of the skin. A rash can also change the way your skin feels. There are many different conditions and factors that can cause a rash, most of which are not dangerous. Make sure to follow up with your primary care physician or a dermatologist as instructed by your health care provider.    SEEK IMMEDIATE MEDICAL CARE IF YOU HAVE ANY OF THE FOLLOWING SYMPTOMS: fever, blisters, a rash inside your mouth, vaginal or anal pain, or altered mental status.

## 2025-07-09 NOTE — CHART NOTE - NSCHARTNOTEFT_GEN_A_CORE
left vm 7/8 - DK / left vm 7/9 - DK (Allergy Referral) left rocky 7/8 - ANALISA / left  7/9 - ANALISA pt called in, emailed treasure 7/11 - AR / appt scheduled on 7/31 AT 4:30PM WITH Dr. MEDEIROS 7/11 - ANALISA (Allergy Referral)

## 2025-07-11 ENCOUNTER — APPOINTMENT (OUTPATIENT)
Dept: CARDIOLOGY | Facility: CLINIC | Age: 75
End: 2025-07-11
Payer: MEDICARE

## 2025-07-11 VITALS
HEART RATE: 67 BPM | SYSTOLIC BLOOD PRESSURE: 140 MMHG | BODY MASS INDEX: 25.4 KG/M2 | HEIGHT: 62 IN | DIASTOLIC BLOOD PRESSURE: 100 MMHG | WEIGHT: 138 LBS

## 2025-07-11 PROBLEM — R06.02 SHORTNESS OF BREATH ON EXERTION: Status: ACTIVE | Noted: 2025-07-11

## 2025-07-11 PROCEDURE — 93000 ELECTROCARDIOGRAM COMPLETE: CPT

## 2025-07-11 PROCEDURE — 99214 OFFICE O/P EST MOD 30 MIN: CPT | Mod: 25

## 2025-07-11 RX ORDER — AMLODIPINE BESYLATE 5 MG/1
5 TABLET ORAL DAILY
Qty: 90 | Refills: 1 | Status: ACTIVE | COMMUNITY
Start: 2025-07-11 | End: 1900-01-01

## 2025-07-15 PROBLEM — E87.6 HYPOKALEMIA: Status: ACTIVE | Noted: 2025-07-15

## 2025-07-15 LAB
ALBUMIN SERPL ELPH-MCNC: 4.5 G/DL
ALP BLD-CCNC: 117 U/L
ALT SERPL-CCNC: 36 U/L
ANION GAP SERPL CALC-SCNC: 17 MMOL/L
AST SERPL-CCNC: 19 U/L
BILIRUB SERPL-MCNC: 0.8 MG/DL
BUN SERPL-MCNC: 23 MG/DL
CALCIUM SERPL-MCNC: 9.7 MG/DL
CHLORIDE SERPL-SCNC: 103 MMOL/L
CHOLEST SERPL-MCNC: 223 MG/DL
CO2 SERPL-SCNC: 20 MMOL/L
CREAT SERPL-MCNC: 0.9 MG/DL
EGFRCR SERPLBLD CKD-EPI 2021: 66 ML/MIN/1.73M2
ESTIMATED AVERAGE GLUCOSE: 120 MG/DL
GLUCOSE SERPL-MCNC: 103 MG/DL
HBA1C MFR BLD HPLC: 5.8 %
HCT VFR BLD CALC: 46.8 %
HDLC SERPL-MCNC: 107 MG/DL
HGB BLD-MCNC: 16 G/DL
LDLC SERPL-MCNC: 106 MG/DL
MCHC RBC-ENTMCNC: 31.2 PG
MCHC RBC-ENTMCNC: 34.2 G/DL
MCV RBC AUTO: 91.2 FL
NONHDLC SERPL-MCNC: 116 MG/DL
PLATELET # BLD AUTO: 193 K/UL
PMV BLD AUTO: 0 /100 WBCS
PMV BLD: 11.1 FL
POTASSIUM SERPL-SCNC: 3.3 MMOL/L
PROT SERPL-MCNC: 7.3 G/DL
RBC # BLD: 5.13 M/UL
RBC # FLD: 14.2 %
SODIUM SERPL-SCNC: 140 MMOL/L
TRIGL SERPL-MCNC: 58 MG/DL
TSH SERPL-ACNC: 5.81 UIU/ML
WBC # FLD AUTO: 9.3 K/UL

## 2025-07-15 RX ORDER — POTASSIUM CHLORIDE 1500 MG/1
20 TABLET, EXTENDED RELEASE ORAL DAILY
Qty: 30 | Refills: 1 | Status: ACTIVE | COMMUNITY
Start: 2025-07-15 | End: 1900-01-01

## 2025-07-17 ENCOUNTER — APPOINTMENT (OUTPATIENT)
Dept: CARDIOLOGY | Facility: CLINIC | Age: 75
End: 2025-07-17

## 2025-07-20 NOTE — PATIENT PROFILE ADULT - NSPROEXTENSIONSOFSELF_GEN_A_NUR
Patient admitted from ED at 1900 hours. Came in with the complaint of generalized weakness dizziness that is worsened by coughing and standing up. On assessment patient is alert and oriented x 4. Skin intact and ambulatory without any gait issues.  Fall precaution initiated and the purpose of bed alarm explained to the patient who verbalized understanding. Patient reviewed by Dr Kahn the ID Md. Who placed the patient on azithromycin and ceftriaxone antibiotics. Due medications given and patient resting in the bed at this time.   none prosthesis

## 2025-07-25 ENCOUNTER — OUTPATIENT (OUTPATIENT)
Dept: OUTPATIENT SERVICES | Facility: HOSPITAL | Age: 75
LOS: 1 days | End: 2025-07-25
Payer: MEDICARE

## 2025-07-25 DIAGNOSIS — M54.12 RADICULOPATHY, CERVICAL REGION: ICD-10-CM

## 2025-07-25 DIAGNOSIS — Z98.890 OTHER SPECIFIED POSTPROCEDURAL STATES: Chronic | ICD-10-CM

## 2025-07-25 DIAGNOSIS — M54.2 CERVICALGIA: ICD-10-CM

## 2025-07-25 DIAGNOSIS — Z96.651 PRESENCE OF RIGHT ARTIFICIAL KNEE JOINT: Chronic | ICD-10-CM

## 2025-07-25 DIAGNOSIS — S78.112A COMPLETE TRAUMATIC AMPUTATION AT LEVEL BETWEEN LEFT HIP AND KNEE, INITIAL ENCOUNTER: Chronic | ICD-10-CM

## 2025-07-25 DIAGNOSIS — Z00.8 ENCOUNTER FOR OTHER GENERAL EXAMINATION: ICD-10-CM

## 2025-07-25 DIAGNOSIS — Z95.5 PRESENCE OF CORONARY ANGIOPLASTY IMPLANT AND GRAFT: Chronic | ICD-10-CM

## 2025-07-25 PROCEDURE — 72141 MRI NECK SPINE W/O DYE: CPT

## 2025-07-25 PROCEDURE — 72141 MRI NECK SPINE W/O DYE: CPT | Mod: 26

## 2025-07-26 DIAGNOSIS — M54.2 CERVICALGIA: ICD-10-CM

## 2025-07-26 DIAGNOSIS — M54.12 RADICULOPATHY, CERVICAL REGION: ICD-10-CM

## 2025-07-30 NOTE — PHYSICAL THERAPY INITIAL EVALUATION ADULT - WORK/LEISURE ACTIVITY, REHAB EVAL
[FreeTextEntry1] : Electromyography and nerve conduction study of the left lower extremity showed no evidence of an underlying lumbosacral radiculopathy, mildly reduced sensory latencies of the sural and superficial peroneal but otherwise normal nerve conduction study.  
independent

## 2025-08-13 ENCOUNTER — EMERGENCY (EMERGENCY)
Facility: HOSPITAL | Age: 75
LOS: 0 days | Discharge: HOME | End: 2025-08-13
Attending: STUDENT IN AN ORGANIZED HEALTH CARE EDUCATION/TRAINING PROGRAM
Payer: MEDICARE

## 2025-08-13 VITALS
HEART RATE: 69 BPM | HEIGHT: 62 IN | RESPIRATION RATE: 17 BRPM | SYSTOLIC BLOOD PRESSURE: 183 MMHG | OXYGEN SATURATION: 97 % | WEIGHT: 132.06 LBS | DIASTOLIC BLOOD PRESSURE: 91 MMHG | TEMPERATURE: 98 F

## 2025-08-13 DIAGNOSIS — Z95.5 PRESENCE OF CORONARY ANGIOPLASTY IMPLANT AND GRAFT: Chronic | ICD-10-CM

## 2025-08-13 DIAGNOSIS — F32.A DEPRESSION, UNSPECIFIED: ICD-10-CM

## 2025-08-13 DIAGNOSIS — S78.112A COMPLETE TRAUMATIC AMPUTATION AT LEVEL BETWEEN LEFT HIP AND KNEE, INITIAL ENCOUNTER: Chronic | ICD-10-CM

## 2025-08-13 DIAGNOSIS — Z96.651 PRESENCE OF RIGHT ARTIFICIAL KNEE JOINT: Chronic | ICD-10-CM

## 2025-08-13 DIAGNOSIS — Z89.612 ACQUIRED ABSENCE OF LEFT LEG ABOVE KNEE: ICD-10-CM

## 2025-08-13 DIAGNOSIS — F41.9 ANXIETY DISORDER, UNSPECIFIED: ICD-10-CM

## 2025-08-13 DIAGNOSIS — I10 ESSENTIAL (PRIMARY) HYPERTENSION: ICD-10-CM

## 2025-08-13 DIAGNOSIS — I25.10 ATHEROSCLEROTIC HEART DISEASE OF NATIVE CORONARY ARTERY WITHOUT ANGINA PECTORIS: ICD-10-CM

## 2025-08-13 DIAGNOSIS — K64.4 RESIDUAL HEMORRHOIDAL SKIN TAGS: ICD-10-CM

## 2025-08-13 DIAGNOSIS — Z95.5 PRESENCE OF CORONARY ANGIOPLASTY IMPLANT AND GRAFT: ICD-10-CM

## 2025-08-13 DIAGNOSIS — Z87.19 PERSONAL HISTORY OF OTHER DISEASES OF THE DIGESTIVE SYSTEM: ICD-10-CM

## 2025-08-13 DIAGNOSIS — E78.5 HYPERLIPIDEMIA, UNSPECIFIED: ICD-10-CM

## 2025-08-13 DIAGNOSIS — Z98.890 OTHER SPECIFIED POSTPROCEDURAL STATES: Chronic | ICD-10-CM

## 2025-08-13 DIAGNOSIS — E03.9 HYPOTHYROIDISM, UNSPECIFIED: ICD-10-CM

## 2025-08-13 PROCEDURE — 99283 EMERGENCY DEPT VISIT LOW MDM: CPT

## 2025-08-13 RX ORDER — HYDROCORTISONE 10 MG/G
1 CREAM TOPICAL
Qty: 1 | Refills: 0
Start: 2025-08-13 | End: 2025-09-11

## 2025-08-18 ENCOUNTER — APPOINTMENT (OUTPATIENT)
Dept: SURGERY | Facility: CLINIC | Age: 75
End: 2025-08-18

## 2025-09-08 ENCOUNTER — APPOINTMENT (OUTPATIENT)
Dept: OPHTHALMOLOGY | Facility: CLINIC | Age: 75
End: 2025-09-08

## 2025-09-08 PROCEDURE — 99213 OFFICE O/P EST LOW 20 MIN: CPT
